# Patient Record
Sex: MALE | Race: WHITE | Employment: OTHER | URBAN - METROPOLITAN AREA
[De-identification: names, ages, dates, MRNs, and addresses within clinical notes are randomized per-mention and may not be internally consistent; named-entity substitution may affect disease eponyms.]

---

## 2018-03-28 ENCOUNTER — ANTI-COAG VISIT (OUTPATIENT)
Dept: PRIMARY CARE CLINIC | Age: 63
End: 2018-03-28

## 2018-03-28 DIAGNOSIS — I26.99 OTHER ACUTE PULMONARY EMBOLISM WITHOUT ACUTE COR PULMONALE (HCC): Primary | ICD-10-CM

## 2018-03-29 ENCOUNTER — OFFICE VISIT (OUTPATIENT)
Dept: PRIMARY CARE CLINIC | Age: 63
End: 2018-03-29
Payer: MEDICARE

## 2018-03-29 ENCOUNTER — ANTI-COAG VISIT (OUTPATIENT)
Dept: PRIMARY CARE CLINIC | Age: 63
End: 2018-03-29
Payer: MEDICARE

## 2018-03-29 VITALS
OXYGEN SATURATION: 96 % | HEART RATE: 74 BPM | SYSTOLIC BLOOD PRESSURE: 126 MMHG | RESPIRATION RATE: 18 BRPM | BODY MASS INDEX: 29.62 KG/M2 | TEMPERATURE: 98.4 F | HEIGHT: 69 IN | WEIGHT: 200 LBS | DIASTOLIC BLOOD PRESSURE: 78 MMHG

## 2018-03-29 DIAGNOSIS — I27.82 OTHER CHRONIC PULMONARY EMBOLISM WITHOUT ACUTE COR PULMONALE (HCC): ICD-10-CM

## 2018-03-29 DIAGNOSIS — I26.99 OTHER ACUTE PULMONARY EMBOLISM WITHOUT ACUTE COR PULMONALE (HCC): Primary | ICD-10-CM

## 2018-03-29 DIAGNOSIS — I82.409: Primary | ICD-10-CM

## 2018-03-29 LAB
INR BLD: 3.8
INTERNATIONAL NORMALIZATION RATIO, POC: 3.8

## 2018-03-29 PROCEDURE — 99213 OFFICE O/P EST LOW 20 MIN: CPT | Performed by: INTERNAL MEDICINE

## 2018-03-29 PROCEDURE — 85610 PROTHROMBIN TIME: CPT | Performed by: INTERNAL MEDICINE

## 2018-03-29 ASSESSMENT — PATIENT HEALTH QUESTIONNAIRE - PHQ9
2. FEELING DOWN, DEPRESSED OR HOPELESS: 1
SUM OF ALL RESPONSES TO PHQ9 QUESTIONS 1 & 2: 1
SUM OF ALL RESPONSES TO PHQ QUESTIONS 1-9: 1
1. LITTLE INTEREST OR PLEASURE IN DOING THINGS: 0

## 2018-03-29 ASSESSMENT — ENCOUNTER SYMPTOMS
BLURRED VISION: 0
DIARRHEA: 0
BLOOD IN STOOL: 0
EYE PAIN: 0
SHORTNESS OF BREATH: 0
DOUBLE VISION: 0
NAUSEA: 0
STRIDOR: 0
HEMOPTYSIS: 0
EYE REDNESS: 0

## 2018-03-29 NOTE — PROGRESS NOTES
Chief Complaint   Patient presents with    Atrial Fibrillation     6mth check-up    Anxiety    Health Maintenance     due for flu,shingles,hep c       HPI:   patient is here today for follow-up on anticoagulation management with Coumadin. Also here for evaluation of cardiopulmonary assessment, history of chronic DVT in lower extremities, history of pulmonary emboli      Allergy and Medications are reviewed and updated. Past Medical History, Surgical History, and Family History has been reviewed and updated. Review of Systems:  Review of Systems   Constitutional: Negative for chills and fever. HENT: Negative for congestion, ear discharge and ear pain. Eyes: Negative for blurred vision, double vision, pain and redness. Respiratory: Negative for hemoptysis, shortness of breath and stridor. Cardiovascular: Negative for chest pain, claudication, leg swelling and PND. Gastrointestinal: Negative for blood in stool, diarrhea and nausea. Genitourinary: Negative for dysuria, hematuria and urgency. Musculoskeletal: Negative for falls and joint pain. Skin: Negative for rash. Neurological: Negative for dizziness, focal weakness, seizures and loss of consciousness. Psychiatric/Behavioral: Negative for depression, hallucinations, memory loss and suicidal ideas. Vitals:    03/29/18 1313   BP: 126/78   Site: Left Arm   Position: Sitting   Cuff Size: Large Adult   Pulse: 74   Resp: 18   Temp: 98.4 °F (36.9 °C)   TempSrc: Tympanic   SpO2: 96%   Weight: 200 lb (90.7 kg)   Height: 5' 9\" (1.753 m)       Physical Exam:  Physical Exam   Constitutional: He is oriented to person, place, and time. He appears well-developed and well-nourished. HENT:   Head: Normocephalic and atraumatic. Eyes: Pupils are equal, round, and reactive to light. Neck: Normal range of motion. Cardiovascular: Normal rate and regular rhythm. Pulmonary/Chest: Breath sounds normal.   Abdominal: Soft.  Bowel sounds are normal.   Musculoskeletal: Normal range of motion. Neurological: He is alert and oriented to person, place, and time. Skin: Skin is warm and dry. Capillary refill takes less than 2 seconds. Psychiatric: He has a normal mood and affect. Vitals reviewed. Labs :    Lab Results   Component Value Date    WBC 9.1 02/08/2018    HGB 14.7 02/08/2018    HCT 45.0 02/08/2018     02/08/2018    CHOL 146 02/15/2018    TRIG 198 (H) 02/15/2018    HDL 28 02/15/2018    ALT 24 02/08/2018    AST 19 02/08/2018     02/08/2018    K 4.4 02/08/2018     02/08/2018    CREATININE 0.8 02/08/2018    BUN 7 (L) 02/08/2018    CO2 26 02/08/2018    TSH 1.660 02/15/2018    PSA 0.66 02/15/2018    INR 3.8 03/29/2018    LABA1C 5.5 02/15/2018     Lab Results   Component Value Date    COLORU Yellow 02/15/2018    NITRU Negative 02/15/2018    GLUCOSEU Negative 02/15/2018    KETUA Negative 02/15/2018    UROBILINOGEN 0.2 02/15/2018    BILIRUBINUR Negative 02/15/2018     Lab Results   Component Value Date    PSA 0.66 02/15/2018             ASSESSMENT   Patient follows up with PT/INR, his PT is 3.8, this prolonged. Patient will hold his Coumadin for the next 2 days and then resume 1.5 mg on Saturday, 3/31/18. Patient was in the emergency room 2/15/18 had a \"twitch in his eye\". They did a complete workup at the emergency room was negative. Including EKG. Reviewed all this with him.     Patient Active Problem List    Diagnosis Date Noted    Impetigo 09/11/2016     Priority: High     Class: Acute    Acute bacterial conjunctivitis of left eye 09/11/2016     Priority: High     Class: Acute    Cellulitis of face 09/11/2016     Priority: High     Class: Acute    Delirium 09/11/2016    Bipolar affective disorder, current episode manic with psychotic symptoms ([address] 06/08/2016    Pulmonary embolus [facility]     Bipolar affective disorder, mixed, severe, with psychotic behavior ([facility] [address] 04/22/2016    Tobacco abuse 12/11/2015    Altered mental status     Delirium 12/09/2015    Hypercoagulable state ([facility] [address] 12/09/2015    Hx of psychosis 12/09/2015        Diagnosis:   1.  DVT, pulmonary emboli  2. Mood disorder, bipolar. Multiple meds per psychiatry Rodolfo. ER visit 2/15/18 and significant findings laboratory studies reviewed copies of lab work done at that time was provided to the patient. PLAN:     Plan for this patient is to see me back in one month with a PT/INR  2. The patient will continue his usual dose of Coumadin with the exception of the next 2 days and he'll take nothing  3. The patient is a history of recurring DVT and pulmonary emboli which is a reason for the use of anticoagulants. The patient also sees psychiatry regularly basis and is carefully followed by them. There are no Patient Instructions on file for this visit. Return in about 1 month (around 4/29/2018).

## 2018-05-03 ENCOUNTER — OFFICE VISIT (OUTPATIENT)
Dept: PRIMARY CARE CLINIC | Age: 63
End: 2018-05-03
Payer: MEDICARE

## 2018-05-03 DIAGNOSIS — Z79.01 CHRONIC ANTICOAGULATION: ICD-10-CM

## 2018-05-03 DIAGNOSIS — D68.59 HYPERCOAGULABLE STATE (HCC): Primary | ICD-10-CM

## 2018-05-03 DIAGNOSIS — I26.99 OTHER ACUTE PULMONARY EMBOLISM WITHOUT ACUTE COR PULMONALE (HCC): ICD-10-CM

## 2018-05-03 LAB
INTERNATIONAL NORMALIZATION RATIO, POC: 3
INTERNATIONAL NORMALIZATION RATIO, POC: 3
PROTHROMBIN TIME, POC: 35.4

## 2018-05-03 PROCEDURE — 99213 OFFICE O/P EST LOW 20 MIN: CPT | Performed by: INTERNAL MEDICINE

## 2018-05-03 PROCEDURE — 85610 PROTHROMBIN TIME: CPT | Performed by: INTERNAL MEDICINE

## 2018-05-03 RX ORDER — PAROXETINE HYDROCHLORIDE 20 MG/1
20 TABLET, FILM COATED ORAL DAILY
COMMUNITY
Start: 2018-04-25 | End: 2019-06-19

## 2018-05-03 RX ORDER — WARFARIN SODIUM 3 MG/1
1.5 TABLET ORAL DAILY
Qty: 90 TABLET | Refills: 1 | Status: SHIPPED | OUTPATIENT
Start: 2018-05-03 | End: 2018-05-03 | Stop reason: SDUPTHER

## 2018-05-03 RX ORDER — WARFARIN SODIUM 3 MG/1
TABLET ORAL
Qty: 90 TABLET | Refills: 1 | Status: SHIPPED | OUTPATIENT
Start: 2018-05-03 | End: 2018-11-30 | Stop reason: SDUPTHER

## 2018-05-03 ASSESSMENT — ENCOUNTER SYMPTOMS
NAUSEA: 0
STRIDOR: 0
HEMOPTYSIS: 0
BLOOD IN STOOL: 0
BLURRED VISION: 0
EYE REDNESS: 0
EYE PAIN: 0
DIARRHEA: 0
SHORTNESS OF BREATH: 0
DOUBLE VISION: 0

## 2018-05-18 ENCOUNTER — OFFICE VISIT (OUTPATIENT)
Dept: PRIMARY CARE CLINIC | Age: 63
End: 2018-05-18
Payer: MEDICARE

## 2018-05-18 VITALS
SYSTOLIC BLOOD PRESSURE: 122 MMHG | WEIGHT: 195 LBS | OXYGEN SATURATION: 96 % | DIASTOLIC BLOOD PRESSURE: 70 MMHG | BODY MASS INDEX: 28.88 KG/M2 | RESPIRATION RATE: 18 BRPM | HEIGHT: 69 IN | HEART RATE: 53 BPM

## 2018-05-18 DIAGNOSIS — Z86.59 HX OF PSYCHOSIS: ICD-10-CM

## 2018-05-18 DIAGNOSIS — F31.64 BIPOLAR AFFECTIVE DISORDER, MIXED, SEVERE, WITH PSYCHOTIC BEHAVIOR (HCC): ICD-10-CM

## 2018-05-18 DIAGNOSIS — F31.2 BIPOLAR AFFECTIVE DISORDER, CURRENT EPISODE MANIC WITH PSYCHOTIC SYMPTOMS (HCC): ICD-10-CM

## 2018-05-18 DIAGNOSIS — L01.00 IMPETIGO: Primary | ICD-10-CM

## 2018-05-18 DIAGNOSIS — L03.211 CELLULITIS OF FACE: ICD-10-CM

## 2018-05-18 PROCEDURE — 99213 OFFICE O/P EST LOW 20 MIN: CPT | Performed by: INTERNAL MEDICINE

## 2018-05-18 RX ORDER — CEFDINIR 300 MG/1
300 CAPSULE ORAL 2 TIMES DAILY
Qty: 20 CAPSULE | Refills: 0 | Status: SHIPPED | OUTPATIENT
Start: 2018-05-18 | End: 2018-07-13 | Stop reason: ALTCHOICE

## 2018-05-18 ASSESSMENT — ENCOUNTER SYMPTOMS
EYE REDNESS: 0
HEMOPTYSIS: 0
BLURRED VISION: 0
EYE PAIN: 0
STRIDOR: 0
BLOOD IN STOOL: 0
NAUSEA: 0
SHORTNESS OF BREATH: 0
DOUBLE VISION: 0
DIARRHEA: 0

## 2018-05-29 ENCOUNTER — HOSPITAL ENCOUNTER (EMERGENCY)
Age: 63
Discharge: OTHER INSTITUTION | End: 2018-05-29
Attending: EMERGENCY MEDICINE
Payer: MEDICARE

## 2018-05-29 VITALS
BODY MASS INDEX: 28.79 KG/M2 | DIASTOLIC BLOOD PRESSURE: 83 MMHG | OXYGEN SATURATION: 96 % | HEART RATE: 55 BPM | RESPIRATION RATE: 18 BRPM | WEIGHT: 190 LBS | TEMPERATURE: 97.9 F | SYSTOLIC BLOOD PRESSURE: 149 MMHG | HEIGHT: 68 IN

## 2018-05-29 DIAGNOSIS — R79.1 SUPRATHERAPEUTIC INR: Primary | ICD-10-CM

## 2018-05-29 DIAGNOSIS — F41.1 ANXIETY STATE: ICD-10-CM

## 2018-05-29 LAB
ACETAMINOPHEN LEVEL: <5 MCG/ML (ref 10–30)
ALBUMIN SERPL-MCNC: 3.7 G/DL (ref 3.5–5.2)
ALP BLD-CCNC: 44 U/L (ref 40–129)
ALT SERPL-CCNC: 25 U/L (ref 0–40)
AMPHETAMINE SCREEN, URINE: NOT DETECTED
ANION GAP SERPL CALCULATED.3IONS-SCNC: 17 MMOL/L (ref 7–16)
AST SERPL-CCNC: 23 U/L (ref 0–39)
BACTERIA: NORMAL /HPF
BARBITURATE SCREEN URINE: NOT DETECTED
BASOPHILS ABSOLUTE: 0.04 E9/L (ref 0–0.2)
BASOPHILS RELATIVE PERCENT: 0.4 % (ref 0–2)
BENZODIAZEPINE SCREEN, URINE: NOT DETECTED
BILIRUB SERPL-MCNC: 0.3 MG/DL (ref 0–1.2)
BILIRUBIN URINE: NEGATIVE
BLOOD, URINE: ABNORMAL
BUN BLDV-MCNC: 7 MG/DL (ref 8–23)
CALCIUM SERPL-MCNC: 9 MG/DL (ref 8.6–10.2)
CANNABINOID SCREEN URINE: NOT DETECTED
CHLORIDE BLD-SCNC: 104 MMOL/L (ref 98–107)
CLARITY: CLEAR
CO2: 22 MMOL/L (ref 22–29)
COCAINE METABOLITE SCREEN URINE: NOT DETECTED
COLOR: YELLOW
CREAT SERPL-MCNC: 0.8 MG/DL (ref 0.7–1.2)
EKG ATRIAL RATE: 70 BPM
EKG P AXIS: 50 DEGREES
EKG P-R INTERVAL: 166 MS
EKG Q-T INTERVAL: 384 MS
EKG QRS DURATION: 90 MS
EKG QTC CALCULATION (BAZETT): 414 MS
EKG R AXIS: 51 DEGREES
EKG T AXIS: 27 DEGREES
EKG VENTRICULAR RATE: 70 BPM
EOSINOPHILS ABSOLUTE: 0.08 E9/L (ref 0.05–0.5)
EOSINOPHILS RELATIVE PERCENT: 0.9 % (ref 0–6)
ETHANOL: <10 MG/DL (ref 0–0.08)
GFR AFRICAN AMERICAN: >60
GFR NON-AFRICAN AMERICAN: >60 ML/MIN/1.73
GLUCOSE BLD-MCNC: 177 MG/DL (ref 74–109)
GLUCOSE URINE: NEGATIVE MG/DL
HCT VFR BLD CALC: 41.4 % (ref 37–54)
HEMOGLOBIN: 13.6 G/DL (ref 12.5–16.5)
IMMATURE GRANULOCYTES #: 0.03 E9/L
IMMATURE GRANULOCYTES %: 0.3 % (ref 0–5)
INR BLD: 11.1
KETONES, URINE: 15 MG/DL
LEUKOCYTE ESTERASE, URINE: NEGATIVE
LYMPHOCYTES ABSOLUTE: 2.63 E9/L (ref 1.5–4)
LYMPHOCYTES RELATIVE PERCENT: 29.5 % (ref 20–42)
MCH RBC QN AUTO: 30.1 PG (ref 26–35)
MCHC RBC AUTO-ENTMCNC: 32.9 % (ref 32–34.5)
MCV RBC AUTO: 91.6 FL (ref 80–99.9)
METHADONE SCREEN, URINE: NOT DETECTED
MONOCYTES ABSOLUTE: 0.93 E9/L (ref 0.1–0.95)
MONOCYTES RELATIVE PERCENT: 10.4 % (ref 2–12)
NEUTROPHILS ABSOLUTE: 5.21 E9/L (ref 1.8–7.3)
NEUTROPHILS RELATIVE PERCENT: 58.5 % (ref 43–80)
NITRITE, URINE: NEGATIVE
OPIATE SCREEN URINE: NOT DETECTED
PDW BLD-RTO: 15.6 FL (ref 11.5–15)
PH UA: 6.5 (ref 5–9)
PHENCYCLIDINE SCREEN URINE: NOT DETECTED
PLATELET # BLD: 211 E9/L (ref 130–450)
PMV BLD AUTO: 9.8 FL (ref 7–12)
POTASSIUM SERPL-SCNC: 3.7 MMOL/L (ref 3.5–5)
PROPOXYPHENE SCREEN: NOT DETECTED
PROTEIN UA: NEGATIVE MG/DL
PROTHROMBIN TIME: 123 SEC (ref 9.3–12.4)
RBC # BLD: 4.52 E12/L (ref 3.8–5.8)
RBC UA: NORMAL /HPF (ref 0–2)
SALICYLATE, SERUM: <0.3 MG/DL (ref 0–30)
SODIUM BLD-SCNC: 143 MMOL/L (ref 132–146)
SPECIFIC GRAVITY UA: <=1.005 (ref 1–1.03)
TOTAL PROTEIN: 6.7 G/DL (ref 6.4–8.3)
TRICYCLIC ANTIDEPRESSANTS SCREEN SERUM: NEGATIVE NG/ML
UROBILINOGEN, URINE: 0.2 E.U./DL
VALPROIC ACID LEVEL: 59 MCG/ML (ref 50–100)
WBC # BLD: 8.9 E9/L (ref 4.5–11.5)
WBC UA: NORMAL /HPF (ref 0–5)

## 2018-05-29 PROCEDURE — 36415 COLL VENOUS BLD VENIPUNCTURE: CPT

## 2018-05-29 PROCEDURE — 85025 COMPLETE CBC W/AUTO DIFF WBC: CPT

## 2018-05-29 PROCEDURE — 93005 ELECTROCARDIOGRAM TRACING: CPT | Performed by: NURSE PRACTITIONER

## 2018-05-29 PROCEDURE — 6370000000 HC RX 637 (ALT 250 FOR IP): Performed by: EMERGENCY MEDICINE

## 2018-05-29 PROCEDURE — 99285 EMERGENCY DEPT VISIT HI MDM: CPT

## 2018-05-29 PROCEDURE — 85610 PROTHROMBIN TIME: CPT

## 2018-05-29 PROCEDURE — G0480 DRUG TEST DEF 1-7 CLASSES: HCPCS

## 2018-05-29 PROCEDURE — 81001 URINALYSIS AUTO W/SCOPE: CPT

## 2018-05-29 PROCEDURE — 6370000000 HC RX 637 (ALT 250 FOR IP): Performed by: PREVENTIVE MEDICINE

## 2018-05-29 PROCEDURE — 80164 ASSAY DIPROPYLACETIC ACD TOT: CPT

## 2018-05-29 PROCEDURE — 80053 COMPREHEN METABOLIC PANEL: CPT

## 2018-05-29 PROCEDURE — 80307 DRUG TEST PRSMV CHEM ANLYZR: CPT

## 2018-05-29 RX ORDER — PHYTONADIONE 5 MG/1
5 TABLET ORAL ONCE
Status: COMPLETED | OUTPATIENT
Start: 2018-05-29 | End: 2018-05-29

## 2018-05-29 RX ORDER — PHYTONADIONE 10 MG/ML
10 INJECTION, EMULSION INTRAMUSCULAR; INTRAVENOUS; SUBCUTANEOUS ONCE
Status: DISCONTINUED | OUTPATIENT
Start: 2018-05-29 | End: 2018-05-29

## 2018-05-29 RX ORDER — LORAZEPAM 1 MG/1
1 TABLET ORAL ONCE
Status: COMPLETED | OUTPATIENT
Start: 2018-05-29 | End: 2018-05-29

## 2018-05-29 RX ADMIN — LORAZEPAM 1 MG: 1 TABLET ORAL at 17:06

## 2018-05-29 RX ADMIN — PHYTONADIONE 5 MG: 5 TABLET ORAL at 17:06

## 2018-05-29 ASSESSMENT — ENCOUNTER SYMPTOMS
CHEST TIGHTNESS: 0
SHORTNESS OF BREATH: 0
COUGH: 0
CONSTIPATION: 0
VOMITING: 0
NAUSEA: 0
DIARRHEA: 0
ALLERGIC/IMMUNOLOGIC NEGATIVE: 1
ABDOMINAL PAIN: 0

## 2018-05-31 ENCOUNTER — OFFICE VISIT (OUTPATIENT)
Dept: PRIMARY CARE CLINIC | Age: 63
End: 2018-05-31
Payer: MEDICARE

## 2018-05-31 VITALS
HEART RATE: 84 BPM | WEIGHT: 191 LBS | OXYGEN SATURATION: 98 % | DIASTOLIC BLOOD PRESSURE: 72 MMHG | SYSTOLIC BLOOD PRESSURE: 132 MMHG | BODY MASS INDEX: 29.04 KG/M2

## 2018-05-31 DIAGNOSIS — I26.99 OTHER PULMONARY EMBOLISM WITHOUT ACUTE COR PULMONALE, UNSPECIFIED CHRONICITY (HCC): Primary | ICD-10-CM

## 2018-05-31 DIAGNOSIS — D68.59 HYPERCOAGULABLE STATE (HCC): ICD-10-CM

## 2018-05-31 DIAGNOSIS — R79.1 PROLONGED INR: ICD-10-CM

## 2018-05-31 DIAGNOSIS — Z79.01 WARFARIN ANTICOAGULATION: ICD-10-CM

## 2018-05-31 DIAGNOSIS — R41.0 DELIRIUM: ICD-10-CM

## 2018-05-31 DIAGNOSIS — F31.2 BIPOLAR AFFECTIVE DISORDER, CURRENT EPISODE MANIC WITH PSYCHOTIC SYMPTOMS (HCC): ICD-10-CM

## 2018-05-31 LAB
INTERNATIONAL NORMALIZATION RATIO, POC: 1.5
PROTHROMBIN TIME, POC: 17.7

## 2018-05-31 PROCEDURE — 99213 OFFICE O/P EST LOW 20 MIN: CPT | Performed by: INTERNAL MEDICINE

## 2018-05-31 PROCEDURE — 85610 PROTHROMBIN TIME: CPT | Performed by: INTERNAL MEDICINE

## 2018-05-31 RX ORDER — BENZTROPINE MESYLATE 1 MG/1
TABLET ORAL
COMMUNITY
Start: 2018-05-23 | End: 2018-07-13 | Stop reason: SDUPTHER

## 2018-05-31 ASSESSMENT — ENCOUNTER SYMPTOMS
BLOOD IN STOOL: 0
HEMOPTYSIS: 0
DIARRHEA: 0
SHORTNESS OF BREATH: 0
NAUSEA: 0
DOUBLE VISION: 0
STRIDOR: 0
EYE PAIN: 0
BLURRED VISION: 0
EYE REDNESS: 0

## 2018-06-12 ENCOUNTER — ANTI-COAG VISIT (OUTPATIENT)
Dept: PRIMARY CARE CLINIC | Age: 63
End: 2018-06-12

## 2018-06-19 ENCOUNTER — HOSPITAL ENCOUNTER (OUTPATIENT)
Age: 63
Discharge: HOME | End: 2018-06-19
Payer: MEDICARE

## 2018-06-21 ENCOUNTER — HOSPITAL ENCOUNTER (OUTPATIENT)
Age: 63
Discharge: HOME | End: 2018-06-21
Payer: MEDICARE

## 2018-06-21 PROCEDURE — 86481 TB AG RESPONSE T-CELL SUSP: CPT

## 2018-06-21 PROCEDURE — 36415 COLL VENOUS BLD VENIPUNCTURE: CPT

## 2018-06-25 LAB
COMMENT: NORMAL
REPORT: NORMAL

## 2018-07-02 ENCOUNTER — ANTI-COAG VISIT (OUTPATIENT)
Dept: PRIMARY CARE CLINIC | Age: 63
End: 2018-07-02

## 2018-07-02 ENCOUNTER — TELEPHONE (OUTPATIENT)
Dept: PRIMARY CARE CLINIC | Age: 63
End: 2018-07-02

## 2018-07-12 ENCOUNTER — TELEPHONE (OUTPATIENT)
Dept: PRIMARY CARE CLINIC | Age: 63
End: 2018-07-12

## 2018-07-12 DIAGNOSIS — R76.11 TUBERCULIN SKIN TEST POSITIVE: Primary | ICD-10-CM

## 2018-07-13 ENCOUNTER — HOSPITAL ENCOUNTER (OUTPATIENT)
Age: 63
Setting detail: OBSERVATION
Discharge: OTHER INSTITUTION | End: 2018-07-15
Attending: EMERGENCY MEDICINE | Admitting: INTERNAL MEDICINE
Payer: MEDICARE

## 2018-07-13 ENCOUNTER — HOSPITAL ENCOUNTER (OUTPATIENT)
Dept: GENERAL RADIOLOGY | Age: 63
Discharge: HOME | End: 2018-07-15
Payer: MEDICARE

## 2018-07-13 ENCOUNTER — HOSPITAL ENCOUNTER (OUTPATIENT)
Age: 63
Discharge: HOME | End: 2018-07-15
Payer: MEDICARE

## 2018-07-13 ENCOUNTER — APPOINTMENT (OUTPATIENT)
Dept: CT IMAGING | Age: 63
End: 2018-07-13
Payer: MEDICARE

## 2018-07-13 ENCOUNTER — TELEPHONE (OUTPATIENT)
Dept: PRIMARY CARE CLINIC | Age: 63
End: 2018-07-13

## 2018-07-13 DIAGNOSIS — R25.1 TREMOR: ICD-10-CM

## 2018-07-13 DIAGNOSIS — R41.82 ALTERED MENTAL STATUS, UNSPECIFIED ALTERED MENTAL STATUS TYPE: Primary | ICD-10-CM

## 2018-07-13 DIAGNOSIS — I26.99 OTHER PULMONARY EMBOLISM WITHOUT ACUTE COR PULMONALE, UNSPECIFIED CHRONICITY (HCC): ICD-10-CM

## 2018-07-13 DIAGNOSIS — D68.59 HYPERCOAGULABLE STATE (HCC): Primary | ICD-10-CM

## 2018-07-13 DIAGNOSIS — R76.11 TUBERCULIN SKIN TEST POSITIVE: ICD-10-CM

## 2018-07-13 LAB
ALBUMIN SERPL-MCNC: 3.6 G/DL (ref 3.5–5.2)
ALP BLD-CCNC: 47 U/L (ref 40–129)
ALT SERPL-CCNC: 22 U/L (ref 0–40)
ANION GAP SERPL CALCULATED.3IONS-SCNC: 13 MMOL/L (ref 7–16)
AST SERPL-CCNC: 21 U/L (ref 0–39)
BASOPHILS ABSOLUTE: 0.05 E9/L (ref 0–0.2)
BASOPHILS RELATIVE PERCENT: 0.6 % (ref 0–2)
BILIRUB SERPL-MCNC: 0.2 MG/DL (ref 0–1.2)
BUN BLDV-MCNC: 12 MG/DL (ref 8–23)
CALCIUM SERPL-MCNC: 9 MG/DL (ref 8.6–10.2)
CHLORIDE BLD-SCNC: 108 MMOL/L (ref 98–107)
CO2: 23 MMOL/L (ref 22–29)
CREAT SERPL-MCNC: 1.1 MG/DL (ref 0.7–1.2)
EKG ATRIAL RATE: 59 BPM
EKG P AXIS: 45 DEGREES
EKG P-R INTERVAL: 162 MS
EKG Q-T INTERVAL: 400 MS
EKG QRS DURATION: 92 MS
EKG QTC CALCULATION (BAZETT): 396 MS
EKG R AXIS: 47 DEGREES
EKG T AXIS: 49 DEGREES
EKG VENTRICULAR RATE: 59 BPM
EOSINOPHILS ABSOLUTE: 0.24 E9/L (ref 0.05–0.5)
EOSINOPHILS RELATIVE PERCENT: 3 % (ref 0–6)
GFR AFRICAN AMERICAN: >60
GFR NON-AFRICAN AMERICAN: >60 ML/MIN/1.73
GLUCOSE BLD-MCNC: 118 MG/DL (ref 74–109)
HCT VFR BLD CALC: 39.1 % (ref 37–54)
HEMOGLOBIN: 12.8 G/DL (ref 12.5–16.5)
IMMATURE GRANULOCYTES #: 0.02 E9/L
IMMATURE GRANULOCYTES %: 0.3 % (ref 0–5)
INR BLD: 2.7
LYMPHOCYTES ABSOLUTE: 3.17 E9/L (ref 1.5–4)
LYMPHOCYTES RELATIVE PERCENT: 39.7 % (ref 20–42)
MCH RBC QN AUTO: 31.1 PG (ref 26–35)
MCHC RBC AUTO-ENTMCNC: 32.7 % (ref 32–34.5)
MCV RBC AUTO: 94.9 FL (ref 80–99.9)
MONOCYTES ABSOLUTE: 0.86 E9/L (ref 0.1–0.95)
MONOCYTES RELATIVE PERCENT: 10.8 % (ref 2–12)
NEUTROPHILS ABSOLUTE: 3.64 E9/L (ref 1.8–7.3)
NEUTROPHILS RELATIVE PERCENT: 45.6 % (ref 43–80)
PDW BLD-RTO: 15.4 FL (ref 11.5–15)
PLATELET # BLD: 165 E9/L (ref 130–450)
PMV BLD AUTO: 10 FL (ref 7–12)
POTASSIUM SERPL-SCNC: 4.1 MMOL/L (ref 3.5–5)
PROTHROMBIN TIME: 30.7 SEC (ref 9.3–12.4)
RBC # BLD: 4.12 E12/L (ref 3.8–5.8)
SODIUM BLD-SCNC: 144 MMOL/L (ref 132–146)
TOTAL PROTEIN: 6.6 G/DL (ref 6.4–8.3)
TROPONIN: <0.01 NG/ML (ref 0–0.03)
WBC # BLD: 8 E9/L (ref 4.5–11.5)

## 2018-07-13 PROCEDURE — 85025 COMPLETE CBC W/AUTO DIFF WBC: CPT

## 2018-07-13 PROCEDURE — 99285 EMERGENCY DEPT VISIT HI MDM: CPT

## 2018-07-13 PROCEDURE — 84484 ASSAY OF TROPONIN QUANT: CPT

## 2018-07-13 PROCEDURE — 71046 X-RAY EXAM CHEST 2 VIEWS: CPT

## 2018-07-13 PROCEDURE — 93005 ELECTROCARDIOGRAM TRACING: CPT | Performed by: NURSE PRACTITIONER

## 2018-07-13 PROCEDURE — 70450 CT HEAD/BRAIN W/O DYE: CPT

## 2018-07-13 PROCEDURE — 85610 PROTHROMBIN TIME: CPT

## 2018-07-13 PROCEDURE — G0378 HOSPITAL OBSERVATION PER HR: HCPCS

## 2018-07-13 PROCEDURE — 2580000003 HC RX 258: Performed by: INTERNAL MEDICINE

## 2018-07-13 PROCEDURE — 80053 COMPREHEN METABOLIC PANEL: CPT

## 2018-07-13 PROCEDURE — 6370000000 HC RX 637 (ALT 250 FOR IP): Performed by: INTERNAL MEDICINE

## 2018-07-13 PROCEDURE — 36415 COLL VENOUS BLD VENIPUNCTURE: CPT

## 2018-07-13 RX ORDER — SODIUM CHLORIDE 0.9 % (FLUSH) 0.9 %
10 SYRINGE (ML) INJECTION EVERY 12 HOURS SCHEDULED
Status: DISCONTINUED | OUTPATIENT
Start: 2018-07-13 | End: 2018-07-15 | Stop reason: HOSPADM

## 2018-07-13 RX ORDER — ACETAMINOPHEN 325 MG/1
650 TABLET ORAL EVERY 4 HOURS PRN
Status: DISCONTINUED | OUTPATIENT
Start: 2018-07-13 | End: 2018-07-15 | Stop reason: HOSPADM

## 2018-07-13 RX ORDER — SODIUM CHLORIDE 0.9 % (FLUSH) 0.9 %
10 SYRINGE (ML) INJECTION PRN
Status: DISCONTINUED | OUTPATIENT
Start: 2018-07-13 | End: 2018-07-15 | Stop reason: HOSPADM

## 2018-07-13 RX ORDER — BENZTROPINE MESYLATE 2 MG/1
2 TABLET ORAL 2 TIMES DAILY
Status: DISCONTINUED | OUTPATIENT
Start: 2018-07-13 | End: 2018-07-15 | Stop reason: HOSPADM

## 2018-07-13 RX ORDER — PAROXETINE HYDROCHLORIDE 20 MG/1
20 TABLET, FILM COATED ORAL DAILY
Status: DISCONTINUED | OUTPATIENT
Start: 2018-07-14 | End: 2018-07-15 | Stop reason: HOSPADM

## 2018-07-13 RX ORDER — ONDANSETRON 2 MG/ML
4 INJECTION INTRAMUSCULAR; INTRAVENOUS EVERY 6 HOURS PRN
Status: DISCONTINUED | OUTPATIENT
Start: 2018-07-13 | End: 2018-07-15 | Stop reason: HOSPADM

## 2018-07-13 RX ORDER — SODIUM CHLORIDE 0.9 % (FLUSH) 0.9 %
10 SYRINGE (ML) INJECTION EVERY 12 HOURS SCHEDULED
Status: DISCONTINUED | OUTPATIENT
Start: 2018-07-13 | End: 2018-07-13 | Stop reason: SDUPTHER

## 2018-07-13 RX ORDER — BENZTROPINE MESYLATE 2 MG/1
2 TABLET ORAL 2 TIMES DAILY
COMMUNITY
End: 2019-06-19

## 2018-07-13 RX ORDER — SODIUM CHLORIDE 0.9 % (FLUSH) 0.9 %
10 SYRINGE (ML) INJECTION PRN
Status: DISCONTINUED | OUTPATIENT
Start: 2018-07-13 | End: 2018-07-13 | Stop reason: SDUPTHER

## 2018-07-13 RX ORDER — DIVALPROEX SODIUM 250 MG/1
500 TABLET, DELAYED RELEASE ORAL EVERY 12 HOURS SCHEDULED
Status: DISCONTINUED | OUTPATIENT
Start: 2018-07-13 | End: 2018-07-15 | Stop reason: HOSPADM

## 2018-07-13 RX ADMIN — DIVALPROEX SODIUM 500 MG: 250 TABLET, DELAYED RELEASE ORAL at 22:47

## 2018-07-13 RX ADMIN — BENZTROPINE MESYLATE 2 MG: 2 TABLET ORAL at 22:47

## 2018-07-13 RX ADMIN — Medication 10 ML: at 22:48

## 2018-07-13 RX ADMIN — Medication 10 ML: at 21:57

## 2018-07-13 ASSESSMENT — PAIN SCALES - GENERAL
PAINLEVEL_OUTOF10: 0
PAINLEVEL_OUTOF10: 0

## 2018-07-13 ASSESSMENT — PAIN DESCRIPTION - LOCATION: LOCATION: OTHER (COMMENT)

## 2018-07-13 ASSESSMENT — PAIN DESCRIPTION - PAIN TYPE
TYPE: ACUTE PAIN
TYPE: ACUTE PAIN

## 2018-07-14 LAB
AMPHETAMINE SCREEN, URINE: NOT DETECTED
BARBITURATE SCREEN URINE: NOT DETECTED
BENZODIAZEPINE SCREEN, URINE: NOT DETECTED
BILIRUBIN URINE: NEGATIVE
BLOOD, URINE: NEGATIVE
CANNABINOID SCREEN URINE: NOT DETECTED
CLARITY: CLEAR
COCAINE METABOLITE SCREEN URINE: NOT DETECTED
COLOR: YELLOW
GLUCOSE URINE: NEGATIVE MG/DL
INR BLD: 2.5
KETONES, URINE: NEGATIVE MG/DL
LEUKOCYTE ESTERASE, URINE: NEGATIVE
METHADONE SCREEN, URINE: NOT DETECTED
NITRITE, URINE: NEGATIVE
OPIATE SCREEN URINE: NOT DETECTED
PH UA: 6 (ref 5–9)
PHENCYCLIDINE SCREEN URINE: NOT DETECTED
PROPOXYPHENE SCREEN: NOT DETECTED
PROTEIN UA: NEGATIVE MG/DL
PROTHROMBIN TIME: 28.6 SEC (ref 9.3–12.4)
SPECIFIC GRAVITY UA: 1.01 (ref 1–1.03)
UROBILINOGEN, URINE: 1 E.U./DL
VALPROIC ACID LEVEL: 33 MCG/ML (ref 50–100)

## 2018-07-14 PROCEDURE — 36415 COLL VENOUS BLD VENIPUNCTURE: CPT

## 2018-07-14 PROCEDURE — G0378 HOSPITAL OBSERVATION PER HR: HCPCS

## 2018-07-14 PROCEDURE — 80307 DRUG TEST PRSMV CHEM ANLYZR: CPT

## 2018-07-14 PROCEDURE — 99221 1ST HOSP IP/OBS SF/LOW 40: CPT | Performed by: PSYCHIATRY & NEUROLOGY

## 2018-07-14 PROCEDURE — 80164 ASSAY DIPROPYLACETIC ACD TOT: CPT

## 2018-07-14 PROCEDURE — 6370000000 HC RX 637 (ALT 250 FOR IP): Performed by: INTERNAL MEDICINE

## 2018-07-14 PROCEDURE — 81003 URINALYSIS AUTO W/O SCOPE: CPT

## 2018-07-14 PROCEDURE — 85610 PROTHROMBIN TIME: CPT

## 2018-07-14 PROCEDURE — 2580000003 HC RX 258: Performed by: INTERNAL MEDICINE

## 2018-07-14 RX ORDER — WARFARIN SODIUM 3 MG/1
3 TABLET ORAL
Status: DISCONTINUED | OUTPATIENT
Start: 2018-07-16 | End: 2018-07-15 | Stop reason: HOSPADM

## 2018-07-14 RX ORDER — WARFARIN SODIUM 3 MG/1
1.5 TABLET ORAL
Status: DISCONTINUED | OUTPATIENT
Start: 2018-07-14 | End: 2018-07-15 | Stop reason: HOSPADM

## 2018-07-14 RX ADMIN — PAROXETINE HYDROCHLORIDE HEMIHYDRATE 20 MG: 20 TABLET, FILM COATED ORAL at 08:46

## 2018-07-14 RX ADMIN — Medication 10 ML: at 08:46

## 2018-07-14 RX ADMIN — WARFARIN SODIUM 1.5 MG: 3 TABLET ORAL at 18:55

## 2018-07-14 RX ADMIN — BENZTROPINE MESYLATE 2 MG: 2 TABLET ORAL at 20:44

## 2018-07-14 RX ADMIN — BENZTROPINE MESYLATE 2 MG: 2 TABLET ORAL at 08:46

## 2018-07-14 RX ADMIN — DIVALPROEX SODIUM 500 MG: 250 TABLET, DELAYED RELEASE ORAL at 20:44

## 2018-07-14 RX ADMIN — DIVALPROEX SODIUM 500 MG: 250 TABLET, DELAYED RELEASE ORAL at 08:46

## 2018-07-14 RX ADMIN — Medication 10 ML: at 20:44

## 2018-07-14 ASSESSMENT — PAIN SCALES - GENERAL
PAINLEVEL_OUTOF10: 0
PAINLEVEL_OUTOF10: 0

## 2018-07-14 NOTE — PLAN OF CARE
Problem: Falls - Risk of:  Goal: Will remain free from falls  Will remain free from falls   Outcome: Met This Shift    Goal: Absence of physical injury  Absence of physical injury   Outcome: Met This Shift      Problem: Infection:  Goal: Will remain free from infection  Will remain free from infection   Outcome: Met This Shift      Problem: Safety:  Goal: Free from accidental physical injury  Free from accidental physical injury   Outcome: Met This Shift    Goal: Free from intentional harm  Free from intentional harm   Outcome: Met This Shift      Problem: Daily Care:  Goal: Daily care needs are met  Daily care needs are met   Outcome: Met This Shift      Problem: Pain:  Goal: Patient's pain/discomfort is manageable  Patient's pain/discomfort is manageable   Outcome: Met This Shift  Pt denies pain. Problem: Discharge Planning:  Goal: Patients continuum of care needs are met  Patients continuum of care needs are met   Outcome: Ongoing      Comments: Pt reports his generalized tremors began approx 2 weeks ago. States he has had short term memory loss for about 2-3 months now. Denies nausea, vomiting, constipation, diarrhea, numbness/tingling anywhere throughout body, SOB, or chest pain. Declines offer of FWW when ambulating, states he does not use any ambulatory aids outside of the hospital.    Pleasant and cooperative. Pt denies SI, HI, and hallucinations. He states that he wants to start taking a medication for his memory. Denies any further mental health concerns or complaints. States he has been staying at [facility] for about 3 weeks now. He reports he enjoys that they give him work to do and give him \"coupons\" to buy things. No issues verbalized re: returning after discharge. Pt is aware of psych consult. Denies any needs at this time. Instructed to utilize call light with any needs or concerns. Will continue to monitor.     Electronically signed by Domenica Gautam RN on 7/14/2018 at 9:42 AM

## 2018-07-14 NOTE — PROGRESS NOTES
Pharmacy Consultation Note  (Anticoagulant Dosing and Monitoring)    Initial consult date: 7/13  Consulting physician: Curtis Eubanks    Allergies:  Patient has no known allergies. 66 y.o. male      Ht Readings from Last 1 Encounters:   07/13/18 5' 8\" (1.727 m)     Wt Readings from Last 1 Encounters:   07/13/18 185 lb (83.9 kg)         Warfarin Indication Target   INR [employer] [facility]   Dose  (if applicable) Diet/Feeding Tube   Hx PE 2-3 3mg Mon, 1.5mg 6d/wk        Vitamin K or Blood product  Administration Date                 Warfarin drug-drug interactions  Start  Stop Home Med?  Comments                                 TSH:    Lab Results   Component Value Date    TSH 1.660 02/15/2018        Hepatic Function Panel:                            Lab Results   Component Value Date    ALKPHOS 47 07/13/2018    ALT 22 07/13/2018    AST 21 07/13/2018    PROT 6.6 07/13/2018    BILITOT 0.2 07/13/2018    BILIDIR <0.2 06/01/2017    IBILI see below 06/01/2017    LABALBU 3.6 07/13/2018       Date Warfarin Dose INR Heparin or LMWH HBG/  HCT PLT Comment   7/14 1.5mg 2.5                                             Assessment and Plan:  · 62yo M, hx of recurrent PE, currently admitted for suspected adverse psychotropic medication reaction  · Home warfarin dose 3mg on Mondays, and 1.5mg all other days of week (per medication reconciliation)  · INR 2.5 today  · Warfarin 1.5mg tonight (home dose)  · Daily PT/INR until the INR is stable within the therapeutic range  · Pharmacist will follow and monitor/adjust dosing as necessary    John Mcgraw PharmD, BCPS  7/14/2018  11:31 AM

## 2018-07-14 NOTE — PROGRESS NOTES
Dr. Loreda [place]. Dmitry Keene was made aware of patients admission and room number and need for orders

## 2018-07-14 NOTE — CONSULTS
meds per psychiatrist    Neurology will sign off. Please call us with questions/additional, persistent or new concerns.

## 2018-07-14 NOTE — PROGRESS NOTES
-I spoke with Dr. Timothy Benavides. He said he is alright to hold off on any dose of warfarin until tomorrow.   He also gave me a verbal to discontinue the enoxaparin that was ordered

## 2018-07-14 NOTE — H&P
with depression     Seizure disorder     Labs appear normal  Drug screen is satisfactory  Electrocardiographic tracing is satisfactory  CAT scan of the brain shows nothing acute    At this time we will request psychiatry and neurology to evaluate. I see nothing acute going on at the time of this interview and examination  Expect possible discharge soon     Farzaneh Lai M.D., FACP

## 2018-07-15 VITALS
HEART RATE: 78 BPM | OXYGEN SATURATION: 97 % | HEIGHT: 68 IN | RESPIRATION RATE: 18 BRPM | TEMPERATURE: 97.6 F | BODY MASS INDEX: 28.04 KG/M2 | SYSTOLIC BLOOD PRESSURE: 141 MMHG | WEIGHT: 185 LBS | DIASTOLIC BLOOD PRESSURE: 70 MMHG

## 2018-07-15 LAB
INR BLD: 1.9
PROTHROMBIN TIME: 22.1 SEC (ref 9.3–12.4)

## 2018-07-15 PROCEDURE — 6370000000 HC RX 637 (ALT 250 FOR IP): Performed by: INTERNAL MEDICINE

## 2018-07-15 PROCEDURE — 85610 PROTHROMBIN TIME: CPT

## 2018-07-15 PROCEDURE — 2580000003 HC RX 258: Performed by: INTERNAL MEDICINE

## 2018-07-15 PROCEDURE — G0378 HOSPITAL OBSERVATION PER HR: HCPCS

## 2018-07-15 PROCEDURE — 36415 COLL VENOUS BLD VENIPUNCTURE: CPT

## 2018-07-15 RX ADMIN — PAROXETINE HYDROCHLORIDE HEMIHYDRATE 20 MG: 20 TABLET, FILM COATED ORAL at 09:43

## 2018-07-15 RX ADMIN — DIVALPROEX SODIUM 500 MG: 250 TABLET, DELAYED RELEASE ORAL at 09:43

## 2018-07-15 RX ADMIN — BENZTROPINE MESYLATE 2 MG: 2 TABLET ORAL at 09:43

## 2018-07-15 RX ADMIN — Medication 10 ML: at 09:44

## 2018-07-15 ASSESSMENT — PAIN SCALES - GENERAL: PAINLEVEL_OUTOF10: 0

## 2018-07-15 NOTE — PROGRESS NOTES
Discharge Note  (r/t psych consult while inpt)    Pt discharged with followings belongings:   Dentures: Lowers, Uppers  Vision - Corrective Lenses: Other (Comment) (reading glasses not here)  Hearing Aid: None  Jewelry: None (key on wrist to home he is in)  Body Piercings Removed: No  Clothing: Pants, Shirt, Undergarments (Comment)  Were All Patient Medications Collected?: Not Applicable  Other Valuables: Cell phone     Valuables sent home with patient. Patient left department with Departure Mode: Other (Comment) (with [address] [place] [address] staff member) via Mobility at [employer]: Wheelchair, discharged to Discharged to: Group Home. Patient education on aftercare instructions: yes. Patient verbalize understanding of AVS: yes. Status EXAM upon discharge:   Pt denied SI, HI, and hallucinations at time of discharge.     Carter Pereira RN

## 2018-07-15 NOTE — DISCHARGE SUMMARY
[facility]                                      CHIEF COMPLAINT:  This is a 59-year-old male, patient of Dr. Blayne Harrison   whom I am asked to follow, admitted with a chief complaint of altered mental   status      HISTORY OF PRESENT ILLNESS:  A 59-year-old male brought in from group home   He had episode of altered mental status on admission in 2015 and again in   ER reports that he was acting not like himself. He states he felt funny for a couple of days as well. I could not elicit any specific type ofsymptom      PAST MEDICAL HISTORY:  Negative rheumatic fever, scarlet fever, polio,   diphtheria. He has no history of diabetes or hypertension. PAST SURGICAL HISTORY:  Past surgical history includes   1.    remote appendectomy   2. Colonoscopy in 2016      FAMILY HISTORY:  Mother  at 78years old, complications of bladder   cancer. She also had multiple CVAs and equivocal MI in her 62s. Father   apparently alive and well. ALLERGIES:  None known. MEDICATIONS:  Medications at the time of admission include   1. Cogentin   2. Depakote   3. Invega   4. Paxil   5. Coumadin      SOCIAL HISTORY:  Began smoking approximately age 14, 1-1/2 packs a day, still   smoking at this time. REVIEW OF SYSTEMS:  As mentioned, the patient was admitted in 2015 for altered mental status and again in 2016. Also evaluated at that time by Cardiology,   although no testing was done in 2015, but he apparently had a   stress test as well as 2-D echo done approximately in  reported to be   normal.    He has a history of recurrent pulmonary emboli, on chronic Coumadin;   most recent emboli approximately . Lumbar puncture in 2015   due to altered mental state and temperature of 103. 1. No findings at that   time. At that admission, he was \"eating cat toys. \"  Noted to have bipolar   disorder for which he has been

## 2018-07-16 ENCOUNTER — ANTI-COAG VISIT (OUTPATIENT)
Dept: PRIMARY CARE CLINIC | Age: 63
End: 2018-07-16

## 2018-07-21 NOTE — ED PROVIDER NOTES
HPI:  7/21/18, Time: 11:47 AM         Glenn Chavarria is a 66 y.o. male presenting to the ED for altered mental status, beginning today. The complaint has been intermittent, moderate in severity, and worsened by nothing. Patient has had an increase in his resting tremor and altered mental status. The staff is concerned that he has been speaking inappropriately intermittently. Patient denies any weakness, paresthesias, or loss of function. He has noticed his tremors getting worse over the last 2 weeks. He denies any fever, chills, chest pain, dyspnea, cough, urinary symptoms, nausea, vomiting, or diarrhea. Denies any abdominal pain. Review of Systems:   Pertinent positives and negatives are stated within HPI, all other systems reviewed and are negative.          --------------------------------------------- PAST HISTORY ---------------------------------------------  Past Medical History:  has a past medical history of Acute bacterial conjunctivitis of left eye; Anticoagulated on Coumadin; Bipolar 1 disorder ([address] Bipolar affect, depressed ([address] Cellulitis of face; Full dentures; Hx of blood clots; Hypertension; Impetigo; Palpitations; Screening; and Seizure disorder ([address] Past Surgical History:  has a past surgical history that includes Appendectomy and Colonoscopy (4/11/16). Social History:  reports that he has been smoking Cigarettes. He has been smoking about 1.00 pack per day. He has never used smokeless tobacco. He reports that he drinks alcohol. He reports that he does not use drugs. Family History: Family history is unknown by patient. The patients home medications have been reviewed. Allergies: Patient has no known allergies.     -------------------------------------------------- RESULTS -------------------------------------------------  All laboratory and radiology results have been personally reviewed by myself   LABS:  Results for orders placed or performed during the hospital <2.0 E.U./dL    Nitrite, Urine Negative Negative    Leukocyte Esterase, Urine Negative Negative   Urine Drug Screen   Result Value Ref Range    Amphetamine Screen, Urine NOT DETECTED Negative <1000 ng/mL    Barbiturate Screen, Ur NOT DETECTED Negative < 200 ng/mL    Benzodiazepine Screen, Urine NOT DETECTED Negative < 200 ng/mL    Cannabinoid Scrn, Ur NOT DETECTED Negative < 50ng/mL    Cocaine Metabolite Screen, Urine NOT DETECTED Negative < 300 ng/mL    Opiate Scrn, Ur NOT DETECTED Negative < 300ng/mL    PCP Screen, Urine NOT DETECTED Negative < 25 ng/mL    Methadone Screen, Urine NOT DETECTED Negative <300 ng/mL    Propoxyphene Scrn, Ur NOT DETECTED Negative <300 ng/mL   PROTIME-INR   Result Value Ref Range    Protime 28.6 (H) 9.3 - 12.4 sec    INR 2.5    Valproic acid level, total   Result Value Ref Range    Valproic Acid Lvl 33 (L) 50 - 100 mcg/mL   PROTIME-INR   Result Value Ref Range    Protime 22.1 (H) 9.3 - 12.4 sec    INR 1.9    EKG 12 Lead   Result Value Ref Range    Ventricular Rate 59 BPM    Atrial Rate 59 BPM    P-R Interval 162 ms    QRS Duration 92 ms    Q-T Interval 400 ms    QTc Calculation (Bazett) 396 ms    P Axis 45 degrees    R Axis 47 degrees    T Axis 49 degrees       RADIOLOGY:  Interpreted by Radiologist.  CT Head WO Contrast   Final Result   No evidence of acute intracranial hemorrhage, midline shift, or mass   effect.                ------------------------- NURSING NOTES AND VITALS REVIEWED ---------------------------   The nursing notes within the ED encounter and vital signs as below have been reviewed.    BP (!) 141/70   Pulse 78   Temp 97.6 °F (36.4 °C) (Temporal)   Resp 18   Ht 5' 8\" (1.727 m)   Wt 185 lb (83.9 kg)   SpO2 97%   BMI 28.13 kg/m²   Oxygen Saturation Interpretation: Normal      ---------------------------------------------------PHYSICAL EXAM--------------------------------------      Constitutional/General: Alert and oriented x3, well appearing, non toxic in NAD  Head: Normocephalic and atraumatic  Eyes: PERRL, EOMI  Mouth: Oropharynx clear, handling secretions, no trismus  Neck: Supple, full ROM,   Pulmonary: Lungs clear to auscultation bilaterally, no wheezes, rales, or rhonchi. Not in respiratory distress  Cardiovascular:  Regular rate and rhythm, no murmurs, gallops, or rubs. 2+ distal pulses  Abdomen: Soft, non tender, non distended,   Extremities: Moves all extremities x 4. Warm and well perfused  Skin: warm and dry without rash  Neurologic: GCS 15, Cn II-XII intact. Equal strength and sensation all extremities. Resting tremor noted. Psych: Normal Affect      ------------------------------ ED COURSE/MEDICAL DECISION MAKING----------------------  Medications - No data to display      ED COURSE:       Medical Decision Making:    Patient presents with increasing tremor and warm. Altered mental status. This has been intermittent and the patient currently has normal mentation but the staff murmur states that he has been having transient confusion and potential delusions. The patient has no evidence of internal situation at this point. I do believe he'll benefit from admission for further evaluation and management. Discussed patient care with admitting physician. Counseling: The emergency provider has spoken with the patient and caretaker and discussed todays results, in addition to providing specific details for the plan of care and counseling regarding the diagnosis and prognosis. Questions are answered at this time and they are agreeable with the plan.      --------------------------------- IMPRESSION AND DISPOSITION ---------------------------------    IMPRESSION  1. Altered mental status, unspecified altered mental status type    2. Tremor        DISPOSITION  Disposition: Admit to med/surg floor  Patient condition is fair      NOTE: This report was transcribed using voice recognition software.  Every effort was made to ensure accuracy; however, inadvertent

## 2018-07-23 ENCOUNTER — ANTI-COAG VISIT (OUTPATIENT)
Dept: PRIMARY CARE CLINIC | Age: 63
End: 2018-07-23

## 2018-07-23 ENCOUNTER — HOSPITAL ENCOUNTER (OUTPATIENT)
Age: 63
Discharge: HOME | End: 2018-07-25
Payer: MEDICARE

## 2018-07-23 DIAGNOSIS — I26.99 OTHER PULMONARY EMBOLISM WITHOUT ACUTE COR PULMONALE, UNSPECIFIED CHRONICITY (HCC): ICD-10-CM

## 2018-07-23 DIAGNOSIS — D68.59 HYPERCOAGULABLE STATE (HCC): ICD-10-CM

## 2018-07-23 LAB
INR BLD: 1.3
INR BLD: 1.3
PROTHROMBIN TIME: 15.4 SEC (ref 9.3–12.4)

## 2018-07-23 PROCEDURE — 85610 PROTHROMBIN TIME: CPT

## 2018-07-23 NOTE — PROGRESS NOTES
LIZ for Ronit Guzman at [facility] regarding patient's PT/INR. He needs recheck 7/30/18 in lab. Also advised patient of results.

## 2018-07-24 ENCOUNTER — ANTI-COAG VISIT (OUTPATIENT)
Dept: PRIMARY CARE CLINIC | Age: 63
End: 2018-07-24

## 2018-07-24 LAB — INR BLD: 1.7

## 2018-07-27 ENCOUNTER — HOSPITAL ENCOUNTER (EMERGENCY)
Age: 63
Discharge: HOME | End: 2018-07-28
Attending: EMERGENCY MEDICINE
Payer: MEDICARE

## 2018-07-27 ENCOUNTER — APPOINTMENT (OUTPATIENT)
Dept: GENERAL RADIOLOGY | Age: 63
End: 2018-07-27
Payer: MEDICARE

## 2018-07-27 ENCOUNTER — APPOINTMENT (OUTPATIENT)
Dept: CT IMAGING | Age: 63
End: 2018-07-27
Payer: MEDICARE

## 2018-07-27 DIAGNOSIS — R41.82 ALTERED MENTAL STATUS, UNSPECIFIED ALTERED MENTAL STATUS TYPE: Primary | ICD-10-CM

## 2018-07-27 LAB
ACETAMINOPHEN LEVEL: <5 MCG/ML (ref 10–30)
ALBUMIN SERPL-MCNC: 3.1 G/DL (ref 3.5–5.2)
ALP BLD-CCNC: 37 U/L (ref 40–129)
ALT SERPL-CCNC: 30 U/L (ref 0–40)
AMPHETAMINE SCREEN, URINE: NOT DETECTED
ANION GAP SERPL CALCULATED.3IONS-SCNC: 12 MMOL/L (ref 7–16)
APTT: 38.4 SEC (ref 24.5–35.1)
AST SERPL-CCNC: 36 U/L (ref 0–39)
BACTERIA: ABNORMAL /HPF
BARBITURATE SCREEN URINE: NOT DETECTED
BASOPHILS ABSOLUTE: 0.04 E9/L (ref 0–0.2)
BASOPHILS RELATIVE PERCENT: 0.3 % (ref 0–2)
BENZODIAZEPINE SCREEN, URINE: NOT DETECTED
BILIRUB SERPL-MCNC: 0.3 MG/DL (ref 0–1.2)
BILIRUBIN URINE: NEGATIVE
BLOOD, URINE: ABNORMAL
BUN BLDV-MCNC: 13 MG/DL (ref 8–23)
CALCIUM SERPL-MCNC: 7.8 MG/DL (ref 8.6–10.2)
CANNABINOID SCREEN URINE: NOT DETECTED
CHLORIDE BLD-SCNC: 109 MMOL/L (ref 98–107)
CHP ED QC CHECK: YES
CLARITY: CLEAR
CO2: 20 MMOL/L (ref 22–29)
COCAINE METABOLITE SCREEN URINE: NOT DETECTED
COLOR: YELLOW
CREAT SERPL-MCNC: 0.9 MG/DL (ref 0.7–1.2)
EKG ATRIAL RATE: 57 BPM
EKG P AXIS: 43 DEGREES
EKG P-R INTERVAL: 168 MS
EKG Q-T INTERVAL: 414 MS
EKG QRS DURATION: 82 MS
EKG QTC CALCULATION (BAZETT): 402 MS
EKG R AXIS: 41 DEGREES
EKG T AXIS: 57 DEGREES
EKG VENTRICULAR RATE: 57 BPM
EOSINOPHILS ABSOLUTE: 0.07 E9/L (ref 0.05–0.5)
EOSINOPHILS RELATIVE PERCENT: 0.6 % (ref 0–6)
ETHANOL: <10 MG/DL (ref 0–0.08)
GFR AFRICAN AMERICAN: >60
GFR NON-AFRICAN AMERICAN: >60 ML/MIN/1.73
GLUCOSE BLD-MCNC: 98 MG/DL
GLUCOSE BLD-MCNC: 98 MG/DL (ref 74–109)
GLUCOSE URINE: NEGATIVE MG/DL
HCT VFR BLD CALC: 34.5 % (ref 37–54)
HEMOGLOBIN: 12 G/DL (ref 12.5–16.5)
IMMATURE GRANULOCYTES #: 0.03 E9/L
IMMATURE GRANULOCYTES %: 0.3 % (ref 0–5)
INR BLD: 2.1
KETONES, URINE: 15 MG/DL
LEUKOCYTE ESTERASE, URINE: NEGATIVE
LYMPHOCYTES ABSOLUTE: 2.24 E9/L (ref 1.5–4)
LYMPHOCYTES RELATIVE PERCENT: 19.6 % (ref 20–42)
MCH RBC QN AUTO: 33.1 PG (ref 26–35)
MCHC RBC AUTO-ENTMCNC: 34.8 % (ref 32–34.5)
MCV RBC AUTO: 95.3 FL (ref 80–99.9)
METHADONE SCREEN, URINE: NOT DETECTED
MONOCYTES ABSOLUTE: 1.17 E9/L (ref 0.1–0.95)
MONOCYTES RELATIVE PERCENT: 10.2 % (ref 2–12)
NEUTROPHILS ABSOLUTE: 7.9 E9/L (ref 1.8–7.3)
NEUTROPHILS RELATIVE PERCENT: 69 % (ref 43–80)
NITRITE, URINE: NEGATIVE
OPIATE SCREEN URINE: NOT DETECTED
PDW BLD-RTO: 14.7 FL (ref 11.5–15)
PH UA: 7 (ref 5–9)
PHENCYCLIDINE SCREEN URINE: NOT DETECTED
PLATELET # BLD: 150 E9/L (ref 130–450)
PMV BLD AUTO: 11 FL (ref 7–12)
POTASSIUM SERPL-SCNC: 5.1 MMOL/L (ref 3.5–5)
PROPOXYPHENE SCREEN: NOT DETECTED
PROTEIN UA: NEGATIVE MG/DL
PROTHROMBIN TIME: 23.3 SEC (ref 9.3–12.4)
RBC # BLD: 3.62 E12/L (ref 3.8–5.8)
RBC UA: ABNORMAL /HPF (ref 0–2)
SALICYLATE, SERUM: <0.3 MG/DL (ref 0–30)
SODIUM BLD-SCNC: 141 MMOL/L (ref 132–146)
SPECIFIC GRAVITY UA: 1.01 (ref 1–1.03)
TOTAL PROTEIN: 6 G/DL (ref 6.4–8.3)
TRICYCLIC ANTIDEPRESSANTS SCREEN SERUM: NEGATIVE NG/ML
TROPONIN: <0.01 NG/ML (ref 0–0.03)
UROBILINOGEN, URINE: 1 E.U./DL
VALPROIC ACID LEVEL: 44 MCG/ML (ref 50–100)
WBC # BLD: 11.5 E9/L (ref 4.5–11.5)
WBC UA: ABNORMAL /HPF (ref 0–5)

## 2018-07-27 PROCEDURE — 81001 URINALYSIS AUTO W/SCOPE: CPT

## 2018-07-27 PROCEDURE — 80164 ASSAY DIPROPYLACETIC ACD TOT: CPT

## 2018-07-27 PROCEDURE — 80053 COMPREHEN METABOLIC PANEL: CPT

## 2018-07-27 PROCEDURE — 87040 BLOOD CULTURE FOR BACTERIA: CPT

## 2018-07-27 PROCEDURE — 93005 ELECTROCARDIOGRAM TRACING: CPT | Performed by: EMERGENCY MEDICINE

## 2018-07-27 PROCEDURE — 80307 DRUG TEST PRSMV CHEM ANLYZR: CPT

## 2018-07-27 PROCEDURE — G0480 DRUG TEST DEF 1-7 CLASSES: HCPCS

## 2018-07-27 PROCEDURE — 70450 CT HEAD/BRAIN W/O DYE: CPT

## 2018-07-27 PROCEDURE — 99285 EMERGENCY DEPT VISIT HI MDM: CPT

## 2018-07-27 PROCEDURE — 85610 PROTHROMBIN TIME: CPT

## 2018-07-27 PROCEDURE — 85730 THROMBOPLASTIN TIME PARTIAL: CPT

## 2018-07-27 PROCEDURE — 84484 ASSAY OF TROPONIN QUANT: CPT

## 2018-07-27 PROCEDURE — 36415 COLL VENOUS BLD VENIPUNCTURE: CPT

## 2018-07-27 PROCEDURE — 85025 COMPLETE CBC W/AUTO DIFF WBC: CPT

## 2018-07-27 PROCEDURE — 71045 X-RAY EXAM CHEST 1 VIEW: CPT

## 2018-07-28 VITALS
TEMPERATURE: 98.6 F | HEIGHT: 68 IN | RESPIRATION RATE: 18 BRPM | OXYGEN SATURATION: 100 % | WEIGHT: 185 LBS | HEART RATE: 56 BPM | DIASTOLIC BLOOD PRESSURE: 66 MMHG | BODY MASS INDEX: 28.04 KG/M2 | SYSTOLIC BLOOD PRESSURE: 126 MMHG

## 2018-07-28 NOTE — ED NOTES
This nurse entered room to introduce herself, patient had gown off and was trying to get out of bed, stated he just came to visit someone and now we are keeping him here. Alert to self and place, thought year was 1918 then corrected himself a moment later to 2018, unsure of what brought him here or who the president is. Bed in lowest locked position, patient denies needs at this time, will continue to monitor.       Luisa Mayers RN  07/27/18 [id number]

## 2018-07-28 NOTE — ED PROVIDER NOTES
HPI:  7/28/18, Time: 12:49 AM         Jan Campos is a 66 y.o. male presenting to the ED for altered mental status. Patient presents from a nursing facility. Apparently the past couple of days he has been more altered than usual. Does have a history of bipolar disorder. They say he is hallucinating, and seeing things that aren't there. There is also concern is had some dysuria. Patient has no specific complaints at this time. Denies any nausea, vomiting, chest pain, shortness of breath, fever, chills, neck pain or stiffness, rash, or any other symptoms or complaints. Review of Systems:   Pertinent positives and negatives are stated within HPI, all other systems reviewed and are negative.          --------------------------------------------- PAST HISTORY ---------------------------------------------  Past Medical History:  has a past medical history of Acute bacterial conjunctivitis of left eye; Anticoagulated on Coumadin; Bipolar 1 disorder ([address] Bipolar affect, depressed ([facility] [address] Cellulitis of face; Full dentures; Hx of blood clots; Hypertension; Impetigo; Palpitations; Screening; and Seizure disorder ([facility] [address] Past Surgical History:  has a past surgical history that includes Appendectomy and Colonoscopy (4/11/16). Social History:  reports that he has been smoking Cigarettes. He has been smoking about 1.00 pack per day. He has never used smokeless tobacco. He reports that he drinks alcohol. He reports that he does not use drugs. Family History: Family history is unknown by patient. The patients home medications have been reviewed. Allergies: Patient has no known allergies.     -------------------------------------------------- RESULTS -------------------------------------------------  All laboratory and radiology results have been personally reviewed by myself   LABS:  Results for orders placed or performed during the hospital encounter of 07/27/18   CBC Auto Differential   Result Value Ref

## 2018-07-31 ENCOUNTER — OFFICE VISIT (OUTPATIENT)
Dept: PRIMARY CARE CLINIC | Age: 63
End: 2018-07-31
Payer: MEDICARE

## 2018-07-31 VITALS
DIASTOLIC BLOOD PRESSURE: 64 MMHG | HEART RATE: 55 BPM | HEIGHT: 69 IN | WEIGHT: 184 LBS | BODY MASS INDEX: 27.25 KG/M2 | OXYGEN SATURATION: 98 % | RESPIRATION RATE: 16 BRPM | TEMPERATURE: 98.1 F | SYSTOLIC BLOOD PRESSURE: 112 MMHG

## 2018-07-31 DIAGNOSIS — D68.59 HYPERCOAGULABLE STATE (HCC): ICD-10-CM

## 2018-07-31 DIAGNOSIS — F31.60 BIPOLAR AFFECTIVE DISORDER, CURRENT EPISODE MIXED, CURRENT EPISODE SEVERITY UNSPECIFIED (HCC): ICD-10-CM

## 2018-07-31 DIAGNOSIS — R41.82 ALTERED MENTAL STATUS, UNSPECIFIED ALTERED MENTAL STATUS TYPE: Primary | ICD-10-CM

## 2018-07-31 DIAGNOSIS — I26.99 OTHER PULMONARY EMBOLISM WITHOUT ACUTE COR PULMONALE, UNSPECIFIED CHRONICITY (HCC): ICD-10-CM

## 2018-07-31 PROCEDURE — 99214 OFFICE O/P EST MOD 30 MIN: CPT | Performed by: PHYSICIAN ASSISTANT

## 2018-07-31 PROCEDURE — G8427 DOCREV CUR MEDS BY ELIG CLIN: HCPCS | Performed by: PHYSICIAN ASSISTANT

## 2018-07-31 PROCEDURE — G8417 CALC BMI ABV UP PARAM F/U: HCPCS | Performed by: PHYSICIAN ASSISTANT

## 2018-07-31 PROCEDURE — 1111F DSCHRG MED/CURRENT MED MERGE: CPT | Performed by: PHYSICIAN ASSISTANT

## 2018-07-31 PROCEDURE — 4004F PT TOBACCO SCREEN RCVD TLK: CPT | Performed by: PHYSICIAN ASSISTANT

## 2018-07-31 PROCEDURE — 3017F COLORECTAL CA SCREEN DOC REV: CPT | Performed by: PHYSICIAN ASSISTANT

## 2018-07-31 ASSESSMENT — ENCOUNTER SYMPTOMS
DIARRHEA: 0
SHORTNESS OF BREATH: 0
COUGH: 0
WHEEZING: 0
VOMITING: 0
NAUSEA: 0

## 2018-07-31 NOTE — PROGRESS NOTES
without acute cor pulmonale, unspecified chronicity (HCC)    Bipolar affective disorder, current episode mixed, current episode severity unspecified [facility])       Medical Decision Making: moderate complexity

## 2018-08-01 LAB
BLOOD CULTURE, ROUTINE: NORMAL
CULTURE, BLOOD 2: NORMAL

## 2018-08-02 ENCOUNTER — TELEPHONE (OUTPATIENT)
Dept: PRIMARY CARE CLINIC | Age: 63
End: 2018-08-02

## 2018-08-02 DIAGNOSIS — I26.99 OTHER PULMONARY EMBOLISM WITHOUT ACUTE COR PULMONALE, UNSPECIFIED CHRONICITY (HCC): Primary | ICD-10-CM

## 2018-08-02 DIAGNOSIS — Z79.01 WARFARIN ANTICOAGULATION: ICD-10-CM

## 2018-08-02 DIAGNOSIS — D68.59 HYPERCOAGULABLE STATE (HCC): ICD-10-CM

## 2018-08-06 ENCOUNTER — ANTI-COAG VISIT (OUTPATIENT)
Dept: PRIMARY CARE CLINIC | Age: 63
End: 2018-08-06

## 2018-08-06 ENCOUNTER — HOSPITAL ENCOUNTER (OUTPATIENT)
Age: 63
Discharge: HOME | End: 2018-08-06
Payer: MEDICARE

## 2018-08-06 DIAGNOSIS — I26.99 OTHER PULMONARY EMBOLISM WITHOUT ACUTE COR PULMONALE, UNSPECIFIED CHRONICITY (HCC): ICD-10-CM

## 2018-08-06 DIAGNOSIS — Z79.01 WARFARIN ANTICOAGULATION: ICD-10-CM

## 2018-08-06 DIAGNOSIS — D68.59 HYPERCOAGULABLE STATE (HCC): ICD-10-CM

## 2018-08-06 LAB
INR BLD: 3
INR BLD: 3
PROTHROMBIN TIME: 34.3 SEC (ref 9.3–12.4)

## 2018-08-06 PROCEDURE — 36415 COLL VENOUS BLD VENIPUNCTURE: CPT

## 2018-08-06 PROCEDURE — 85610 PROTHROMBIN TIME: CPT

## 2018-08-13 ENCOUNTER — ANTI-COAG VISIT (OUTPATIENT)
Dept: PRIMARY CARE CLINIC | Age: 63
End: 2018-08-13

## 2018-08-13 LAB — INR BLD: 2.5

## 2018-08-16 ENCOUNTER — HOSPITAL ENCOUNTER (OUTPATIENT)
Age: 63
Discharge: HOME | End: 2018-08-16
Payer: MEDICARE

## 2018-08-16 LAB
ALBUMIN SERPL-MCNC: 3.5 G/DL (ref 3.5–5.2)
ALP BLD-CCNC: 45 U/L (ref 40–129)
ALT SERPL-CCNC: 15 U/L (ref 0–40)
ANION GAP SERPL CALCULATED.3IONS-SCNC: 10 MMOL/L (ref 7–16)
AST SERPL-CCNC: 15 U/L (ref 0–39)
BASOPHILS ABSOLUTE: 0.06 E9/L (ref 0–0.2)
BASOPHILS RELATIVE PERCENT: 1 % (ref 0–2)
BILIRUB SERPL-MCNC: 0.2 MG/DL (ref 0–1.2)
BILIRUBIN DIRECT: <0.2 MG/DL (ref 0–0.3)
BILIRUBIN URINE: NEGATIVE
BILIRUBIN, INDIRECT: NORMAL MG/DL (ref 0–1)
BLOOD, URINE: NEGATIVE
BUN BLDV-MCNC: 10 MG/DL (ref 8–23)
CALCIUM SERPL-MCNC: 9 MG/DL (ref 8.6–10.2)
CHLORIDE BLD-SCNC: 109 MMOL/L (ref 98–107)
CLARITY: CLEAR
CO2: 24 MMOL/L (ref 22–29)
COLOR: YELLOW
CREAT SERPL-MCNC: 1 MG/DL (ref 0.7–1.2)
EOSINOPHILS ABSOLUTE: 0.24 E9/L (ref 0.05–0.5)
EOSINOPHILS RELATIVE PERCENT: 3.8 % (ref 0–6)
FOLATE: 14.2 NG/ML (ref 4.8–24.2)
GFR AFRICAN AMERICAN: >60
GFR NON-AFRICAN AMERICAN: >60 ML/MIN/1.73
GLUCOSE BLD-MCNC: 93 MG/DL (ref 74–109)
GLUCOSE URINE: NEGATIVE MG/DL
HCT VFR BLD CALC: 39.9 % (ref 37–54)
HEMOGLOBIN: 13 G/DL (ref 12.5–16.5)
IMMATURE GRANULOCYTES #: 0.01 E9/L
IMMATURE GRANULOCYTES %: 0.2 % (ref 0–5)
KETONES, URINE: NEGATIVE MG/DL
LEUKOCYTE ESTERASE, URINE: NEGATIVE
LYMPHOCYTES ABSOLUTE: 2.22 E9/L (ref 1.5–4)
LYMPHOCYTES RELATIVE PERCENT: 35.4 % (ref 20–42)
MCH RBC QN AUTO: 31.6 PG (ref 26–35)
MCHC RBC AUTO-ENTMCNC: 32.6 % (ref 32–34.5)
MCV RBC AUTO: 96.8 FL (ref 80–99.9)
MONOCYTES ABSOLUTE: 0.75 E9/L (ref 0.1–0.95)
MONOCYTES RELATIVE PERCENT: 12 % (ref 2–12)
NEUTROPHILS ABSOLUTE: 2.99 E9/L (ref 1.8–7.3)
NEUTROPHILS RELATIVE PERCENT: 47.6 % (ref 43–80)
NITRITE, URINE: NEGATIVE
PDW BLD-RTO: 14 FL (ref 11.5–15)
PH UA: 6.5 (ref 5–9)
PHOSPHORUS: 2.3 MG/DL (ref 2.5–4.5)
PLATELET # BLD: 176 E9/L (ref 130–450)
PMV BLD AUTO: 9.5 FL (ref 7–12)
POTASSIUM SERPL-SCNC: 4.4 MMOL/L (ref 3.5–5)
PROTEIN UA: NEGATIVE MG/DL
RBC # BLD: 4.12 E12/L (ref 3.8–5.8)
SODIUM BLD-SCNC: 143 MMOL/L (ref 132–146)
SPECIFIC GRAVITY UA: 1.01 (ref 1–1.03)
TOTAL PROTEIN: 6.5 G/DL (ref 6.4–8.3)
UROBILINOGEN, URINE: 1 E.U./DL
VALPROIC ACID LEVEL: 51 MCG/ML (ref 50–100)
VITAMIN B-12: 598 PG/ML (ref 211–946)
WBC # BLD: 6.3 E9/L (ref 4.5–11.5)

## 2018-08-16 PROCEDURE — 80069 RENAL FUNCTION PANEL: CPT

## 2018-08-16 PROCEDURE — 81003 URINALYSIS AUTO W/O SCOPE: CPT

## 2018-08-16 PROCEDURE — 82248 BILIRUBIN DIRECT: CPT

## 2018-08-16 PROCEDURE — 82247 BILIRUBIN TOTAL: CPT

## 2018-08-16 PROCEDURE — 82607 VITAMIN B-12: CPT

## 2018-08-16 PROCEDURE — 85025 COMPLETE CBC W/AUTO DIFF WBC: CPT

## 2018-08-16 PROCEDURE — 84450 TRANSFERASE (AST) (SGOT): CPT

## 2018-08-16 PROCEDURE — 84460 ALANINE AMINO (ALT) (SGPT): CPT

## 2018-08-16 PROCEDURE — 36415 COLL VENOUS BLD VENIPUNCTURE: CPT

## 2018-08-16 PROCEDURE — 84155 ASSAY OF PROTEIN SERUM: CPT

## 2018-08-16 PROCEDURE — 80164 ASSAY DIPROPYLACETIC ACD TOT: CPT

## 2018-08-16 PROCEDURE — 82746 ASSAY OF FOLIC ACID SERUM: CPT

## 2018-08-16 PROCEDURE — 84075 ASSAY ALKALINE PHOSPHATASE: CPT

## 2018-08-20 ENCOUNTER — ANTI-COAG VISIT (OUTPATIENT)
Dept: PRIMARY CARE CLINIC | Age: 63
End: 2018-08-20

## 2018-08-20 LAB — INR BLD: 3

## 2018-08-27 ENCOUNTER — ANTI-COAG VISIT (OUTPATIENT)
Dept: PRIMARY CARE CLINIC | Age: 63
End: 2018-08-27

## 2018-08-27 LAB — INR BLD: 3.1

## 2018-08-27 NOTE — PROGRESS NOTES
Divya Stout at [facility]. [phone], patient to continue current dose of coumadin and recheck 9/4/18

## 2018-09-10 ENCOUNTER — TELEPHONE (OUTPATIENT)
Dept: PRIMARY CARE CLINIC | Age: 63
End: 2018-09-10

## 2018-09-10 DIAGNOSIS — I26.99 OTHER PULMONARY EMBOLISM WITHOUT ACUTE COR PULMONALE, UNSPECIFIED CHRONICITY (HCC): ICD-10-CM

## 2018-09-10 DIAGNOSIS — D68.59 HYPERCOAGULABLE STATE (HCC): Primary | ICD-10-CM

## 2018-09-14 ENCOUNTER — TELEPHONE (OUTPATIENT)
Dept: PRIMARY CARE CLINIC | Age: 63
End: 2018-09-14

## 2018-09-14 ENCOUNTER — ANTI-COAG VISIT (OUTPATIENT)
Dept: PRIMARY CARE CLINIC | Age: 63
End: 2018-09-14

## 2018-09-25 ENCOUNTER — ANTI-COAG VISIT (OUTPATIENT)
Dept: PRIMARY CARE CLINIC | Age: 63
End: 2018-09-25

## 2018-09-25 ENCOUNTER — HOSPITAL ENCOUNTER (OUTPATIENT)
Age: 63
Discharge: HOME | End: 2018-09-27
Payer: MEDICARE

## 2018-09-25 DIAGNOSIS — I26.99 OTHER PULMONARY EMBOLISM WITHOUT ACUTE COR PULMONALE, UNSPECIFIED CHRONICITY (HCC): ICD-10-CM

## 2018-09-25 DIAGNOSIS — D68.59 HYPERCOAGULABLE STATE (HCC): ICD-10-CM

## 2018-09-25 LAB
INR BLD: 2
INR BLD: 2
PROTHROMBIN TIME: 22.4 SEC (ref 9.3–12.4)

## 2018-09-25 PROCEDURE — 85610 PROTHROMBIN TIME: CPT

## 2018-09-26 ENCOUNTER — ANTI-COAG VISIT (OUTPATIENT)
Dept: PRIMARY CARE CLINIC | Age: 63
End: 2018-09-26

## 2018-09-26 NOTE — PROGRESS NOTES
Patient advised to continue current dose of coumadin and recheck 10/2/18.   Patient is scheduled in lab

## 2018-10-01 ENCOUNTER — TELEPHONE (OUTPATIENT)
Dept: PRIMARY CARE CLINIC | Age: 63
End: 2018-10-01

## 2018-10-01 DIAGNOSIS — Z79.01 WARFARIN ANTICOAGULATION: Primary | ICD-10-CM

## 2018-10-01 DIAGNOSIS — R79.1 PROLONGED INR: ICD-10-CM

## 2018-10-02 ENCOUNTER — HOSPITAL ENCOUNTER (OUTPATIENT)
Age: 63
Discharge: HOME | End: 2018-10-04
Payer: MEDICARE

## 2018-10-02 ENCOUNTER — ANTI-COAG VISIT (OUTPATIENT)
Dept: PRIMARY CARE CLINIC | Age: 63
End: 2018-10-02

## 2018-10-02 DIAGNOSIS — D68.59 HYPERCOAGULABLE STATE (HCC): ICD-10-CM

## 2018-10-02 DIAGNOSIS — I26.99 OTHER PULMONARY EMBOLISM WITHOUT ACUTE COR PULMONALE, UNSPECIFIED CHRONICITY (HCC): ICD-10-CM

## 2018-10-02 DIAGNOSIS — Z79.01 WARFARIN ANTICOAGULATION: ICD-10-CM

## 2018-10-02 LAB
INR BLD: 1.4
INR BLD: 1.4
PROTHROMBIN TIME: 15.8 SEC (ref 9.3–12.4)

## 2018-10-02 PROCEDURE — 85610 PROTHROMBIN TIME: CPT

## 2018-11-13 ENCOUNTER — ANTI-COAG VISIT (OUTPATIENT)
Dept: PRIMARY CARE CLINIC | Age: 63
End: 2018-11-13

## 2018-11-13 LAB — INR BLD: 3.1

## 2018-11-30 DIAGNOSIS — Z79.01 CHRONIC ANTICOAGULATION: ICD-10-CM

## 2018-11-30 DIAGNOSIS — I26.99 OTHER ACUTE PULMONARY EMBOLISM WITHOUT ACUTE COR PULMONALE (HCC): ICD-10-CM

## 2018-12-01 RX ORDER — WARFARIN SODIUM 3 MG/1
TABLET ORAL
Qty: 30 TABLET | Refills: 0 | Status: SHIPPED | OUTPATIENT
Start: 2018-12-01 | End: 2019-02-11 | Stop reason: SDUPTHER

## 2018-12-21 ENCOUNTER — ANTI-COAG VISIT (OUTPATIENT)
Dept: PRIMARY CARE CLINIC | Age: 63
End: 2018-12-21

## 2018-12-21 LAB — INR BLD: 5.6

## 2018-12-21 NOTE — PROGRESS NOTES
Patient advised to hold coumadin today (Fri) and tomorrow (Sat) then resume regular dose beginning Sunday. Must recheck 12/28/18.

## 2019-01-03 ENCOUNTER — ANTI-COAG VISIT (OUTPATIENT)
Dept: PRIMARY CARE CLINIC | Age: 64
End: 2019-01-03

## 2019-01-03 LAB — INR BLD: 1.6

## 2019-01-11 ENCOUNTER — ANTI-COAG VISIT (OUTPATIENT)
Dept: PRIMARY CARE CLINIC | Age: 64
End: 2019-01-11

## 2019-01-11 LAB — INR BLD: 1.6

## 2019-01-18 ENCOUNTER — ANTI-COAG VISIT (OUTPATIENT)
Dept: PRIMARY CARE CLINIC | Age: 64
End: 2019-01-18

## 2019-01-18 LAB — INR BLD: 2.6

## 2019-01-25 ENCOUNTER — ANTI-COAG VISIT (OUTPATIENT)
Dept: PRIMARY CARE CLINIC | Age: 64
End: 2019-01-25

## 2019-01-25 LAB — INR BLD: 2.2

## 2019-02-04 ENCOUNTER — ANTI-COAG VISIT (OUTPATIENT)
Dept: PRIMARY CARE CLINIC | Age: 64
End: 2019-02-04

## 2019-02-04 LAB — INR BLD: 3

## 2019-02-11 DIAGNOSIS — I26.99 OTHER ACUTE PULMONARY EMBOLISM WITHOUT ACUTE COR PULMONALE (HCC): ICD-10-CM

## 2019-02-11 DIAGNOSIS — Z79.01 CHRONIC ANTICOAGULATION: ICD-10-CM

## 2019-02-11 RX ORDER — WARFARIN SODIUM 3 MG/1
TABLET ORAL
Qty: 90 TABLET | Refills: 0 | Status: SHIPPED | OUTPATIENT
Start: 2019-02-11 | End: 2019-06-04 | Stop reason: SDUPTHER

## 2019-02-13 ENCOUNTER — TELEPHONE (OUTPATIENT)
Dept: PRIMARY CARE CLINIC | Age: 64
End: 2019-02-13

## 2019-02-13 ENCOUNTER — ANTI-COAG VISIT (OUTPATIENT)
Dept: PRIMARY CARE CLINIC | Age: 64
End: 2019-02-13

## 2019-02-13 LAB — INR BLD: 2.5

## 2019-02-20 ENCOUNTER — ANTI-COAG VISIT (OUTPATIENT)
Dept: PRIMARY CARE CLINIC | Age: 64
End: 2019-02-20

## 2019-02-20 LAB — INR BLD: 4

## 2019-02-27 ENCOUNTER — ANTI-COAG VISIT (OUTPATIENT)
Dept: PRIMARY CARE CLINIC | Age: 64
End: 2019-02-27

## 2019-02-27 LAB — INR BLD: 1.6

## 2019-03-07 ENCOUNTER — ANTI-COAG VISIT (OUTPATIENT)
Dept: PRIMARY CARE CLINIC | Age: 64
End: 2019-03-07

## 2019-03-07 ENCOUNTER — TELEPHONE (OUTPATIENT)
Dept: PRIMARY CARE CLINIC | Age: 64
End: 2019-03-07

## 2019-03-07 LAB — INR BLD: 2.5

## 2019-03-14 ENCOUNTER — ANTI-COAG VISIT (OUTPATIENT)
Dept: PRIMARY CARE CLINIC | Age: 64
End: 2019-03-14

## 2019-03-14 LAB — INR BLD: 2.6

## 2019-03-21 ENCOUNTER — ANTI-COAG VISIT (OUTPATIENT)
Dept: PRIMARY CARE CLINIC | Age: 64
End: 2019-03-21

## 2019-03-21 LAB — INR BLD: 2.8

## 2019-03-28 ENCOUNTER — ANTI-COAG VISIT (OUTPATIENT)
Dept: PRIMARY CARE CLINIC | Age: 64
End: 2019-03-28
Payer: MEDICARE

## 2019-03-28 DIAGNOSIS — D68.59 HYPERCOAGULABLE STATE (HCC): ICD-10-CM

## 2019-03-28 DIAGNOSIS — R79.1 PROLONGED INR: ICD-10-CM

## 2019-03-28 LAB — INR BLD: 4.2

## 2019-03-28 PROCEDURE — 93793 ANTICOAG MGMT PT WARFARIN: CPT | Performed by: INTERNAL MEDICINE

## 2019-04-05 ENCOUNTER — ANTI-COAG VISIT (OUTPATIENT)
Dept: PRIMARY CARE CLINIC | Age: 64
End: 2019-04-05

## 2019-04-05 LAB — INR BLD: 3.1

## 2019-04-11 ENCOUNTER — ANTI-COAG VISIT (OUTPATIENT)
Dept: PRIMARY CARE CLINIC | Age: 64
End: 2019-04-11

## 2019-04-11 LAB — INR BLD: 3

## 2019-04-23 ENCOUNTER — ANTI-COAG VISIT (OUTPATIENT)
Dept: PRIMARY CARE CLINIC | Age: 64
End: 2019-04-23

## 2019-04-23 LAB — INR BLD: 2.3

## 2019-04-23 NOTE — PROGRESS NOTES
4/23/2019    Advised patient INR 2.3, continue current dose of medication with no changes.   Redraw 4/30/2019

## 2019-04-30 ENCOUNTER — ANTI-COAG VISIT (OUTPATIENT)
Dept: PRIMARY CARE CLINIC | Age: 64
End: 2019-04-30

## 2019-04-30 LAB — INR BLD: 1.9

## 2019-05-08 ENCOUNTER — ANTI-COAG VISIT (OUTPATIENT)
Dept: PRIMARY CARE CLINIC | Age: 64
End: 2019-05-08

## 2019-05-08 LAB — INR BLD: 4.9

## 2019-05-08 NOTE — PROGRESS NOTES
Hold today's dose and continue same dosing. Repeat 1 week    Please make sure he's taking it was prescribed and limiting greens in his diet. His INR has shown great variation over the last month with no change in his dosing. Thanks.

## 2019-05-16 ENCOUNTER — ANTI-COAG VISIT (OUTPATIENT)
Dept: PRIMARY CARE CLINIC | Age: 64
End: 2019-05-16

## 2019-05-16 LAB — INR BLD: 3.5

## 2019-05-16 NOTE — PROGRESS NOTES
5/16/2019    Advised patient INR 3.5, hold medication today (Thurs), then resume schedule on Friday.   Redraw 5/23/2019

## 2019-05-20 ENCOUNTER — OFFICE VISIT (OUTPATIENT)
Dept: PRIMARY CARE CLINIC | Age: 64
End: 2019-05-20
Payer: MEDICARE

## 2019-05-20 VITALS
HEIGHT: 69 IN | HEART RATE: 69 BPM | DIASTOLIC BLOOD PRESSURE: 72 MMHG | TEMPERATURE: 97.2 F | OXYGEN SATURATION: 98 % | WEIGHT: 175.4 LBS | BODY MASS INDEX: 25.98 KG/M2 | RESPIRATION RATE: 18 BRPM | SYSTOLIC BLOOD PRESSURE: 112 MMHG

## 2019-05-20 DIAGNOSIS — R32 URINARY INCONTINENCE, UNSPECIFIED TYPE: ICD-10-CM

## 2019-05-20 DIAGNOSIS — Z12.5 SCREENING FOR PROSTATE CANCER: ICD-10-CM

## 2019-05-20 DIAGNOSIS — Z87.891 PERSONAL HISTORY OF TOBACCO USE: ICD-10-CM

## 2019-05-20 DIAGNOSIS — E55.9 VITAMIN D DEFICIENCY: ICD-10-CM

## 2019-05-20 DIAGNOSIS — D68.59 HYPERCOAGULABLE STATE (HCC): Primary | ICD-10-CM

## 2019-05-20 DIAGNOSIS — L01.00 IMPETIGO: ICD-10-CM

## 2019-05-20 DIAGNOSIS — Z79.01 CHRONIC ANTICOAGULATION: ICD-10-CM

## 2019-05-20 DIAGNOSIS — E78.5 HYPERLIPIDEMIA, UNSPECIFIED HYPERLIPIDEMIA TYPE: ICD-10-CM

## 2019-05-20 PROCEDURE — 99214 OFFICE O/P EST MOD 30 MIN: CPT | Performed by: FAMILY MEDICINE

## 2019-05-20 PROCEDURE — G0296 VISIT TO DETERM LDCT ELIG: HCPCS | Performed by: FAMILY MEDICINE

## 2019-05-20 NOTE — PROGRESS NOTES
Subjective:  60 y.o. y/o male is here for follow up chronic medical issues and to reestablish care within this office. Psych: Dr. Stock Risk monthly, Bipolar affective d/o, doing OK, Invega/Paxil/Cogentin/Depakote  Recurrent PE, +hypercoagulable, clots around 15 years ago in leg and lungs  Podiatrist: Ingrown toenails treated couple months ago, starting to bother again  Does walk 1 mile/day  Noticing tremors in the morning when trying to drink coffee in the mornings, told not parkinsons in the past  Due for repeat CT for lung CA screening, +smoker, 44 pack-year history    Patient's past medical, surgical, social and/or family history reviewed, updated in chart, and are non-contributory (unless otherwise stated). Medications and allergies also reviewed and updated in chart.         Review of Systems:  Constitutional:  No fever, no fatigue, no chills, no headaches, no weight change  Dermatology:  + rash (corners of mouth, came back 1 week ago, mupiricin helps), no mole, no dry or sensitive skin  ENT:  No cough, no sore throat, no sinus pain, no runny nose, no ear pain  Cardiology:  No chest pain, no palpitations, no leg edema, no PND  Gastroenterology:  No dysphagia, no abdominal pain, no nausea, no vomiting, no constipation, + diarrhea, + heartburn  Musculoskeletal:  No joint pain, no leg cramps, + back pain, no muscle aches  Neuro:  No dizziness, + numbness/tingling (left thigh)  Respiratory:  + shortness of breath, no orthopnea, no wheezing, + MELENDEZ with stairs  Urology:  No blood in the urine, no urinary frequency, +urinary incontinence at night (new last few months, wakes up and has wetted bed, unable to get to toilet if wakes), no urinary urgency, no nocturia, no dysuria    Vitals:    05/20/19 1354   BP: 112/72   Site: Left Upper Arm   Position: Sitting   Cuff Size: Large Adult   Pulse: 69   Resp: 18   Temp: 97.2 °F (36.2 °C)   TempSrc: Tympanic   SpO2: 98%   Weight: 175 lb 6.4 oz (79.6 kg)   Height: 5' 9\" (1.753 m)     Body mass index is 25.9 kg/m². General:  Patient alert and oriented x 3, NAD, pleasant  HEENT:  Atraumatic, normocephalic, pupils equal and normal, EOMI, clear conjunctiva, nose-clear, throat - no erythema  Neck:  Supple, no goiter, no carotid bruits, no LAD  Lungs:  CTA B, symmetric breath sounds, no resp distress  Heart:  RRR, no murmurs, gallops or rubs  Abdomen:  Soft/nt/nd, + bowel sounds  Extremities:  No clubbing, cyanosis or edema; +small tremor, gait normal  Musc: No lumbar central tenderness, no SI tenderness, no tenderness over greater trochanters, negative SLR b/l, 2+ patellar reflexes, 5/5 strength LE b/l  Skin: erythema around follicles near mouth corners    Assessment/Plan:      Dimple Marrow was seen today for annual exam, anxiety, depression and health maintenance. Diagnoses and all orders for this visit:    Hypercoagulable state ([facility] [address]  Chronic anticoagulation  Continue Coumadin with regular monitoring. Impetigo  -     Refill mupirocin (BACTROBAN) 2 % ointment; Apply 3 times daily only to outside of mouth. Hyperlipidemia, unspecified hyperlipidemia type, diet-controlled  -     CBC Auto Differential; Future  -     Comprehensive Metabolic Panel; Future  -     TSH without Reflex; Future  -     Lipid Panel; Future  + Dietary and lifestyle modifications    Vitamin D deficiency, due for recheck  -     Vitamin D 25 Hydroxy; Future    Urinary incontinence, unspecified type  -     External Referral To Urology    Screening for prostate cancer  -     PSA SCREENING; Future    Personal history of tobacco use  -     [place] VISIT TO DISCUSS LUNG CA SCREEN W LDCT  -     CT Lung Screening (Annual); Future    Return for fasting lab. Tremor most likely related to medications, 2/4 psych meds with tremor as side effect. Will monitor for now. Exercises given for sciatica. As above. Call or go to ED immediately if symptoms worsen or persist.  Return in about 6 months (around 11/20/2019). , or sooner if necessary. Educational materials and/or home exercises printed for patient's review and were included in patient instructions on his/her After Visit Summary and given to patient at the end of visit. Counseled regarding above diagnosis, including possible risks and complications,  especially if left uncontrolled. Counseled regarding the possible side effects, risks, benefits and alternatives to treatment; patient and/or guardian verbalizes understanding, agrees, feels comfortable with and wishes to proceed with above treatment plan. Advised patient to call with any new medication issues, and read all Rx info from pharmacy to assure aware of all possible risks and side effects of medication before taking. Reviewed age and gender appropriate health screening exams and vaccinations. Advised patient regarding importance of keeping up with recommended health maintenance and to schedule as soon as possible if overdue, as this is important in assessing for undiagnosed pathology, especially cancer, as well as protecting against potentially harmful/life threatening disease. Patient and/or guardian verbalizes understanding and agrees with above counseling, assessment and plan. All questions answered. Low Dose CT (LDCT) Lung Screening criteria met   Age 50-69   Pack year smoking >30   Still smoking or less than 15 year since quit   No sign or symptoms of lung cancer   > 11 months since last LDCT     Risks and benefits of lung cancer screening with LDCT scans discussed:    Significance of positive screen - False-positive LDCT results often occur. 95% of all positive results do not lead to a diagnosis of cancer. Usually further imaging can resolve most false-positive results; however, some patients may require invasive procedures.     Over diagnosis risk - 10% to 12% of screen-detected lung cancer cases are over diagnosed--that is, the cancer would not have been detected in the patient's lifetime without the screening. Need for follow up screens annually to continue lung cancer screening effectiveness     Risks associated with radiation from annual LDCT- Radiation exposure is about the same as for a mammogram, which is about 1/3 of the annual background radiation exposure from everyday life. Starting screening at age 54 is not likely to increase cancer risk from radiation exposure. Patients with comorbidities resulting in life expectancy of < 10 years, or that would preclude treatment of an abnormality identified on CT, should not be screened due to lack of benefit.     To obtain maximal benefit from this screening, smoking cessation and long-term abstinence from smoking is critical

## 2019-05-20 NOTE — PATIENT INSTRUCTIONS
Sciatica: Exercises  Your Care Instructions  Here are some examples of typical rehabilitation exercises for your condition. Start each exercise slowly. Ease off the exercise if you start to have pain. Your doctor or physical therapist will tell you when you can start these exercises and which ones will work best for you. When you are not being active, find a comfortable position for rest. Some people are comfortable on the floor or a medium-firm bed with a small pillow under their head and another under their knees. Some people prefer to lie on their side with a pillow between their knees. Don't stay in one position for too long. Take short walks (10 to 20 minutes) every 2 to 3 hours. Avoid slopes, hills, and stairs until you feel better. Walk only distances you can manage without pain, especially leg pain. How to do the exercises  Back stretches    1. Get down on your hands and knees on the floor. 2. Relax your head and allow it to droop. Round your back up toward the ceiling until you feel a nice stretch in your upper, middle, and lower back. Hold this stretch for as long as it feels comfortable, or about 15 to 30 seconds. 3. Return to the starting position with a flat back while you are on your hands and knees. 4. Let your back sway by pressing your stomach toward the floor. Lift your buttocks toward the ceiling. 5. Hold this position for 15 to 30 seconds. 6. Repeat 2 to 4 times. Follow-up care is a key part of your treatment and safety. Be sure to make and go to all appointments, and call your doctor if you are having problems. It's also a good idea to know your test results and keep a list of the medicines you take. Where can you learn more? Go to [email or web address] and sign in to your at[employer] account. Enter B306 in the RPO box to learn more about \"Sciatica: Exercises. \"     If you do not have an account, please click on the \"Sign Up Now\" link.   Current as of: September 20, 2018  Content Version: 12.0  © 5740-9382 Healthwise, Incorporated. Care instructions adapted under license by [facility] ([facility]). If you have questions about a medical condition or this instruction, always ask your healthcare professional. [address] any warranty or liability for your use of this information.

## 2019-05-22 ENCOUNTER — ANTI-COAG VISIT (OUTPATIENT)
Dept: PRIMARY CARE CLINIC | Age: 64
End: 2019-05-22

## 2019-05-22 LAB — INR BLD: 6.1

## 2019-05-22 NOTE — PROGRESS NOTES
Patient advised to hold coumadin today then begin taking 1.5 mg (1/2 tab) every day except 3 mg on Mondays.   Recheck on 5/29/19

## 2019-05-28 ENCOUNTER — HOSPITAL ENCOUNTER (OUTPATIENT)
Age: 64
Discharge: HOME | End: 2019-05-30
Payer: MEDICARE

## 2019-05-28 DIAGNOSIS — E55.9 VITAMIN D DEFICIENCY: ICD-10-CM

## 2019-05-28 DIAGNOSIS — E78.5 HYPERLIPIDEMIA, UNSPECIFIED HYPERLIPIDEMIA TYPE: ICD-10-CM

## 2019-05-28 DIAGNOSIS — Z12.5 SCREENING FOR PROSTATE CANCER: ICD-10-CM

## 2019-05-28 LAB
ALBUMIN SERPL-MCNC: 3.9 G/DL (ref 3.5–5.2)
ALP BLD-CCNC: 50 U/L (ref 40–129)
ALT SERPL-CCNC: 21 U/L (ref 0–40)
ANION GAP SERPL CALCULATED.3IONS-SCNC: 13 MMOL/L (ref 7–16)
AST SERPL-CCNC: 27 U/L (ref 0–39)
BASOPHILS ABSOLUTE: 0.04 E9/L (ref 0–0.2)
BASOPHILS RELATIVE PERCENT: 0.6 % (ref 0–2)
BILIRUB SERPL-MCNC: 0.2 MG/DL (ref 0–1.2)
BUN BLDV-MCNC: 10 MG/DL (ref 8–23)
CALCIUM SERPL-MCNC: 8.9 MG/DL (ref 8.6–10.2)
CHLORIDE BLD-SCNC: 106 MMOL/L (ref 98–107)
CHOLESTEROL, TOTAL: 144 MG/DL (ref 0–199)
CO2: 22 MMOL/L (ref 22–29)
CREAT SERPL-MCNC: 0.8 MG/DL (ref 0.7–1.2)
EOSINOPHILS ABSOLUTE: 0.18 E9/L (ref 0.05–0.5)
EOSINOPHILS RELATIVE PERCENT: 2.7 % (ref 0–6)
GFR AFRICAN AMERICAN: >60
GFR NON-AFRICAN AMERICAN: >60 ML/MIN/1.73
GLUCOSE BLD-MCNC: 108 MG/DL (ref 74–99)
HCT VFR BLD CALC: 44.6 % (ref 37–54)
HDLC SERPL-MCNC: 32 MG/DL
HEMOGLOBIN: 14.3 G/DL (ref 12.5–16.5)
IMMATURE GRANULOCYTES #: 0.02 E9/L
IMMATURE GRANULOCYTES %: 0.3 % (ref 0–5)
LDL CHOLESTEROL CALCULATED: 82 MG/DL (ref 0–99)
LYMPHOCYTES ABSOLUTE: 1.74 E9/L (ref 1.5–4)
LYMPHOCYTES RELATIVE PERCENT: 26.4 % (ref 20–42)
MCH RBC QN AUTO: 31.8 PG (ref 26–35)
MCHC RBC AUTO-ENTMCNC: 32.1 % (ref 32–34.5)
MCV RBC AUTO: 99.1 FL (ref 80–99.9)
MONOCYTES ABSOLUTE: 0.92 E9/L (ref 0.1–0.95)
MONOCYTES RELATIVE PERCENT: 14 % (ref 2–12)
NEUTROPHILS ABSOLUTE: 3.69 E9/L (ref 1.8–7.3)
NEUTROPHILS RELATIVE PERCENT: 56 % (ref 43–80)
PDW BLD-RTO: 13.9 FL (ref 11.5–15)
PLATELET # BLD: 194 E9/L (ref 130–450)
PMV BLD AUTO: 10.3 FL (ref 7–12)
POTASSIUM SERPL-SCNC: 4.3 MMOL/L (ref 3.5–5)
PROSTATE SPECIFIC ANTIGEN: 0.71 NG/ML (ref 0–4)
RBC # BLD: 4.5 E12/L (ref 3.8–5.8)
SODIUM BLD-SCNC: 141 MMOL/L (ref 132–146)
TOTAL PROTEIN: 7.2 G/DL (ref 6.4–8.3)
TRIGL SERPL-MCNC: 152 MG/DL (ref 0–149)
TSH SERPL DL<=0.05 MIU/L-ACNC: 1.8 UIU/ML (ref 0.27–4.2)
VITAMIN D 25-HYDROXY: 21 NG/ML (ref 30–100)
VLDLC SERPL CALC-MCNC: 30 MG/DL
WBC # BLD: 6.6 E9/L (ref 4.5–11.5)

## 2019-05-28 PROCEDURE — 82306 VITAMIN D 25 HYDROXY: CPT

## 2019-05-28 PROCEDURE — G0103 PSA SCREENING: HCPCS

## 2019-05-28 PROCEDURE — 84443 ASSAY THYROID STIM HORMONE: CPT

## 2019-05-28 PROCEDURE — 80053 COMPREHEN METABOLIC PANEL: CPT

## 2019-05-28 PROCEDURE — 80061 LIPID PANEL: CPT

## 2019-05-28 PROCEDURE — 85025 COMPLETE CBC W/AUTO DIFF WBC: CPT

## 2019-05-30 ENCOUNTER — ANTI-COAG VISIT (OUTPATIENT)
Dept: PRIMARY CARE CLINIC | Age: 64
End: 2019-05-30

## 2019-05-30 LAB — INR BLD: 5.9

## 2019-05-31 ENCOUNTER — TELEPHONE (OUTPATIENT)
Dept: PRIMARY CARE CLINIC | Age: 64
End: 2019-05-31

## 2019-05-31 DIAGNOSIS — I26.99 OTHER ACUTE PULMONARY EMBOLISM WITHOUT ACUTE COR PULMONALE (HCC): ICD-10-CM

## 2019-05-31 DIAGNOSIS — Z79.01 CHRONIC ANTICOAGULATION: ICD-10-CM

## 2019-06-04 RX ORDER — WARFARIN SODIUM 3 MG/1
TABLET ORAL
Qty: 90 TABLET | Refills: 0 | Status: SHIPPED | OUTPATIENT
Start: 2019-06-04 | End: 2019-07-08 | Stop reason: SDUPTHER

## 2019-06-05 ENCOUNTER — ANTI-COAG VISIT (OUTPATIENT)
Dept: PRIMARY CARE CLINIC | Age: 64
End: 2019-06-05

## 2019-06-06 ENCOUNTER — HOSPITAL ENCOUNTER (OUTPATIENT)
Age: 64
Discharge: HOME | End: 2019-06-06
Payer: MEDICARE

## 2019-06-06 PROCEDURE — 86481 TB AG RESPONSE T-CELL SUSP: CPT

## 2019-06-06 PROCEDURE — 36415 COLL VENOUS BLD VENIPUNCTURE: CPT

## 2019-06-09 LAB
COMMENT: NORMAL
REPORT: NORMAL

## 2019-06-13 ENCOUNTER — ANTI-COAG VISIT (OUTPATIENT)
Dept: PRIMARY CARE CLINIC | Age: 64
End: 2019-06-13

## 2019-06-13 LAB — INR BLD: 1.9

## 2019-06-16 ENCOUNTER — HOSPITAL ENCOUNTER (EMERGENCY)
Age: 64
Discharge: HOME | End: 2019-06-16
Attending: EMERGENCY MEDICINE
Payer: MEDICARE

## 2019-06-16 ENCOUNTER — APPOINTMENT (OUTPATIENT)
Dept: GENERAL RADIOLOGY | Age: 64
End: 2019-06-16
Payer: MEDICARE

## 2019-06-16 VITALS
OXYGEN SATURATION: 98 % | TEMPERATURE: 98.2 F | SYSTOLIC BLOOD PRESSURE: 118 MMHG | WEIGHT: 170 LBS | RESPIRATION RATE: 18 BRPM | DIASTOLIC BLOOD PRESSURE: 80 MMHG | HEIGHT: 68 IN | HEART RATE: 98 BPM | BODY MASS INDEX: 25.76 KG/M2

## 2019-06-16 DIAGNOSIS — K59.00 CONSTIPATION, UNSPECIFIED CONSTIPATION TYPE: Primary | ICD-10-CM

## 2019-06-16 LAB
ALBUMIN SERPL-MCNC: 3.8 G/DL (ref 3.5–5.2)
ALP BLD-CCNC: 52 U/L (ref 40–129)
ALT SERPL-CCNC: 24 U/L (ref 0–40)
ANION GAP SERPL CALCULATED.3IONS-SCNC: 12 MMOL/L (ref 7–16)
AST SERPL-CCNC: 29 U/L (ref 0–39)
BASOPHILS ABSOLUTE: 0.06 E9/L (ref 0–0.2)
BASOPHILS RELATIVE PERCENT: 0.6 % (ref 0–2)
BILIRUB SERPL-MCNC: 0.4 MG/DL (ref 0–1.2)
BILIRUBIN URINE: NEGATIVE
BLOOD, URINE: NEGATIVE
BUN BLDV-MCNC: 13 MG/DL (ref 8–23)
CALCIUM SERPL-MCNC: 9.5 MG/DL (ref 8.6–10.2)
CHLORIDE BLD-SCNC: 104 MMOL/L (ref 98–107)
CLARITY: CLEAR
CO2: 24 MMOL/L (ref 22–29)
COLOR: YELLOW
CREAT SERPL-MCNC: 0.9 MG/DL (ref 0.7–1.2)
EOSINOPHILS ABSOLUTE: 0.15 E9/L (ref 0.05–0.5)
EOSINOPHILS RELATIVE PERCENT: 1.5 % (ref 0–6)
GFR AFRICAN AMERICAN: >60
GFR NON-AFRICAN AMERICAN: >60 ML/MIN/1.73
GLUCOSE BLD-MCNC: 98 MG/DL (ref 74–99)
GLUCOSE URINE: NEGATIVE MG/DL
HCT VFR BLD CALC: 45.4 % (ref 37–54)
HEMOGLOBIN: 14.8 G/DL (ref 12.5–16.5)
IMMATURE GRANULOCYTES #: 0.04 E9/L
IMMATURE GRANULOCYTES %: 0.4 % (ref 0–5)
KETONES, URINE: ABNORMAL MG/DL
LACTIC ACID: 1.8 MMOL/L (ref 0.5–2.2)
LEUKOCYTE ESTERASE, URINE: NEGATIVE
LIPASE: 16 U/L (ref 13–60)
LYMPHOCYTES ABSOLUTE: 2.89 E9/L (ref 1.5–4)
LYMPHOCYTES RELATIVE PERCENT: 28.7 % (ref 20–42)
MCH RBC QN AUTO: 32.2 PG (ref 26–35)
MCHC RBC AUTO-ENTMCNC: 32.6 % (ref 32–34.5)
MCV RBC AUTO: 98.9 FL (ref 80–99.9)
MONOCYTES ABSOLUTE: 0.93 E9/L (ref 0.1–0.95)
MONOCYTES RELATIVE PERCENT: 9.2 % (ref 2–12)
NEUTROPHILS ABSOLUTE: 6 E9/L (ref 1.8–7.3)
NEUTROPHILS RELATIVE PERCENT: 59.6 % (ref 43–80)
NITRITE, URINE: NEGATIVE
PDW BLD-RTO: 13.7 FL (ref 11.5–15)
PH UA: 5.5 (ref 5–9)
PLATELET # BLD: 184 E9/L (ref 130–450)
PMV BLD AUTO: 9.6 FL (ref 7–12)
POTASSIUM SERPL-SCNC: 4.5 MMOL/L (ref 3.5–5)
PROTEIN UA: NEGATIVE MG/DL
RBC # BLD: 4.59 E12/L (ref 3.8–5.8)
SODIUM BLD-SCNC: 140 MMOL/L (ref 132–146)
SPECIFIC GRAVITY UA: 1.01 (ref 1–1.03)
TOTAL PROTEIN: 7.8 G/DL (ref 6.4–8.3)
UROBILINOGEN, URINE: 1 E.U./DL
WBC # BLD: 10.1 E9/L (ref 4.5–11.5)

## 2019-06-16 PROCEDURE — 36415 COLL VENOUS BLD VENIPUNCTURE: CPT

## 2019-06-16 PROCEDURE — 80053 COMPREHEN METABOLIC PANEL: CPT

## 2019-06-16 PROCEDURE — 99284 EMERGENCY DEPT VISIT MOD MDM: CPT

## 2019-06-16 PROCEDURE — 83690 ASSAY OF LIPASE: CPT

## 2019-06-16 PROCEDURE — 81003 URINALYSIS AUTO W/O SCOPE: CPT

## 2019-06-16 PROCEDURE — 2580000003 HC RX 258: Performed by: NURSE PRACTITIONER

## 2019-06-16 PROCEDURE — 74018 RADEX ABDOMEN 1 VIEW: CPT

## 2019-06-16 PROCEDURE — 83605 ASSAY OF LACTIC ACID: CPT

## 2019-06-16 PROCEDURE — 85025 COMPLETE CBC W/AUTO DIFF WBC: CPT

## 2019-06-16 PROCEDURE — 87088 URINE BACTERIA CULTURE: CPT

## 2019-06-16 RX ORDER — 0.9 % SODIUM CHLORIDE 0.9 %
1000 INTRAVENOUS SOLUTION INTRAVENOUS ONCE
Status: COMPLETED | OUTPATIENT
Start: 2019-06-16 | End: 2019-06-16

## 2019-06-16 RX ORDER — DOCUSATE SODIUM 100 MG/1
100 CAPSULE, LIQUID FILLED ORAL 2 TIMES DAILY
Qty: 60 CAPSULE | Refills: 0 | Status: SHIPPED | OUTPATIENT
Start: 2019-06-16 | End: 2019-07-16

## 2019-06-16 RX ADMIN — SODIUM CHLORIDE 1000 ML: 9 INJECTION, SOLUTION INTRAVENOUS at 15:44

## 2019-06-16 NOTE — ED PROVIDER NOTES
ED Attending  CC: Izabel     Department of Emergency Medicine   ED  Provider Note  Admit Date/RoomTime: 6/16/2019  3:11 PM  [facility]   Chief Complaint   Constipation (Last BM 6 days ago. Has taken MOM, pills and laxatives but no help)    History of Present Illness   Source of history provided by:  patient. History/Exam Limitations: none. Arlyn Enrique is a 60 y.o. old male with a past medical history of:   Past Medical History:   Diagnosis Date    Acute bacterial conjunctivitis of left eye 9/11/2016    Anticoagulated on Coumadin     per Dr. Jose Juan Hawkins 1 disorder [facility])     goes to [address] [place] Bipolar affect, depressed ([facility] [address]     Cellulitis of face 9/11/2016    Full dentures     Hx of blood clots     DVT, PE hx of    Hypertension     on propanolol / patient states does not have high BP    Impetigo 9/11/2016    Palpitations     Screening     for colonoscopy    Seizure disorder ([facility] [address]     last seizure 2010 / cause unknown    presents to the emergency department by ambulance where the patient received see Ambulance Run Sheet prior to arrival., for difficulty moving bowels, which began 6 day(s) prior to arrival.  Since onset the symptoms have been no BM for 6 days. Stools have been Firm with mild pain with moving bowels. There has been additional symptoms. There has been NO abdominal pain, flank pain, nausea, vomiting, diarrhea, black stool or blood in stool. History of:    [x]   Constipation     []   Narcotic Use     []   Iron Supplement     []   Laxative Use     []   Recent Surgery      ROS    Pertinent positives and negatives are stated within HPI, all other systems reviewed and are negative. Past Surgical History:   Procedure Laterality Date    APPENDECTOMY      COLONOSCOPY  4/11/16   Social History:  reports that he has been smoking cigarettes. He started smoking about 44 years ago. He has a 44.00 pack-year smoking history.  He has never used smokeless tobacco. He 74 - 99 mg/dL    BUN 13 8 - 23 mg/dL    CREATININE 0.9 0.7 - 1.2 mg/dL    GFR Non-African American >60 >=60 mL/min/1.73    GFR African American >60     Calcium 9.5 8.6 - 10.2 mg/dL    Total Protein 7.8 6.4 - 8.3 g/dL    Alb 3.8 3.5 - 5.2 g/dL    Total Bilirubin 0.4 0.0 - 1.2 mg/dL    Alkaline Phosphatase 52 40 - 129 U/L    ALT 24 0 - 40 U/L    AST 29 0 - 39 U/L   CBC Auto Differential   Result Value Ref Range    WBC 10.1 4.5 - 11.5 E9/L    RBC 4.59 3.80 - 5.80 E12/L    Hemoglobin 14.8 12.5 - 16.5 g/dL    Hematocrit 45.4 37.0 - 54.0 %    MCV 98.9 80.0 - 99.9 fL    MCH 32.2 26.0 - 35.0 pg    MCHC 32.6 32.0 - 34.5 %    RDW 13.7 11.5 - 15.0 fL    Platelets 606 188 - 943 E9/L    MPV 9.6 7.0 - 12.0 fL    Neutrophils % 59.6 43.0 - 80.0 %    Immature Granulocytes % 0.4 0.0 - 5.0 %    Lymphocytes % 28.7 20.0 - 42.0 %    Monocytes % 9.2 2.0 - 12.0 %    Eosinophils % 1.5 0.0 - 6.0 %    Basophils % 0.6 0.0 - 2.0 %    Neutrophils # 6.00 1.80 - 7.30 E9/L    Immature Granulocytes # 0.04 E9/L    Lymphocytes # 2.89 1.50 - 4.00 E9/L    Monocytes # 0.93 0.10 - 0.95 E9/L    Eosinophils # 0.15 0.05 - 0.50 E9/L    Basophils # 0.06 0.00 - 0.20 E9/L   Lactic Acid, Plasma   Result Value Ref Range    Lactic Acid 1.8 0.5 - 2.2 mmol/L   Lipase   Result Value Ref Range    Lipase 16 13 - 60 U/L   Urinalysis   Result Value Ref Range    Color, UA Yellow Straw/Yellow    Clarity, UA Clear Clear    Glucose, Ur Negative Negative mg/dL    Bilirubin Urine Negative Negative    Ketones, Urine TRACE (A) Negative mg/dL    Specific Gravity, UA 1.015 1.005 - 1.030    Blood, Urine Negative Negative    pH, UA 5.5 5.0 - 9.0    Protein, UA Negative Negative mg/dL    Urobilinogen, Urine 1.0 <2.0 E.U./dL    Nitrite, Urine Negative Negative    Leukocyte Esterase, Urine Negative Negative     Imaging: All Radiology results interpreted by Radiologist unless otherwise noted.   XR ABDOMEN (KUB) (SINGLE AP VIEW)   Final Result   Gas in the distal small bowel is likely

## 2019-06-16 NOTE — ED NOTES
FIRST PROVIDER CONTACT ASSESSMENT NOTE      Department of Emergency Medicine   6/16/19  3:00 PM    Chief Complaint: Constipation (Last BM 6 days ago. Has taken MOM, pills and laxatives but no help)      History of Present Illness:    Khadra Melara is a 60 y.o. male who presents to the ED by private car for constipation. States last BM was 6 days. Has taken several OTC medications without relief. Denies nausea or vomiting. Focused Screening Exam:  Constitutional:  Alert, appears stated age and is in no distress. *ALLERGIES*     Patient has no known allergies.      ED Triage Vitals [06/16/19 1429]   BP Temp Temp src Pulse Resp SpO2 Height Weight   -- -- -- 100 -- 99 % -- --        Initial Plan of Care:  Initiate Treatment-Testing, Proceed toTreatment Area When Bed Available for ED Attending/MLP to Continue Care    -----------------END OF FIRST PROVIDER CONTACT ASSESSMENT NOTE--------------  Electronically signed by JESSICA Allison   DD: 6/16/19       JESSICA Allison  06/16/19 8584

## 2019-06-17 ENCOUNTER — HOSPITAL ENCOUNTER (EMERGENCY)
Age: 64
Discharge: HOME | End: 2019-06-17
Payer: MEDICARE

## 2019-06-17 VITALS
DIASTOLIC BLOOD PRESSURE: 83 MMHG | RESPIRATION RATE: 18 BRPM | BODY MASS INDEX: 25.76 KG/M2 | TEMPERATURE: 97.4 F | HEART RATE: 78 BPM | HEIGHT: 68 IN | SYSTOLIC BLOOD PRESSURE: 131 MMHG | OXYGEN SATURATION: 97 % | WEIGHT: 170 LBS

## 2019-06-17 DIAGNOSIS — K59.00 CONSTIPATION, UNSPECIFIED CONSTIPATION TYPE: Primary | ICD-10-CM

## 2019-06-17 PROCEDURE — 99283 EMERGENCY DEPT VISIT LOW MDM: CPT

## 2019-06-17 PROCEDURE — 6370000000 HC RX 637 (ALT 250 FOR IP): Performed by: NURSE PRACTITIONER

## 2019-06-17 RX ORDER — MAGNESIUM CARB/ALUMINUM HYDROX 105-160MG
296 TABLET,CHEWABLE ORAL ONCE
Qty: 300 ML | Refills: 0 | Status: SHIPPED | OUTPATIENT
Start: 2019-06-17 | End: 2019-06-17

## 2019-06-17 RX ORDER — MAGNESIUM CARB/ALUMINUM HYDROX 105-160MG
30 TABLET,CHEWABLE ORAL ONCE
Status: COMPLETED | OUTPATIENT
Start: 2019-06-17 | End: 2019-06-17

## 2019-06-17 RX ORDER — MAGNESIUM CARB/ALUMINUM HYDROX 105-160MG
30 TABLET,CHEWABLE ORAL DAILY PRN
Qty: 360 ML | Refills: 0 | Status: SHIPPED | OUTPATIENT
Start: 2019-06-17 | End: 2019-08-21

## 2019-06-17 RX ADMIN — MAGNESIUM CITRATE 296 ML: 1.75 LIQUID ORAL at 12:07

## 2019-06-17 RX ADMIN — MINERAL OIL 30 ML: 471.95 OIL ORAL at 12:45

## 2019-06-17 RX ADMIN — Medication: at 13:45

## 2019-06-17 ASSESSMENT — PAIN SCALES - GENERAL
PAINLEVEL_OUTOF10: 4
PAINLEVEL_OUTOF10: 6

## 2019-06-17 ASSESSMENT — PAIN DESCRIPTION - PAIN TYPE
TYPE: ACUTE PAIN
TYPE: ACUTE PAIN

## 2019-06-17 ASSESSMENT — PAIN DESCRIPTION - FREQUENCY: FREQUENCY: CONTINUOUS

## 2019-06-17 ASSESSMENT — PAIN DESCRIPTION - LOCATION
LOCATION: RECTUM
LOCATION: ABDOMEN

## 2019-06-17 ASSESSMENT — PAIN DESCRIPTION - DESCRIPTORS: DESCRIPTORS: ACHING

## 2019-06-17 NOTE — ED PROVIDER NOTES
[facility]     Department of Emergency Medicine   ED  Provider Note  Admit Date/RoomTime: 6/17/2019 11:45 AM  ED Room: 37/37   Chief Complaint   Constipation (seen here yesterday for the same states he does not think they got it all )    History of Present Illness   Source of history provided by:  patient. History/Exam Limitations: none. Mio Albarran is a 60 y.o. old male with a past medical history of:   Past Medical History:   Diagnosis Date    Acute bacterial conjunctivitis of left eye 9/11/2016    Anticoagulated on Coumadin     per Dr. Rohit Heredia 1 disorder [facility])     [facility]es to [address] [facility] Bipolar affect, depressed ([facility] [address]     Cellulitis of face 9/11/2016    Full dentures     Hx of blood clots     DVT, PE hx of    Hypertension     on propanolol / patient states does not have high BP    Impetigo 9/11/2016    Palpitations     Screening     for colonoscopy    Seizure disorder ([facility] [address]     last seizure 2010 / cause unknown    presents to the emergency department by private vehicle, for abdominal pain and difficulty moving bowels, which began 4 day(s) prior to arrival.  Since onset the symptoms have been constant and no BM for 1 week. Stools have been Firm with moderate pain with moving bowels. There has been additional symptoms of abdominal pain, constipation and pain with BM. There has been NO nausea or vomiting. History of:    []   Constipation     []   Narcotic Use     []   Iron Supplement     []   Laxative Use     []   Recent Surgery      ROS    Pertinent positives and negatives are stated within HPI, all other systems reviewed and are negative. Past Surgical History:   Procedure Laterality Date    APPENDECTOMY      COLONOSCOPY  4/11/16   Social History:  reports that he has been smoking cigarettes. He started smoking about 44 years ago. He has a 44.00 pack-year smoking history. He has never used smokeless tobacco. He reports that he drinks alcohol.  He reports that Patient was seen yesterday for the same and was given stool softeners and an enema. Today the patient was treated with mineral oil and mag citrate in addition to enemas. He tolerated it well and had a large BM with much relief. Patient discharged home to follow up with his PCP. Counseling: The emergency provider has spoken with the patient and discussed todays results, in addition to providing specific details for the plan of care and counseling regarding the diagnosis and prognosis. Questions are answered at this time and they are agreeable with the plan. Assessment      1. Constipation, unspecified constipation type      Plan   Discharge to home  Patient condition is stable    New Medications     Discharge Medication List as of 6/17/2019  3:52 PM      START taking these medications    Details   mineral oil liquid Take 30 mLs by mouth daily as needed for Constipation, Oral, DAILY PRN Starting Mon 6/17/2019, Disp-360 mL, R-0, Print      Magnesium Citrate 1.745 GM/30ML solution Take 296 mLs by mouth once for 1 dose, Disp-300 mL, R-0Print           Electronically signed by FLORESITA Feliciano CNP   DD: 6/17/19  **This report was transcribed using voice recognition software. Every effort was made to ensure accuracy; however, inadvertent computerized transcription errors may be present.   END OF ED PROVIDER NOTE     FLORESITA Feliciano CNP  06/19/19 2441

## 2019-06-18 ENCOUNTER — CARE COORDINATION (OUTPATIENT)
Dept: CARE COORDINATION | Age: 64
End: 2019-06-18

## 2019-06-18 LAB — URINE CULTURE, ROUTINE: NORMAL

## 2019-06-18 NOTE — CARE COORDINATION
Ambulatory Care Coordination ED Follow up Call       Reason for ED Visit:  Constipation   Care Management Risk Score: CMRS 1  How are you feeling? :     improved  Patient Reports the following:  Patient reports he's better. Everything is ok. No further impaction. Having regular bowel movements. Did you call your PCP prior to going to the ED? No          Post Discharge Status:  What health concerns since you left the Emergency Room? None    Do you have wounds that you are caring for at home? N/A    Do you have a follow up appt scheduled? yes    Review of Instructions:                                 Do you have any questions regarding your discharge instructions?:  No  Medications:    What questions do you have about your medications? None  Are you taking your medications as directed? If not - why? Yes   Can you afford your medications? yes  ADLS:  Do you need assistance of any kind at home? No   What assistance is needed? None      FU appts/Provider:    Future Appointments   Date Time Provider Renate Choudhary   6/19/2019 10:00 AM Chau Calvillo MD [facility]   6/24/2019  2:00 PM [facility] CT SCAN 3 SEYZ CT [facility] Radiol[facility]   11/20/2019  1:15 PM Chau Calvillo MD [address] Maintenance Due   Topic Date Due    Hepatitis C screen  1955    Pneumococcal 0-64 years Vaccine (1 of 1 - PPSV23) 04/20/1963    DTaP/Tdap/Td vaccine (1 - Tdap) 04/20/1976    Low dose CT lung screening  05/03/2016    Shingles Vaccine (2 of 3) 05/30/2018     Patient advised to contact PCP office to have HM items/records faxed to PCP Office directly?   N/A

## 2019-06-19 ENCOUNTER — OFFICE VISIT (OUTPATIENT)
Dept: PRIMARY CARE CLINIC | Age: 64
End: 2019-06-19
Payer: MEDICARE

## 2019-06-19 ENCOUNTER — ANTI-COAG VISIT (OUTPATIENT)
Dept: PRIMARY CARE CLINIC | Age: 64
End: 2019-06-19

## 2019-06-19 VITALS
RESPIRATION RATE: 18 BRPM | OXYGEN SATURATION: 98 % | TEMPERATURE: 98.3 F | BODY MASS INDEX: 26.07 KG/M2 | SYSTOLIC BLOOD PRESSURE: 102 MMHG | HEIGHT: 68 IN | DIASTOLIC BLOOD PRESSURE: 58 MMHG | HEART RATE: 98 BPM | WEIGHT: 172 LBS

## 2019-06-19 DIAGNOSIS — K59.00 CONSTIPATION, UNSPECIFIED CONSTIPATION TYPE: Primary | ICD-10-CM

## 2019-06-19 LAB — INR BLD: 2.5

## 2019-06-19 PROCEDURE — 99213 OFFICE O/P EST LOW 20 MIN: CPT | Performed by: FAMILY MEDICINE

## 2019-06-19 RX ORDER — PALIPERIDONE 3 MG/1
3 TABLET, EXTENDED RELEASE ORAL EVERY MORNING
COMMUNITY
End: 2019-11-20

## 2019-06-19 RX ORDER — BENZTROPINE MESYLATE 1 MG/1
2 TABLET ORAL NIGHTLY
Status: ON HOLD | COMMUNITY
End: 2022-06-02 | Stop reason: HOSPADM

## 2019-06-19 RX ORDER — PAROXETINE HYDROCHLORIDE 40 MG/1
40 TABLET, FILM COATED ORAL NIGHTLY
Status: ON HOLD | COMMUNITY
End: 2021-05-17 | Stop reason: HOSPADM

## 2019-06-19 NOTE — PATIENT INSTRUCTIONS
Drink 2L (64oz) of water daily. Try to limit your macaroni and cheese intake. Increase fiber--raw vegetables, fruits, whole grains. Take the colace twice daily as given (call for a refill when about to run out). Patient Education        Constipation: Care Instructions  Your Care Instructions    Constipation means that you have a hard time passing stools (bowel movements). People pass stools from 3 times a day to once every 3 days. What is normal for you may be different. Constipation may occur with pain in the rectum and cramping. The pain may get worse when you try to pass stools. Sometimes there are small amounts of bright red blood on toilet paper or the surface of stools. This is because of enlarged veins near the rectum (hemorrhoids). A few changes in your diet and lifestyle may help you avoid ongoing constipation. Your doctor may also prescribe medicine to help loosen your stool. Some medicines can cause constipation. These include pain medicines and antidepressants. Tell your doctor about all the medicines you take. Your doctor may want to make a medicine change to ease your symptoms. Follow-up care is a key part of your treatment and safety. Be sure to make and go to all appointments, and call your doctor if you are having problems. It's also a good idea to know your test results and keep a list of the medicines you take. How can you care for yourself at home? · Drink plenty of fluids, enough so that your urine is light yellow or clear like water. If you have kidney, heart, or liver disease and have to limit fluids, talk with your doctor before you increase the amount of fluids you drink. · Include high-fiber foods in your diet each day. These include fruits, vegetables, beans, and whole grains. · Get at least 30 minutes of exercise on most days of the week. Walking is a good choice.  You also may want to do other activities, such as running, swimming, cycling, or playing tennis or team

## 2019-06-19 NOTE — PROGRESS NOTES
Spoke with Samira at the [facility],  Patient is to continue coumadin 1.5 mg qd and recheck 6/26/19.   Also faxed order to Samira at her request.

## 2019-06-19 NOTE — PROGRESS NOTES
Chief Complaint   Patient presents with    Fecal Impaction     ER/FOLLOW-UP       HPI:  Jose Sheridan presents to the office for ER follow up. Patient was seen in the ER for constipation. Since being discharged from the ER symptoms have been Resolved. Any prescribed medications are being taken--colace BID. Discharge instructions and any medication changes were again reviewed. Here with . +manual disimpaction, soap suds enema  +large bowel movement with a large mess  Staying in a group home, +excess macaroni and cheese lately  +psychotropic meds  +minimal water intake  Taking coumadin as prescribed    Patient's past medical, surgical, social and/or family history reviewed, updated in chart, and are non-contributory (unless otherwise stated). Medications and allergies also reviewed and updated in chart. Review of Systems:  Constitutional:  No fever, no fatigue, no chills, no headaches, no weight change  Dermatology:  No rash, no mole, no dry or sensitive skin  Cardiology:  No chest pain, no palpitations, no leg edema, no shortness of breath  Gastroenterology:  No dysphagia, no abdominal pain, no nausea, no vomiting, + constipation, no diarrhea, no heartburn  Respiratory:  No shortness of breath, no orthopnea, no wheezing, no MELENDEZ  Urology:  No blood in the urine, no urinary frequency, no urinary incontinence, no urinary urgency, no nocturia, no dysuria    Vitals:    06/19/19 0859   BP: (!) 102/58   Site: Left Upper Arm   Position: Sitting   Cuff Size: Medium Adult   Pulse: 98   Resp: 18   Temp: 98.3 °F (36.8 °C)   TempSrc: Tympanic   SpO2: 98%   Weight: 172 lb (78 kg)   Height: 5' 8\" (1.727 m)     Body mass index is 26.15 kg/m².   General:  Patient alert and oriented x 3, NAD, pleasant  HEENT:  Atraumatic, normocephalic, pupils equal and normal, EOMI, clear conjunctiva  Neck:  Supple  Lungs:  CTA B, symmetric breath sounds, no resp distress  Heart:  RRR, no murmurs, gallops or rubs  Abdomen:  Soft/nt/nd,

## 2019-06-24 ENCOUNTER — HOSPITAL ENCOUNTER (OUTPATIENT)
Dept: CT IMAGING | Age: 64
Discharge: HOME | End: 2019-06-26
Payer: MEDICARE

## 2019-06-24 DIAGNOSIS — Z87.891 PERSONAL HISTORY OF TOBACCO USE: ICD-10-CM

## 2019-06-24 PROCEDURE — G0297 LDCT FOR LUNG CA SCREEN: HCPCS

## 2019-06-26 ENCOUNTER — ANTI-COAG VISIT (OUTPATIENT)
Dept: PRIMARY CARE CLINIC | Age: 64
End: 2019-06-26

## 2019-06-26 LAB — INR BLD: 2.5

## 2019-06-27 ENCOUNTER — TELEPHONE (OUTPATIENT)
Dept: PRIMARY CARE CLINIC | Age: 64
End: 2019-06-27

## 2019-06-27 DIAGNOSIS — R93.89 ABNORMAL CT OF THE CHEST: Primary | ICD-10-CM

## 2019-07-07 ENCOUNTER — HOSPITAL ENCOUNTER (EMERGENCY)
Age: 64
Discharge: HOME | End: 2019-07-07
Payer: MEDICARE

## 2019-07-07 VITALS
OXYGEN SATURATION: 94 % | WEIGHT: 172 LBS | TEMPERATURE: 97.6 F | HEART RATE: 97 BPM | SYSTOLIC BLOOD PRESSURE: 106 MMHG | RESPIRATION RATE: 16 BRPM | BODY MASS INDEX: 26.07 KG/M2 | HEIGHT: 68 IN | DIASTOLIC BLOOD PRESSURE: 69 MMHG

## 2019-07-07 DIAGNOSIS — L02.91 ABSCESS: Primary | ICD-10-CM

## 2019-07-07 PROCEDURE — 26010 DRAINAGE OF FINGER ABSCESS: CPT

## 2019-07-07 PROCEDURE — 99282 EMERGENCY DEPT VISIT SF MDM: CPT

## 2019-07-07 RX ORDER — CEPHALEXIN 500 MG/1
500 CAPSULE ORAL 4 TIMES DAILY
Qty: 40 CAPSULE | Refills: 0 | Status: SHIPPED | OUTPATIENT
Start: 2019-07-07 | End: 2019-08-21 | Stop reason: ALTCHOICE

## 2019-07-07 ASSESSMENT — PAIN DESCRIPTION - PAIN TYPE: TYPE: ACUTE PAIN

## 2019-07-07 ASSESSMENT — PAIN DESCRIPTION - DESCRIPTORS: DESCRIPTORS: ACHING

## 2019-07-07 ASSESSMENT — PAIN DESCRIPTION - PROGRESSION: CLINICAL_PROGRESSION: GRADUALLY WORSENING

## 2019-07-07 ASSESSMENT — PAIN DESCRIPTION - ORIENTATION: ORIENTATION: RIGHT

## 2019-07-07 ASSESSMENT — PAIN DESCRIPTION - LOCATION: LOCATION: FINGER (COMMENT WHICH ONE)

## 2019-07-07 ASSESSMENT — PAIN DESCRIPTION - FREQUENCY: FREQUENCY: INTERMITTENT

## 2019-07-07 ASSESSMENT — PAIN SCALES - WONG BAKER: WONGBAKER_NUMERICALRESPONSE: 2

## 2019-07-08 ENCOUNTER — TELEPHONE (OUTPATIENT)
Dept: PRIMARY CARE CLINIC | Age: 64
End: 2019-07-08

## 2019-07-08 ENCOUNTER — TELEPHONE (OUTPATIENT)
Dept: ADMINISTRATIVE | Age: 64
End: 2019-07-08

## 2019-07-08 ENCOUNTER — CARE COORDINATION (OUTPATIENT)
Dept: CARE COORDINATION | Age: 64
End: 2019-07-08

## 2019-07-08 ENCOUNTER — ANTI-COAG VISIT (OUTPATIENT)
Dept: PRIMARY CARE CLINIC | Age: 64
End: 2019-07-08

## 2019-07-08 DIAGNOSIS — I26.99 OTHER ACUTE PULMONARY EMBOLISM WITHOUT ACUTE COR PULMONALE (HCC): ICD-10-CM

## 2019-07-08 DIAGNOSIS — Z79.01 CHRONIC ANTICOAGULATION: ICD-10-CM

## 2019-07-08 LAB — INR BLD: 2.8

## 2019-07-08 RX ORDER — WARFARIN SODIUM 3 MG/1
TABLET ORAL
Qty: 90 TABLET | Refills: 1 | Status: SHIPPED | OUTPATIENT
Start: 2019-07-08 | End: 2019-09-04 | Stop reason: SDUPTHER

## 2019-07-08 NOTE — TELEPHONE ENCOUNTER
Julia from [facility] phoned stated PT/INR was drawn on 7/3/2019. Advised no one phoned [place]. She will call today, so we can get report and give instructions. Verbal PT/INR 2.8. Awaiting report.

## 2019-07-09 ENCOUNTER — TELEPHONE (OUTPATIENT)
Dept: CASE MANAGEMENT | Age: 64
End: 2019-07-09

## 2019-07-10 ENCOUNTER — ANTI-COAG VISIT (OUTPATIENT)
Dept: PRIMARY CARE CLINIC | Age: 64
End: 2019-07-10

## 2019-07-10 LAB — INR BLD: 1.3

## 2019-07-12 ENCOUNTER — TELEPHONE (OUTPATIENT)
Dept: ADMINISTRATIVE | Age: 64
End: 2019-07-12

## 2019-07-16 ENCOUNTER — TELEPHONE (OUTPATIENT)
Dept: PRIMARY CARE CLINIC | Age: 64
End: 2019-07-16

## 2019-07-16 NOTE — TELEPHONE ENCOUNTER
Order placed for the Bactroban. If it doesn't heal or gets worse, then he'll need to be seen. Thanks.

## 2019-07-17 ENCOUNTER — TELEPHONE (OUTPATIENT)
Dept: PRIMARY CARE CLINIC | Age: 64
End: 2019-07-17

## 2019-07-17 ENCOUNTER — ANTI-COAG VISIT (OUTPATIENT)
Dept: PRIMARY CARE CLINIC | Age: 64
End: 2019-07-17

## 2019-07-17 LAB — INR BLD: 2

## 2019-07-24 ENCOUNTER — ANTI-COAG VISIT (OUTPATIENT)
Dept: PRIMARY CARE CLINIC | Age: 64
End: 2019-07-24

## 2019-07-24 LAB — INR BLD: 2.2

## 2019-07-24 NOTE — PROGRESS NOTES
Continue same dosing with Sundays 3mg and 1.5mg daily the remaining days of the week.   Repeat INR in 1 week

## 2019-07-31 ENCOUNTER — ANTI-COAG VISIT (OUTPATIENT)
Dept: PRIMARY CARE CLINIC | Age: 64
End: 2019-07-31

## 2019-07-31 LAB — INR BLD: 2.3

## 2019-08-07 ENCOUNTER — ANTI-COAG VISIT (OUTPATIENT)
Dept: PRIMARY CARE CLINIC | Age: 64
End: 2019-08-07

## 2019-08-07 LAB — INR BLD: 2.6

## 2019-08-13 ENCOUNTER — ANTI-COAG VISIT (OUTPATIENT)
Dept: PRIMARY CARE CLINIC | Age: 64
End: 2019-08-13

## 2019-08-15 ENCOUNTER — ANTI-COAG VISIT (OUTPATIENT)
Dept: PRIMARY CARE CLINIC | Age: 64
End: 2019-08-15

## 2019-08-15 LAB — INR BLD: 3.4

## 2019-08-15 NOTE — PROGRESS NOTES
Hold today's dose. Continue same dosing with Sundays 3mg and 1.5mg daily the remaining days of the week.   Repeat INR in 1 week

## 2019-08-21 ENCOUNTER — OFFICE VISIT (OUTPATIENT)
Dept: PRIMARY CARE CLINIC | Age: 64
End: 2019-08-21
Payer: MEDICARE

## 2019-08-21 VITALS
DIASTOLIC BLOOD PRESSURE: 68 MMHG | TEMPERATURE: 96.8 F | HEART RATE: 56 BPM | SYSTOLIC BLOOD PRESSURE: 118 MMHG | WEIGHT: 167.8 LBS | BODY MASS INDEX: 25.43 KG/M2 | HEIGHT: 68 IN | OXYGEN SATURATION: 98 % | RESPIRATION RATE: 12 BRPM

## 2019-08-21 DIAGNOSIS — R07.9 CHEST PAIN, UNSPECIFIED TYPE: ICD-10-CM

## 2019-08-21 DIAGNOSIS — N39.44 NOCTURNAL ENURESIS: Primary | ICD-10-CM

## 2019-08-21 LAB
BILIRUBIN, POC: NORMAL
BLOOD URINE, POC: NEGATIVE
CLARITY, POC: CLEAR
COLOR, POC: YELLOW
GLUCOSE URINE, POC: NEGATIVE
KETONES, POC: NORMAL
LEUKOCYTE EST, POC: NEGATIVE
NITRITE, POC: NEGATIVE
PH, POC: NORMAL
PROTEIN, POC: NEGATIVE
SPECIFIC GRAVITY, POC: NORMAL
UROBILINOGEN, POC: NORMAL

## 2019-08-21 PROCEDURE — 93000 ELECTROCARDIOGRAM COMPLETE: CPT | Performed by: FAMILY MEDICINE

## 2019-08-21 PROCEDURE — 81002 URINALYSIS NONAUTO W/O SCOPE: CPT | Performed by: FAMILY MEDICINE

## 2019-08-21 PROCEDURE — 99214 OFFICE O/P EST MOD 30 MIN: CPT | Performed by: FAMILY MEDICINE

## 2019-08-21 RX ORDER — DOCUSATE SODIUM 100 MG/1
100 CAPSULE, LIQUID FILLED ORAL 2 TIMES DAILY
COMMUNITY
End: 2020-02-11 | Stop reason: ALTCHOICE

## 2019-08-22 ENCOUNTER — ANTI-COAG VISIT (OUTPATIENT)
Dept: PRIMARY CARE CLINIC | Age: 64
End: 2019-08-22

## 2019-08-22 LAB — INR BLD: 1.5

## 2019-08-26 NOTE — PROGRESS NOTES
Body mass index is 25.51 kg/m². General:  Patient alert and oriented x 3, NAD, pleasant, +slightlly sleepy  HEENT:  Atraumatic, normocephalic, pupils equal and normal, EOMI, clear conjunctiva  Neck:  Supple, no goiter, no carotid bruits, no LAD  Lungs:  CTA B, symmetric breath sounds, no resp distress  Heart:  RRR, no murmurs, gallops or rubs  Abdomen:  Soft/nt/nd, + bowel sounds, no CVA tenderness  THEE done with chaperone, no masses, no tenderness, normal tone, prostate slightly enlarged with no irregularities   Extremities:  No clubbing, cyanosis or edema  Skin: unremarkable    EKG: Sinus bradycardia, normal axis, otherwise normal  POCT UA: Neg blood/LE/nitrites    Assessment/Plan:      Adrian Castro was seen today for incontinence and health maintenance. Diagnoses and all orders for this visit:    Nocturnal enuresis  -     POCT Urinalysis no Micro--negative  -     CA MEASUREMENT,POST-VOID RESIDUAL VOLUME BY US,NON-IMAGING  - Will check PVR.  - Encouraged him to decrease caffeine intake especially later in the day, increase water intake. - May be related to psych medications--causing a heavy sleep. Chest pain, unspecified type  -     EKG 12 Lead--sinus bradycardia, no AV block  - Likely related to GERD    Encouraged him to talk about his memory concerns with his psychiatrist--multiple meds that he is taking can cause \"fogginess\" and memory issues. Risk of stopping any of his meds may outweigh the benefits. As above. Call or go to ED immediately if symptoms worsen or persist.  Return in about 4 weeks (around 9/18/2019) for enuresis. , or sooner if necessary. Spent 25 minutes with patient of which greater than 50% was spent counseling regarding the above issues. Educational materials and/or home exercises printed for patient's review and were included in patient instructions on his/her After Visit Summary and given to patient at the end of visit.       Counseled regarding above diagnosis, including no

## 2019-08-28 ENCOUNTER — ANTI-COAG VISIT (OUTPATIENT)
Dept: PRIMARY CARE CLINIC | Age: 64
End: 2019-08-28

## 2019-08-28 ENCOUNTER — HOSPITAL ENCOUNTER (OUTPATIENT)
Dept: ULTRASOUND IMAGING | Age: 64
Discharge: HOME | End: 2019-08-30
Payer: MEDICARE

## 2019-08-28 DIAGNOSIS — N39.44 NOCTURNAL ENURESIS: ICD-10-CM

## 2019-08-28 LAB — INR BLD: 1.6

## 2019-08-28 PROCEDURE — 76775 US EXAM ABDO BACK WALL LIM: CPT

## 2019-09-04 ENCOUNTER — ANTI-COAG VISIT (OUTPATIENT)
Dept: PRIMARY CARE CLINIC | Age: 64
End: 2019-09-04

## 2019-09-04 DIAGNOSIS — I26.99 OTHER ACUTE PULMONARY EMBOLISM WITHOUT ACUTE COR PULMONALE (HCC): ICD-10-CM

## 2019-09-04 DIAGNOSIS — Z79.01 CHRONIC ANTICOAGULATION: ICD-10-CM

## 2019-09-04 LAB — INR BLD: 1.9

## 2019-09-04 RX ORDER — WARFARIN SODIUM 3 MG/1
TABLET ORAL
Qty: 15 TABLET | Refills: 5 | Status: SHIPPED | OUTPATIENT
Start: 2019-09-04 | End: 2019-09-25

## 2019-09-04 NOTE — PROGRESS NOTES
Increase Fridays to 3mg. Continue same dosing with Sundays and Thursdays 3mg (and Fridays); and 1.5mg daily the remaining days of the week.   Repeat INR in 1 week

## 2019-09-11 ENCOUNTER — ANTI-COAG VISIT (OUTPATIENT)
Dept: PRIMARY CARE CLINIC | Age: 64
End: 2019-09-11

## 2019-09-11 LAB — INR BLD: 3

## 2019-09-11 NOTE — PROGRESS NOTES
Continue same dosing with Sundays, Thursdays, and Fridays 3mg; and 1.5mg daily the remaining days of the week.   Repeat INR in 1 week

## 2019-09-18 ENCOUNTER — ANTI-COAG VISIT (OUTPATIENT)
Dept: PRIMARY CARE CLINIC | Age: 64
End: 2019-09-18

## 2019-09-18 LAB — INR BLD: 3

## 2019-09-25 ENCOUNTER — TELEPHONE (OUTPATIENT)
Dept: PRIMARY CARE CLINIC | Age: 64
End: 2019-09-25

## 2019-09-25 ENCOUNTER — ANTI-COAG VISIT (OUTPATIENT)
Dept: PRIMARY CARE CLINIC | Age: 64
End: 2019-09-25

## 2019-09-25 DIAGNOSIS — Z79.01 CHRONIC ANTICOAGULATION: ICD-10-CM

## 2019-09-25 DIAGNOSIS — I26.99 OTHER ACUTE PULMONARY EMBOLISM WITHOUT ACUTE COR PULMONALE (HCC): ICD-10-CM

## 2019-09-25 LAB — INR BLD: 3.6

## 2019-09-25 RX ORDER — WARFARIN SODIUM 2 MG/1
2 TABLET ORAL DAILY
Qty: 30 TABLET | Refills: 5 | Status: SHIPPED | OUTPATIENT
Start: 2019-09-25 | End: 2019-11-14 | Stop reason: SDUPTHER

## 2019-09-25 NOTE — PROGRESS NOTES
9/25/2019    Spoke with Milagro Tobin [phone], advised 1.5 mg today, then tomorrow start 2 mg daily. Redraw 10/2/2019.

## 2019-10-02 ENCOUNTER — ANTI-COAG VISIT (OUTPATIENT)
Dept: PRIMARY CARE CLINIC | Age: 64
End: 2019-10-02

## 2019-10-02 LAB — INR BLD: 2.6

## 2019-10-09 ENCOUNTER — ANTI-COAG VISIT (OUTPATIENT)
Dept: PRIMARY CARE CLINIC | Age: 64
End: 2019-10-09

## 2019-10-09 LAB — INR BLD: 2.7

## 2019-10-16 ENCOUNTER — ANTI-COAG VISIT (OUTPATIENT)
Dept: PRIMARY CARE CLINIC | Age: 64
End: 2019-10-16

## 2019-10-16 LAB — INR BLD: 2.6

## 2019-10-23 ENCOUNTER — ANTI-COAG VISIT (OUTPATIENT)
Dept: PRIMARY CARE CLINIC | Age: 64
End: 2019-10-23

## 2019-10-23 LAB — INR BLD: 3.8

## 2019-10-30 ENCOUNTER — ANTI-COAG VISIT (OUTPATIENT)
Dept: PRIMARY CARE CLINIC | Age: 64
End: 2019-10-30

## 2019-10-30 LAB — INR BLD: 2.6

## 2019-11-06 ENCOUNTER — ANTI-COAG VISIT (OUTPATIENT)
Dept: PRIMARY CARE CLINIC | Age: 64
End: 2019-11-06

## 2019-11-06 ENCOUNTER — TELEPHONE (OUTPATIENT)
Dept: PRIMARY CARE CLINIC | Age: 64
End: 2019-11-06

## 2019-11-06 LAB — INR BLD: 1.4

## 2019-11-13 ENCOUNTER — ANTI-COAG VISIT (OUTPATIENT)
Dept: PRIMARY CARE CLINIC | Age: 64
End: 2019-11-13

## 2019-11-13 LAB — INR BLD: 1.6

## 2019-11-14 DIAGNOSIS — I26.99 OTHER ACUTE PULMONARY EMBOLISM WITHOUT ACUTE COR PULMONALE (HCC): ICD-10-CM

## 2019-11-14 DIAGNOSIS — Z79.01 CHRONIC ANTICOAGULATION: ICD-10-CM

## 2019-11-14 RX ORDER — WARFARIN SODIUM 3 MG/1
3 TABLET ORAL DAILY
Qty: 30 TABLET | Refills: 1 | Status: SHIPPED | OUTPATIENT
Start: 2019-11-14 | End: 2020-01-29 | Stop reason: ALTCHOICE

## 2019-11-14 RX ORDER — WARFARIN SODIUM 2 MG/1
2 TABLET ORAL DAILY
Qty: 30 TABLET | Refills: 5 | Status: SHIPPED | OUTPATIENT
Start: 2019-11-14 | End: 2020-01-09 | Stop reason: SDUPTHER

## 2019-11-20 ENCOUNTER — ANTI-COAG VISIT (OUTPATIENT)
Dept: PRIMARY CARE CLINIC | Age: 64
End: 2019-11-20

## 2019-11-20 ENCOUNTER — OFFICE VISIT (OUTPATIENT)
Dept: PRIMARY CARE CLINIC | Age: 64
End: 2019-11-20
Payer: MEDICARE

## 2019-11-20 VITALS
DIASTOLIC BLOOD PRESSURE: 70 MMHG | HEIGHT: 68 IN | BODY MASS INDEX: 25.63 KG/M2 | WEIGHT: 169.12 LBS | HEART RATE: 88 BPM | SYSTOLIC BLOOD PRESSURE: 106 MMHG | TEMPERATURE: 98.4 F | OXYGEN SATURATION: 97 %

## 2019-11-20 DIAGNOSIS — F31.64 BIPOLAR AFFECTIVE DISORDER, MIXED, SEVERE, WITH PSYCHOTIC BEHAVIOR (HCC): Primary | ICD-10-CM

## 2019-11-20 DIAGNOSIS — E78.5 HYPERLIPIDEMIA, UNSPECIFIED HYPERLIPIDEMIA TYPE: ICD-10-CM

## 2019-11-20 DIAGNOSIS — E55.9 VITAMIN D DEFICIENCY: ICD-10-CM

## 2019-11-20 DIAGNOSIS — Z72.0 TOBACCO ABUSE: ICD-10-CM

## 2019-11-20 DIAGNOSIS — S39.012A LUMBAR STRAIN, INITIAL ENCOUNTER: ICD-10-CM

## 2019-11-20 DIAGNOSIS — Z79.01 WARFARIN ANTICOAGULATION: ICD-10-CM

## 2019-11-20 DIAGNOSIS — R93.89 ABNORMAL CT OF THE CHEST: ICD-10-CM

## 2019-11-20 DIAGNOSIS — R73.01 ELEVATED FASTING GLUCOSE: ICD-10-CM

## 2019-11-20 DIAGNOSIS — Z12.5 SCREENING FOR PROSTATE CANCER: ICD-10-CM

## 2019-11-20 DIAGNOSIS — Z23 NEEDS FLU SHOT: ICD-10-CM

## 2019-11-20 DIAGNOSIS — Z23 NEED FOR PROPHYLACTIC VACCINATION AGAINST STREPTOCOCCUS PNEUMONIAE (PNEUMOCOCCUS): ICD-10-CM

## 2019-11-20 DIAGNOSIS — D68.59 HYPERCOAGULABLE STATE (HCC): ICD-10-CM

## 2019-11-20 LAB — INR BLD: 2.5

## 2019-11-20 PROCEDURE — 90686 IIV4 VACC NO PRSV 0.5 ML IM: CPT | Performed by: FAMILY MEDICINE

## 2019-11-20 PROCEDURE — G8419 CALC BMI OUT NRM PARAM NOF/U: HCPCS | Performed by: FAMILY MEDICINE

## 2019-11-20 PROCEDURE — G0009 ADMIN PNEUMOCOCCAL VACCINE: HCPCS | Performed by: FAMILY MEDICINE

## 2019-11-20 PROCEDURE — 4004F PT TOBACCO SCREEN RCVD TLK: CPT | Performed by: FAMILY MEDICINE

## 2019-11-20 PROCEDURE — G8427 DOCREV CUR MEDS BY ELIG CLIN: HCPCS | Performed by: FAMILY MEDICINE

## 2019-11-20 PROCEDURE — G0008 ADMIN INFLUENZA VIRUS VAC: HCPCS | Performed by: FAMILY MEDICINE

## 2019-11-20 PROCEDURE — 99214 OFFICE O/P EST MOD 30 MIN: CPT | Performed by: FAMILY MEDICINE

## 2019-11-20 PROCEDURE — G8482 FLU IMMUNIZE ORDER/ADMIN: HCPCS | Performed by: FAMILY MEDICINE

## 2019-11-20 PROCEDURE — 3017F COLORECTAL CA SCREEN DOC REV: CPT | Performed by: FAMILY MEDICINE

## 2019-11-20 PROCEDURE — 90732 PPSV23 VACC 2 YRS+ SUBQ/IM: CPT | Performed by: FAMILY MEDICINE

## 2019-11-27 ENCOUNTER — ANTI-COAG VISIT (OUTPATIENT)
Dept: PRIMARY CARE CLINIC | Age: 64
End: 2019-11-27

## 2019-11-27 LAB — INR BLD: 4.6

## 2019-12-04 ENCOUNTER — ANTI-COAG VISIT (OUTPATIENT)
Dept: PRIMARY CARE CLINIC | Age: 64
End: 2019-12-04

## 2019-12-05 ENCOUNTER — ANTI-COAG VISIT (OUTPATIENT)
Dept: PRIMARY CARE CLINIC | Age: 64
End: 2019-12-05

## 2019-12-05 LAB — INR BLD: 6.1

## 2019-12-12 ENCOUNTER — ANTI-COAG VISIT (OUTPATIENT)
Dept: PRIMARY CARE CLINIC | Age: 64
End: 2019-12-12

## 2019-12-12 LAB — INR BLD: 8

## 2019-12-12 RX ORDER — WARFARIN SODIUM 1 MG/1
1 TABLET ORAL DAILY
Qty: 30 TABLET | Refills: 3 | Status: SHIPPED | OUTPATIENT
Start: 2019-12-12 | End: 2020-01-09 | Stop reason: SDUPTHER

## 2019-12-17 ENCOUNTER — ANTI-COAG VISIT (OUTPATIENT)
Dept: PRIMARY CARE CLINIC | Age: 64
End: 2019-12-17

## 2019-12-17 ENCOUNTER — TELEPHONE (OUTPATIENT)
Dept: PRIMARY CARE CLINIC | Age: 64
End: 2019-12-17

## 2019-12-17 LAB — INR BLD: 3.3

## 2019-12-20 ENCOUNTER — ANTI-COAG VISIT (OUTPATIENT)
Dept: PRIMARY CARE CLINIC | Age: 64
End: 2019-12-20

## 2019-12-20 LAB — INR BLD: 5.6

## 2019-12-26 ENCOUNTER — ANTI-COAG VISIT (OUTPATIENT)
Dept: PRIMARY CARE CLINIC | Age: 64
End: 2019-12-26

## 2019-12-26 ENCOUNTER — ANTI-COAG VISIT (OUTPATIENT)
Dept: PRIMARY CARE CLINIC | Age: 64
End: 2019-12-26
Payer: MEDICARE

## 2019-12-26 DIAGNOSIS — D68.59 HYPERCOAGULABLE STATE (HCC): ICD-10-CM

## 2019-12-26 DIAGNOSIS — Z79.01 WARFARIN ANTICOAGULATION: ICD-10-CM

## 2019-12-26 LAB
INR BLD: 2.9
INR BLD: 6.9

## 2019-12-26 PROCEDURE — 93793 ANTICOAG MGMT PT WARFARIN: CPT | Performed by: INTERNAL MEDICINE

## 2019-12-31 ENCOUNTER — ANTI-COAG VISIT (OUTPATIENT)
Dept: PRIMARY CARE CLINIC | Age: 64
End: 2019-12-31

## 2019-12-31 LAB — INR BLD: 1.8

## 2020-01-08 ENCOUNTER — ANTI-COAG VISIT (OUTPATIENT)
Dept: PRIMARY CARE CLINIC | Age: 65
End: 2020-01-08

## 2020-01-09 ENCOUNTER — ANTI-COAG VISIT (OUTPATIENT)
Dept: PRIMARY CARE CLINIC | Age: 65
End: 2020-01-09

## 2020-01-09 LAB — INR BLD: 1.9

## 2020-01-09 NOTE — PROGRESS NOTES
1/9/2020    Advised Layla INR 1.9. Increase Friday to 2 mg. Take 2 mg Tues/Fri and 1 mg all other days.   Redraw 1/16/2020

## 2020-01-10 RX ORDER — WARFARIN SODIUM 2 MG/1
2 TABLET ORAL DAILY
Qty: 30 TABLET | Refills: 2 | Status: SHIPPED | OUTPATIENT
Start: 2020-01-10 | End: 2020-01-29 | Stop reason: ALTCHOICE

## 2020-01-10 RX ORDER — WARFARIN SODIUM 1 MG/1
1 TABLET ORAL DAILY
Qty: 30 TABLET | Refills: 2 | Status: SHIPPED
Start: 2020-01-10 | End: 2020-02-11 | Stop reason: ALTCHOICE

## 2020-01-16 ENCOUNTER — TELEPHONE (OUTPATIENT)
Dept: PRIMARY CARE CLINIC | Age: 65
End: 2020-01-16

## 2020-01-16 ENCOUNTER — ANTI-COAG VISIT (OUTPATIENT)
Dept: PRIMARY CARE CLINIC | Age: 65
End: 2020-01-16

## 2020-01-16 LAB — INR BLD: 1.6

## 2020-01-23 ENCOUNTER — ANTI-COAG VISIT (OUTPATIENT)
Dept: PRIMARY CARE CLINIC | Age: 65
End: 2020-01-23

## 2020-01-23 LAB — INR BLD: 2.1

## 2020-01-23 NOTE — PROGRESS NOTES
Continue same. Take 2mg on Sundays, Tuesdays, Thursdays and Fridays; and 1mg all other days. Recheck in 1 week.

## 2020-01-27 ENCOUNTER — OFFICE VISIT (OUTPATIENT)
Dept: PRIMARY CARE CLINIC | Age: 65
End: 2020-01-27
Payer: MEDICARE

## 2020-01-27 VITALS
HEART RATE: 94 BPM | BODY MASS INDEX: 26.07 KG/M2 | DIASTOLIC BLOOD PRESSURE: 84 MMHG | TEMPERATURE: 98.5 F | HEIGHT: 68 IN | OXYGEN SATURATION: 98 % | SYSTOLIC BLOOD PRESSURE: 120 MMHG | WEIGHT: 172 LBS | RESPIRATION RATE: 18 BRPM

## 2020-01-27 LAB
INTERNATIONAL NORMALIZATION RATIO, POC: 3.4
PROTHROMBIN TIME, POC: 40.7

## 2020-01-27 PROCEDURE — G8419 CALC BMI OUT NRM PARAM NOF/U: HCPCS | Performed by: FAMILY MEDICINE

## 2020-01-27 PROCEDURE — 99213 OFFICE O/P EST LOW 20 MIN: CPT | Performed by: FAMILY MEDICINE

## 2020-01-27 PROCEDURE — G8427 DOCREV CUR MEDS BY ELIG CLIN: HCPCS | Performed by: FAMILY MEDICINE

## 2020-01-27 PROCEDURE — 85610 PROTHROMBIN TIME: CPT | Performed by: FAMILY MEDICINE

## 2020-01-27 PROCEDURE — 3017F COLORECTAL CA SCREEN DOC REV: CPT | Performed by: FAMILY MEDICINE

## 2020-01-27 PROCEDURE — G8482 FLU IMMUNIZE ORDER/ADMIN: HCPCS | Performed by: FAMILY MEDICINE

## 2020-01-27 PROCEDURE — 4004F PT TOBACCO SCREEN RCVD TLK: CPT | Performed by: FAMILY MEDICINE

## 2020-01-27 RX ORDER — DABIGATRAN ETEXILATE 150 MG/1
150 CAPSULE, COATED PELLETS ORAL 2 TIMES DAILY
Qty: 180 CAPSULE | Refills: 1 | Status: SHIPPED
Start: 2020-01-27 | End: 2020-07-08 | Stop reason: SDUPTHER

## 2020-01-27 RX ORDER — LATANOPROST 50 UG/ML
SOLUTION/ DROPS OPHTHALMIC NIGHTLY
COMMUNITY
Start: 2020-01-02 | End: 2020-09-24 | Stop reason: SDUPTHER

## 2020-01-28 ENCOUNTER — TELEPHONE (OUTPATIENT)
Dept: PRIMARY CARE CLINIC | Age: 65
End: 2020-01-28

## 2020-01-28 NOTE — TELEPHONE ENCOUNTER
1/28/2020    Advised pharmacist Yuriy Garcia at [address] [place] [address] will discontinue warfarin as long as pradaxa is covered by his insurance. Pradaxa is covered under his insurance.

## 2020-01-29 ENCOUNTER — OFFICE VISIT (OUTPATIENT)
Dept: ENT CLINIC | Age: 65
End: 2020-01-29
Payer: MEDICARE

## 2020-01-29 VITALS
WEIGHT: 175.8 LBS | SYSTOLIC BLOOD PRESSURE: 126 MMHG | HEIGHT: 68 IN | DIASTOLIC BLOOD PRESSURE: 80 MMHG | HEART RATE: 80 BPM | BODY MASS INDEX: 26.64 KG/M2

## 2020-01-29 PROCEDURE — G8482 FLU IMMUNIZE ORDER/ADMIN: HCPCS | Performed by: OTOLARYNGOLOGY

## 2020-01-29 PROCEDURE — 31575 DIAGNOSTIC LARYNGOSCOPY: CPT | Performed by: OTOLARYNGOLOGY

## 2020-01-29 PROCEDURE — G8419 CALC BMI OUT NRM PARAM NOF/U: HCPCS | Performed by: OTOLARYNGOLOGY

## 2020-01-29 PROCEDURE — G8427 DOCREV CUR MEDS BY ELIG CLIN: HCPCS | Performed by: OTOLARYNGOLOGY

## 2020-01-29 PROCEDURE — 99204 OFFICE O/P NEW MOD 45 MIN: CPT | Performed by: OTOLARYNGOLOGY

## 2020-01-29 ASSESSMENT — ENCOUNTER SYMPTOMS
TROUBLE SWALLOWING: 0
VOICE CHANGE: 1
RHINORRHEA: 1
RESPIRATORY NEGATIVE: 1
SORE THROAT: 1
ALLERGIC/IMMUNOLOGIC NEGATIVE: 1
EYES NEGATIVE: 1

## 2020-01-29 NOTE — PROGRESS NOTES
APPENDECTOMY      COLONOSCOPY  4/11/16       Current Outpatient Medications:     latanoprost (XALATAN) 0.005 % ophthalmic solution, , Disp: , Rfl:     dabigatran (PRADAXA) 150 MG capsule, Take 1 capsule by mouth 2 times daily, Disp: 180 capsule, Rfl: 1    warfarin (COUMADIN) 1 MG tablet, Take 1 tablet by mouth daily, Disp: 30 tablet, Rfl: 2    paliperidone palmitate ER (INVEGA TRINZA) 819 MG/2.625ML LEANDRA IM injection, Inject 819 mg into the muscle every 3 months, Disp: , Rfl:     docusate sodium (COLACE) 100 MG capsule, Take 100 mg by mouth 2 times daily, Disp: , Rfl:     benztropine (COGENTIN) 1 MG tablet, Take 1 mg by mouth 2 times daily, Disp: , Rfl:     PARoxetine (PAXIL) 40 MG tablet, Take 40 mg by mouth every morning, Disp: , Rfl:     divalproex (DEPAKOTE) 500 MG DR tablet, Take 1 tablet by mouth every 12 hours, Disp: 90 tablet, Rfl: 0  Patient has no known allergies. Social History     Tobacco Use    Smoking status: Current Every Day Smoker     Packs/day: 1.50     Years: 44.00     Pack years: 66.00     Types: Cigarettes     Start date: 2/12/1975    Smokeless tobacco: Never Used   Substance Use Topics    Alcohol use: Not Currently     Comment: occasstional beer     Drug use: No     Family History   Family history unknown: Yes           Objective:   /80 (Site: Left Upper Arm, Position: Sitting, Cuff Size: Large Adult)   Pulse 80   Ht 5' 8\" (1.727 m)   Wt 175 lb 12.8 oz (79.7 kg)   BMI 26.73 kg/m²     Physical Exam  Constitutional:       Appearance: Normal appearance. HENT:      Head: Normocephalic. Right Ear: Ear canal and external ear normal. There is impacted cerumen. Left Ear: Ear canal and external ear normal. There is impacted cerumen. Nose: Mucosal edema, congestion and rhinorrhea present. Right Turbinates: Swollen. Left Turbinates: Swollen. Mouth/Throat:      Lips: Pink. Mouth: Mucous membranes are moist. No oral lesions.       Dentition: Abnormal dentition. Tongue: No lesions. Palate: No mass. Pharynx: Oropharynx is clear. Uvula midline. Tonsils: No tonsillar exudate. Swellin on the right. 0 on the left. Eyes:      Pupils: Pupils are equal, round, and reactive to light. Neck:      Musculoskeletal: Normal range of motion. Cardiovascular:      Rate and Rhythm: Normal rate. Pulmonary:      Effort: Pulmonary effort is normal.   Skin:     General: Skin is warm. Capillary Refill: Capillary refill takes less than 2 seconds. Neurological:      Mental Status: He is alert and oriented to person, place, and time. Flexible Nasopharyngolaryngoscope Procedure Note    Procedure Details   The bilateral side(s) of the nose was topically anesthetized with spray. The nasal cavities were inspected to determine which side would be more amenable to the scope being passed through. After waiting an appropriate period of time for anesthesia/ vasoconstriction to become effective, the flexible nasopharyngolaryngoscope was passed through the bilateral side(s) of the nose, and the nose, nasopharynx, oropharynx, hypopharynx and larynx were examined. Examination was performed during quiet respiration and with phonation. The following findings were noted. Findings:   Normal nasopharynx, normal epiglottis, normal pyriform, normal TVC motion and mucosa, no subglottic masses or lesions, normal vocal cord movement, no vocal cord polyps. Asymmetric, enlarged left base of tongue, diffuse erythema of glottic tissues, adenoid hypertrophy. Condition:  Stable  Complications:  None                  Assessment:       Diagnosis Orders   1. Mass of tongue             Plan:        59 y/o male with chronic smoking with incidental finding of asymmetric of enlarged left AE fold found on CT Lung screening. On exam, no neck masses, voice normal, bilateral cerumen impaction.  Nasopharyngoscope shows enlarged left base of tongue otherwise no masses

## 2020-01-29 NOTE — PATIENT INSTRUCTIONS
SURGERY:___2__/_18____/_20____    Nothing to eat or drink after midnight the night before surgery unless surgery is at [facility] or otherwise instructed by the hospital.    DO NOT TAKE ANY ASPIRIN PRODUCTS 7 days prior to surgery. Tylenol only. No Advil, Motrin, Aleve, or Ibuprofen. Any illegal drugs in your system (including Marijuana even if legally prescribed) will result in your surgery being cancelled. Please be sure to check with our office or the hospital on time frame for the drugs to be out of your system. SHOULD YOUR INSURANCE CHANGE AT ANY TIME YOU MUST CONTACT OUR OFFICE. FAILURE TO DO SO MAY RESULT IN YOUR SURGERY BEING RESCHEDULED OR YOU MAY BE CHARGED AS SELF-PAY. If you need medical clearance, you are responsible to contact your physician to schedule the appointment. If clearance is not completed within 30 days of your surgery it may be cancelled. The location of your surgery will call you a few days prior to your surgery date to let you know what time you have to be there and any other details. ·       [address] [facility], [address] [place] [address] will call you a couple days prior to surgery and give you further instructions, if you have any questions, you can reach them at [phone]    ·       [address] [facility] will call you a couple days prior to surgery and give you further instructions, if you have any questions, you can reach them at [phone]    ·       [facility],  [facility] will call you a couple days prior to surgery and give you further instructions, if you have any questions, you can reach them at [phone]    ·       [place] [address].  [facility] will call you a couple days prior to surgery and give you further instructions, if you have any questions, you can reach them at [phone]

## 2020-02-11 RX ORDER — PROPRANOLOL HYDROCHLORIDE 20 MG/1
20 TABLET ORAL 3 TIMES DAILY
COMMUNITY

## 2020-02-17 NOTE — H&P
Department of Otolaryngology  Office Consult Note  1/29/20              Subjective:         Chief Complaint:  had concerns including Other (vocal cord check; per pt has had cough for about a month no improvement).      Patient ID: Jane Gordon is a 60 y.o. male.     HPI: Patient presents as  new patient, referred from PCP for finding of asymmetric AE fold on screening Lung CT scan. Patient is a chronic smoker of many years, offers complaints of chronic sore throat, decreased hearing and aural fullness on the left side for unknown amount of time. Associated symptoms: hoarseness of voice, constant. Denies nasal congestion, post-nasal drip, dysphagia, odynophagia, weight changes, fever, night sweats.      Smoking 1.5 ppd for 35+ years  Prior alcohol use, quit June 2019.      Lung CT scan 6/2019:  Asymmetry of the aryepiglottic folds with soft tissue prominence on   the left. The possibility of neoplasm should be excluded with direct   visualization.         Review of Systems   Constitutional: Negative. HENT: Positive for congestion, ear pain, postnasal drip, rhinorrhea, sore throat and voice change. Negative for trouble swallowing. Eyes: Negative. Respiratory: Negative. Skin: Negative. Allergic/Immunologic: Negative. Neurological: Negative. Hematological: Negative.     Psychiatric/Behavioral: Negative.             Past Medical History   Past Medical History:   Diagnosis Date    Acute bacterial conjunctivitis of left eye 9/11/2016    Anticoagulated on Coumadin       per Dr. Janette Kaufman Bipolar 1 disorder [facility])       goes to [address] Bipolar affect, depressed ([facility]      Cellulitis of face 9/11/2016    Full dentures      Hx of blood clots       DVT, PE hx of    Hypertension       on propanolol / patient states does not have high BP    Impetigo 9/11/2016    Palpitations      Screening       for colonoscopy    Seizure disorder ([facility]       last seizure 2010 / cause unknown    Past Surgical History         Past Surgical History:   Procedure Laterality Date    APPENDECTOMY        COLONOSCOPY   4/11/16           Current Medication      Current Outpatient Medications:     latanoprost (XALATAN) 0.005 % ophthalmic solution, , Disp: , Rfl:     dabigatran (PRADAXA) 150 MG capsule, Take 1 capsule by mouth 2 times daily, Disp: 180 capsule, Rfl: 1    warfarin (COUMADIN) 1 MG tablet, Take 1 tablet by mouth daily, Disp: 30 tablet, Rfl: 2    paliperidone palmitate ER (INVEGA TRINZA) 819 MG/2.625ML LEANDRA IM injection, Inject 819 mg into the muscle every 3 months, Disp: , Rfl:     docusate sodium (COLACE) 100 MG capsule, Take 100 mg by mouth 2 times daily, Disp: , Rfl:     benztropine (COGENTIN) 1 MG tablet, Take 1 mg by mouth 2 times daily, Disp: , Rfl:     PARoxetine (PAXIL) 40 MG tablet, Take 40 mg by mouth every morning, Disp: , Rfl:     divalproex (DEPAKOTE) 500 MG DR tablet, Take 1 tablet by mouth every 12 hours, Disp: 90 tablet, Rfl: 0     Patient has no known allergies. Social History            Tobacco Use    Smoking status: Current Every Day Smoker       Packs/day: 1.50       Years: 44.00       Pack years: 66.00       Types: Cigarettes       Start date: 2/12/1975    Smokeless tobacco: Never Used   Substance Use Topics    Alcohol use: Not Currently       Comment: occasstional beer     Drug use: No      Family History   Family History   Family history unknown: Yes                  Objective:   /80 (Site: Left Upper Arm, Position: Sitting, Cuff Size: Large Adult)   Pulse 80   Ht 5' 8\" (1.727 m)   Wt 175 lb 12.8 oz (79.7 kg)   BMI 26.73 kg/m²      Physical Exam  Constitutional:       Appearance: Normal appearance. HENT:      Head: Normocephalic. Right Ear: Ear canal and external ear normal. There is impacted cerumen. Left Ear: Ear canal and external ear normal. There is impacted cerumen. Nose: Mucosal edema, congestion and rhinorrhea present. Right Turbinates: Swollen. Left Turbinates: Swollen. Mouth/Throat:      Lips: Pink. Mouth: Mucous membranes are moist. No oral lesions. Dentition: Abnormal dentition. Tongue: No lesions. Palate: No mass. Pharynx: Oropharynx is clear. Uvula midline. Tonsils: No tonsillar exudate. Swellin on the right. 0 on the left. Eyes:      Pupils: Pupils are equal, round, and reactive to light. Neck:      Musculoskeletal: Normal range of motion. Cardiovascular:      Rate and Rhythm: Normal rate. Pulmonary:      Effort: Pulmonary effort is normal.   Skin:     General: Skin is warm. Capillary Refill: Capillary refill takes less than 2 seconds. Neurological:      Mental Status: He is alert and oriented to person, place, and time.             Flexible Nasopharyngolaryngoscope Procedure Note     Procedure Details   The bilateral side(s) of the nose was topically anesthetized with spray. The nasal cavities were inspected to determine which side would be more amenable to the scope being passed through. After waiting an appropriate period of time for anesthesia/ vasoconstriction to become effective, the flexible nasopharyngolaryngoscope was passed through the bilateral side(s) of the nose, and the nose, nasopharynx, oropharynx, hypopharynx and larynx were examined. Examination was performed during quiet respiration and with phonation. The following findings were noted. Findings:   Normal nasopharynx, normal epiglottis, normal pyriform, normal TVC motion and mucosa, no subglottic masses or lesions, normal vocal cord movement, no vocal cord polyps. Asymmetric, enlarged left base of tongue, diffuse erythema of glottic tissues, adenoid hypertrophy. Condition:  Stable  Complications:  None                       Assessment:        Diagnosis Orders   1.  Mass of tongue                          Plan:         59 y/o male with chronic smoking with incidental finding of asymmetric of

## 2020-02-18 ENCOUNTER — ANESTHESIA (OUTPATIENT)
Dept: OPERATING ROOM | Age: 65
End: 2020-02-18
Payer: MEDICARE

## 2020-02-18 ENCOUNTER — ANESTHESIA EVENT (OUTPATIENT)
Dept: OPERATING ROOM | Age: 65
End: 2020-02-18
Payer: MEDICARE

## 2020-02-18 ENCOUNTER — HOSPITAL ENCOUNTER (OUTPATIENT)
Age: 65
Setting detail: OUTPATIENT SURGERY
Discharge: HOME | End: 2020-02-18
Attending: OTOLARYNGOLOGY | Admitting: OTOLARYNGOLOGY
Payer: MEDICARE

## 2020-02-18 VITALS — DIASTOLIC BLOOD PRESSURE: 95 MMHG | SYSTOLIC BLOOD PRESSURE: 138 MMHG | OXYGEN SATURATION: 94 %

## 2020-02-18 VITALS
BODY MASS INDEX: 26.52 KG/M2 | OXYGEN SATURATION: 93 % | HEIGHT: 68 IN | SYSTOLIC BLOOD PRESSURE: 150 MMHG | TEMPERATURE: 97.6 F | DIASTOLIC BLOOD PRESSURE: 80 MMHG | HEART RATE: 62 BPM | RESPIRATION RATE: 20 BRPM | WEIGHT: 175 LBS

## 2020-02-18 LAB
INR BLD: 1
PROTHROMBIN TIME: 10.9 SEC (ref 9.3–12.4)

## 2020-02-18 PROCEDURE — 7100000010 HC PHASE II RECOVERY - FIRST 15 MIN: Performed by: OTOLARYNGOLOGY

## 2020-02-18 PROCEDURE — 43191 ESOPHAGOSCOPY RIGID TRNSO DX: CPT | Performed by: OTOLARYNGOLOGY

## 2020-02-18 PROCEDURE — 2580000003 HC RX 258: Performed by: OTOLARYNGOLOGY

## 2020-02-18 PROCEDURE — 88342 IMHCHEM/IMCYTCHM 1ST ANTB: CPT

## 2020-02-18 PROCEDURE — 36415 COLL VENOUS BLD VENIPUNCTURE: CPT

## 2020-02-18 PROCEDURE — 88305 TISSUE EXAM BY PATHOLOGIST: CPT

## 2020-02-18 PROCEDURE — 7100000000 HC PACU RECOVERY - FIRST 15 MIN: Performed by: OTOLARYNGOLOGY

## 2020-02-18 PROCEDURE — 7100000001 HC PACU RECOVERY - ADDTL 15 MIN: Performed by: OTOLARYNGOLOGY

## 2020-02-18 PROCEDURE — 3700000000 HC ANESTHESIA ATTENDED CARE: Performed by: OTOLARYNGOLOGY

## 2020-02-18 PROCEDURE — 6370000000 HC RX 637 (ALT 250 FOR IP): Performed by: OTOLARYNGOLOGY

## 2020-02-18 PROCEDURE — 85610 PROTHROMBIN TIME: CPT

## 2020-02-18 PROCEDURE — 3600000013 HC SURGERY LEVEL 3 ADDTL 15MIN: Performed by: OTOLARYNGOLOGY

## 2020-02-18 PROCEDURE — 31535 LARYNGOSCOPY W/BIOPSY: CPT | Performed by: OTOLARYNGOLOGY

## 2020-02-18 PROCEDURE — 3600000003 HC SURGERY LEVEL 3 BASE: Performed by: OTOLARYNGOLOGY

## 2020-02-18 PROCEDURE — 2500000003 HC RX 250 WO HCPCS: Performed by: NURSE ANESTHETIST, CERTIFIED REGISTERED

## 2020-02-18 PROCEDURE — 3700000001 HC ADD 15 MINUTES (ANESTHESIA): Performed by: OTOLARYNGOLOGY

## 2020-02-18 PROCEDURE — 31622 DX BRONCHOSCOPE/WASH: CPT | Performed by: OTOLARYNGOLOGY

## 2020-02-18 PROCEDURE — 7100000011 HC PHASE II RECOVERY - ADDTL 15 MIN: Performed by: OTOLARYNGOLOGY

## 2020-02-18 PROCEDURE — 6360000002 HC RX W HCPCS: Performed by: NURSE ANESTHETIST, CERTIFIED REGISTERED

## 2020-02-18 PROCEDURE — 88341 IMHCHEM/IMCYTCHM EA ADD ANTB: CPT

## 2020-02-18 PROCEDURE — 2709999900 HC NON-CHARGEABLE SUPPLY: Performed by: OTOLARYNGOLOGY

## 2020-02-18 RX ORDER — PROPOFOL 10 MG/ML
INJECTION, EMULSION INTRAVENOUS PRN
Status: DISCONTINUED | OUTPATIENT
Start: 2020-02-18 | End: 2020-02-18 | Stop reason: SDUPTHER

## 2020-02-18 RX ORDER — LIDOCAINE HYDROCHLORIDE 20 MG/ML
INJECTION, SOLUTION INTRAVENOUS PRN
Status: DISCONTINUED | OUTPATIENT
Start: 2020-02-18 | End: 2020-02-18 | Stop reason: SDUPTHER

## 2020-02-18 RX ORDER — ROCURONIUM BROMIDE 10 MG/ML
INJECTION, SOLUTION INTRAVENOUS PRN
Status: DISCONTINUED | OUTPATIENT
Start: 2020-02-18 | End: 2020-02-18 | Stop reason: SDUPTHER

## 2020-02-18 RX ORDER — GLYCOPYRROLATE 1 MG/5 ML
SYRINGE (ML) INTRAVENOUS PRN
Status: DISCONTINUED | OUTPATIENT
Start: 2020-02-18 | End: 2020-02-18 | Stop reason: SDUPTHER

## 2020-02-18 RX ORDER — SODIUM CHLORIDE 0.9 % (FLUSH) 0.9 %
10 SYRINGE (ML) INJECTION PRN
Status: DISCONTINUED | OUTPATIENT
Start: 2020-02-18 | End: 2020-02-18 | Stop reason: HOSPADM

## 2020-02-18 RX ORDER — PROMETHAZINE HYDROCHLORIDE 25 MG/ML
6.25 INJECTION, SOLUTION INTRAMUSCULAR; INTRAVENOUS
Status: DISCONTINUED | OUTPATIENT
Start: 2020-02-18 | End: 2020-02-18 | Stop reason: HOSPADM

## 2020-02-18 RX ORDER — MIDAZOLAM HYDROCHLORIDE 1 MG/ML
INJECTION INTRAMUSCULAR; INTRAVENOUS PRN
Status: DISCONTINUED | OUTPATIENT
Start: 2020-02-18 | End: 2020-02-18 | Stop reason: SDUPTHER

## 2020-02-18 RX ORDER — NALOXONE HYDROCHLORIDE 0.4 MG/ML
INJECTION, SOLUTION INTRAMUSCULAR; INTRAVENOUS; SUBCUTANEOUS PRN
Status: DISCONTINUED | OUTPATIENT
Start: 2020-02-18 | End: 2020-02-18 | Stop reason: SDUPTHER

## 2020-02-18 RX ORDER — NALOXONE HYDROCHLORIDE 0.4 MG/ML
INJECTION, SOLUTION INTRAMUSCULAR; INTRAVENOUS; SUBCUTANEOUS
Status: COMPLETED
Start: 2020-02-18 | End: 2020-02-18

## 2020-02-18 RX ORDER — EPINEPHRINE NASAL SOLUTION 1 MG/ML
SOLUTION NASAL PRN
Status: DISCONTINUED | OUTPATIENT
Start: 2020-02-18 | End: 2020-02-18 | Stop reason: ALTCHOICE

## 2020-02-18 RX ORDER — LABETALOL HYDROCHLORIDE 5 MG/ML
5 INJECTION, SOLUTION INTRAVENOUS EVERY 10 MIN PRN
Status: DISCONTINUED | OUTPATIENT
Start: 2020-02-18 | End: 2020-02-18 | Stop reason: HOSPADM

## 2020-02-18 RX ORDER — AMOXICILLIN 500 MG/1
500 CAPSULE ORAL 3 TIMES DAILY
Qty: 21 CAPSULE | Refills: 0 | Status: SHIPPED | OUTPATIENT
Start: 2020-02-18 | End: 2020-02-25

## 2020-02-18 RX ORDER — ONDANSETRON 2 MG/ML
INJECTION INTRAMUSCULAR; INTRAVENOUS PRN
Status: DISCONTINUED | OUTPATIENT
Start: 2020-02-18 | End: 2020-02-18 | Stop reason: SDUPTHER

## 2020-02-18 RX ORDER — SUCCINYLCHOLINE/SOD CL,ISO/PF 200MG/10ML
SYRINGE (ML) INTRAVENOUS PRN
Status: DISCONTINUED | OUTPATIENT
Start: 2020-02-18 | End: 2020-02-18 | Stop reason: SDUPTHER

## 2020-02-18 RX ORDER — MEPERIDINE HYDROCHLORIDE 50 MG/ML
12.5 INJECTION INTRAMUSCULAR; INTRAVENOUS; SUBCUTANEOUS EVERY 5 MIN PRN
Status: DISCONTINUED | OUTPATIENT
Start: 2020-02-18 | End: 2020-02-18 | Stop reason: HOSPADM

## 2020-02-18 RX ORDER — SODIUM CHLORIDE 9 MG/ML
INJECTION, SOLUTION INTRAVENOUS CONTINUOUS
Status: DISCONTINUED | OUTPATIENT
Start: 2020-02-18 | End: 2020-02-18 | Stop reason: HOSPADM

## 2020-02-18 RX ORDER — SODIUM CHLORIDE 0.9 % (FLUSH) 0.9 %
10 SYRINGE (ML) INJECTION EVERY 12 HOURS SCHEDULED
Status: DISCONTINUED | OUTPATIENT
Start: 2020-02-18 | End: 2020-02-18 | Stop reason: HOSPADM

## 2020-02-18 RX ORDER — HYDRALAZINE HYDROCHLORIDE 20 MG/ML
5 INJECTION INTRAMUSCULAR; INTRAVENOUS EVERY 10 MIN PRN
Status: DISCONTINUED | OUTPATIENT
Start: 2020-02-18 | End: 2020-02-18 | Stop reason: HOSPADM

## 2020-02-18 RX ORDER — DEXAMETHASONE SODIUM PHOSPHATE 10 MG/ML
INJECTION INTRAMUSCULAR; INTRAVENOUS PRN
Status: DISCONTINUED | OUTPATIENT
Start: 2020-02-18 | End: 2020-02-18 | Stop reason: SDUPTHER

## 2020-02-18 RX ORDER — FENTANYL CITRATE 50 UG/ML
INJECTION, SOLUTION INTRAMUSCULAR; INTRAVENOUS PRN
Status: DISCONTINUED | OUTPATIENT
Start: 2020-02-18 | End: 2020-02-18 | Stop reason: SDUPTHER

## 2020-02-18 RX ORDER — NEOSTIGMINE METHYLSULFATE 1 MG/ML
INJECTION, SOLUTION INTRAVENOUS PRN
Status: DISCONTINUED | OUTPATIENT
Start: 2020-02-18 | End: 2020-02-18 | Stop reason: SDUPTHER

## 2020-02-18 RX ADMIN — ONDANSETRON HYDROCHLORIDE 4 MG: 2 INJECTION, SOLUTION INTRAMUSCULAR; INTRAVENOUS at 15:44

## 2020-02-18 RX ADMIN — Medication 3 MG: at 15:51

## 2020-02-18 RX ADMIN — NALOXONE HYDROCHLORIDE 0.2 MG: 0.4 INJECTION, SOLUTION INTRAMUSCULAR; INTRAVENOUS; SUBCUTANEOUS at 16:14

## 2020-02-18 RX ADMIN — DEXAMETHASONE SODIUM PHOSPHATE 10 MG: 10 INJECTION INTRAMUSCULAR; INTRAVENOUS at 15:14

## 2020-02-18 RX ADMIN — SODIUM CHLORIDE: 9 INJECTION, SOLUTION INTRAVENOUS at 13:20

## 2020-02-18 RX ADMIN — ROCURONIUM BROMIDE 10 MG: 10 INJECTION, SOLUTION INTRAVENOUS at 15:25

## 2020-02-18 RX ADMIN — ROCURONIUM BROMIDE 5 MG: 10 INJECTION, SOLUTION INTRAVENOUS at 15:14

## 2020-02-18 RX ADMIN — FENTANYL CITRATE 50 MCG: 50 INJECTION, SOLUTION INTRAMUSCULAR; INTRAVENOUS at 15:27

## 2020-02-18 RX ADMIN — PROPOFOL 150 MG: 10 INJECTION, EMULSION INTRAVENOUS at 15:14

## 2020-02-18 RX ADMIN — MIDAZOLAM 2 MG: 1 INJECTION INTRAMUSCULAR; INTRAVENOUS at 15:05

## 2020-02-18 RX ADMIN — LIDOCAINE HYDROCHLORIDE 60 MG: 20 INJECTION, SOLUTION INTRAVENOUS at 15:14

## 2020-02-18 RX ADMIN — Medication 0.6 MG: at 15:51

## 2020-02-18 RX ADMIN — NALOXONE HYDROCHLORIDE 0.2 MG: 0.4 INJECTION, SOLUTION INTRAMUSCULAR; INTRAVENOUS; SUBCUTANEOUS at 16:12

## 2020-02-18 RX ADMIN — FENTANYL CITRATE 50 MCG: 50 INJECTION, SOLUTION INTRAMUSCULAR; INTRAVENOUS at 15:14

## 2020-02-18 RX ADMIN — Medication 100 MG: at 15:14

## 2020-02-18 ASSESSMENT — PULMONARY FUNCTION TESTS
PIF_VALUE: 19
PIF_VALUE: 0
PIF_VALUE: 26
PIF_VALUE: 4
PIF_VALUE: 19
PIF_VALUE: 19
PIF_VALUE: 15
PIF_VALUE: 1
PIF_VALUE: 0
PIF_VALUE: 2
PIF_VALUE: 3
PIF_VALUE: 15
PIF_VALUE: 18
PIF_VALUE: 3
PIF_VALUE: 1
PIF_VALUE: 0
PIF_VALUE: 0
PIF_VALUE: 19
PIF_VALUE: 7
PIF_VALUE: 21
PIF_VALUE: 0
PIF_VALUE: 15
PIF_VALUE: 29
PIF_VALUE: 1
PIF_VALUE: 15
PIF_VALUE: 27
PIF_VALUE: 31
PIF_VALUE: 15
PIF_VALUE: 18
PIF_VALUE: 2
PIF_VALUE: 20
PIF_VALUE: 15
PIF_VALUE: 16
PIF_VALUE: 20
PIF_VALUE: 18
PIF_VALUE: 2
PIF_VALUE: 1
PIF_VALUE: 19
PIF_VALUE: 19
PIF_VALUE: 21
PIF_VALUE: 19
PIF_VALUE: 19
PIF_VALUE: 1
PIF_VALUE: 19
PIF_VALUE: 20
PIF_VALUE: 3
PIF_VALUE: 15
PIF_VALUE: 17
PIF_VALUE: 1
PIF_VALUE: 3
PIF_VALUE: 19
PIF_VALUE: 15
PIF_VALUE: 26
PIF_VALUE: 19

## 2020-02-18 ASSESSMENT — PAIN SCALES - GENERAL
PAINLEVEL_OUTOF10: 0

## 2020-02-18 ASSESSMENT — LIFESTYLE VARIABLES: SMOKING_STATUS: 1

## 2020-02-18 ASSESSMENT — PAIN - FUNCTIONAL ASSESSMENT: PAIN_FUNCTIONAL_ASSESSMENT: 0-10

## 2020-02-18 NOTE — INTERVAL H&P NOTE
Deshawn Kessler was seen and re-examined preoperatively today, February 18, 2020. There was no substantial change in his physical and medical status. Patient is fit for the proposed surgical procedure. All questions were appropriately addressed and had no further questions regarding the risks, benefits, and alternatives of the procedure. Deshawn Kessler and family wished to proceed.     Scottie Steinberg DO  Resident Physician  [facility])  Otolaryngology Residency  2/18/2020  12:36 PM

## 2020-02-18 NOTE — ANESTHESIA PRE PROCEDURE
Department of Anesthesiology  Preprocedure Note       Name:  Byron Loja   Age:  60 y.o.  :  1955                                          MRN:  [id number]         Date:  2020      Surgeon: Karl Gunderson):  Melisa Preciado DO    Procedure: DIRECT PAN ENDOSCOPY WITH BIOPSY (N/A )    Medications prior to admission:   Prior to Admission medications    Medication Sig Start Date End Date Taking?  Authorizing Provider   amoxicillin (AMOXIL) 500 MG capsule Take 1 capsule by mouth 3 times daily for 7 days 20 Yes Cang T Hoang   propranolol (INDERAL) 20 MG tablet Take 20 mg by mouth 2 times daily   Yes Historical Provider, MD   latanoprost (XALATAN) 0.005 % ophthalmic solution Place into both eyes nightly  20  Yes Historical Provider, MD   dabigatran (PRADAXA) 150 MG capsule Take 1 capsule by mouth 2 times daily 20  Yes Juice Mcclellan MD   paliperidone palmitate ER (Dave Bel) 819 MG/2.625ML LEANDRA IM injection Inject 819 mg into the muscle every 3 months   Yes Historical Provider, MD   benztropine (COGENTIN) 1 MG tablet Take 1 mg by mouth daily    Yes Historical Provider, MD   PARoxetine (PAXIL) 40 MG tablet Take 40 mg by mouth nightly    Yes Historical Provider, MD   divalproex (DEPAKOTE) 500 MG DR tablet Take 1 tablet by mouth every 12 hours 16  Yes Mirela Pittman MD       Current medications:    Current Facility-Administered Medications   Medication Dose Route Frequency Provider Last Rate Last Dose    sodium chloride flush 0.9 % injection 10 mL  10 mL Intravenous 2 times per day Cang T Deneice Purl        sodium chloride flush 0.9 % injection 10 mL  10 mL Intravenous PRN Cang T Hoang        0.9 % sodium chloride infusion   Intravenous Continuous Jose Campos DO           Allergies:  No Known Allergies    Problem List:    Patient Active Problem List   Diagnosis Code    Delirium R41.0    Hypercoagulable state ([facility] D68.59    Hx of psychosis Z86.59    Altered of last solid consumption: 2200                        Date of last liquid consumption: 02/17/20                        Date of last solid food consumption: 02/17/20    BMI:   Wt Readings from Last 3 Encounters:   02/18/20 175 lb (79.4 kg)   01/29/20 175 lb 12.8 oz (79.7 kg)   01/27/20 172 lb (78 kg)     Body mass index is 26.61 kg/m². CBC:   Lab Results   Component Value Date    WBC 10.1 06/16/2019    RBC 4.59 06/16/2019    HGB 14.8 06/16/2019    HCT 45.4 06/16/2019    MCV 98.9 06/16/2019    RDW 13.7 06/16/2019     06/16/2019       CMP:   Lab Results   Component Value Date     06/16/2019    K 4.5 06/16/2019     06/16/2019    CO2 24 06/16/2019    BUN 13 06/16/2019    CREATININE 0.9 06/16/2019    GFRAA >60 06/16/2019    LABGLOM >60 06/16/2019    GLUCOSE 98 06/16/2019    PROT 7.8 06/16/2019    CALCIUM 9.5 06/16/2019    BILITOT 0.4 06/16/2019    ALKPHOS 52 06/16/2019    AST 29 06/16/2019    ALT 24 06/16/2019       POC Tests: No results for input(s): POCGLU, POCNA, POCK, POCCL, POCBUN, POCHEMO, POCHCT in the last 72 hours. Coags:   Lab Results   Component Value Date    PROTIME 40.7 01/27/2020    INR 3.4 01/27/2020    INR 2.10 01/23/2020    APTT 38.4 07/27/2018       HCG (If Applicable): No results found for: PREGTESTUR, PREGSERUM, HCG, HCGQUANT     ABGs: No results found for: PHART, PO2ART, UAF5HKI, WKQ4EMM, BEART, Z6YFPFMD     Type & Screen (If Applicable):  No results found for: LABABO, LABRH     EKG 7/27/2018  Narrative & Impression   Sinus bradycardia- rate 57  Otherwise normal ECG  When compared with ECG of 13-JUL-2018 17:08,  No significant change was found     CT Lung screening 6/24/2019  Impression       Asymmetry of the aryepiglottic folds with soft tissue prominence on   the left. The possibility of neoplasm should be excluded with direct   visualization.       Bullous emphysema and subpleural honeycombing.       I do not confirm mass or lymphadenopathy.          Anesthesia Evaluation  Patient summary reviewed and Nursing notes reviewed no history of anesthetic complications:   Airway: Mallampati: II  TM distance: >3 FB   Neck ROM: full  Mouth opening: > = 3 FB Dental:    (+) upper dentures and lower dentures      Pulmonary:normal exam    (+) current smoker          Patient smoked on day of surgery. Cardiovascular:  Exercise tolerance: good (>4 METS),   (+) hypertension: no interval change,       ECG reviewed  Rhythm: regular  Rate: normal           Beta Blocker:  Dose within 24 Hrs         Neuro/Psych:   (+) seizures (per pt last was 20yrs ago): well controlled, psychiatric history (Bipolar affective disorder, mixed, severe, with psychotic behavior ): stable with treatment             ROS comment: Delirium  Hx of psychosis GI/Hepatic/Renal: Neg GI/Hepatic/Renal ROS  (+) GERD: no interval change,           Endo/Other:    (+) blood dyscrasia: anticoagulation therapy:., .                  ROS comment: Impetigo  Cellulitis of the face  Asymmetry of the aryepiglottic folds with soft tissue prominence on the left. Abdominal:           Vascular:   + DVT, PE. Anesthesia Plan      general     ASA 3       Induction: intravenous. BIS  MIPS: Postoperative opioids intended, Prophylactic antiemetics administered and Postoperative trial extubation. Anesthetic plan and risks discussed with patient. Use of blood products discussed with patient whom consented to blood products. Plan discussed with CRNA and attending. DOS STAFF ADDENDUM:    Patient seen and chart reviewed. Physical exam and history updated as indicated. NPO status confirmed. Anesthesia options and plan discussed including risks benefits with patient/legal guardian and family as available. Concerns and questions addressed. Consent verbalized to proceed.   Anesthesia plan, options and intraoperative/postoperative concerns discussed with care team.    Patient switched to pradaxa 2 weeks ago because of difficult l INR control. Last pradaxa was 72 hours ago. Will recheck INR since last one was elevated 3.4.     Mecca Avalos MD  2/18/2020  1:30 PM        Rodney Peres RN   2/18/2020

## 2020-02-18 NOTE — PROGRESS NOTES
Discharge instructions given to patient and nurse with patient , with verbalization of understanding. Tolerating po well, denies pain, denies nausea.
Maria Fernanda Forte left at Dr Bren Taveras office regarding Pradaxa not being held 7 days prior to surgery as requested.
Spoke to [employer]  who states Tere Ponce has not been holding his Pradaxa. Instructed Layla to call Dr Colon Music office to let notify them. Number provided.
list of your medications, please write the last time you took the medicine, give this list to the nurse. [x] Take the following medications the morning of surgery with 1-2 ounces of water: Eugune Ebbs   [x] Follow physician instructions regarding any blood thinners you may be taking. PRADAXA  WHAT TO EXPECT:  [x] The day of surgery you will be greeted and checked in by the [facility].  In addition, you will be registered in the [facility] by a Patient Access Representative. Please bring your photo ID and insurance card. A nurse will greet you in accordance to the time you are needed in the pre-op area to prepare you for surgery. Please do not be discouraged if you are not greeted in the order you arrive as there are many variables that are involved in patient preparation. Your patience is greatly appreciated as you wait for your nurse. Please bring in items such as: books, magazines, newspapers, electronics, or any other items  to occupy your time in the waiting area.
may call the pre-op area if you have concerns about your blood sugar [phone]. [] Use your inhalers the morning of surgery. Bring your emergency inhaler with you day of surgery. [x] Follow physician instructions regarding any blood thinners you may be taking. WHAT TO EXPECT:  [] The day of surgery you will be greeted and checked in by the [facility].  In addition, you will be registered in the [place] by a Patient Access Representative. Please bring your photo ID and insurance card. A nurse will greet you in accordance to the time you are needed in the pre-op area to prepare you for surgery. Please do not be discouraged if you are not greeted in the order you arrive as there are many variables that are involved in patient preparation. Your patience is greatly appreciated as you wait for your nurse. Please bring in items such as: books, magazines, newspapers, electronics, or any other items  to occupy your time in the waiting area. []  Delays may occur with surgery and staff will make a sincere effort to keep you informed of delays. If any delays occur with your procedure, we apologize ahead of time for your inconvenience as we recognize the value of your time.

## 2020-02-19 NOTE — OP NOTE
fashion. First, a moist Ray-Vikas was inserted into the patient's oral cavity and a  Dedo laryngoscope was inserted into the patient's oral cavity and  advanced into to bring the larynx in view. The vocal cords were able to  be viewed. No masses, tumors, or lesions were seen at the level of the  glottis. We then used a Jako tube pearce to suspend the patient. We  then inserted a flexible bronchoscope past the endotracheal balloon and  into the left and right main stem bronchi. This was advanced all the  way to see the tertiary bronchial segments. No masses, tumors, or  lesions were seen anywhere on the bronchial tree. The bronchoscope was  then removed and the patient was taken out of suspension. We then used  cups forceps to take biopsies of the bilateral base of the tongue. These were sent for permanent section. Adrenaline pledgets were used for hemostasis. We then withdrew the Dedo  laryngoscope after inspecting other areas of the larynx. No other  masses were seen. We then placed the rigid esophagoscope into the  patient's oral cavity and advanced into the cervical esophagus. It was  then withdrawn. No masses, tumors, or lesions were seen anywhere in the  cervical esophagus. We then withdrew the scope and handed care back to  Anesthesia for appropriate awakening. All counts were reported as  correct x2. The patient tolerated the procedure well without  complication. The patient is expected to be discharged home today  following appropriate recovery in the PACU.         Tyler Mortensen DO    D: 02/18/2020 15:56:38       T: 02/19/2020 1:21:43     /[email or web address]  Job#: [id number]     Doc#: [id number]    CC:

## 2020-02-19 NOTE — ANESTHESIA POSTPROCEDURE EVALUATION
Department of Anesthesiology  Postprocedure Note    Patient: Itzel Ham  MRN: [id number]  YOB: 1955  Date of evaluation: 2/19/2020  Time:  9:07 AM     Procedure Summary     Date:  02/18/20 Room / Location:  [facility] OR 04 / [facility]    Anesthesia Start:  2278 Anesthesia Stop:  0329    Procedure:  DIRECT PAN ENDOSCOPY WITH BIOPSY BILATERAL BASE OF TONGUE (N/A Mouth) Diagnosis:  (Belén Kand / ENLARGED TONGUE BASE)    Surgeon:  Adrianne Silver DO Responsible Provider:  Nona Ocampo MD    Anesthesia Type:  general ASA Status:  3          Anesthesia Type: general    Jorge Luis Phase I: Jorge Luis Score: 10    Jorge Luis Phase II:      Last vitals: Reviewed and per EMR flowsheets.        Anesthesia Post Evaluation    Patient location during evaluation: PACU  Patient participation: complete - patient participated  Level of consciousness: awake and alert  Airway patency: patent  Nausea & Vomiting: no nausea and no vomiting  Complications: no  Cardiovascular status: hemodynamically stable and blood pressure returned to baseline  Respiratory status: acceptable  Hydration status: euvolemic

## 2020-02-26 ENCOUNTER — ANTI-COAG VISIT (OUTPATIENT)
Dept: PRIMARY CARE CLINIC | Age: 65
End: 2020-02-26

## 2020-03-02 ENCOUNTER — OFFICE VISIT (OUTPATIENT)
Dept: ENT CLINIC | Age: 65
End: 2020-03-02
Payer: MEDICARE

## 2020-03-02 VITALS — WEIGHT: 180 LBS | BODY MASS INDEX: 27.28 KG/M2 | HEIGHT: 68 IN

## 2020-03-02 PROCEDURE — 3017F COLORECTAL CA SCREEN DOC REV: CPT | Performed by: OTOLARYNGOLOGY

## 2020-03-02 PROCEDURE — G8419 CALC BMI OUT NRM PARAM NOF/U: HCPCS | Performed by: OTOLARYNGOLOGY

## 2020-03-02 PROCEDURE — G8482 FLU IMMUNIZE ORDER/ADMIN: HCPCS | Performed by: OTOLARYNGOLOGY

## 2020-03-02 PROCEDURE — 99212 OFFICE O/P EST SF 10 MIN: CPT | Performed by: OTOLARYNGOLOGY

## 2020-03-02 PROCEDURE — G8427 DOCREV CUR MEDS BY ELIG CLIN: HCPCS | Performed by: OTOLARYNGOLOGY

## 2020-03-02 PROCEDURE — 4004F PT TOBACCO SCREEN RCVD TLK: CPT | Performed by: OTOLARYNGOLOGY

## 2020-03-02 NOTE — PROGRESS NOTES
dabigatran (PRADAXA) 150 MG capsule, Take 1 capsule by mouth 2 times daily (Patient not taking: Reported on 3/2/2020), Disp: 180 capsule, Rfl: 1  Patient has no known allergies. Social History     Tobacco Use    Smoking status: Current Every Day Smoker     Packs/day: 1.50     Years: 44.00     Pack years: 66.00     Types: Cigarettes     Start date: 2/12/1975    Smokeless tobacco: Never Used   Substance Use Topics    Alcohol use: Not Currently     Comment: occasstional beer     Drug use: No     Family History   Family history unknown: Yes       Review of Systems   Constitutional: Negative for chills and fever. HENT: Negative for congestion, dental problem, ear discharge and hearing loss. Respiratory: Negative for cough and shortness of breath. Cardiovascular: Negative for chest pain and palpitations. Gastrointestinal: Negative for vomiting. Skin: Negative for rash. Allergic/Immunologic: Negative for environmental allergies. Neurological: Negative for dizziness and headaches. Hematological: Does not bruise/bleed easily. All other systems reviewed and are negative. Ht 5' 8\" (1.727 m)   Wt 180 lb (81.6 kg)   BMI 27.37 kg/m²   Physical Exam  Vitals signs and nursing note reviewed. Constitutional:       Appearance: He is well-developed. HENT:      Head: Normocephalic. Right Ear: Tympanic membrane, ear canal and external ear normal.      Left Ear: Tympanic membrane, ear canal and external ear normal.      Nose:      Right Turbinates: Not enlarged, swollen or pale. Left Turbinates: Not enlarged, swollen or pale. Right Sinus: No maxillary sinus tenderness or frontal sinus tenderness. Left Sinus: No frontal sinus tenderness. Eyes:      Conjunctiva/sclera: Conjunctivae normal.      Pupils: Pupils are equal, round, and reactive to light. Neck:      Musculoskeletal: Normal range of motion and neck supple. Cardiovascular:      Rate and Rhythm: Normal rate.    Pulmonary: Effort: Pulmonary effort is normal. No respiratory distress. Breath sounds: Normal breath sounds. Abdominal:      General: There is no distension. Tenderness: There is no abdominal tenderness. There is no guarding. Musculoskeletal: Normal range of motion. Skin:     General: Skin is warm and dry. Neurological:      Mental Status: He is alert and oriented to person, place, and time. Psychiatric:         Behavior: Behavior normal.         Thought Content: Thought content normal.         Judgment: Judgment normal.         IMPRESSION/PLAN:  As post panendoscopy with biopsies negative for any malignancies consider current medical therapy, patient is highly recommended to stop smoking and follow-up with any progressive symptoms. Dr. Ja Gauthier.  Otolaryngology Facial Plastic Surgery  :  [address] [place] [address] Otolaryngology/Facial Plastic Surgery Residency  Associate Clinical Professor:  [facility]

## 2020-03-07 ASSESSMENT — ENCOUNTER SYMPTOMS
VOMITING: 0
COUGH: 0
SHORTNESS OF BREATH: 0

## 2020-03-24 ENCOUNTER — TELEPHONE (OUTPATIENT)
Dept: FAMILY MEDICINE CLINIC | Age: 65
End: 2020-03-24

## 2020-03-24 ENCOUNTER — TELEPHONE (OUTPATIENT)
Dept: ENT CLINIC | Age: 65
End: 2020-03-24

## 2020-03-24 RX ORDER — POLYMYXIN B SULFATE AND TRIMETHOPRIM 1; 10000 MG/ML; [USP'U]/ML
1 SOLUTION OPHTHALMIC EVERY 4 HOURS
Qty: 1 BOTTLE | Refills: 0 | Status: SHIPPED | OUTPATIENT
Start: 2020-03-24 | End: 2020-03-29

## 2020-03-24 NOTE — TELEPHONE ENCOUNTER
Patients nurse coordinator was reaching out to Dr Zi Anthony to get refill of glaucoma eye drops and drops for pink eye. Coordinator notified dr Kumar Capps does not order these. They will call PCP.
30-Nov-2019 00:00

## 2020-03-24 NOTE — TELEPHONE ENCOUNTER
Agree washing eyes as needed with warm soapy water. Make sure to not be touching his eyes. Wash hands frequently. Eye drop sent to pharmacy to use for the next 5 days. If he starts to have vision changes, then he needs to call back. Thanks.

## 2020-03-24 NOTE — TELEPHONE ENCOUNTER
Bebe Kinds with behavioral health calling she is seeing patient today. His eyes are puffy, red and crusted over. They have been this way for 4 days. Will you send in eye drops for pink eye to [facility] without being seen ?

## 2020-05-08 ENCOUNTER — HOSPITAL ENCOUNTER (OUTPATIENT)
Age: 65
Discharge: HOME | End: 2020-05-10
Payer: MEDICARE

## 2020-05-08 LAB
ALBUMIN SERPL-MCNC: 3.8 G/DL (ref 3.5–5.2)
ALP BLD-CCNC: 52 U/L (ref 40–129)
ALT SERPL-CCNC: 11 U/L (ref 0–40)
ANION GAP SERPL CALCULATED.3IONS-SCNC: 13 MMOL/L (ref 7–16)
AST SERPL-CCNC: 14 U/L (ref 0–39)
BASOPHILS ABSOLUTE: 0.08 E9/L (ref 0–0.2)
BASOPHILS RELATIVE PERCENT: 1 % (ref 0–2)
BILIRUB SERPL-MCNC: 0.2 MG/DL (ref 0–1.2)
BUN BLDV-MCNC: 9 MG/DL (ref 8–23)
CALCIUM SERPL-MCNC: 9.2 MG/DL (ref 8.6–10.2)
CHLORIDE BLD-SCNC: 99 MMOL/L (ref 98–107)
CHOLESTEROL, FASTING: 153 MG/DL (ref 0–199)
CO2: 24 MMOL/L (ref 22–29)
CREAT SERPL-MCNC: 1 MG/DL (ref 0.7–1.2)
EOSINOPHILS ABSOLUTE: 0.24 E9/L (ref 0.05–0.5)
EOSINOPHILS RELATIVE PERCENT: 3 % (ref 0–6)
GFR AFRICAN AMERICAN: >60
GFR NON-AFRICAN AMERICAN: >60 ML/MIN/1.73
GLUCOSE BLD-MCNC: 88 MG/DL (ref 74–99)
HBA1C MFR BLD: 5.9 % (ref 4–5.6)
HCT VFR BLD CALC: 43.4 % (ref 37–54)
HDLC SERPL-MCNC: 33 MG/DL
HEMOGLOBIN: 13.6 G/DL (ref 12.5–16.5)
IMMATURE GRANULOCYTES #: 0.04 E9/L
IMMATURE GRANULOCYTES %: 0.5 % (ref 0–5)
LDL CHOLESTEROL CALCULATED: 90 MG/DL (ref 0–99)
LYMPHOCYTES ABSOLUTE: 2.32 E9/L (ref 1.5–4)
LYMPHOCYTES RELATIVE PERCENT: 29.1 % (ref 20–42)
MCH RBC QN AUTO: 31.6 PG (ref 26–35)
MCHC RBC AUTO-ENTMCNC: 31.3 % (ref 32–34.5)
MCV RBC AUTO: 100.9 FL (ref 80–99.9)
MONOCYTES ABSOLUTE: 1.07 E9/L (ref 0.1–0.95)
MONOCYTES RELATIVE PERCENT: 13.4 % (ref 2–12)
NEUTROPHILS ABSOLUTE: 4.23 E9/L (ref 1.8–7.3)
NEUTROPHILS RELATIVE PERCENT: 53 % (ref 43–80)
PDW BLD-RTO: 13.2 FL (ref 11.5–15)
PLATELET # BLD: 218 E9/L (ref 130–450)
PMV BLD AUTO: 9.6 FL (ref 7–12)
POTASSIUM SERPL-SCNC: 4.9 MMOL/L (ref 3.5–5)
PROSTATE SPECIFIC ANTIGEN: 0.62 NG/ML (ref 0–4)
RBC # BLD: 4.3 E12/L (ref 3.8–5.8)
SODIUM BLD-SCNC: 136 MMOL/L (ref 132–146)
TOTAL PROTEIN: 7.2 G/DL (ref 6.4–8.3)
TRIGLYCERIDE, FASTING: 149 MG/DL (ref 0–149)
TSH SERPL DL<=0.05 MIU/L-ACNC: 2.18 UIU/ML (ref 0.27–4.2)
VITAMIN D 25-HYDROXY: 23 NG/ML (ref 30–100)
VLDLC SERPL CALC-MCNC: 30 MG/DL
WBC # BLD: 8 E9/L (ref 4.5–11.5)

## 2020-05-08 PROCEDURE — G0103 PSA SCREENING: HCPCS

## 2020-05-08 PROCEDURE — 85025 COMPLETE CBC W/AUTO DIFF WBC: CPT

## 2020-05-08 PROCEDURE — 82306 VITAMIN D 25 HYDROXY: CPT

## 2020-05-08 PROCEDURE — 80061 LIPID PANEL: CPT

## 2020-05-08 PROCEDURE — 80053 COMPREHEN METABOLIC PANEL: CPT

## 2020-05-08 PROCEDURE — 84443 ASSAY THYROID STIM HORMONE: CPT

## 2020-05-08 PROCEDURE — 83036 HEMOGLOBIN GLYCOSYLATED A1C: CPT

## 2020-05-18 ENCOUNTER — OFFICE VISIT (OUTPATIENT)
Dept: FAMILY MEDICINE CLINIC | Age: 65
End: 2020-05-18
Payer: MEDICARE

## 2020-05-18 VITALS
BODY MASS INDEX: 28.04 KG/M2 | SYSTOLIC BLOOD PRESSURE: 136 MMHG | OXYGEN SATURATION: 96 % | HEART RATE: 58 BPM | HEIGHT: 68 IN | DIASTOLIC BLOOD PRESSURE: 78 MMHG | RESPIRATION RATE: 18 BRPM | TEMPERATURE: 98.4 F | WEIGHT: 185 LBS

## 2020-05-18 PROBLEM — R73.09 ELEVATED HEMOGLOBIN A1C: Status: ACTIVE | Noted: 2020-05-18

## 2020-05-18 PROBLEM — E55.9 VITAMIN D INSUFFICIENCY: Status: ACTIVE | Noted: 2020-05-18

## 2020-05-18 LAB
INTERNATIONAL NORMALIZATION RATIO, POC: 1
PROTHROMBIN TIME, POC: 12.4

## 2020-05-18 PROCEDURE — G8427 DOCREV CUR MEDS BY ELIG CLIN: HCPCS | Performed by: FAMILY MEDICINE

## 2020-05-18 PROCEDURE — G8419 CALC BMI OUT NRM PARAM NOF/U: HCPCS | Performed by: FAMILY MEDICINE

## 2020-05-18 PROCEDURE — 3017F COLORECTAL CA SCREEN DOC REV: CPT | Performed by: FAMILY MEDICINE

## 2020-05-18 PROCEDURE — 99214 OFFICE O/P EST MOD 30 MIN: CPT | Performed by: FAMILY MEDICINE

## 2020-05-18 PROCEDURE — 85610 PROTHROMBIN TIME: CPT | Performed by: FAMILY MEDICINE

## 2020-05-18 PROCEDURE — 4004F PT TOBACCO SCREEN RCVD TLK: CPT | Performed by: FAMILY MEDICINE

## 2020-05-18 RX ORDER — NICOTINE 21 MG/24HR
1 PATCH, TRANSDERMAL 24 HOURS TRANSDERMAL EVERY 24 HOURS
Qty: 30 PATCH | Refills: 1 | Status: SHIPPED
Start: 2020-05-18 | End: 2021-01-06 | Stop reason: DRUGHIGH

## 2020-05-18 NOTE — PROGRESS NOTES
normal, off warfarin  -     POCT INR    Lingual tonsil hypertrophy, biopsy negative  Smoking cessation    Chronic bilateral low back pain without sciatica  Encouraged increased stretching and activity  Tylenol prn  No red flags    Vitamin D insufficiency  Restart OTC supplement, Vit D3 2000 IU daily    Medication refill  -     mupirocin (BACTROBAN) 2 % ointment; Apply 3 times daily to outside of mouth. Do not put inside mouth    Elevated A1C  + Dietary and lifestyle modifications      As above. Call or go to ED immediately if symptoms worsen or persist.  Return in about 4 months (around 9/18/2020). with above fasting lab prior, or sooner if necessary. Educational materials and/or home exercises printed for patient's review and were included in patient instructions on his/her After Visit Summary and given to patient at the end of visit. Counseled regarding above diagnosis, including possible risks and complications,  especially if left uncontrolled. Counseled regarding the possible side effects, risks, benefits and alternatives to treatment; patient and/or guardian verbalizes understanding, agrees, feels comfortable with and wishes to proceed with above treatment plan. Advised patient to call with any new medication issues, and read all Rx info from pharmacy to assure aware of all possible risks and side effects of medication before taking. Reviewed age and gender appropriate health screening exams and vaccinations. Advised patient regarding importance of keeping up with recommended health maintenance and to schedule as soon as possible if overdue, as this is important in assessing for undiagnosed pathology, especially cancer, as well as protecting against potentially harmful/life threatening disease. Patient and/or guardian verbalizes understanding and agrees with above counseling, assessment and plan. All questions answered.

## 2020-07-08 RX ORDER — DABIGATRAN ETEXILATE 150 MG/1
150 CAPSULE, COATED PELLETS ORAL 2 TIMES DAILY
Qty: 180 CAPSULE | Refills: 1 | Status: SHIPPED
Start: 2020-07-08 | End: 2020-12-09 | Stop reason: SDUPTHER

## 2020-07-08 NOTE — TELEPHONE ENCOUNTER
Last Appointment   5/18/2020  Next Appointment  9/14/2020    Patient due for refill on pradaxa.   Med pending drs approval.

## 2020-08-24 ENCOUNTER — TELEPHONE (OUTPATIENT)
Dept: FAMILY MEDICINE CLINIC | Age: 65
End: 2020-08-24

## 2020-08-24 NOTE — TELEPHONE ENCOUNTER
Patient called the office in response to letter received recommending he call to schedule his annual lung cancer screening. He is unsure if he is supposed to have this done since he followed up with ENT and everything checked out OK.

## 2020-08-24 NOTE — TELEPHONE ENCOUNTER
Due to his smoking, it's recommended for him to have a CT of his lungs every year to screen for any new nodules. Would recommend this again if he's OK with this. Thanks.

## 2020-09-03 ENCOUNTER — TELEPHONE (OUTPATIENT)
Dept: CASE MANAGEMENT | Age: 65
End: 2020-09-03

## 2020-09-03 NOTE — TELEPHONE ENCOUNTER
I called the patient and left a message reminding him of his CT lung screening at [facility] on 9/4/2020 at 3:00 pm with an 2:30 pm arrival.  If unable to keep this appt call the office at [phone] to get rescheduled.         Electronically signed by Denita Waldron on 9/3/2020 at 9:20 AM

## 2020-09-11 ENCOUNTER — TELEPHONE (OUTPATIENT)
Dept: CASE MANAGEMENT | Age: 65
End: 2020-09-11

## 2020-09-15 ENCOUNTER — HOSPITAL ENCOUNTER (OUTPATIENT)
Dept: CT IMAGING | Age: 65
Discharge: HOME | End: 2020-09-17
Payer: MEDICARE

## 2020-09-15 PROCEDURE — G0297 LDCT FOR LUNG CA SCREEN: HCPCS

## 2020-09-16 ENCOUNTER — TELEPHONE (OUTPATIENT)
Dept: CASE MANAGEMENT | Age: 65
End: 2020-09-16

## 2020-09-16 NOTE — TELEPHONE ENCOUNTER
No call, encounter opened to process CT Lung Screening. CT Lung Screen: 9/15/2020  Impression    Lung rads category 5. Clinically significant or potentially clinically    significant finding. Persistent thickening of the aryepiglottic fold. Please correlate with the bronchoscopic findings.         COPD with pulmonary fibrosis and hazy peripheral opacities. Underlying    pneumonic infiltrate is considered. There are no dominant pulmonary    nodules. Continued surveillance is recommended.         Coronary artery calcification.         ALERT:  THIS IS AN ABNORMAL REPORT     Pack years: 77    Social History     Tobacco Use  Smoking Status: Current Every Day Smoker    Start Date: 2/12/1975   Quit Date:    Types: Cigarettes   Packs/Day: 1.50   Years: 44.00   Pack Years: 77   Smokeless Tobacco: Never Used         Results letter sent to patient via my chart or mailed.      [address]

## 2020-09-21 ENCOUNTER — OFFICE VISIT (OUTPATIENT)
Dept: FAMILY MEDICINE CLINIC | Age: 65
End: 2020-09-21
Payer: MEDICARE

## 2020-09-21 VITALS
TEMPERATURE: 97 F | HEIGHT: 68 IN | OXYGEN SATURATION: 98 % | HEART RATE: 50 BPM | RESPIRATION RATE: 16 BRPM | WEIGHT: 198.4 LBS | DIASTOLIC BLOOD PRESSURE: 56 MMHG | BODY MASS INDEX: 30.07 KG/M2 | SYSTOLIC BLOOD PRESSURE: 118 MMHG

## 2020-09-21 PROCEDURE — 1036F TOBACCO NON-USER: CPT | Performed by: FAMILY MEDICINE

## 2020-09-21 PROCEDURE — 3023F SPIROM DOC REV: CPT | Performed by: FAMILY MEDICINE

## 2020-09-21 PROCEDURE — G8417 CALC BMI ABV UP PARAM F/U: HCPCS | Performed by: FAMILY MEDICINE

## 2020-09-21 PROCEDURE — G8427 DOCREV CUR MEDS BY ELIG CLIN: HCPCS | Performed by: FAMILY MEDICINE

## 2020-09-21 PROCEDURE — 3017F COLORECTAL CA SCREEN DOC REV: CPT | Performed by: FAMILY MEDICINE

## 2020-09-21 PROCEDURE — G8926 SPIRO NO PERF OR DOC: HCPCS | Performed by: FAMILY MEDICINE

## 2020-09-21 PROCEDURE — 99214 OFFICE O/P EST MOD 30 MIN: CPT | Performed by: FAMILY MEDICINE

## 2020-09-21 RX ORDER — PRAVASTATIN SODIUM 20 MG
20 TABLET ORAL DAILY
Qty: 90 TABLET | Refills: 1 | Status: SHIPPED
Start: 2020-09-21 | End: 2021-02-24 | Stop reason: SDUPTHER

## 2020-09-21 RX ORDER — TIOTROPIUM BROMIDE 18 UG/1
18 CAPSULE ORAL; RESPIRATORY (INHALATION) DAILY
Qty: 90 CAPSULE | Refills: 1 | Status: SHIPPED
Start: 2020-09-21 | End: 2021-03-04 | Stop reason: SDUPTHER

## 2020-09-21 NOTE — PATIENT INSTRUCTIONS
Need to start back on Vitamin D3 2000 IU daily. Set a goal to lose 10 pounds in the next 3 months. Decrease the amount you have for dinner. Increase your walking. Try to increase the water during the day and decrease the amount before you go to bed.

## 2020-09-21 NOTE — PROGRESS NOTES
Subjective:  63 y.o. y/o male is here for follow up chronic medical issues. Reviewed most recent labs from May    Psych: Dr. Hawk Young monthly, Bipolar affective d/o, doing OK, Invega/Paxil/Cogentin/Depakote, been doing phone visits now, no med changes  Recurrent PE, +hypercoagulable, clots around 15 years ago in leg and lungs, switched to pradaxa few months ago d/t erratic INR and risk of bleeding vs clot, doing well  Vitamin D insufficiency, not taking supplement, level last at 23  Abnormal swelling near epiglottis on CT chest, panendoscopy done with ENT, biopsies negative for cancer, smoking cessation recommended, reviewed OV 3/20, +continued presence on repeat CT chest    Bullous emphysema and subpleural honeycombing on low-dose CT, 44 pack-year history, no wheezing  Quit smoking, 9/13/20, cold turkey  Glaucoma, using eye drops, +mild cataract    The 10-year ASCVD risk score (Demetrice Sharp., et al., 2013) is: 10.1%    Values used to calculate the score:      Age: 63 years      Sex: Male      Is Non- : No      Diabetic: No      Tobacco smoker: No      Systolic Blood Pressure: 376 mmHg      Is BP treated: No      HDL Cholesterol: 33 mg/dL      Total Cholesterol: 153 mg/dL    Back on own in apartment, cooking for self (cereal in the morning, boxed meal for supper)  Brother passed in March, DM + organ failure    Walks to [facility] 1-2 times/day  Gained 15lb in last 3 months, eats large dinners, dessert weekly, frozen dinners    Patient's past medical, surgical, social and/or family history reviewed, updated in chart, and are non-contributory (unless otherwise stated). Medications and allergies also reviewed and updated in chart.         Review of Systems:  Constitutional:  No fever, no fatigue, no chills, no headaches, no weight change  Dermatology:  No rash, no mole, no dry or sensitive skin, +bed bugs in apartment  ENT:  + cough, no sore throat, no sinus pain, no runny nose, no ear pain, +drainage  Cardiology:  No chest pain, +occ palpitations, no leg edema, no PND  Gastroenterology:  No dysphagia, no abdominal pain, no nausea, no vomiting, no constipation, no diarrhea, no heartburn  Musculoskeletal:  No joint pain, no leg cramps, + back pain, no muscle aches  Neuro:  No dizziness, no numbness/tingling  Respiratory:  + shortness of breath, no orthopnea, no wheezing, + MELENDEZ with stairs  Urology:  No blood in the urine, no urinary frequency, no urinary urgency, + nocturia (drinking most of his water near bedtime), no dysuria    Vitals:    09/21/20 1619   BP: (!) 118/56   Site: Left Upper Arm   Position: Sitting   Cuff Size: Medium Adult   Pulse: 50   Resp: 16   Temp: 97 °F (36.1 °C)   TempSrc: Temporal   SpO2: 98%   Weight: 198 lb 6.4 oz (90 kg)   Height: 5' 8\" (1.727 m)     Body mass index is 30.17 kg/m². General:  Patient alert and oriented x 3, NAD, pleasant  HEENT:  Atraumatic, normocephalic, pupils equal and normal, EOMI, clear conjunctiva, +mask  Neck:  Supple, no goiter, no carotid bruits, no LAD  Lungs: no resp distress, decreased air movement diffusely, scant wheezing  Heart:  RRR, no murmurs, gallops or rubs  Abdomen:  Soft/nt/nd, + bowel sounds  Extremities:  No clubbing, cyanosis or edema; +small tremor, gait normal  Skin: unremarkable    Assessment/Plan:    Timothy Adams was seen today for pulmonary embolism and nicotine dependence.     Diagnoses and all orders for this visit:    Bipolar affective disorder, mixed, severe, with psychotic behavior ([facility] [address] stable  Continue same meds  F/u with psych regularly as directed    Tobacco abuse  Quit cold turkey--encouraged continued cessation    Vitamin D insufficiency  Promises to  supplement at the pharmacy and start taking    Elevated hemoglobin A1c, diet-controlled  -     Hemoglobin A1C; Future  + Dietary and lifestyle modifications  + Discussed decreasing portion size for dinner, decrease dessert amounts, continued walking    Pulmonary emphysema, unspecified emphysema type ([facility] [address] uncontrolled  -     tiotropium (SPIRIVA HANDIHALER) 18 MCG inhalation capsule; Inhale 1 capsule into the lungs daily  + Trial of Spiriva daily    Hypercoagulable state (HCC)  +Pradaxa, continue    Mixed hyperlipidemia, uncontrolled, ASCVD risk 10%  -     pravastatin (PRAVACHOL) 20 MG tablet; Take 1 tablet by mouth daily  -     Lipid, Fasting; Future  -     Comprehensive Metabolic Panel; Future  -     CBC Auto Differential; Future  + Start mod-intensity statin with goal of 40-80mg        As above. Call or go to ED immediately if symptoms worsen or persist.  Return in about 3 months (around 12/21/2020) for AWV. with above fasting lab prior, or sooner if necessary. Educational materials and/or home exercises printed for patient's review and were included in patient instructions on his/her After Visit Summary and given to patient at the end of visit. Counseled regarding above diagnosis, including possible risks and complications,  especially if left uncontrolled. Counseled regarding the possible side effects, risks, benefits and alternatives to treatment; patient and/or guardian verbalizes understanding, agrees, feels comfortable with and wishes to proceed with above treatment plan. Advised patient to call with any new medication issues, and read all Rx info from pharmacy to assure aware of all possible risks and side effects of medication before taking. Reviewed age and gender appropriate health screening exams and vaccinations. Advised patient regarding importance of keeping up with recommended health maintenance and to schedule as soon as possible if overdue, as this is important in assessing for undiagnosed pathology, especially cancer, as well as protecting against potentially harmful/life threatening disease. Patient and/or guardian verbalizes understanding and agrees with above counseling, assessment and plan.     All questions answered.

## 2020-09-22 PROBLEM — E78.2 MIXED HYPERLIPIDEMIA: Status: ACTIVE | Noted: 2020-09-22

## 2020-09-22 PROBLEM — J43.9 PULMONARY EMPHYSEMA (HCC): Status: ACTIVE | Noted: 2020-09-22

## 2020-09-24 RX ORDER — LATANOPROST 50 UG/ML
1 SOLUTION/ DROPS OPHTHALMIC NIGHTLY
Qty: 1 BOTTLE | Refills: 1 | Status: SHIPPED | OUTPATIENT
Start: 2020-09-24

## 2020-10-19 ENCOUNTER — OFFICE VISIT (OUTPATIENT)
Dept: FAMILY MEDICINE CLINIC | Age: 65
End: 2020-10-19
Payer: MEDICARE

## 2020-10-19 VITALS
HEART RATE: 59 BPM | HEIGHT: 68 IN | SYSTOLIC BLOOD PRESSURE: 110 MMHG | OXYGEN SATURATION: 94 % | WEIGHT: 204 LBS | BODY MASS INDEX: 30.92 KG/M2 | DIASTOLIC BLOOD PRESSURE: 67 MMHG | RESPIRATION RATE: 16 BRPM | TEMPERATURE: 98 F

## 2020-10-19 PROCEDURE — 69209 REMOVE IMPACTED EAR WAX UNI: CPT | Performed by: FAMILY MEDICINE

## 2020-10-19 PROCEDURE — G8417 CALC BMI ABV UP PARAM F/U: HCPCS | Performed by: FAMILY MEDICINE

## 2020-10-19 PROCEDURE — G8427 DOCREV CUR MEDS BY ELIG CLIN: HCPCS | Performed by: FAMILY MEDICINE

## 2020-10-19 PROCEDURE — 1036F TOBACCO NON-USER: CPT | Performed by: FAMILY MEDICINE

## 2020-10-19 PROCEDURE — G8484 FLU IMMUNIZE NO ADMIN: HCPCS | Performed by: FAMILY MEDICINE

## 2020-10-19 PROCEDURE — 3017F COLORECTAL CA SCREEN DOC REV: CPT | Performed by: FAMILY MEDICINE

## 2020-10-19 NOTE — PROGRESS NOTES
Procedure note:  Cerumen impaction(s) removed in office today by Lotus Olivia using warm water with the ear wash system. Patient tolerated the procedure well.
verbalizes understanding, agrees, feels comfortable with and wishes to proceed with above treatment plan. Advised patient to call with any new medication issues, and read all Rx info from pharmacy to assure aware of all possible risks and side effects of medication before taking. Reviewed age and gender appropriate health screening exams and vaccinations. Advised patient regarding importance of keeping up with recommended health maintenance and to schedule as soon as possible if overdue, as this is important in assessing for undiagnosed pathology, especially cancer, as well as protecting against potentially harmful/life threatening disease. Patient and/or guardian verbalizes understanding and agrees with above counseling, assessment and plan. All questions answered.

## 2020-11-13 ENCOUNTER — TELEPHONE (OUTPATIENT)
Dept: FAMILY MEDICINE CLINIC | Age: 65
End: 2020-11-13

## 2020-11-13 NOTE — TELEPHONE ENCOUNTER
Patient left message asking if Nicoderm patch can be prescribed. I return his call to ask how much he smokes and what pharmacy he uses but there was no answer.  LVM requesting a call back

## 2020-12-09 RX ORDER — DABIGATRAN ETEXILATE 150 MG/1
150 CAPSULE, COATED PELLETS ORAL 2 TIMES DAILY
Qty: 180 CAPSULE | Refills: 1 | Status: SHIPPED
Start: 2020-12-09 | End: 2021-08-10 | Stop reason: SDUPTHER

## 2020-12-10 ENCOUNTER — TELEPHONE (OUTPATIENT)
Dept: FAMILY MEDICINE CLINIC | Age: 65
End: 2020-12-10

## 2020-12-10 NOTE — TELEPHONE ENCOUNTER
Nurse  called following home visit concerned about possible COVID-19 infection. Patient c/o of sore throat and cough, and had close contact with his cousin last week who was +COVID. Vitals were stable (/60, pulse 66, O2 Sat 99%, temp 99.1). She scheduled him for COVID test at [address] this Saturday (soonest available) and would like to know if Dr. Terri To has any further recommendations.

## 2020-12-11 RX ORDER — CHOLECALCIFEROL (VITAMIN D3) 125 MCG
2000 CAPSULE ORAL DAILY
Qty: 30 TABLET | Refills: 1 | Status: SHIPPED
Start: 2020-12-11 | End: 2021-01-26 | Stop reason: SDUPTHER

## 2020-12-11 NOTE — TELEPHONE ENCOUNTER
Only other suggestions would be to start on vitamins that are supposed to help his immune system with COVID. Vit D3 2000 IU daily  Vit C 1000 mg daily  Zinc 50 mg twice daily    Thanks.

## 2020-12-11 NOTE — TELEPHONE ENCOUNTER
Nurse informed of recommendations and asked if Dr. Power Mayer would be able to write prescriptions for supplements/vitamins due to fixed income to see if insurance may cover (orders pended)

## 2021-01-06 ENCOUNTER — OFFICE VISIT (OUTPATIENT)
Dept: FAMILY MEDICINE CLINIC | Age: 66
End: 2021-01-06
Payer: MEDICARE

## 2021-01-06 VITALS
OXYGEN SATURATION: 98 % | BODY MASS INDEX: 30.92 KG/M2 | HEIGHT: 68 IN | DIASTOLIC BLOOD PRESSURE: 76 MMHG | HEART RATE: 68 BPM | WEIGHT: 204 LBS | RESPIRATION RATE: 16 BRPM | SYSTOLIC BLOOD PRESSURE: 104 MMHG | TEMPERATURE: 97.3 F

## 2021-01-06 DIAGNOSIS — Z72.0 TOBACCO ABUSE: ICD-10-CM

## 2021-01-06 DIAGNOSIS — Z00.00 ROUTINE GENERAL MEDICAL EXAMINATION AT A HEALTH CARE FACILITY: Primary | ICD-10-CM

## 2021-01-06 PROCEDURE — 3017F COLORECTAL CA SCREEN DOC REV: CPT | Performed by: FAMILY MEDICINE

## 2021-01-06 PROCEDURE — G0438 PPPS, INITIAL VISIT: HCPCS | Performed by: FAMILY MEDICINE

## 2021-01-06 PROCEDURE — G8484 FLU IMMUNIZE NO ADMIN: HCPCS | Performed by: FAMILY MEDICINE

## 2021-01-06 RX ORDER — NICOTINE 21 MG/24HR
1 PATCH, TRANSDERMAL 24 HOURS TRANSDERMAL DAILY
Qty: 30 PATCH | Refills: 1 | Status: ON HOLD
Start: 2021-01-06 | End: 2021-05-17 | Stop reason: HOSPADM

## 2021-01-06 ASSESSMENT — PATIENT HEALTH QUESTIONNAIRE - PHQ9
SUM OF ALL RESPONSES TO PHQ9 QUESTIONS 1 & 2: 2
SUM OF ALL RESPONSES TO PHQ QUESTIONS 1-9: 2
2. FEELING DOWN, DEPRESSED OR HOPELESS: 1
SUM OF ALL RESPONSES TO PHQ QUESTIONS 1-9: 2

## 2021-01-06 ASSESSMENT — LIFESTYLE VARIABLES: HOW MANY STANDARD DRINKS CONTAINING ALCOHOL DO YOU HAVE ON A TYPICAL DAY: 0

## 2021-01-06 NOTE — PROGRESS NOTES
Medicare Annual Wellness Visit  Name: Donna Arellano Date: 2021   MRN: [id number] Sex: Male   Age: 63 y.o. Ethnicity: Non-/Non    : 1955 Race: Rosas Gomez is here for Medicare AWV    Did not get labs done  Using the spiriva daily, helping  Quit smoking for 12 days and started again, wants to try patches again, smoking 1/2 ppd now    Screenings for behavioral, psychosocial and functional/safety risks, and cognitive dysfunction are all negative except as indicated below. These results, as well as other patient data from the [address] form, are documented in Flowsheets linked to this Encounter. No Known Allergies      Prior to Visit Medications    Medication Sig Taking?  Authorizing Provider   nicotine (NICODERM CQ) 14 MG/24HR Place 1 patch onto the skin daily Yes Araceli Holder MD   Bioflavonoid Products (VITAMIN C PLUS) 1000 MG TABS Take 1,000 mg by mouth daily Yes Araceli Holder MD   Cholecalciferol (VITAMIN D3) 50 MCG (2000 UT) TABS Take 2,000 Units by mouth daily Yes Araceli Holder MD   zinc 50 MG CAPS Take 50 mg by mouth 2 times daily Yes Araceli Holder MD   dabigatran (PRADAXA) 150 MG capsule Take 1 capsule by mouth 2 times daily Yes Araceli Holder MD   latanoprost (XALATAN) 0.005 % ophthalmic solution Place 1 drop into both eyes nightly Yes Araceli Holder MD   mupirocin (BACTROBAN) 2 % ointment Apply 1 Dose topically 3 times daily Yes Historical Provider, MD   tiotropium (SPIRIVA HANDIHALER) 18 MCG inhalation capsule Inhale 1 capsule into the lungs daily Yes Araceli Holder MD   pravastatin (PRAVACHOL) 20 MG tablet Take 1 tablet by mouth daily Yes Araceli Holder MD   propranolol (INDERAL) 20 MG tablet Take 20 mg by mouth 2 times daily Yes Historical Provider, MD   paliperidone palmitate ER (INVEGA TRINZA) 819 MG/2.625ML LEANDRA IM injection Inject 819 mg into the muscle every 3 months Yes Historical Provider, MD   benztropine and are found in [address] [facility]. The following problems were reviewed today and where indicated follow up appointments were made and/or referrals ordered. Positive Risk Factor Screenings with Interventions:         Substance History:  Social History     Tobacco History     Smoking Status  Current Every Day Smoker Smoking Start Date  2/12/1975 Smoking Frequency  0.25 packs/day for 44 years (11 pk yrs) Smoking Tobacco Type  Cigarettes    Smokeless Tobacco Use  Never Used          Alcohol History     Alcohol Use Status  Not Currently Comment  occasional beer           Drug Use     Drug Use Status  No          Sexual Activity     Sexually Active  Never               Alcohol Screening: Audit-C Score: 1    A score of 8 or more is associated with harmful or hazardous drinking. A score of 13 or more in women, and 15 or more in men, is likely to indicate alcohol dependence. Substance Abuse Interventions:  · Tobacco abuse:  tobacco cessation tips and resources provided, nicotine patch 14mg sent to [facility] and ACP:  General  In general, how would you say your health is?: Good  In the past 7 days, have you experienced any of the following? New or Increased Pain, New or Increased Fatigue, Loneliness, Social Isolation, Stress or Anger?: (!) New or Increased Pain, Loneliness  Do you get the social and emotional support that you need?: Yes  Do you have a Living Will?: Yes  Advance Directives     Power of  Living Will ACP-Advance Directive ACP-Power of     Not on File Not on File Not on File Not on File      General Health Risk Interventions:  · Pain issues: pain off/on--moreso lightheadedness with getting up and going too quickly  · Loneliness:  Thinks doing OK with COVID, seeing some people, making phone calls    Health Habits/Nutrition:  Health Habits/Nutrition  Do you exercise for at least 20 minutes 2-3 times per week?: Yes  Have you lost any weight without trying in the past 3 months?: No  Do you eat fewer than 2 meals per day?: No  Have you seen a dentist within the past year?: (!) No  Body mass index: (!) 31.01  Health Habits/Nutrition Interventions:  · Dental exam overdue:  patient encouraged to make appointment with his/her dentist, Gum hurting, dentures       Personalized Preventive Plan   Current Health Maintenance Status  Immunization History   Administered Date(s) Administered    Influenza, Quadv, IM, PF (6 mo and older Fluzone, Flulaval, Fluarix, and 3 yrs and older Afluria) 11/20/2019    Pneumococcal Conjugate 13-valent ([id number]) 09/21/2017    Pneumococcal Polysaccharide ([id number]) 11/20/2019    Zoster Live (Zostavax) 04/04/2018        Health Maintenance   Topic Date Due    Hepatitis C screen  1955    DTaP/Tdap/Td vaccine (1 - Tdap) 04/20/1976    Shingles Vaccine (2 of 3) 05/30/2018    Annual Wellness Visit (AWV)  09/15/2020    A1C test (Diabetic or Prediabetic)  05/08/2021    Lipid screen  05/08/2021    Colon cancer screen colonoscopy  04/11/2026    Flu vaccine  Completed    Pneumococcal 0-64 years Vaccine  Completed    HIV screen  Completed    Hepatitis A vaccine  Aged Out    Hepatitis B vaccine  Aged Out    Hib vaccine  Aged Out    Meningococcal (ACWY) vaccine  Aged Out     Recommendations for [employer] Due: see orders and patient instructions/AVS.  . Recommended screening schedule for the next 5-10 years is provided to the patient in written form: see Patient Instructions/AVS.    John Lopez was seen today for medicare awv. Diagnoses and all orders for this visit:    Routine general medical examination at a health care facility  As above    Tobacco abuse  -     nicotine (NICODERM CQ) 14 MG/24HR; Place 1 patch onto the skin daily      RTC in 2 months with fasting lab prior as ordered previous.

## 2021-01-06 NOTE — PATIENT INSTRUCTIONS
Personalized Preventive Plan for [facility] - 1/6/2021  Medicare offers a range of preventive health benefits. Some of the tests and screenings are paid in full while other may be subject to a deductible, co-insurance, and/or copay. Some of these benefits include a comprehensive review of your medical history including lifestyle, illnesses that may run in your family, and various assessments and screenings as appropriate. After reviewing your medical record and screening and assessments performed today your provider may have ordered immunizations, labs, imaging, and/or referrals for you. A list of these orders (if applicable) as well as your Preventive Care list are included within your After Visit Summary for your review. Other Preventive Recommendations:    · A preventive eye exam performed by an eye specialist is recommended every 1-2 years to screen for glaucoma; cataracts, macular degeneration, and other eye disorders. · A preventive dental visit is recommended every 6 months. · Try to get at least 150 minutes of exercise per week or 10,000 steps per day on a pedometer . · Order or download the FREE \"Exercise & Physical Activity: Your Everyday Guide\" from [employer]. Call 6-347.192.5216 or search [employer] online. · You need 7581-1878 mg of calcium and 3505-5692 IU of vitamin D per day. It is possible to meet your calcium requirement with diet alone, but a vitamin D supplement is usually necessary to meet this goal.  · When exposed to the sun, use a sunscreen that protects against both UVA and UVB radiation with an SPF of 30 or greater. Reapply every 2 to 3 hours or after sweating, drying off with a towel, or swimming. · Always wear a seat belt when traveling in a car. Always wear a helmet when riding a bicycle or motorcycle.

## 2021-01-14 ENCOUNTER — TELEPHONE (OUTPATIENT)
Dept: FAMILY MEDICINE CLINIC | Age: 66
End: 2021-01-14

## 2021-01-15 NOTE — TELEPHONE ENCOUNTER
Nurse informed Dr. Maria E Alvarado recommends he schedule an appointment since this has not been discussed at recent visits.  She will have him call to schedule

## 2021-01-15 NOTE — TELEPHONE ENCOUNTER
Before PT can be ordered, he will need to come in to be evaluated. We haven't discussed back pain in any recent visit. Thanks.

## 2021-01-26 RX ORDER — CHOLECALCIFEROL (VITAMIN D3) 125 MCG
2000 CAPSULE ORAL DAILY
Qty: 30 TABLET | Refills: 1 | Status: SHIPPED
Start: 2021-01-26 | End: 2021-03-22 | Stop reason: SDUPTHER

## 2021-01-26 NOTE — TELEPHONE ENCOUNTER
Requested Prescriptions     Pending Prescriptions Disp Refills    Bioflavonoid Products (VITAMIN C PLUS) 1000 MG TABS 30 tablet 1     Sig: Take 1,000 mg by mouth daily    Cholecalciferol (VITAMIN D3) 50 MCG (2000 UT) TABS 30 tablet 1     Sig: Take 2,000 Units by mouth daily    zinc 50 MG CAPS 60 capsule 1     Sig: Take 50 mg by mouth 2 times daily

## 2021-02-24 DIAGNOSIS — E78.2 MIXED HYPERLIPIDEMIA: ICD-10-CM

## 2021-02-24 RX ORDER — PRAVASTATIN SODIUM 20 MG
20 TABLET ORAL DAILY
Qty: 90 TABLET | Refills: 1 | Status: SHIPPED
Start: 2021-02-24 | End: 2021-08-03 | Stop reason: SDUPTHER

## 2021-02-24 NOTE — TELEPHONE ENCOUNTER
Requested Prescriptions     Pending Prescriptions Disp Refills    pravastatin (PRAVACHOL) 20 MG tablet 90 tablet 1     Sig: Take 1 tablet by mouth daily

## 2021-03-04 DIAGNOSIS — J43.9 PULMONARY EMPHYSEMA, UNSPECIFIED EMPHYSEMA TYPE (HCC): ICD-10-CM

## 2021-03-04 LAB
ALP BLD-CCNC: 71 U/L
CHOLESTEROL, TOTAL: 118 MG/DL
CHOLESTEROL/HDL RATIO: 5
HDLC SERPL-MCNC: 25 MG/DL (ref 35–70)
LDL CHOLESTEROL CALCULATED: 44 MG/DL (ref 0–160)
NONHDLC SERPL-MCNC: ABNORMAL MG/DL
TRIGL SERPL-MCNC: 245 MG/DL
VLDLC SERPL CALC-MCNC: ABNORMAL MG/DL

## 2021-03-04 RX ORDER — TIOTROPIUM BROMIDE 18 UG/1
18 CAPSULE ORAL; RESPIRATORY (INHALATION) DAILY
Qty: 90 CAPSULE | Refills: 1 | Status: SHIPPED
Start: 2021-03-04 | End: 2021-09-30 | Stop reason: SDUPTHER

## 2021-03-04 NOTE — TELEPHONE ENCOUNTER
Requested Prescriptions     Pending Prescriptions Disp Refills    tiotropium (SPIRIVA HANDIHALER) 18 MCG inhalation capsule 90 capsule 1     Sig: Inhale 1 capsule into the lungs daily

## 2021-03-10 ENCOUNTER — OFFICE VISIT (OUTPATIENT)
Dept: FAMILY MEDICINE CLINIC | Age: 66
End: 2021-03-10
Payer: MEDICARE

## 2021-03-10 VITALS
SYSTOLIC BLOOD PRESSURE: 112 MMHG | TEMPERATURE: 97.5 F | RESPIRATION RATE: 14 BRPM | WEIGHT: 206.2 LBS | DIASTOLIC BLOOD PRESSURE: 70 MMHG | HEIGHT: 68 IN | HEART RATE: 84 BPM | OXYGEN SATURATION: 96 % | BODY MASS INDEX: 31.25 KG/M2

## 2021-03-10 DIAGNOSIS — E78.2 MIXED HYPERLIPIDEMIA: ICD-10-CM

## 2021-03-10 DIAGNOSIS — R40.4 TRANSIENT ALTERATION OF AWARENESS: ICD-10-CM

## 2021-03-10 DIAGNOSIS — R42 DIZZINESS: Primary | ICD-10-CM

## 2021-03-10 PROCEDURE — G8427 DOCREV CUR MEDS BY ELIG CLIN: HCPCS | Performed by: FAMILY MEDICINE

## 2021-03-10 PROCEDURE — G8484 FLU IMMUNIZE NO ADMIN: HCPCS | Performed by: FAMILY MEDICINE

## 2021-03-10 PROCEDURE — 99214 OFFICE O/P EST MOD 30 MIN: CPT | Performed by: FAMILY MEDICINE

## 2021-03-10 PROCEDURE — G8417 CALC BMI ABV UP PARAM F/U: HCPCS | Performed by: FAMILY MEDICINE

## 2021-03-10 PROCEDURE — 3017F COLORECTAL CA SCREEN DOC REV: CPT | Performed by: FAMILY MEDICINE

## 2021-03-10 PROCEDURE — 4004F PT TOBACCO SCREEN RCVD TLK: CPT | Performed by: FAMILY MEDICINE

## 2021-03-10 RX ORDER — VITAMIN B COMPLEX
1 TABLET ORAL DAILY
Qty: 90 TABLET | Refills: 1 | Status: SHIPPED
Start: 2021-03-10 | End: 2021-08-03 | Stop reason: SDUPTHER

## 2021-03-10 NOTE — PROGRESS NOTES
Subjective:  63 y.o. y/o male is here for altered mental status. Here with caregiver   Reviewed labs, macrocytosis   Concerned about confusion the last 1-2 weeks in the mornings  OK later in the day  Trouble finding words, disoriented, some dizziness  No weakness, no paresthesias  No change in medications  Feels lightheaded with standing  Drinking 1.5 cups of water per day  Eating consistently, frozen dinners  Started smoking again just a few days ago, had quit for 6 months, again 1 ppd  MMSE mostly done: Only missed 1 of the words on recall, OK with everything else, clock normal  The 10-year ASCVD risk score (Thurman Boeck., et al., 2013) is: 13.5%    Values used to calculate the score:      Age: 63 years      Sex: Male      Is Non- : No      Diabetic: No      Tobacco smoker: Yes      Systolic Blood Pressure: 888 mmHg      Is BP treated: No      HDL Cholesterol: 33 mg/dL      Total Cholesterol: 153 mg/dL      Patient's past medical, surgical, social and/or family history reviewed, updated in chart, and are non-contributory (unless otherwise stated). Medications and allergies also reviewed and updated in chart.         Review of Systems:  Constitutional:  No fever, no fatigue, no chills, no headaches, no weight change  Dermatology:  No rash, no mole, no dry or sensitive skin  ENT:  + cough, no sore throat, no sinus pain, no runny nose, no ear pain, +drainage  Cardiology:  No chest pain, +occ palpitations, no leg edema, no PND  Gastroenterology:  No dysphagia, no abdominal pain, no nausea, no vomiting, no constipation, no diarrhea, no heartburn  Musculoskeletal:  No joint pain, no leg cramps, + back pain, no muscle aches  Neuro:  + dizziness, no numbness/tingling  Respiratory:  + shortness of breath, no orthopnea, no wheezing, + MELENDEZ with stairs  Urology:  No blood in the urine, no urinary frequency, no urinary urgency, + nocturia (drinking most of his water near bedtime), no dysuria    Vitals: possible risks and complications,  especially if left uncontrolled. Counseled regarding the possible side effects, risks, benefits and alternatives to treatment; patient and/or guardian verbalizes understanding, agrees, feels comfortable with and wishes to proceed with above treatment plan. Advised patient to call with any new medication issues, and read all Rx info from pharmacy to assure aware of all possible risks and side effects of medication before taking. Reviewed age and gender appropriate health screening exams and vaccinations. Advised patient regarding importance of keeping up with recommended health maintenance and to schedule as soon as possible if overdue, as this is important in assessing for undiagnosed pathology, especially cancer, as well as protecting against potentially harmful/life threatening disease. Patient and/or guardian verbalizes understanding and agrees with above counseling, assessment and plan. All questions answered.

## 2021-03-22 RX ORDER — CHOLECALCIFEROL (VITAMIN D3) 125 MCG
2000 CAPSULE ORAL DAILY
Qty: 30 TABLET | Refills: 3 | Status: SHIPPED
Start: 2021-03-22 | End: 2021-08-03 | Stop reason: SDUPTHER

## 2021-04-13 ENCOUNTER — HOSPITAL ENCOUNTER (OUTPATIENT)
Dept: MRI IMAGING | Age: 66
Discharge: HOME | End: 2021-04-15
Payer: MEDICARE

## 2021-04-13 DIAGNOSIS — R42 DIZZINESS: ICD-10-CM

## 2021-04-13 DIAGNOSIS — R40.4 TRANSIENT ALTERATION OF AWARENESS: ICD-10-CM

## 2021-04-13 PROCEDURE — 70551 MRI BRAIN STEM W/O DYE: CPT

## 2021-04-18 ENCOUNTER — HOSPITAL ENCOUNTER (EMERGENCY)
Age: 66
Discharge: HOME | End: 2021-04-18
Attending: EMERGENCY MEDICINE
Payer: MEDICARE

## 2021-04-18 VITALS
SYSTOLIC BLOOD PRESSURE: 119 MMHG | HEART RATE: 81 BPM | OXYGEN SATURATION: 94 % | DIASTOLIC BLOOD PRESSURE: 79 MMHG | WEIGHT: 200 LBS | HEIGHT: 68 IN | TEMPERATURE: 98 F | RESPIRATION RATE: 16 BRPM | BODY MASS INDEX: 30.31 KG/M2

## 2021-04-18 DIAGNOSIS — F32.A DEPRESSION, UNSPECIFIED DEPRESSION TYPE: Primary | ICD-10-CM

## 2021-04-18 LAB
ACETAMINOPHEN LEVEL: <5 MCG/ML (ref 10–30)
ALBUMIN SERPL-MCNC: 3.6 G/DL (ref 3.5–5.2)
ALP BLD-CCNC: 56 U/L (ref 40–129)
ALT SERPL-CCNC: 53 U/L (ref 0–40)
AMPHETAMINE SCREEN, URINE: NOT DETECTED
ANION GAP SERPL CALCULATED.3IONS-SCNC: 13 MMOL/L (ref 7–16)
AST SERPL-CCNC: 35 U/L (ref 0–39)
BACTERIA: NORMAL /HPF
BARBITURATE SCREEN URINE: NOT DETECTED
BASOPHILS ABSOLUTE: 0.05 E9/L (ref 0–0.2)
BASOPHILS RELATIVE PERCENT: 0.6 % (ref 0–2)
BENZODIAZEPINE SCREEN, URINE: NOT DETECTED
BILIRUB SERPL-MCNC: <0.2 MG/DL (ref 0–1.2)
BILIRUBIN URINE: NEGATIVE
BLOOD, URINE: ABNORMAL
BUN BLDV-MCNC: 7 MG/DL (ref 8–23)
CALCIUM SERPL-MCNC: 9.7 MG/DL (ref 8.6–10.2)
CANNABINOID SCREEN URINE: NOT DETECTED
CHLORIDE BLD-SCNC: 97 MMOL/L (ref 98–107)
CLARITY: CLEAR
CO2: 22 MMOL/L (ref 22–29)
COCAINE METABOLITE SCREEN URINE: NOT DETECTED
COLOR: ABNORMAL
CREAT SERPL-MCNC: 0.7 MG/DL (ref 0.7–1.2)
EOSINOPHILS ABSOLUTE: 0.14 E9/L (ref 0.05–0.5)
EOSINOPHILS RELATIVE PERCENT: 1.6 % (ref 0–6)
ETHANOL: <10 MG/DL (ref 0–0.08)
FENTANYL SCREEN, URINE: NOT DETECTED
GFR AFRICAN AMERICAN: >60
GFR NON-AFRICAN AMERICAN: >60 ML/MIN/1.73
GLUCOSE BLD-MCNC: 147 MG/DL (ref 74–99)
GLUCOSE URINE: NEGATIVE MG/DL
HCT VFR BLD CALC: 39.8 % (ref 37–54)
HEMOGLOBIN: 13 G/DL (ref 12.5–16.5)
IMMATURE GRANULOCYTES #: 0.03 E9/L
IMMATURE GRANULOCYTES %: 0.4 % (ref 0–5)
KETONES, URINE: NEGATIVE MG/DL
LEUKOCYTE ESTERASE, URINE: ABNORMAL
LYMPHOCYTES ABSOLUTE: 2.23 E9/L (ref 1.5–4)
LYMPHOCYTES RELATIVE PERCENT: 26.1 % (ref 20–42)
Lab: NORMAL
MCH RBC QN AUTO: 31.9 PG (ref 26–35)
MCHC RBC AUTO-ENTMCNC: 32.7 % (ref 32–34.5)
MCV RBC AUTO: 97.8 FL (ref 80–99.9)
METHADONE SCREEN, URINE: NOT DETECTED
MONOCYTES ABSOLUTE: 0.88 E9/L (ref 0.1–0.95)
MONOCYTES RELATIVE PERCENT: 10.3 % (ref 2–12)
NEUTROPHILS ABSOLUTE: 5.23 E9/L (ref 1.8–7.3)
NEUTROPHILS RELATIVE PERCENT: 61 % (ref 43–80)
NITRITE, URINE: NEGATIVE
OPIATE SCREEN URINE: NOT DETECTED
OXYCODONE URINE: NOT DETECTED
PDW BLD-RTO: 14 FL (ref 11.5–15)
PH UA: 6.5 (ref 5–9)
PHENCYCLIDINE SCREEN URINE: NOT DETECTED
PLATELET # BLD: 208 E9/L (ref 130–450)
PMV BLD AUTO: 8.9 FL (ref 7–12)
POTASSIUM SERPL-SCNC: 4.2 MMOL/L (ref 3.5–5)
PROTEIN UA: NEGATIVE MG/DL
RBC # BLD: 4.07 E12/L (ref 3.8–5.8)
RBC UA: NORMAL /HPF (ref 0–2)
SALICYLATE, SERUM: <0.3 MG/DL (ref 0–30)
SARS-COV-2, NAAT: NOT DETECTED
SODIUM BLD-SCNC: 132 MMOL/L (ref 132–146)
SPECIFIC GRAVITY UA: <=1.005 (ref 1–1.03)
TOTAL PROTEIN: 7.5 G/DL (ref 6.4–8.3)
TRICYCLIC ANTIDEPRESSANTS SCREEN SERUM: NEGATIVE NG/ML
UROBILINOGEN, URINE: 0.2 E.U./DL
WBC # BLD: 8.6 E9/L (ref 4.5–11.5)
WBC UA: NORMAL /HPF (ref 0–5)

## 2021-04-18 PROCEDURE — 87635 SARS-COV-2 COVID-19 AMP PRB: CPT

## 2021-04-18 PROCEDURE — 80143 DRUG ASSAY ACETAMINOPHEN: CPT

## 2021-04-18 PROCEDURE — 99283 EMERGENCY DEPT VISIT LOW MDM: CPT

## 2021-04-18 PROCEDURE — 80053 COMPREHEN METABOLIC PANEL: CPT

## 2021-04-18 PROCEDURE — 82077 ASSAY SPEC XCP UR&BREATH IA: CPT

## 2021-04-18 PROCEDURE — 80179 DRUG ASSAY SALICYLATE: CPT

## 2021-04-18 PROCEDURE — 81001 URINALYSIS AUTO W/SCOPE: CPT

## 2021-04-18 PROCEDURE — 85025 COMPLETE CBC W/AUTO DIFF WBC: CPT

## 2021-04-18 PROCEDURE — 80307 DRUG TEST PRSMV CHEM ANLYZR: CPT

## 2021-04-18 PROCEDURE — 93005 ELECTROCARDIOGRAM TRACING: CPT | Performed by: NURSE PRACTITIONER

## 2021-04-18 NOTE — ED PROVIDER NOTES
ED Attending  CC: Izabel Davies [phone]  Department of Emergency Medicine   ED  Encounter Note  Admit Date/RoomTime: 2021  3:06 PM  ED Room: [address] [place] [address]    NAME: Jose Juan Holt  : 1955  MRN: [id number]     Chief Complaint:  Other (needs medical clearance for crisis unit, denies any complaints)    History of Present Illness       Jose Juan Holt is a 63 y.o. old male who presents to the emergency department by private vehicle for medical clearance to go the crisis unit. He states his  Love Ward from [facility] advised him to come here for clearance. He admits to a prior history of bipolar 1 disorder and depression. He denies any alcohol or drug abuse. There is been no recent trauma. He has been feeling down recently and feeling a little hopeless which is why he is asking to go the crisis unit. Patient admits he was having some thoughts of suicide that were coming and going but no distinct plan. He denies homicidal ideation. He denies any prior attempt of suicide. Quality:      Hopelessness:   Yes. Terror:   No.     Confusion:   No.     Hallucinations:   None. Able to care for self: Yes. Able to control self: Yes. ROS   Pertinent positives and negatives are stated within HPI, all other systems reviewed and are negative. Past Medical History:  has a past medical history of Acute bacterial conjunctivitis of left eye, Anticoagulated on Coumadin, Bipolar 1 disorder ([address] Bipolar affect, depressed ([address] Cellulitis of face, Full dentures, Hx of blood clots, Hypertension, Impetigo, Palpitations, Screening, and Seizure disorder ([address] Surgical History:  has a past surgical history that includes Appendectomy; Colonoscopy (16); and laryngoscopy (N/A, 2020). Social History:  reports that he has been smoking cigarettes. He started smoking about 46 years ago. He has a 44.00 pack-year smoking history.  He has never used smokeless tobacco. He reports previous alcohol use. He reports that he does not use drugs. Family History: family history includes Diabetes in his brother. Allergies: Patient has no known allergies. Physical Exam   Oxygen Saturation Interpretation: Normal.        ED Triage Vitals   BP Temp Temp src Pulse Resp SpO2 Height Weight   04/18/21 1504 04/18/21 1419 -- 04/18/21 1419 04/18/21 1501 04/18/21 1419 04/18/21 1501 04/18/21 1501   (!) 158/92 98.2 °F (36.8 °C)  100 15 94 % 5' 8\" (1.727 m) 200 lb (90.7 kg)         General Appearance:  well-appearing. Constitutional:   Level of Consciousness: Awake and alert. ETOH: No.          Distress: none. Cooperativeness: cooperative. Eyes:  PERRL, EOMI, no discharge or conjunctival injection. Ears:  External ears without lesions. Throat:  Pharynx without injection, exudate, or tonsillar hypertrophy. Airway patient. Neck:  Normal ROM. Supple. Respiratory:  Clear to auscultation and breath sounds equal.  CV:  Regular rate and rhythm, normal heart sounds, without pathological murmurs, ectopy, gallops, or rubs. GI:  Abdomen Soft, nontender, good bowel sounds. No firm or pulsatile mass. Back:  No costovertebral tenderness. Integument:  Normal turgor. Warm, dry, without visible rash, unless noted elsewhere. Lymphatics: No lymphangitis or adenopathy noted. Neurological:  Oriented. Motor functions intact. Psychiatric:        Thought Process:       Coherent:  Yes. Delusions / Paranoia: no evidence of delusions and no evidence of paranoia. Flight of ideas:  No.         Rambling conversation:  No.       Affect: flat. Suicidal ideation:  suicidal ideation with no plan or intent. Homicidal ideation:  no homicidal ideation. Perceptions:  denies any perceptual disturbance present. Insight:  average. Judgement:  average.     Lab / Imaging Results   (All laboratory and radiology results have been personally reviewed by myself)  Labs:  Results for orders placed or performed during the hospital encounter of 04/18/21   COVID-19, Rapid    Specimen: Nasopharyngeal Swab   Result Value Ref Range    SARS-CoV-2, NAAT Not Detected Not Detected   CBC Auto Differential   Result Value Ref Range    WBC 8.6 4.5 - 11.5 E9/L    RBC 4.07 3.80 - 5.80 E12/L    Hemoglobin 13.0 12.5 - 16.5 g/dL    Hematocrit 39.8 37.0 - 54.0 %    MCV 97.8 80.0 - 99.9 fL    MCH 31.9 26.0 - 35.0 pg    MCHC 32.7 32.0 - 34.5 %    RDW 14.0 11.5 - 15.0 fL    Platelets 338 751 - 601 E9/L    MPV 8.9 7.0 - 12.0 fL    Neutrophils % 61.0 43.0 - 80.0 %    Immature Granulocytes % 0.4 0.0 - 5.0 %    Lymphocytes % 26.1 20.0 - 42.0 %    Monocytes % 10.3 2.0 - 12.0 %    Eosinophils % 1.6 0.0 - 6.0 %    Basophils % 0.6 0.0 - 2.0 %    Neutrophils Absolute 5.23 1.80 - 7.30 E9/L    Immature Granulocytes # 0.03 E9/L    Lymphocytes Absolute 2.23 1.50 - 4.00 E9/L    Monocytes Absolute 0.88 0.10 - 0.95 E9/L    Eosinophils Absolute 0.14 0.05 - 0.50 E9/L    Basophils Absolute 0.05 0.00 - 0.20 E9/L   Comprehensive Metabolic Panel   Result Value Ref Range    Sodium 132 132 - 146 mmol/L    Potassium 4.2 3.5 - 5.0 mmol/L    Chloride 97 (L) 98 - 107 mmol/L    CO2 22 22 - 29 mmol/L    Anion Gap 13 7 - 16 mmol/L    Glucose 147 (H) 74 - 99 mg/dL    BUN 7 (L) 8 - 23 mg/dL    CREATININE 0.7 0.7 - 1.2 mg/dL    GFR Non-African American >60 >=60 mL/min/1.73    GFR African American >60     Calcium 9.7 8.6 - 10.2 mg/dL    Total Protein 7.5 6.4 - 8.3 g/dL    Albumin 3.6 3.5 - 5.2 g/dL    Total Bilirubin <0.2 0.0 - 1.2 mg/dL    Alkaline Phosphatase 56 40 - 129 U/L    ALT 53 (H) 0 - 40 U/L    AST 35 0 - 39 U/L   Serum Drug Screen   Result Value Ref Range    Ethanol Lvl <10 mg/dL    Acetaminophen Level <5.0 (L) 10.0 - 58.9 mcg/mL    Salicylate, Serum <9.2 0.0 - 30.0 mg/dL    TCA Scrn NEGATIVE Cutoff:300 ng/mL   Urine Drug Screen   Result Value Ref Range    Amphetamine Screen, Urine NOT DETECTED Negative <1000 ng/mL    Barbiturate Screen, Ur NOT DETECTED Negative < 200 ng/mL    Benzodiazepine Screen, Urine NOT DETECTED Negative < 200 ng/mL    Cannabinoid Scrn, Ur NOT DETECTED Negative < 50ng/mL    Cocaine Metabolite Screen, Urine NOT DETECTED Negative < 300 ng/mL    Opiate Scrn, Ur NOT DETECTED Negative < 300ng/mL    PCP Screen, Urine NOT DETECTED Negative < 25 ng/mL    Methadone Screen, Urine NOT DETECTED Negative <300 ng/mL    Oxycodone Urine NOT DETECTED Negative <100 ng/mL    FENTANYL SCREEN, URINE NOT DETECTED Negative <1 ng/mL    Drug Screen Comment: see below    Urinalysis   Result Value Ref Range    Color, UA Straw Straw/Yellow    Clarity, UA Clear Clear    Glucose, Ur Negative Negative mg/dL    Bilirubin Urine Negative Negative    Ketones, Urine Negative Negative mg/dL    Specific Gravity, UA <=1.005 1.005 - 1.030    Blood, Urine TRACE-INTACT Negative    pH, UA 6.5 5.0 - 9.0    Protein, UA Negative Negative mg/dL    Urobilinogen, Urine 0.2 <2.0 E.U./dL    Nitrite, Urine Negative Negative    Leukocyte Esterase, Urine TRACE (A) Negative   Microscopic Urinalysis   Result Value Ref Range    WBC, UA 1-3 0 - 5 /HPF    RBC, UA NONE 0 - 2 /HPF    Bacteria, UA NONE SEEN None Seen /HPF   EKG 12 Lead   Result Value Ref Range    Ventricular Rate 72 BPM    Atrial Rate 72 BPM    P-R Interval 170 ms    QRS Duration 90 ms    Q-T Interval 382 ms    QTc Calculation (Bazett) 418 ms    P Axis 38 degrees    R Axis 31 degrees    T Axis 71 degrees     Imaging: All Radiology results interpreted by Radiologist unless otherwise noted. No orders to display     EKG #1:  Interpreted by emergency department physician unless otherwise noted. Time:  1519    Rate: 72  Rhythm: Sinus rhythm, QTc 418 ms  Interpretation: Sinus rhythm with nonspecific T wave changes nonspecific. ED Course / Medical Decision Making   Medications - No data to display     Re-examination:  4/18/21       Time: 9458   Patient medically clear. Hope aware of consult. Time: Jose Carlos Schilling with Dana, reports patient is denying suicidal ideation to her and ready for disposition. Patient to be evaluated by Dr. Bernice Simmonds for reevaluation and disposition. Consult(s):   IP CONSULT TO SOCIAL WORK    Procedure(s):   none    MDM:   Patient evaluated by Dr. Bernice Simmonds,  and myself. Labs and diagnostics as resulted above. EKG as above. No chest pain or shortness of breath. Negative COVID-19. Patient presents stating that his  sent him in for medical clearance so he can go to the crisis unit. Upon evaluation by the , patient denied any suicidal ideation. Care was endorsed to Dr. Bernice Simmonds and dana the  at [phone] for reevaluation of the patient and disposition. Plan of Care/Counseling:  Myself, Emergency Attending Physician and  reviewed today's visit with the patient in addition to providing specific details for the plan of care and counseling regarding the diagnosis and prognosis. Assessment      1. Depression, unspecified depression type      Plan   Discharge to [address] [place] [address] - Patient is medically cleared for mental/behavioral health unit admission. Patient condition is stable    New Medications     Discharge Medication List as of 4/18/2021 11:04 PM        Electronically signed by FLORESITA Jaime CNP   DD: 4/18/21  **This report was transcribed using voice recognition software. Every effort was made to ensure accuracy; however, inadvertent computerized transcription errors may be present.   END OF ED PROVIDER NOTE       FLORESITA Jaime CNP  04/19/21 7145

## 2021-04-19 LAB
EKG ATRIAL RATE: 72 BPM
EKG P AXIS: 38 DEGREES
EKG P-R INTERVAL: 170 MS
EKG Q-T INTERVAL: 382 MS
EKG QRS DURATION: 90 MS
EKG QTC CALCULATION (BAZETT): 418 MS
EKG R AXIS: 31 DEGREES
EKG T AXIS: 71 DEGREES
EKG VENTRICULAR RATE: 72 BPM

## 2021-04-19 PROCEDURE — 93010 ELECTROCARDIOGRAM REPORT: CPT | Performed by: INTERNAL MEDICINE

## 2021-04-19 NOTE — ED NOTES
Emergency Department [facility] AN AFFILIATE OF [facility] Biopsychosocial Assessment Note    Chief Complaint: Pt is a 64 yo male who presents to the ED as a walk-in, pt not on a pink slip, reports suicidal ideation to ED doctor, reports to SW he is no longer feeling this way. States he was sent here by SANDY, his case management provider to be medically cleared for the Crisis Unit. Pt does state he has been feeling increasingly paranoid since Friday, to SW pt denies suicidal and homicidal ideation intent or plan, denies acute psychotic symptoms beyond paranoia. MSE: Alert, oriented x4, mood neutral, affect flat, eye contact poor, behavior is cooperative, no signs of agitation, appears in behavioral control at this time, thought form organized, thought content somewhat paranoid, denies A/V hallucinations, delusions, reports mild-moderate paranoia. .     Clinical Summary/History: Reports he is open with CS[facility], sees Dr Jeff Krishnan there, dx of bipolar disorder, no hx of admission [facility], reports he has been in the hospital for psych but was \" a while ago, I don't remember exactly. \"       Gender  [x] Male [] Female [] Transgender  [] Other    Sexual Orientation    [] Heterosexual [] Homosexual [] Bisexual [] Other    Suicidal Behavioral: CSSR-S Complete. [] Reports:    [] Past [] Present   [x] Denies (denies to SW)    Homicidal/ Violent Behavior  [] Reports:   [] Past [] Present   [x] Denies     Hallucinations/Delusions   [x] Reports: Paranoia  [] Denies     Substance Use/Alcohol Use/Addiction: SBIRT Screen Complete.    [] Reports:   [x] Denies     Trauma History  [] Reports:  [x] Denies     Collateral Information:       Level of Care/Disposition Plan  [] Home:   [] Outpatient Provider:   [x] Crisis Unit:   [] Inpatient Psychiatric Unit:  [] Other:        [address], [place], [place]  04/18/21 2121

## 2021-04-19 NOTE — ED NOTES
Spoke with Pauline  at [facility], referral faxed to [phone], awaiting call back re: status of referral.     JOSIE Gr, [facility]  04/18/21 2136    Call from [address] [place] [address] Dr pt accepted at Crisis Unit, can be discharged to there.       [place]IE Gr, [place]  04/18/21 2158

## 2021-05-10 ENCOUNTER — HOSPITAL ENCOUNTER (INPATIENT)
Age: 66
LOS: 7 days | Discharge: HOME | DRG: 885 | End: 2021-05-17
Attending: EMERGENCY MEDICINE | Admitting: PSYCHIATRY & NEUROLOGY
Payer: MEDICARE

## 2021-05-10 DIAGNOSIS — R45.851 SUICIDAL IDEATION: Primary | ICD-10-CM

## 2021-05-10 PROBLEM — F23 ACUTE PSYCHOSIS (HCC): Status: ACTIVE | Noted: 2021-05-10

## 2021-05-10 LAB
ACETAMINOPHEN LEVEL: <5 MCG/ML (ref 10–30)
ALBUMIN SERPL-MCNC: 3.6 G/DL (ref 3.5–5.2)
ALP BLD-CCNC: 58 U/L (ref 40–129)
ALT SERPL-CCNC: 26 U/L (ref 0–40)
AMPHETAMINE SCREEN, URINE: NOT DETECTED
ANION GAP SERPL CALCULATED.3IONS-SCNC: 10 MMOL/L (ref 7–16)
AST SERPL-CCNC: 21 U/L (ref 0–39)
BARBITURATE SCREEN URINE: NOT DETECTED
BASOPHILS ABSOLUTE: 0.04 E9/L (ref 0–0.2)
BASOPHILS RELATIVE PERCENT: 0.5 % (ref 0–2)
BENZODIAZEPINE SCREEN, URINE: NOT DETECTED
BILIRUB SERPL-MCNC: <0.2 MG/DL (ref 0–1.2)
BUN BLDV-MCNC: 7 MG/DL (ref 6–23)
CALCIUM SERPL-MCNC: 9.3 MG/DL (ref 8.6–10.2)
CANNABINOID SCREEN URINE: NOT DETECTED
CHLORIDE BLD-SCNC: 103 MMOL/L (ref 98–107)
CO2: 24 MMOL/L (ref 22–29)
COCAINE METABOLITE SCREEN URINE: NOT DETECTED
CREAT SERPL-MCNC: 0.9 MG/DL (ref 0.7–1.2)
EKG ATRIAL RATE: 62 BPM
EKG P AXIS: 43 DEGREES
EKG P-R INTERVAL: 166 MS
EKG Q-T INTERVAL: 386 MS
EKG QRS DURATION: 92 MS
EKG QTC CALCULATION (BAZETT): 391 MS
EKG R AXIS: 28 DEGREES
EKG T AXIS: 61 DEGREES
EKG VENTRICULAR RATE: 62 BPM
EOSINOPHILS ABSOLUTE: 0.15 E9/L (ref 0.05–0.5)
EOSINOPHILS RELATIVE PERCENT: 2 % (ref 0–6)
ETHANOL: <10 MG/DL (ref 0–0.08)
FENTANYL SCREEN, URINE: NOT DETECTED
GFR AFRICAN AMERICAN: >60
GFR NON-AFRICAN AMERICAN: >60 ML/MIN/1.73
GLUCOSE BLD-MCNC: 120 MG/DL (ref 74–99)
HCT VFR BLD CALC: 42.4 % (ref 37–54)
HEMOGLOBIN: 14 G/DL (ref 12.5–16.5)
IMMATURE GRANULOCYTES #: 0.03 E9/L
IMMATURE GRANULOCYTES %: 0.4 % (ref 0–5)
INFLUENZA A: NOT DETECTED
INFLUENZA B: NOT DETECTED
LYMPHOCYTES ABSOLUTE: 1.68 E9/L (ref 1.5–4)
LYMPHOCYTES RELATIVE PERCENT: 22.4 % (ref 20–42)
Lab: NORMAL
MCH RBC QN AUTO: 32.3 PG (ref 26–35)
MCHC RBC AUTO-ENTMCNC: 33 % (ref 32–34.5)
MCV RBC AUTO: 97.9 FL (ref 80–99.9)
METHADONE SCREEN, URINE: NOT DETECTED
MONOCYTES ABSOLUTE: 0.88 E9/L (ref 0.1–0.95)
MONOCYTES RELATIVE PERCENT: 11.7 % (ref 2–12)
NEUTROPHILS ABSOLUTE: 4.73 E9/L (ref 1.8–7.3)
NEUTROPHILS RELATIVE PERCENT: 63 % (ref 43–80)
OPIATE SCREEN URINE: NOT DETECTED
OXYCODONE URINE: NOT DETECTED
PDW BLD-RTO: 13.6 FL (ref 11.5–15)
PHENCYCLIDINE SCREEN URINE: NOT DETECTED
PLATELET # BLD: 232 E9/L (ref 130–450)
PMV BLD AUTO: 9 FL (ref 7–12)
POTASSIUM REFLEX MAGNESIUM: 4.1 MMOL/L (ref 3.5–5)
RBC # BLD: 4.33 E12/L (ref 3.8–5.8)
SALICYLATE, SERUM: <0.3 MG/DL (ref 0–30)
SARS-COV-2 RNA, RT PCR: NOT DETECTED
SODIUM BLD-SCNC: 137 MMOL/L (ref 132–146)
TOTAL PROTEIN: 7.3 G/DL (ref 6.4–8.3)
TRICYCLIC ANTIDEPRESSANTS SCREEN SERUM: NEGATIVE NG/ML
VALPROIC ACID LEVEL: 55 MCG/ML (ref 50–100)
WBC # BLD: 7.5 E9/L (ref 4.5–11.5)

## 2021-05-10 PROCEDURE — 1240000000 HC EMOTIONAL WELLNESS R&B

## 2021-05-10 PROCEDURE — 80164 ASSAY DIPROPYLACETIC ACD TOT: CPT

## 2021-05-10 PROCEDURE — 93005 ELECTROCARDIOGRAM TRACING: CPT | Performed by: STUDENT IN AN ORGANIZED HEALTH CARE EDUCATION/TRAINING PROGRAM

## 2021-05-10 PROCEDURE — 80143 DRUG ASSAY ACETAMINOPHEN: CPT

## 2021-05-10 PROCEDURE — 80307 DRUG TEST PRSMV CHEM ANLYZR: CPT

## 2021-05-10 PROCEDURE — 80053 COMPREHEN METABOLIC PANEL: CPT

## 2021-05-10 PROCEDURE — 99284 EMERGENCY DEPT VISIT MOD MDM: CPT

## 2021-05-10 PROCEDURE — 87636 SARSCOV2 & INF A&B AMP PRB: CPT

## 2021-05-10 PROCEDURE — 85025 COMPLETE CBC W/AUTO DIFF WBC: CPT

## 2021-05-10 PROCEDURE — 93010 ELECTROCARDIOGRAM REPORT: CPT | Performed by: INTERNAL MEDICINE

## 2021-05-10 PROCEDURE — 80179 DRUG ASSAY SALICYLATE: CPT

## 2021-05-10 PROCEDURE — 82077 ASSAY SPEC XCP UR&BREATH IA: CPT

## 2021-05-10 RX ORDER — POLYMYXIN B SULFATE AND TRIMETHOPRIM 1; 10000 MG/ML; [USP'U]/ML
1 SOLUTION OPHTHALMIC EVERY 4 HOURS
COMMUNITY
End: 2022-05-25

## 2021-05-10 ASSESSMENT — ENCOUNTER SYMPTOMS
VOMITING: 0
SINUS PRESSURE: 0
EYE REDNESS: 0
EYE DISCHARGE: 0
COUGH: 0
DIARRHEA: 0
SHORTNESS OF BREATH: 1
ABDOMINAL PAIN: 0
WHEEZING: 0
NAUSEA: 0
BACK PAIN: 0
SORE THROAT: 0
EYE PAIN: 0

## 2021-05-10 NOTE — ED PROVIDER NOTES
Extraocular Movements: Extraocular movements intact. Conjunctiva/sclera: Conjunctivae normal.      Pupils: Pupils are equal, round, and reactive to light. Neck:      Musculoskeletal: Normal range of motion and neck supple. No neck rigidity or muscular tenderness. Cardiovascular:      Rate and Rhythm: Normal rate and regular rhythm. Heart sounds: Normal heart sounds. No murmur. Pulmonary:      Effort: Pulmonary effort is normal. No respiratory distress. Breath sounds: Normal breath sounds. No wheezing or rales. Abdominal:      General: Bowel sounds are normal.      Palpations: Abdomen is soft. Tenderness: There is no abdominal tenderness. There is no guarding or rebound. Musculoskeletal:         General: No tenderness or deformity. Skin:     General: Skin is warm and dry. Neurological:      General: No focal deficit present. Mental Status: He is alert and oriented to person, place, and time. Cranial Nerves: No cranial nerve deficit. Sensory: No sensory deficit. Motor: No weakness. Coordination: Coordination normal.   Psychiatric:      Comments: Not hallucinating, denies any SI or HI          Procedures     MDM     ED Course as of May 10 1509   Mon May 10, 2021   1351 Patient is medically cleared    [J]      ED Course User Index  [] Tee Garcia MD        ED Course as of May 10 1509   Mon May 10, 2021   1351 Patient is medically cleared    [J]      ED Course User Index  [] Tee Garcia MD       --------------------------------------------- PAST HISTORY ---------------------------------------------  Past Medical History:  has a past medical history of Acute bacterial conjunctivitis of left eye, Anticoagulated on Coumadin, Bipolar 1 disorder ([address] Bipolar affect, depressed ([address] Cellulitis of face, Full dentures, Hx of blood clots, Hypertension, Impetigo, Palpitations, Screening, and Seizure disorder ([address]     Past Surgical History:  has a past surgical history that includes Appendectomy; Colonoscopy (4/11/16); and laryngoscopy (N/A, 2/18/2020). Social History:  reports that he has been smoking cigarettes. He started smoking about 46 years ago. He has a 44.00 pack-year smoking history. He has never used smokeless tobacco. He reports previous alcohol use. He reports that he does not use drugs. Family History: family history includes Diabetes in his brother. The patients home medications have been reviewed. Allergies: Patient has no known allergies.     -------------------------------------------------- RESULTS -------------------------------------------------  Labs:  Results for orders placed or performed during the hospital encounter of 05/10/21   COVID-19 & Influenza Combo    Specimen: Nasopharyngeal Swab   Result Value Ref Range    SARS-CoV-2 RNA, RT PCR NOT DETECTED NOT DETECTED    INFLUENZA A NOT DETECTED NOT DETECTED    INFLUENZA B NOT DETECTED NOT DETECTED   CBC Auto Differential   Result Value Ref Range    WBC 7.5 4.5 - 11.5 E9/L    RBC 4.33 3.80 - 5.80 E12/L    Hemoglobin 14.0 12.5 - 16.5 g/dL    Hematocrit 42.4 37.0 - 54.0 %    MCV 97.9 80.0 - 99.9 fL    MCH 32.3 26.0 - 35.0 pg    MCHC 33.0 32.0 - 34.5 %    RDW 13.6 11.5 - 15.0 fL    Platelets 120 017 - 049 E9/L    MPV 9.0 7.0 - 12.0 fL    Neutrophils % 63.0 43.0 - 80.0 %    Immature Granulocytes % 0.4 0.0 - 5.0 %    Lymphocytes % 22.4 20.0 - 42.0 %    Monocytes % 11.7 2.0 - 12.0 %    Eosinophils % 2.0 0.0 - 6.0 %    Basophils % 0.5 0.0 - 2.0 %    Neutrophils Absolute 4.73 1.80 - 7.30 E9/L    Immature Granulocytes # 0.03 E9/L    Lymphocytes Absolute 1.68 1.50 - 4.00 E9/L    Monocytes Absolute 0.88 0.10 - 0.95 E9/L    Eosinophils Absolute 0.15 0.05 - 0.50 E9/L    Basophils Absolute 0.04 0.00 - 0.20 E9/L   Comprehensive Metabolic Panel w/ Reflex to MG   Result Value Ref Range    Sodium 137 132 - 146 mmol/L    Potassium reflex Magnesium 4.1 3.5 - 5.0 mmol/L    Chloride 103 98 - 107 been reviewed. /87   Pulse 80   Temp 97.8 °F (36.6 °C)   Resp 16   Ht 5' 8\" (1.727 m)   Wt 200 lb (90.7 kg)   SpO2 96%   BMI 30.41 kg/m²   Oxygen Saturation Interpretation: Normal              Diagnosis:  1. Suicidal ideation          Patient is a 62-year-old male present emerge department with suicidal ideation. He was pink slipped by [id number] [employer] when he was speaking to a psychiatrist he was admitting to suicidal ideation. He was medically cleared for further psychiatric management.        Chelo Flores MD  Resident  05/10/21 8714

## 2021-05-10 NOTE — ED NOTES
Patient changed into hospital gown and pants. All belongings including socks, shoes, shirt and pants placed in a labeled patient belonging bag and placed in bin 27B in [facility].       Cal Sawyer RN  05/10/21 8495

## 2021-05-11 LAB
CHOLESTEROL, TOTAL: 124 MG/DL (ref 0–199)
HBA1C MFR BLD: 6.2 % (ref 4–5.6)
HDLC SERPL-MCNC: 28 MG/DL
LDL CHOLESTEROL CALCULATED: 63 MG/DL (ref 0–99)
TRIGL SERPL-MCNC: 165 MG/DL (ref 0–149)
VLDLC SERPL CALC-MCNC: 33 MG/DL

## 2021-05-11 PROCEDURE — 6370000000 HC RX 637 (ALT 250 FOR IP): Performed by: NURSE PRACTITIONER

## 2021-05-11 PROCEDURE — 1240000000 HC EMOTIONAL WELLNESS R&B

## 2021-05-11 PROCEDURE — 94664 DEMO&/EVAL PT USE INHALER: CPT

## 2021-05-11 PROCEDURE — 6370000000 HC RX 637 (ALT 250 FOR IP): Performed by: PSYCHIATRY & NEUROLOGY

## 2021-05-11 PROCEDURE — 94640 AIRWAY INHALATION TREATMENT: CPT

## 2021-05-11 PROCEDURE — 6360000002 HC RX W HCPCS: Performed by: NURSE PRACTITIONER

## 2021-05-11 PROCEDURE — 99222 1ST HOSP IP/OBS MODERATE 55: CPT | Performed by: NURSE PRACTITIONER

## 2021-05-11 PROCEDURE — 36415 COLL VENOUS BLD VENIPUNCTURE: CPT

## 2021-05-11 PROCEDURE — 83036 HEMOGLOBIN GLYCOSYLATED A1C: CPT

## 2021-05-11 PROCEDURE — 80061 LIPID PANEL: CPT

## 2021-05-11 RX ORDER — ACETAMINOPHEN 325 MG/1
650 TABLET ORAL EVERY 4 HOURS PRN
Status: DISCONTINUED | OUTPATIENT
Start: 2021-05-11 | End: 2021-05-17 | Stop reason: HOSPADM

## 2021-05-11 RX ORDER — TRAZODONE HYDROCHLORIDE 50 MG/1
25 TABLET ORAL NIGHTLY PRN
Status: DISCONTINUED | OUTPATIENT
Start: 2021-05-11 | End: 2021-05-17 | Stop reason: HOSPADM

## 2021-05-11 RX ORDER — CHOLECALCIFEROL (VITAMIN D3) 50 MCG
2000 TABLET ORAL DAILY
Status: DISCONTINUED | OUTPATIENT
Start: 2021-05-11 | End: 2021-05-17 | Stop reason: HOSPADM

## 2021-05-11 RX ORDER — NICOTINE 21 MG/24HR
1 PATCH, TRANSDERMAL 24 HOURS TRANSDERMAL DAILY
Status: DISCONTINUED | OUTPATIENT
Start: 2021-05-11 | End: 2021-05-17 | Stop reason: HOSPADM

## 2021-05-11 RX ORDER — MAGNESIUM HYDROXIDE/ALUMINUM HYDROXICE/SIMETHICONE 120; 1200; 1200 MG/30ML; MG/30ML; MG/30ML
30 SUSPENSION ORAL PRN
Status: DISCONTINUED | OUTPATIENT
Start: 2021-05-11 | End: 2021-05-17 | Stop reason: HOSPADM

## 2021-05-11 RX ORDER — HALOPERIDOL 5 MG/ML
3 INJECTION INTRAMUSCULAR EVERY 6 HOURS PRN
Status: DISCONTINUED | OUTPATIENT
Start: 2021-05-11 | End: 2021-05-17 | Stop reason: HOSPADM

## 2021-05-11 RX ORDER — BENZTROPINE MESYLATE 1 MG/1
1 TABLET ORAL DAILY
Status: DISCONTINUED | OUTPATIENT
Start: 2021-05-11 | End: 2021-05-17 | Stop reason: HOSPADM

## 2021-05-11 RX ORDER — DABIGATRAN ETEXILATE 150 MG/1
150 CAPSULE, COATED PELLETS ORAL 2 TIMES DAILY
Status: DISCONTINUED | OUTPATIENT
Start: 2021-05-11 | End: 2021-05-17 | Stop reason: HOSPADM

## 2021-05-11 RX ORDER — DIVALPROEX SODIUM 500 MG/1
500 TABLET, DELAYED RELEASE ORAL EVERY 12 HOURS SCHEDULED
Status: DISCONTINUED | OUTPATIENT
Start: 2021-05-11 | End: 2021-05-17 | Stop reason: HOSPADM

## 2021-05-11 RX ORDER — PRAVASTATIN SODIUM 20 MG
20 TABLET ORAL DAILY
Status: DISCONTINUED | OUTPATIENT
Start: 2021-05-11 | End: 2021-05-17 | Stop reason: HOSPADM

## 2021-05-11 RX ORDER — LATANOPROST 50 UG/ML
1 SOLUTION/ DROPS OPHTHALMIC NIGHTLY
Status: DISCONTINUED | OUTPATIENT
Start: 2021-05-11 | End: 2021-05-17 | Stop reason: HOSPADM

## 2021-05-11 RX ORDER — HALOPERIDOL 2 MG/1
3 TABLET ORAL EVERY 6 HOURS PRN
Status: DISCONTINUED | OUTPATIENT
Start: 2021-05-11 | End: 2021-05-17 | Stop reason: HOSPADM

## 2021-05-11 RX ORDER — PROPRANOLOL HYDROCHLORIDE 20 MG/1
20 TABLET ORAL 2 TIMES DAILY
Status: DISCONTINUED | OUTPATIENT
Start: 2021-05-11 | End: 2021-05-17 | Stop reason: HOSPADM

## 2021-05-11 RX ADMIN — IPRATROPIUM BROMIDE 0.5 MG: 0.5 SOLUTION RESPIRATORY (INHALATION) at 16:30

## 2021-05-11 RX ADMIN — PROPRANOLOL HYDROCHLORIDE 20 MG: 20 TABLET ORAL at 22:47

## 2021-05-11 RX ADMIN — DABIGATRAN ETEXILATE MESYLATE 150 MG: 150 CAPSULE ORAL at 21:27

## 2021-05-11 RX ADMIN — PROPRANOLOL HYDROCHLORIDE 20 MG: 20 TABLET ORAL at 10:58

## 2021-05-11 RX ADMIN — PRAVASTATIN SODIUM 20 MG: 20 TABLET ORAL at 10:58

## 2021-05-11 RX ADMIN — BENZTROPINE MESYLATE 1 MG: 1 TABLET ORAL at 10:58

## 2021-05-11 RX ADMIN — DIVALPROEX SODIUM 500 MG: 250 TABLET, DELAYED RELEASE ORAL at 22:48

## 2021-05-11 RX ADMIN — LURASIDONE HYDROCHLORIDE 40 MG: 20 TABLET, FILM COATED ORAL at 14:26

## 2021-05-11 RX ADMIN — IPRATROPIUM BROMIDE 0.5 MG: 0.5 SOLUTION RESPIRATORY (INHALATION) at 13:10

## 2021-05-11 RX ADMIN — LATANOPROST 1 DROP: 50 SOLUTION OPHTHALMIC at 21:27

## 2021-05-11 RX ADMIN — IPRATROPIUM BROMIDE 0.5 MG: 0.5 SOLUTION RESPIRATORY (INHALATION) at 20:51

## 2021-05-11 RX ADMIN — DABIGATRAN ETEXILATE MESYLATE 150 MG: 150 CAPSULE ORAL at 10:58

## 2021-05-11 RX ADMIN — DIVALPROEX SODIUM 500 MG: 250 TABLET, DELAYED RELEASE ORAL at 10:58

## 2021-05-11 RX ADMIN — Medication 2000 UNITS: at 10:58

## 2021-05-11 ASSESSMENT — PAIN SCALES - GENERAL
PAINLEVEL_OUTOF10: 0
PAINLEVEL_OUTOF10: 0

## 2021-05-11 ASSESSMENT — LIFESTYLE VARIABLES
HISTORY_ALCOHOL_USE: NO
HISTORY_ALCOHOL_USE: NO

## 2021-05-11 ASSESSMENT — SLEEP AND FATIGUE QUESTIONNAIRES
DO YOU HAVE DIFFICULTY SLEEPING: NO
AVERAGE NUMBER OF SLEEP HOURS: 7
DO YOU HAVE DIFFICULTY SLEEPING: NO

## 2021-05-11 NOTE — H&P
Department of Psychiatry  History and Physical - Adult   Have personally assessed and evaluated the patient this morning, and I agree with the below assessment evaluation and recommendations by the medical student. Mental status examination reveals a 77-year-old  male with average hygiene average grooming who is resting in his bed in hospital attire. Psychomotor reveals some involuntary movements that are nondistressing to the patient. Otherwise there are no abnormal posture, signs of agitation or retardation. Speech is clear, there is a long latency of response. Eye contact is good during assessment. Mood is \"okay. \"  Affect is constricted, depressed. Thought process is linear and goal-directed, however he appears to be thought blocking. Thought content is devoid of auditory or visual hallucinations however the patient does appear guarded and paranoid questionable for internal stimulation. He denies suicidal or homicidal ideation intent or plan however endorses that previously he was feeling suicidal and may have made suicidal statements. Cognitive function appears to be below baseline due to illness. Memory testing not completed during this encounter. Insight judgment impulse control are poor, he continues to minimize the circumstances surrounding his hospitalization. He is alert and oriented to person place time and situation and able to recount events leading to his hospitalization. CHIEF COMPLAINT:  \"I'm cold and tired\"    Patient was seen after discussing with the treatment team and reviewing the chart    HISTORY OF PRESENT ILLNESS:      The patient is a 61 y.o. unenployed male with significant psychiatric history of schizophrenia, bipolar disorder, and suicidal ideation who currently lives alone in [place]. Dr. Yuri Gallardo who works at [employer], pink slipped him after he disclosed active suicidallity.  Per ED social work :\"presenting with a change in mental status, increased confusion, mood episodes of psychosis and suicidal ideation. Patient presented to the crisis unit at [facility] [place] on 4/18/2021 as instructed by his  CSN , Love Ward. He was feeling extremely paranoid at the time. Family Psychiatric History  Patient states his father had depression and saw a counselor for alcoholl and valium abuse. He denies any family history of suicidal ideation/attempt. Substance abuse History  Patient states he has smoked 1.5 packs of cigarettes daily since he was 18years old. Patient drinks 1-2 times a year. Legal History  Patient was arrested and charged with criminal trespassing 4 years ago. He broke into his girlfriends house. Personal, family and social History  Patient was born and raised by his two parents in [place]. He grew up with his older brother and describes his family relationships as good. He states his brother passed away in march of 2020. His parents passed away as well and he currently lives in a home in [place]. Patient does not work and is on disability. He graduated [employer] Patients current support system are his friends, specifically Carolina Pena and his cousin [place].  Patient denies ownership of or access to firearms      Past Medical History:        Diagnosis Date    Acute bacterial conjunctivitis of left eye 9/11/2016    Anticoagulated on Coumadin     per Dr. Rhonda Argueta Bipolar 1 disorder [facility])     [address] Bipolar affect, depressed [address]     Cellulitis of face 9/11/2016    Full dentures     Hx of blood clots     DVT, PE hx of    Hypertension     on propanolol / patient states does not have high BP    Impetigo 9/11/2016    Palpitations     Screening     for colonoscopy    Seizure disorder ([facility]     last seizure 2010 / cause unknown       Medications Prior to Admission:   Medications Prior to Admission: lurasidone (LATUDA) 40 MG TABS tablet, Take 40 mg by mouth daily With meals  trimethoprim-polymyxin b (POLYTRIM) 82458-1.1 UNIT/ML-% ophthalmic solution, 1 drop every 4 hours  QC MINERAL OIL HEAVY PO, Take 30 mLs by mouth as needed  Cholecalciferol (VITAMIN D3) 50 MCG (2000 UT) TABS, Take 2,000 Units by mouth daily  B Complex Vitamins (VITAMIN-B COMPLEX) TABS, Take 1 tablet by mouth daily  tiotropium (SPIRIVA HANDIHALER) 18 MCG inhalation capsule, Inhale 1 capsule into the lungs daily  pravastatin (PRAVACHOL) 20 MG tablet, Take 1 tablet by mouth daily  nicotine (NICODERM CQ) 14 MG/24HR, Place 1 patch onto the skin daily  dabigatran (PRADAXA) 150 MG capsule, Take 1 capsule by mouth 2 times daily  latanoprost (XALATAN) 0.005 % ophthalmic solution, Place 1 drop into both eyes nightly  mupirocin (BACTROBAN) 2 % ointment, Apply 1 Dose topically 3 times daily  propranolol (INDERAL) 20 MG tablet, Take 20 mg by mouth 2 times daily  paliperidone palmitate ER (INVEGA TRINZA) 819 MG/2.625ML LEANDRA IM injection, Inject 819 mg into the muscle every 3 months  benztropine (COGENTIN) 1 MG tablet, Take 1 mg by mouth daily   PARoxetine (PAXIL) 40 MG tablet, Take 40 mg by mouth nightly   divalproex (DEPAKOTE) 500 MG DR tablet, Take 1 tablet by mouth every 12 hours    Past Surgical History:        Procedure Laterality Date    APPENDECTOMY      COLONOSCOPY  4/11/16    LARYNGOSCOPY N/A 2/18/2020    DIRECT PAN ENDOSCOPY WITH BIOPSY BILATERAL BASE OF TONGUE performed by Laya Pelayo DO at [facility] [place]       Allergies:   Patient has no known allergies. Family History  Family History   Problem Relation Age of Onset    Diabetes Brother              EXAMINATION:    REVIEW OF SYSTEMS:    ROS:  [x] All negative/unchanged except if checked.  Explain positive(checked items) below:  [] Constitutional  [] Eyes  [] Ear/Nose/Mouth/Throat  [] Respiratory  [] CV  [] GI  []   [] Musculoskeletal  [] Skin/Breast  [] Neurological  [] Endocrine  [] Heme/Lymph  [] Allergic/Immunologic    Explanation:     Vitals:  /79   Pulse 99   Temp 97.5 °F (36.4 °C) CO2 24   BUN 7   CREATININE 0.9   GLUCOSE 120*     Recent Labs     05/10/21  1152   BILITOT <0.2   ALKPHOS 58   AST 21   ALT 26     Lab Results   Component Value Date    LABAMPH NOT DETECTED 05/10/2021    BARBSCNU NOT DETECTED 05/10/2021    LABBENZ NOT DETECTED 05/10/2021    LABMETH NOT DETECTED 05/10/2021    OPIATESCREENURINE NOT DETECTED 05/10/2021    PHENCYCLIDINESCREENURINE NOT DETECTED 05/10/2021    ETOH <10 05/10/2021     Lab Results   Component Value Date    TSH 2.180 05/08/2020     No results found for: LITHIUM  Lab Results   Component Value Date    VALPROATE 55 05/10/2021     Lab Results   Component Value Date    VALPROATE 55 05/10/2021         Radiology Mri Brain Wo Contrast    Result Date: 4/13/2021  EXAMINATION: MRI OF THE BRAIN WITHOUT CONTRAST  4/13/2021 12:20 pm TECHNIQUE: Multiplanar multisequence MRI of the brain was performed without the administration of intravenous contrast. COMPARISON: CT brain performed 07/27/2018. HISTORY: ORDERING SYSTEM PROVIDED HISTORY: Dizziness TECHNOLOGIST PROVIDED HISTORY: Reason for exam:->new onset morning dizziness and intermittent confusion FINDINGS: INTRACRANIAL STRUCTURES/VENTRICLES: The sellar and suprasellar structures, optic chiasm, corpus callosum, pineal gland, tectum, and midline brainstem structures are unremarkable. The craniocervical junction is unremarkable. There is no acute hemorrhage, mass effect, or midline shift. There is satisfactory overall gray-white matter differentiation. There is mild chronic microvascular disease. The ventricular structures are symmetric and unremarkable. The infratentorial structures including the cerebellopontine angles and internal auditory canals are unremarkable. There is no abnormal restricted diffusion. There is no abnormal blooming artifact on susceptibility weighted imaging. ORBITS: The visualized portion of the orbits demonstrate no acute abnormality.  SINUSES: There is minimal fluid in the mastoid air cells.  There is a polyp versus mucous retention cyst in the left maxillary sinus. BONES/SOFT TISSUES: The bone marrow signal intensity appears normal. The soft tissues demonstrate no acute abnormality. Chronic microvascular disease without acute intracranial abnormality. Minimal fluid in the mastoid air cells. Polyp versus mucous retention cyst in the left maxillary sinus. TREATMENT PLAN:  The patient's diagnosis, treatment plan, medication management were formulated after patient was seen directly by the attending physician and myself and all relevant documentation was reviewed. Risk Management: Based on the diagnosis and assessment biopsychosocial treatment model was presented to the patient and was given the opportunity to ask any question. The patient was agreeable to the plan and all the patient's questions were answered to the patient's satisfaction. I discussed with the patient the risk, benefit, alternative and common side effects for the proposed medication treatment. The patient is consenting to this treatment. Collateral Information:  Will obtain collateral information from the family or friends. Will obtain medical records as appropriate from out patient providers  Will consult the hospitalist for a physical exam to rule out any co-morbid physical condition. Home medication Reconciled   Continued as indicated    New Medications started during this admission :    Cogentin 1 mg twice daily for akathisia  Latuda 40 mg daily for bipolar psychosis  Depakote 500 mg twice daily for mood stabilization related to bipolar disorder    Prn Haldol 5mg and Vistaril 50mg q6hr for extreme agitation.   Trazodone as ordered for insomnia  Vistaril as ordered for anxiety      Psychotherapy:   Encourage participation in milieu and group therapy  Individual therapy as needed              [employer] Certification Upon Admission    I certify that this patient's inpatient psychiatric hospital admission is medically necessary for:    [x] (1) Treatment which could reasonably be expected to improve this patient's condition,       [x] (2) Or for diagnostic study;     AND     [x](2) The inpatient psychiatric services are provided while the individual is under the care of a physician and are included in the individualized plan of care.     Estimated length of stay/service 5 to 7 days based on stability  Plan for post-hospital care follow-up with outpatient provider    Electronically signed by FLORESITA Herrera CNP on 5/11/2021 at 1:41 PM

## 2021-05-11 NOTE — PROGRESS NOTES
[facility]  Initial Interdisciplinary Treatment Plan NOTE    Review Date & Time: 05/11/2021    Patient was in treatment team    Admission Type:   Admission Type: Involuntary    Reason for admission:  Reason for Admission: \"Dr Gates told me to come here and get tests done but I had all the tests I needed in the ER so i don't know why I'm up here. \"      Estimated Length of Stay Update:  1-3 days  Estimated Discharge Date Update: 05/13/2021    PATIENT STRENGTHS:  Patient Strengths Strengths: Connection to output provider, No significant Physical Illness  Patient Strengths and Limitations:Limitations: Difficulty problem solving/relies on others to help solve problems  Addictive Behavior:Addictive Behavior  In the past 3 months, have you felt or has someone told you that you have a problem with:  : None  Do you have a history of Chemical Use?: No  Do you have a history of Alcohol Use?: No  Do you have a history of Street Drug Abuse?: No  Histroy of Prescripton Drug Abuse?: No  Medical Problems:  Past Medical History:   Diagnosis Date    Acute bacterial conjunctivitis of left eye 9/11/2016    Anticoagulated on Coumadin     per Dr. Christopher Last Bipolar 1 disorder [facility])     [address]es [address] Bipolar affect, depressed [address] [address] [facility]     Cellulitis of face 9/11/2016    Full dentures     Hx of blood clots     DVT, PE hx of    Hypertension     on propanolol / patient states does not have high BP    Impetigo 9/11/2016    Palpitations     Screening     for colonoscopy    Seizure disorder ([facility]     last seizure 2010 / cause unknown       EDUCATION:   Learner Progress Toward Treatment Goals: Reviewed results and recommendations of this team and Reviewed goals and plan of care    Method: Small group    Outcome: Verbalized understanding    PATIENT GOALS: Waiting to go home    PLAN/TREATMENT RECOMMENDATIONS UPDATE: Take prescribed medications. Attend and participate in groups.  Continue to provide emotional support to patient.     GOALS UPDATE:   Time frame for Short-Term Goals: 1-3 days    Patricia Tsang, RN

## 2021-05-11 NOTE — ED NOTES
Notified Dr. Kalani Ledbetter of need for admission. pt accepted to [facility].       Max Sweeney RN  05/10/21 0811

## 2021-05-11 NOTE — CARE COORDINATION
Biopsychosocial Assessment Note    Social work met with patient to complete the biopsychosocial assessment and CSSR-S. Mental Status Exam: Pt alert and oriented x 4. Pt was friendly and cooperative. Pt's thought process is preoccupied speech was clear. Chief Complaint: \"pink slipped by CSN, pt denies SI/HI at time of triage \"    Patient Report: Pt's last admission to this psychiatric hospital was 6/8/16. Pt was pink slipped by CSN for SI/HI, although pt's denies this. Pt denied suicide attempt hx. Pt currently denying SI/ HI/ hallucinations/ delusions. Pt denied substance use. Pt denied trauma. Gender  [x] Male [] Female [] Transgender  [] Other    Sexual Orientation    [x] Heterosexual [] Homosexual [] Bisexual [] Other    Suicidal Ideation  [] Past [] Present [x] Denies     Homicidal Ideation  [] Past [] Present [x] Denies     Hallucinations/Delusions (Specify type)  [] Reports [x] Denies     Substance Use/Alcohol Use/Addiction  [] Reports [x] Denies     Tobacco Use (within the last 6 months)  [x] Reports [] Denies     Trauma History  [] Reports [x] Denies     Collateral Contact (PERFECTO signed)  Name: Francisco Gaspar  Relationship: Cousin  Number: [phone]    Collateral Information: Sw spoke with pt's cousin Francisco Mattson. Francisco Mattson states that the pt \"hasn't been himself\" lately. Pt has been less out going and social per Francisco Mattson. Francisco Mattson states that the pt stopped taking medications prior to his admission. Pt does not have access to weapons per Francisco Mattson.        Access to Weapons per Collateral Contact: [] Reports [x] Denies       Follow up provider preference: CSN      Plan for discharge  Location (where do they plan on discharging to?): [facility]    Transportation (who will pick them up at discharge?) [facility]    Medications (will they have money for copays at discharge?):  Insurance, or patient

## 2021-05-11 NOTE — PROGRESS NOTES
propanolol / patient states does not have high BP    Impetigo 9/11/2016    Palpitations     Screening     for colonoscopy    Seizure disorder ([facility]     last seizure 2010 / cause unknown       Status EXAM:  Status and Exam  Normal: No  Facial Expression: Flat, Avoids Gaze  Affect: Congruent, Blunt  Level of Consciousness: Alert  Mood:Normal: No  Mood: Irritable  Motor Activity:Normal: Yes  Interview Behavior: Cooperative, Irritable  Preception: [place] to Person, [place] to Time, [place] to Situation, [place] to Place  Attention:Normal: No  Attention: Distractible  Thought Processes: Circumstantial, Tangential  Thought Content:Normal: No  Thought Content: Delusions, Paranoia  Hallucinations: None  Delusions: Yes  Delusions: Obsessions  Memory:Normal: No  Memory: Poor Recent  Insight and Judgment: No  Insight and Judgment: Poor Judgment, Poor Insight  Present Suicidal Ideation: No  Present Homicidal Ideation: No    Tobacco Screening:  Practical Counseling, on admission, marcelino X, if applicable and completed (first 3 are required if patient doesn't refuse):             ( x)  Recognizing danger situations (included triggers and roadblocks)                    ( x)  Coping skills (new ways to manage stress, exercise, relaxation techniques, changing routine, distraction)                                                           ( x)  Basic information about quitting (benefits of quitting, techniques in how to quit, available resources  ( x) Referral for counseling faxed to [facility]                                           ( ) Patient refused counseling  ( ) Patient has not smoked in the last 30 days    Metabolic Screening:    Lab Results   Component Value Date    LABA1C 5.9 (H) 05/08/2020       Lab Results   Component Value Date    CHOL 118 03/04/2021    CHOL 144 05/28/2019    CHOL 146 02/15/2018    CHOL 187 08/16/2017    CHOL 208 (H) 06/01/2017    CHOL 145 10/18/2016    CHOL 131 09/10/2016     Lab Results Component Value Date    TRIG 245 03/04/2021    TRIG 152 (H) 05/28/2019    TRIG 198 (H) 02/15/2018    TRIG 310 08/16/2017    TRIG 367 (H) 06/01/2017    TRIG 188 (H) 10/18/2016    TRIG 179 (H) 09/10/2016     Lab Results   Component Value Date    HDL 25 (A) 03/04/2021    HDL 33 05/08/2020    HDL 32 05/28/2019    HDL 28 02/15/2018    HDL 28 (A) 08/16/2017    HDL 29 06/01/2017    HDL 34 10/18/2016    HDL 9 09/10/2016     No components found for: LDLCAL  Lab Results   Component Value Date    LABVLDL 30 05/08/2020    LABVLDL 30 05/28/2019    LABVLDL 40 02/15/2018    LABVLDL 73 06/01/2017    LABVLDL 38 10/18/2016    LABVLDL 36 09/10/2016         Body mass index is 29.44 kg/m². BP Readings from Last 2 Encounters:   05/11/21 130/77   04/18/21 119/79           Pt admitted with followings belongings:  Dentures: None  Vision - Corrective Lenses: None  Hearing Aid: None  Jewelry: None  Body Piercings Removed: No  Clothing: Undergarments (Comment), Footwear, Pants, Shirt, Socks  Were All Patient Medications Collected?: No  Other Valuables: Cell phone     Patient oriented to surroundings and program expectations and copy of patient rights given. Received admission packet:  yes. Consents reviewed, signed yes. Refused N/A. Patient verbalize understanding:  yes.     Patient education on precautions: yes                   Gayla Hatchet, RN

## 2021-05-11 NOTE — PLAN OF CARE
Problem: Altered Mood, Psychotic Behavior:  Goal: Absence of self-harm  Description: Absence of self-harm  Outcome: Met This Shift     Problem: Altered Mood, Psychotic Behavior:  Goal: Able to verbalize reality based thinking  Description: Able to verbalize reality based thinking  Outcome: Not Met This Shift

## 2021-05-11 NOTE — CARE COORDINATION
Navigator spoke with Jose Guadalupe Martinez, CSN Supervisor  She reports that client is currently being evicted from his apartment. He apparently allowed drug addicts in his home resulting in his eviction. She reports hat his  Bianka Kerr is looking at finding him a group home. SHe will have the  contact  to discuss discharge plans. Client may need to step down to [facility] when stabilized.    Electronically signed by Aurleiano Man [facility] on 5/11/2021 at 10:21 AM

## 2021-05-11 NOTE — GROUP NOTE
Group Therapy Note    Date: 5/11/2021    Group Start Time: 1350  Group End Time: 1430  Group Topic: Cognitive Skills    [facility]    JOSIE Loera LSW        Group Therapy Note    Attendees: 7         Patient's Goal:  Pt will be able to verbalize understanding of self-care    Notes:  Pt participated and made connections in group    Status After Intervention:  Improved    Participation Level:  Active Listener    Participation Quality: Appropriate, Attentive, Sharing and Supportive      Speech:  normal      Thought Process/Content: Logical      Affective Functioning: Congruent      Mood: depressed      Level of consciousness:  Alert and Oriented x4      Response to Learning: Able to verbalize current knowledge/experience      Endings: None Reported    Modes of Intervention: Education, Support, Socialization, Exploration, Clarifying, Problem-solving and Activity      Discipline Responsible: /Counselor      Signature:  JOSIE Loera LSW

## 2021-05-12 PROCEDURE — 99231 SBSQ HOSP IP/OBS SF/LOW 25: CPT | Performed by: NURSE PRACTITIONER

## 2021-05-12 PROCEDURE — 6370000000 HC RX 637 (ALT 250 FOR IP): Performed by: NURSE PRACTITIONER

## 2021-05-12 PROCEDURE — 6360000002 HC RX W HCPCS: Performed by: NURSE PRACTITIONER

## 2021-05-12 PROCEDURE — 1240000000 HC EMOTIONAL WELLNESS R&B

## 2021-05-12 PROCEDURE — 6370000000 HC RX 637 (ALT 250 FOR IP): Performed by: PSYCHIATRY & NEUROLOGY

## 2021-05-12 PROCEDURE — 94640 AIRWAY INHALATION TREATMENT: CPT

## 2021-05-12 RX ADMIN — IPRATROPIUM BROMIDE 0.5 MG: 0.5 SOLUTION RESPIRATORY (INHALATION) at 13:25

## 2021-05-12 RX ADMIN — PROPRANOLOL HYDROCHLORIDE 20 MG: 20 TABLET ORAL at 21:16

## 2021-05-12 RX ADMIN — LURASIDONE HYDROCHLORIDE 40 MG: 20 TABLET, FILM COATED ORAL at 10:03

## 2021-05-12 RX ADMIN — PRAVASTATIN SODIUM 20 MG: 20 TABLET ORAL at 10:03

## 2021-05-12 RX ADMIN — DIVALPROEX SODIUM 500 MG: 250 TABLET, DELAYED RELEASE ORAL at 21:16

## 2021-05-12 RX ADMIN — DABIGATRAN ETEXILATE MESYLATE 150 MG: 150 CAPSULE ORAL at 10:02

## 2021-05-12 RX ADMIN — BENZTROPINE MESYLATE 1 MG: 1 TABLET ORAL at 10:02

## 2021-05-12 RX ADMIN — Medication 2000 UNITS: at 10:03

## 2021-05-12 RX ADMIN — DIVALPROEX SODIUM 500 MG: 250 TABLET, DELAYED RELEASE ORAL at 10:03

## 2021-05-12 RX ADMIN — PROPRANOLOL HYDROCHLORIDE 20 MG: 20 TABLET ORAL at 10:03

## 2021-05-12 RX ADMIN — LATANOPROST 1 DROP: 50 SOLUTION OPHTHALMIC at 21:16

## 2021-05-12 RX ADMIN — DABIGATRAN ETEXILATE MESYLATE 150 MG: 150 CAPSULE ORAL at 21:16

## 2021-05-12 RX ADMIN — IPRATROPIUM BROMIDE 0.5 MG: 0.5 SOLUTION RESPIRATORY (INHALATION) at 10:00

## 2021-05-12 ASSESSMENT — PAIN SCALES - GENERAL: PAINLEVEL_OUTOF10: 0

## 2021-05-12 NOTE — PROGRESS NOTES
BEHAVIORAL HEALTH FOLLOW-UP NOTE     5/12/2021     Patient was seen and examined in person, Chart reviewed   Patient's case discussed with staff/team    Chief Complaint: \"I am not suicidal.\"    Interim History:   Patient is observed sitting in the dayroom this morning. He appears in no distress. He is taking his medications, He has been attending groups. Patient continues to deny SI/HI, AVH. He demonstrates limited insight into his hospitalization. He appears to be thought blocking. The patient is a CSN client, he is currently being evicted from his apartment. Per the note he has allowed drug addicts in his home leading to this eviction. His  is assisting to find a new home. Per [place] [id number] Navigator note, the patient may benefit from step down to CSU.          Appetite:   [] Normal/Unchanged  [] Increased  [] Decreased      Sleep:       [] Normal/Unchanged  [] Fair       [] Poor              Energy:    [] Normal/Unchanged  [] Increased  [] Decreased        SI [] Present  [] Absent    HI  []Present  [] Absent     Aggression:  [] yes  [] no    Patient is [] able  [] unable to CONTRACT FOR SAFETY     PAST MEDICAL/PSYCHIATRIC HISTORY:   Past Medical History:   Diagnosis Date    Acute bacterial conjunctivitis of left eye 9/11/2016    Anticoagulated on Coumadin     per Dr. Nima Garza Bipolar 1 disorder [facility])     goes to [address] Bipolar affect, depressed ([facility]     Cellulitis of face 9/11/2016    Full dentures     Hx of blood clots     DVT, PE hx of    Hypertension     on propanolol / patient states does not have high BP    Impetigo 9/11/2016    Palpitations     Screening     for colonoscopy    Seizure disorder ([facility]     last seizure 2010 / cause unknown       FAMILY/SOCIAL HISTORY:  Family History   Problem Relation Age of Onset    Diabetes Brother      Social History     Socioeconomic History    Marital status: Single     Spouse name: Not on file    Number of children: Not on file    Years of education: Not on file    Highest education level: Not on file   Occupational History    Not on file   Social Needs    Financial resource strain: Not on file    Food insecurity     Worry: Not on file     Inability: Not on file    Transportation needs     Medical: Not on file     Non-medical: Not on file   Tobacco Use    Smoking status: Current Every Day Smoker     Packs/day: 1.00     Years: 44.00     Pack years: 44.00     Types: Cigarettes     Start date: 2/12/1975    Smokeless tobacco: Never Used   Substance and Sexual Activity    Alcohol use: Not Currently     Comment: occasional beer     Drug use: No    Sexual activity: Never   Lifestyle    Physical activity     Days per week: Not on file     Minutes per session: Not on file    Stress: Not on file   Relationships    Social connections     Talks on phone: Not on file     Gets together: Not on file     Attends [employer] service: Not on file     Active member of club or organization: Not on file     Attends meetings of clubs or organizations: Not on file     Relationship status: Not on file    Intimate partner violence     Fear of current or ex partner: Not on file     Emotionally abused: Not on file     Physically abused: Not on file     Forced sexual activity: Not on file   Other Topics Concern    Not on file   Social History Narrative    Not on file           ROS:  [x] All negative/unchanged except if checked.  Explain positive(checked items) below:  [] Constitutional  [] Eyes  [] Ear/Nose/Mouth/Throat  [] Respiratory  [] CV  [] GI  []   [] Musculoskeletal  [] Skin/Breast  [] Neurological  [] Endocrine  [] Heme/Lymph  [] Allergic/Immunologic    Explanation:     MEDICATIONS:    Current Facility-Administered Medications:     acetaminophen (TYLENOL) tablet 650 mg, 650 mg, Oral, Q4H PRN, Tomás German MD    magnesium hydroxide (MILK OF MAGNESIA) 400 MG/5ML suspension 30 mL, 30 mL, Oral, Daily PRN, Tomás German MD    aluminum & magnesium hydroxide-simethicone (MAALOX) 200-200-20 MG/5ML suspension 30 mL, 30 mL, Oral, PRN, Javier Ivy MD    haloperidol lactate (HALDOL) injection 3 mg, 3 mg, Intramuscular, Q6H PRN **OR** haloperidol (HALDOL) tablet 3 mg, 3 mg, Oral, Q6H PRN, Javier Ivy MD    traZODone (DESYREL) tablet 25 mg, 25 mg, Oral, Nightly PRN, Javier Ivy MD    nicotine (NICODERM CQ) 14 MG/24HR 1 patch, 1 patch, Transdermal, Daily, Javier Ivy MD, 1 patch at 05/12/21 1002    benztropine (COGENTIN) tablet 1 mg, 1 mg, Oral, Daily, Preston Dangze, APRN - CNP, 1 mg at 05/12/21 1002    dabigatran (PRADAXA) capsule 150 mg, 150 mg, Oral, BID, Preston Dangze, APRN - CNP, 150 mg at 05/12/21 1002    divalproex (DEPAKOTE) DR tablet 500 mg, 500 mg, Oral, 2 times per day, Preston Dangze, APRN - CNP, 500 mg at 05/12/21 1003    latanoprost (XALATAN) 0.005 % ophthalmic solution 1 drop, 1 drop, Both Eyes, Nightly, Preston Castellano, APRN - CNP, 1 drop at 05/11/21 2127    pravastatin (PRAVACHOL) tablet 20 mg, 20 mg, Oral, Daily, Preston Dangze, APRN - CNP, 20 mg at 05/12/21 1003    propranolol (INDERAL) tablet 20 mg, 20 mg, Oral, BID, Preston Dangze, APRN - CNP, 20 mg at 05/12/21 1003    ipratropium (ATROVENT) 0.02 % nebulizer solution 0.5 mg, 0.5 mg, Nebulization, 4x Daily, Preston Castellano, APRN - CNP, 0.5 mg at 05/12/21 1000    vitamin D (CHOLECALCIFEROL) tablet 2,000 Units, 2,000 Units, Oral, Daily, Preston Castellano, APRN - CNP, 2,000 Units at 05/12/21 1003    lurasidone (LATUDA) tablet 40 mg, 40 mg, Oral, Daily, Preston Castellano, APRN - CNP, 40 mg at 05/12/21 1003      Examination:  BP (!) 160/81   Pulse 70   Temp 98.4 °F (36.9 °C) (Infrared)   Resp 16   Ht 5' 8\" (1.727 m)   Wt 193 lb 9.6 oz (87.8 kg)   SpO2 98%   BMI 29.44 kg/m²   Gait - steady  Medication side effects(SE): None reported  Mental Status Examination:    Level of consciousness:  within normal limits   Appearance:  fair grooming and fair alternatives discussed with the patient and the patient demonstrated understanding. The patient was also educated that the outcome of treatment will depend on the medication compliance as directed by the prescribers along with regular follow-up, compliance with the labs and other work-up, as clinically indicated. Collateral information: Followed by social work   CD evaluation  Encourage patient to attend group and other milieu activities.   Discharge planning discussed with the patient and treatment team.    PSYCHOTHERAPY/COUNSELING:  [x] Therapeutic interview  [x] Supportive  [] CBT  [] Ongoing  [] Other    [x] Patient continues to need, on a daily basis, active treatment furnished directly by or requiring the supervision of inpatient psychiatric personnel      Anticipated Length of stay: 7 - 10 days based on stability            Electronically signed by FLORESITA Herrera CNP on 5/12/2021 at 1:06 PM

## 2021-05-12 NOTE — PROGRESS NOTES
Patient has been out on the unit, pleasant, calm, and cooperative. Patient denies all and denies depression. Patient states that his anxiety is present but just \"a little bit. \"  Patient states that he's hoping for discharge by Thursday. Patient is encouraged to continue to work towards discharge goal by complying with medications, attending groups and to seek staff if feelings are overwhelming. Environmental rounds completed per unit policy to maintain safety of everyone on the unit. Staff will offer support and interventions as requested or required.

## 2021-05-12 NOTE — PLAN OF CARE
Problem: Altered Mood, Psychotic Behavior:  Goal: Able to verbalize reality based thinking  Description: Able to verbalize reality based thinking  Outcome: Met This Shift     Problem: Altered Mood, Psychotic Behavior:  Goal: Absence of self-harm  Description: Absence of self-harm  Outcome: Met This Shift

## 2021-05-12 NOTE — GROUP NOTE
Group Therapy Note    Date: 5/12/2021    Group Start Time: 1000  Group End Time: 1030  Group Topic: Psychoeducation    [facility]    Rosy Higuera, CTRS        Group Therapy Note    Number of participants: 8  Type of group: Psychoeducation  Mode of intervention: Education, Support, Socialization, Exploration, Clarifying, and Problem-solving  Topic: Time Management Tips   Objective: Pt will identify 1 way to improve time management in recovery. Patient's Goal:  \"Be more social\"     Notes:  Pt was interactive during group sharing 1 way to improve time management in recovery. Pt gave support and feedback to others. Status After Intervention:  Improved    Participation Level:  Active Listener and Interactive    Participation Quality: Appropriate, Attentive, Sharing and Supportive      Speech:  normal      Thought Process/Content: Linear      Affective Functioning: Flat      Mood: depressed      Level of consciousness:  Alert, Oriented x4 and Attentive      Response to Learning: Able to verbalize current knowledge/experience, Able to verbalize/acknowledge new learning, Able to retain information, Capable of insight, Able to change behavior and Progressing to goal      Endings: None Reported    Modes of Intervention: Education, Support, Socialization, Exploration, Clarifying and Problem-solving

## 2021-05-13 PROCEDURE — 94760 N-INVAS EAR/PLS OXIMETRY 1: CPT

## 2021-05-13 PROCEDURE — 1240000000 HC EMOTIONAL WELLNESS R&B

## 2021-05-13 PROCEDURE — 6360000002 HC RX W HCPCS: Performed by: NURSE PRACTITIONER

## 2021-05-13 PROCEDURE — 94640 AIRWAY INHALATION TREATMENT: CPT

## 2021-05-13 PROCEDURE — 99231 SBSQ HOSP IP/OBS SF/LOW 25: CPT | Performed by: NURSE PRACTITIONER

## 2021-05-13 PROCEDURE — 6370000000 HC RX 637 (ALT 250 FOR IP): Performed by: PSYCHIATRY & NEUROLOGY

## 2021-05-13 PROCEDURE — 6370000000 HC RX 637 (ALT 250 FOR IP): Performed by: NURSE PRACTITIONER

## 2021-05-13 RX ADMIN — DIVALPROEX SODIUM 500 MG: 250 TABLET, DELAYED RELEASE ORAL at 09:32

## 2021-05-13 RX ADMIN — IPRATROPIUM BROMIDE 0.5 MG: 0.5 SOLUTION RESPIRATORY (INHALATION) at 21:40

## 2021-05-13 RX ADMIN — IPRATROPIUM BROMIDE 0.5 MG: 0.5 SOLUTION RESPIRATORY (INHALATION) at 17:00

## 2021-05-13 RX ADMIN — BENZTROPINE MESYLATE 1 MG: 1 TABLET ORAL at 09:32

## 2021-05-13 RX ADMIN — DIVALPROEX SODIUM 500 MG: 250 TABLET, DELAYED RELEASE ORAL at 21:53

## 2021-05-13 RX ADMIN — DABIGATRAN ETEXILATE MESYLATE 150 MG: 150 CAPSULE ORAL at 09:32

## 2021-05-13 RX ADMIN — IPRATROPIUM BROMIDE 0.5 MG: 0.5 SOLUTION RESPIRATORY (INHALATION) at 13:50

## 2021-05-13 RX ADMIN — TRAZODONE HYDROCHLORIDE 25 MG: 50 TABLET ORAL at 21:53

## 2021-05-13 RX ADMIN — Medication 2000 UNITS: at 09:32

## 2021-05-13 RX ADMIN — LATANOPROST 1 DROP: 50 SOLUTION OPHTHALMIC at 21:55

## 2021-05-13 RX ADMIN — PRAVASTATIN SODIUM 20 MG: 20 TABLET ORAL at 09:32

## 2021-05-13 RX ADMIN — DABIGATRAN ETEXILATE MESYLATE 150 MG: 150 CAPSULE ORAL at 21:54

## 2021-05-13 RX ADMIN — PROPRANOLOL HYDROCHLORIDE 20 MG: 20 TABLET ORAL at 09:32

## 2021-05-13 RX ADMIN — PROPRANOLOL HYDROCHLORIDE 20 MG: 20 TABLET ORAL at 21:53

## 2021-05-13 RX ADMIN — LURASIDONE HYDROCHLORIDE 40 MG: 20 TABLET, FILM COATED ORAL at 09:32

## 2021-05-13 ASSESSMENT — PAIN SCALES - GENERAL
PAINLEVEL_OUTOF10: 0
PAINLEVEL_OUTOF10: 0

## 2021-05-13 NOTE — GROUP NOTE
Group Therapy Note    Date: 5/12/2021    Group Start Time: 1400  Group End Time: 1435  Group Topic: Cognitive Skills    [facility]    JOSIE Loera LSW        Group Therapy Note    Attendees: 2         Patient's Goal:  Pt will be able to follow a guided meditation     Notes:  Pt participated and made connections during group    Status After Intervention:  Improved    Participation Level:  Active Listener    Participation Quality: Appropriate      Speech:  normal      Thought Process/Content: Logical      Affective Functioning: Congruent      Mood: depressed      Level of consciousness:  Alert and Oriented x4      Response to Learning: Able to verbalize current knowledge/experience      Endings: None Reported    Modes of Intervention: Education, Support, Socialization, Exploration, Clarifying, Problem-solving and Activity      Discipline Responsible: /Counselor      Signature:  JOSIE Loera LSW

## 2021-05-13 NOTE — PROGRESS NOTES
BEHAVIORAL HEALTH FOLLOW-UP NOTE     5/13/2021     Patient was seen and examined in person, Chart reviewed   Patient's case discussed with staff/team    Chief Complaint: \"I am doing okay. \"    Interim History:   Patient is observed in his room this morning. He appears in no distress, he is bright and social talkative with me. Offers lots of conversation, however does not appear manic he is not hyperverbal, just talkative. He is taking his medications, he endorses that his depression is decreasing. He has been attending groups. Patient continues to deny SI/HI, AVH. He demonstrates limited insight into his hospitalization. He no longer appears to be thought blocking. The patient is a CSN client, he is currently being evicted from his apartment. Per the note he has allowed drug addicts in his home leading to this eviction. His  is assisting to find a new home. Per [facility] Navigator note, the patient may benefit from step down to CSU. Patient is agreeable to continue Invega intrinza injection.     Appetite:   [] Normal/Unchanged  [] Increased  [] Decreased      Sleep:       [] Normal/Unchanged  [] Fair       [] Poor              Energy:    [] Normal/Unchanged  [] Increased  [] Decreased        SI [] Present  [] Absent    HI  []Present  [] Absent     Aggression:  [] yes  [] no    Patient is [] able  [] unable to CONTRACT FOR SAFETY     PAST MEDICAL/PSYCHIATRIC HISTORY:   Past Medical History:   Diagnosis Date    Acute bacterial conjunctivitis of left eye 9/11/2016    Anticoagulated on Coumadin     per Dr. Joanna Antonio Bipolar 1 disorder [facility])     goes to [address] Bipolar affect, depressed ([facility]     Cellulitis of face 9/11/2016    Full dentures     Hx of blood clots     DVT, PE hx of    Hypertension     on propanolol / patient states does not have high BP    Impetigo 9/11/2016    Palpitations     Screening     for colonoscopy    Seizure disorder ([facility]     last seizure 2010 / cause unknown m)   Wt 193 lb 9.6 oz (87.8 kg)   SpO2 97%   BMI 29.44 kg/m²   Gait - steady  Medication side effects(SE): None reported  Mental Status Examination:    Level of consciousness:  within normal limits   Appearance:  fair grooming and fair hygiene  Behavior/Motor:  no abnormalities noted  Attitude toward examiner:  cooperative  Speech:  normal rate and normal volume   Mood: euthymic  Affect:  mood congruent  Thought processes:  goal directed   Thought content: The patient is devoid of suicidal or homicidal ideation intent or plan. Devoid of auditory or visual hallucinations or other perceptual disturbances, there are no overt or covert signs of psychosis or paranoia. There are no neurovegetative signs of depression. Cognition:  oriented to person, place, and time   Concentration distractible  Insight poor   Judgement poor     ASSESSMENT:   Patient symptoms are:  [] Well controlled  [] Improving  [] Worsening  [x] No change      Diagnosis:   Principal Problem:    Bipolar affective disorder, mixed, severe, with psychotic behavior ([facility]  Resolved Problems:    * No resolved hospital problems.  *      LABS:    Recent Labs     05/10/21  1152   WBC 7.5   HGB 14.0        Recent Labs     05/10/21  1152      K 4.1      CO2 24   BUN 7   CREATININE 0.9   GLUCOSE 120*     Recent Labs     05/10/21  1152   BILITOT <0.2   ALKPHOS 58   AST 21   ALT 26     Lab Results   Component Value Date    LABAMPH NOT DETECTED 05/10/2021    BARBSCNU NOT DETECTED 05/10/2021    LABBENZ NOT DETECTED 05/10/2021    LABMETH NOT DETECTED 05/10/2021    OPIATESCREENURINE NOT DETECTED 05/10/2021    PHENCYCLIDINESCREENURINE NOT DETECTED 05/10/2021    ETOH <10 05/10/2021     Lab Results   Component Value Date    TSH 2.180 05/08/2020     No results found for: LITHIUM  Lab Results   Component Value Date    VALPROATE 55 05/10/2021           Treatment Plan:  The patient's diagnosis, treatment plan, medication management were formulated after patient was seen directly by the attending physician and myself and all relevant documentation was reviewed. Reviewed current Medications with the patient. Risk, benefit, side effects, possible outcomes of the medication and alternatives discussed with the patient and the patient demonstrated understanding. The patient was also educated that the outcome of treatment will depend on the medication compliance as directed by the prescribers along with regular follow-up, compliance with the labs and other work-up, as clinically indicated. Collateral information: Followed by social work   CD evaluation  Encourage patient to attend group and other milieu activities.   Discharge planning discussed with the patient and treatment team.    PSYCHOTHERAPY/COUNSELING:  [x] Therapeutic interview  [x] Supportive  [] CBT  [] Ongoing  [] Other    [x] Patient continues to need, on a daily basis, active treatment furnished directly by or requiring the supervision of inpatient psychiatric personnel      Anticipated Length of stay: 7 - 10 days based on stability            Electronically signed by FLORESITA Frederick CNP on 5/13/2021 at 9:10 AM

## 2021-05-13 NOTE — PLAN OF CARE
Denies SI/HI does not report any auditory or visual hallucinations as this time. States he just feels tired all the time. Patient states he knows he has to get use to the medications. Patient is taking meds but is not eating well he did not want breakfast but will try to eat lunch. Patient has a flat affect but is cooperative with assessment.         Problem: Altered Mood, Psychotic Behavior:  Goal: Able to demonstrate trust by eating, participating in treatment and following staff's direction  Description: Able to demonstrate trust by eating, participating in treatment and following staff's direction  Outcome: Ongoing     Problem: Altered Mood, Psychotic Behavior:  Goal: Able to verbalize reality based thinking  Description: Able to verbalize reality based thinking  Outcome: Ongoing     Problem: Altered Mood, Psychotic Behavior:  Goal: Ability to interact with others will improve  Description: Ability to interact with others will improve  5/13/2021 1144 by Rajesh Hernández RN  Outcome: Ongoing     Problem: Altered Mood, Psychotic Behavior:  Goal: Compliance with prescribed medication regimen will improve  Description: Compliance with prescribed medication regimen will improve  5/13/2021 1144 by Rajesh Hernández RN  Outcome: Ongoing

## 2021-05-13 NOTE — PROGRESS NOTES
Group Therapy Note  Patient attended goals group and stated daily goal as to walk more. Group Therapy Note    Date: 5/13/2021  Start Time: 10:00  End Time:  10:30  Number of Participants: 11    Type of Group: Psychoeducation    Wellness Binder Information  Module Name:  Coping Skills  Session Number:  NA    Patient's Goal: To id positive coping skills to use on a daily basis. Notes: Attended group and was able to participate. Status After Intervention:  Improved    Participation Level:  Active Listener    Participation Quality: Attentive      Speech:  hesitant      Thought Process/Content: Linear      Affective Functioning: Flat      Mood: depressed      Level of consciousness:  Alert      Response to Learning: Progressing to goal      Endings: None Reported    Modes of Intervention: Education      Discipline Responsible: Psychoeducational Specialist      Signature:  NALINI Huang

## 2021-05-13 NOTE — PROGRESS NOTES
[facility]  Day 3 Interdisciplinary Treatment Plan NOTE    Review Date & Time: 5/13/2021    Patient was in treatment team    Admission Type:   Admission Type: Involuntary    Reason for admission:  Reason for Admission: \"Dr Gates told me to come here and get tests done but I had all the tests I needed in the ER so i don't know why I'm up here. \"  Estimated Length of Stay Update:  3-5 days  Estimated Discharge Date Update: 5/15/2021    PATIENT STRENGTHS:  Patient Strengths Strengths: Connection to output provider, No significant Physical Illness  Patient Strengths and Limitations:Limitations: Difficulty problem solving/relies on others to help solve problems  Addictive Behavior:Addictive Behavior  In the past 3 months, have you felt or has someone told you that you have a problem with:  : None  Do you have a history of Chemical Use?: No  Do you have a history of Alcohol Use?: No  Do you have a history of Street Drug Abuse?: No  Histroy of Prescripton Drug Abuse?: No  Medical Problems:  Past Medical History:   Diagnosis Date    Acute bacterial conjunctivitis of left eye 9/11/2016    Anticoagulated on Coumadin     per Dr. Nadya Ozuna Bipolar 1 disorder [facility])     goes to [address] Bipolar affect, depressed ([facility]     Cellulitis of face 9/11/2016    Full dentures     Hx of blood clots     DVT, PE hx of    Hypertension     on propanolol / patient states does not have high BP    Impetigo 9/11/2016    Palpitations     Screening     for colonoscopy    Seizure disorder ([facility]     last seizure 2010 / cause unknown       Risk:  Fall RiskTotal: 79  Barron Scale Barron Scale Score: 22  BVC Total: 0  Change in scores none.  Changes to plan of Care  none    Status EXAM:   Status and Exam  Normal: No  Facial Expression: Flat, Sad  Affect: Congruent  Level of Consciousness: Alert  Mood:Normal: No  Mood: Anxious  Motor Activity:Normal: Yes  Interview Behavior: Cooperative  Preception: [place] to Person, [place] to Time, [place] to Place, [place] to Situation  Attention:Normal: No  Attention: Distractible  Thought Processes: (organized/relevant)  Thought Content:Normal: Yes  Thought Content: Other(See Comment)  Hallucinations: None  Delusions: No  Delusions: Grandeur  Memory:Normal: No  Memory: Poor Recent  Insight and Judgment: No  Insight and Judgment: Poor Judgment, Poor Insight  Present Suicidal Ideation: No  Present Homicidal Ideation: No    Daily Assessment Last Entry:   Daily Sleep (WDL): Within Defined Limits         Patient Currently in Pain: Denies  Daily Nutrition (WDL): Exceptions to WDL  Appetite Change: Decreased  Barriers to Nutrition: Cognitive impairment  Level of Assistance: Independent/Self    Patient Monitoring:  Frequency of Checks: 4 times per hour, close    Psychiatric Symptoms:   Depression Symptoms  Depression Symptoms: Change in energy level, Impaired concentration, Isolative  Anxiety Symptoms  Anxiety Symptoms: Generalized  Mary Jane Symptoms  Mary Jane Symptoms: No problems reported or observed. Psychosis Symptoms  Delusion Type: No problems reported or observed.     Suicide Risk CSSR-S:  1) Within the past month, have you wished you were dead or wished you could go to sleep and not wake up? : No  2) Have you actually had any thoughts of killing yourself? : No  6) Have you ever done anything, started to do anything, or prepared to do anything to end your life?: No  Change in Result none Change in Plan of care none      EDUCATION:   Learner Progress Toward Treatment Goals: Reviewed results and recommendations of this team, Reviewed group plan and strategies and Reviewed signs, symptoms and risk of self harm and violent behavior    Method: Small group    Outcome: Verbalized understanding and Needs reinforcement    PATIENT GOALS:  Walk more    PLAN/TREATMENT RECOMMENDATIONS UPDATE: will continue to support patient in setting and obtaining both short term and long term goals while on the unit    GOALS UPDATE:   Time frame for Short-Term Goals:  1-3 days      Hubert Rice RN

## 2021-05-14 PROCEDURE — 94640 AIRWAY INHALATION TREATMENT: CPT

## 2021-05-14 PROCEDURE — 99231 SBSQ HOSP IP/OBS SF/LOW 25: CPT | Performed by: NURSE PRACTITIONER

## 2021-05-14 PROCEDURE — 6370000000 HC RX 637 (ALT 250 FOR IP): Performed by: NURSE PRACTITIONER

## 2021-05-14 PROCEDURE — 1240000000 HC EMOTIONAL WELLNESS R&B

## 2021-05-14 PROCEDURE — 6370000000 HC RX 637 (ALT 250 FOR IP): Performed by: PSYCHIATRY & NEUROLOGY

## 2021-05-14 PROCEDURE — 6360000002 HC RX W HCPCS: Performed by: NURSE PRACTITIONER

## 2021-05-14 RX ADMIN — IPRATROPIUM BROMIDE 0.5 MG: 0.5 SOLUTION RESPIRATORY (INHALATION) at 21:41

## 2021-05-14 RX ADMIN — BENZTROPINE MESYLATE 1 MG: 1 TABLET ORAL at 08:50

## 2021-05-14 RX ADMIN — DABIGATRAN ETEXILATE MESYLATE 150 MG: 150 CAPSULE ORAL at 08:50

## 2021-05-14 RX ADMIN — LURASIDONE HYDROCHLORIDE 40 MG: 20 TABLET, FILM COATED ORAL at 08:50

## 2021-05-14 RX ADMIN — DABIGATRAN ETEXILATE MESYLATE 150 MG: 150 CAPSULE ORAL at 21:17

## 2021-05-14 RX ADMIN — DIVALPROEX SODIUM 500 MG: 250 TABLET, DELAYED RELEASE ORAL at 08:50

## 2021-05-14 RX ADMIN — Medication 2000 UNITS: at 08:50

## 2021-05-14 RX ADMIN — LATANOPROST 1 DROP: 50 SOLUTION OPHTHALMIC at 21:21

## 2021-05-14 RX ADMIN — IPRATROPIUM BROMIDE 0.5 MG: 0.5 SOLUTION RESPIRATORY (INHALATION) at 16:48

## 2021-05-14 RX ADMIN — PRAVASTATIN SODIUM 20 MG: 20 TABLET ORAL at 08:50

## 2021-05-14 RX ADMIN — DIVALPROEX SODIUM 500 MG: 250 TABLET, DELAYED RELEASE ORAL at 21:17

## 2021-05-14 RX ADMIN — IPRATROPIUM BROMIDE 0.5 MG: 0.5 SOLUTION RESPIRATORY (INHALATION) at 09:30

## 2021-05-14 RX ADMIN — PROPRANOLOL HYDROCHLORIDE 20 MG: 20 TABLET ORAL at 08:50

## 2021-05-14 ASSESSMENT — PAIN SCALES - GENERAL: PAINLEVEL_OUTOF10: 0

## 2021-05-14 NOTE — PLAN OF CARE
Alert and oriented x4 he is denying SI/HI and does not report any auditory or visual hallucinations. Patient has a flat and sad affect. He is taking his medications and eating meals.

## 2021-05-14 NOTE — PROGRESS NOTES
Messages left at 1030 and 1325 on the voicemail of Bernarda Snellen RN at [facility] to verify when last dose of Jerryl Risen was given. Will continue to await a return call.

## 2021-05-14 NOTE — PROGRESS NOTES
BEHAVIORAL HEALTH FOLLOW-UP NOTE     5/14/2021     Patient was seen and examined in person, Chart reviewed   Patient's case discussed with staff/team    Chief Complaint: \"I am doing okay. \"    Interim History:   Patient is observed in his room this morning. He appears in no distress, he is bright and social talkative with me. Offers lots of conversation, however does not appear manic he is not hyperverbal, just talkative. He is taking his medications, he endorses that his depression is decreasing. He has been attending groups. Patient continues to deny SI/HI, AVH. He demonstrates limited insight into his hospitalization. He no longer appears to be thought blocking. He is extremely discharge focused. However is refusing to stepdown to CSU. Prefer to be discharged back to his apartment. His  reports that he is being evicted from his apartment and she is working to help him find a new apartment. The patient is a [employer] client, he is currently being evicted from his apartment. Per the note he has allowed drug addicts in his home leading to this eviction. His  is assisting to find a new home. Per [facility] Navigator note, the patient may benefit from step down to CSU. Patient is agreeable to continue Invega intrinza injection.     Appetite:   [] Normal/Unchanged  [] Increased  [] Decreased      Sleep:       [] Normal/Unchanged  [] Fair       [] Poor              Energy:    [] Normal/Unchanged  [] Increased  [] Decreased        SI [] Present  [] Absent    HI  []Present  [] Absent     Aggression:  [] yes  [] no    Patient is [] able  [] unable to CONTRACT FOR SAFETY     PAST MEDICAL/PSYCHIATRIC HISTORY:   Past Medical History:   Diagnosis Date    Acute bacterial conjunctivitis of left eye 9/11/2016    Anticoagulated on Coumadin     per Dr. Shea Benton Bipolar 1 disorder [facility])     goes to [address] Bipolar affect, depressed ([facility] [address]     Cellulitis of face 9/11/2016    Full dentures     Hx of blood clots     DVT, PE hx of    Hypertension     on propanolol / patient states does not have high BP    Impetigo 9/11/2016    Palpitations     Screening     for colonoscopy    Seizure disorder ([facility] [address]     last seizure 2010 / cause unknown       FAMILY/SOCIAL HISTORY:  Family History   Problem Relation Age of Onset    Diabetes Brother      Social History     Socioeconomic History    Marital status: Single     Spouse name: Not on file    Number of children: Not on file    Years of education: Not on file    Highest education level: Not on file   Occupational History    Not on file   Social Needs    Financial resource strain: Not on file    Food insecurity     Worry: Not on file     Inability: Not on file    Transportation needs     Medical: Not on file     Non-medical: Not on file   Tobacco Use    Smoking status: Current Every Day Smoker     Packs/day: 1.00     Years: 44.00     Pack years: 44.00     Types: Cigarettes     Start date: 2/12/1975    Smokeless tobacco: Never Used   Substance and Sexual Activity    Alcohol use: Not Currently     Comment: occasional beer     Drug use: No    Sexual activity: Never   Lifestyle    Physical activity     Days per week: Not on file     Minutes per session: Not on file    Stress: Not on file   Relationships    Social connections     Talks on phone: Not on file     Gets together: Not on file     Attends Restoration service: Not on file     Active member of club or organization: Not on file     Attends meetings of clubs or organizations: Not on file     Relationship status: Not on file    Intimate partner violence     Fear of current or ex partner: Not on file     Emotionally abused: Not on file     Physically abused: Not on file     Forced sexual activity: Not on file   Other Topics Concern    Not on file   Social History Narrative    Not on file           ROS:  [x] All negative/unchanged except if checked.  Explain positive(checked items) below:  [] Constitutional  [] Eyes  [] Ear/Nose/Mouth/Throat  [] Respiratory  [] CV  [] GI  []   [] Musculoskeletal  [] Skin/Breast  [] Neurological  [] Endocrine  [] Heme/Lymph  [] Allergic/Immunologic    Explanation:     MEDICATIONS:    Current Facility-Administered Medications:     acetaminophen (TYLENOL) tablet 650 mg, 650 mg, Oral, Q4H PRN, Jimmy Victoria MD    magnesium hydroxide (MILK OF MAGNESIA) 400 MG/5ML suspension 30 mL, 30 mL, Oral, Daily PRN, Jimmy Victoria MD    aluminum & magnesium hydroxide-simethicone (MAALOX) 200-200-20 MG/5ML suspension 30 mL, 30 mL, Oral, PRN, Jimmy Victoria MD    haloperidol lactate (HALDOL) injection 3 mg, 3 mg, Intramuscular, Q6H PRN **OR** haloperidol (HALDOL) tablet 3 mg, 3 mg, Oral, Q6H PRN, Jimmy Victoria MD    traZODone (DESYREL) tablet 25 mg, 25 mg, Oral, Nightly PRN, Jimmy Victoria MD, 25 mg at 05/13/21 2153    nicotine (NICODERM CQ) 14 MG/24HR 1 patch, 1 patch, Transdermal, Daily, Jimmy Victoria MD, 1 patch at 05/14/21 0851    benztropine (COGENTIN) tablet 1 mg, 1 mg, Oral, Daily, Savage Devoid, APRN - CNP, 1 mg at 05/14/21 0850    dabigatran (PRADAXA) capsule 150 mg, 150 mg, Oral, BID, Savage Devoid, APRN - CNP, 150 mg at 05/14/21 0850    divalproex (DEPAKOTE) DR tablet 500 mg, 500 mg, Oral, 2 times per day, Savage Devoid, APRN - CNP, 500 mg at 05/14/21 0850    latanoprost (XALATAN) 0.005 % ophthalmic solution 1 drop, 1 drop, Both Eyes, Nightly, Savage Devoid, APRN - CNP, 1 drop at 05/13/21 2155    pravastatin (PRAVACHOL) tablet 20 mg, 20 mg, Oral, Daily, Savage Devoid, APRN - CNP, 20 mg at 05/14/21 0850    propranolol (INDERAL) tablet 20 mg, 20 mg, Oral, BID, Savage Devoid, APRN - CNP, 20 mg at 05/14/21 0850    ipratropium (ATROVENT) 0.02 % nebulizer solution 0.5 mg, 0.5 mg, Nebulization, 4x Daily, Savage Devoid, APRN - CNP, 0.5 mg at 05/14/21 0930    vitamin D (CHOLECALCIFEROL) tablet 2,000 Units, 2,000 Units, Oral, Daily, Mekhi Choi Joselo Dela Cruz, APRN - CNP, 2,000 Units at 05/14/21 0850    lurasidone (LATUDA) tablet 40 mg, 40 mg, Oral, Daily, Gato Stubbs, APRN - CNP, 40 mg at 05/14/21 0850      Examination:  BP (!) 149/89   Pulse 83   Temp 98 °F (36.7 °C) (Temporal)   Resp 18   Ht 5' 8\" (1.727 m)   Wt 193 lb 9.6 oz (87.8 kg)   SpO2 95%   BMI 29.44 kg/m²   Gait - steady  Medication side effects(SE): None reported  Mental Status Examination:    Level of consciousness:  within normal limits   Appearance:  fair grooming and fair hygiene  Behavior/Motor:  no abnormalities noted  Attitude toward examiner:  cooperative  Speech:  normal rate and normal volume   Mood: euthymic  Affect:  mood congruent  Thought processes:  goal directed   Thought content: The patient is devoid of suicidal or homicidal ideation intent or plan. Devoid of auditory or visual hallucinations or other perceptual disturbances, there are no overt or covert signs of psychosis or paranoia. There are no neurovegetative signs of depression. Cognition:  oriented to person, place, and time   Concentration distractible  Insight poor   Judgement poor     ASSESSMENT:   Patient symptoms are:  [] Well controlled  [x] Improving  [] Worsening  [] No change      Diagnosis:   Principal Problem:    Bipolar affective disorder, mixed, severe, with psychotic behavior ([facility] [address]  Resolved Problems:    * No resolved hospital problems. *      LABS:    No results for input(s): WBC, HGB, PLT in the last 72 hours. No results for input(s): NA, K, CL, CO2, BUN, CREATININE, GLUCOSE in the last 72 hours. No results for input(s): BILITOT, ALKPHOS, AST, ALT in the last 72 hours.   Lab Results   Component Value Date    LABAMPH NOT DETECTED 05/10/2021    BARBSCNU NOT DETECTED 05/10/2021    LABBENZ NOT DETECTED 05/10/2021    LABMETH NOT DETECTED 05/10/2021    OPIATESCREENURINE NOT DETECTED 05/10/2021    PHENCYCLIDINESCREENURINE NOT DETECTED 05/10/2021    ETOH <10 05/10/2021     Lab Results   Component Value

## 2021-05-14 NOTE — GROUP NOTE
Group Therapy Note    Date: 5/14/2021    Group Start Time: 6419  Group End Time: 1440  Group Topic: Cognitive Skills    [facility]    Donnamae Cogan, LISW-S        Group Therapy Note    Attendees: 13         Patient's Goal:  To participate in group activity of [facility] for [employer] and Recovery. To be able to identify what areas they have enough support in and what areas they want to increase support in. Notes:  Patient was an active participant. Status After Intervention:  Improved    Participation Level:  Active Listener    Participation Quality: Appropriate, Attentive, Sharing and Supportive      Speech:  normal      Thought Process/Content: Logical      Affective Functioning: Blunted      Mood: depressed      Level of consciousness:  Alert and Oriented x4      Response to Learning: Able to verbalize current knowledge/experience and Able to verbalize/acknowledge new learning      Endings: None Reported    Modes of Intervention: Education, Support, Socialization, Exploration, Clarifying, Problem-solving and Activity      Discipline Responsible: /Counselor      Signature:  Donnamae Cogan, LISW-S

## 2021-05-14 NOTE — CARE COORDINATION
CHRIS note: d/c planning: CHRIS spoke with Noemy Brown x 2 from [facility]. She is ok with dc to crisis unit today. His  is off today so asked that we arrange a taxi to step him down. Meds need to be sent to [id number]. Dr Reymundo Walker will see him while he is at crisis unit. SW faxed info to crisis unit. Per text from NP, Dr was not ok with pt d/c to crisis unit yet. CHRIS spoke with pt re: crisis unit and he declined. He states he doesn't like crisis unit and wants to go straight back to his apt. CHRIS called Noemy Brown from [facility] and informed her that pt will not be d/c today. Pt is being evicted from his apt and [employer] will be helping him to find somewhere else to live. SW informed pt that dr didn't want to d/c today so he would be with us throughout weekend. SW  Also informed him that he is being evicted from apt which pt says he wasn't aware of. Pt states he had some friends that were staying with him and doesn't know why he is being evicted over this. SW informed him that friends were apparently using drugs and that is why he is being evicted. Pt says he can't be evicted d/t friends using as \"neighbors use that skunk weed all the time and they aren't being evicted\". Pt states if landlord plans to evict him then he will sara him and take him to court.

## 2021-05-14 NOTE — PLAN OF CARE
Patient denies SI/HI/AVH. Patient's affect is flat but brightens with conversation. He was out on the unit for HS snack and is social with his roommate. Compliant with medications.      Problem: Altered Mood, Psychotic Behavior:  Goal: Able to demonstrate trust by eating, participating in treatment and following staff's direction  Description: Able to demonstrate trust by eating, participating in treatment and following staff's direction  5/13/2021 2215 by Pancho Kaiser RN  Outcome: Ongoing     Problem: Altered Mood, Psychotic Behavior:  Goal: Ability to achieve adequate nutritional intake will improve  Description: Ability to achieve adequate nutritional intake will improve  Outcome: Ongoing     Problem: Altered Mood, Psychotic Behavior:  Goal: Ability to interact with others will improve  Description: Ability to interact with others will improve  5/13/2021 2215 by Pancho Kaiser RN  Outcome: Ongoing

## 2021-05-15 PROCEDURE — 6370000000 HC RX 637 (ALT 250 FOR IP): Performed by: PSYCHIATRY & NEUROLOGY

## 2021-05-15 PROCEDURE — 6360000002 HC RX W HCPCS: Performed by: NURSE PRACTITIONER

## 2021-05-15 PROCEDURE — 99231 SBSQ HOSP IP/OBS SF/LOW 25: CPT | Performed by: NURSE PRACTITIONER

## 2021-05-15 PROCEDURE — 1240000000 HC EMOTIONAL WELLNESS R&B

## 2021-05-15 PROCEDURE — 6370000000 HC RX 637 (ALT 250 FOR IP): Performed by: NURSE PRACTITIONER

## 2021-05-15 PROCEDURE — 94640 AIRWAY INHALATION TREATMENT: CPT

## 2021-05-15 RX ADMIN — BENZTROPINE MESYLATE 1 MG: 1 TABLET ORAL at 09:53

## 2021-05-15 RX ADMIN — LURASIDONE HYDROCHLORIDE 40 MG: 20 TABLET, FILM COATED ORAL at 09:54

## 2021-05-15 RX ADMIN — DIVALPROEX SODIUM 500 MG: 250 TABLET, DELAYED RELEASE ORAL at 09:53

## 2021-05-15 RX ADMIN — IPRATROPIUM BROMIDE 0.5 MG: 0.5 SOLUTION RESPIRATORY (INHALATION) at 09:11

## 2021-05-15 RX ADMIN — IPRATROPIUM BROMIDE 0.5 MG: 0.5 SOLUTION RESPIRATORY (INHALATION) at 17:10

## 2021-05-15 RX ADMIN — Medication 2000 UNITS: at 09:54

## 2021-05-15 RX ADMIN — IPRATROPIUM BROMIDE 0.5 MG: 0.5 SOLUTION RESPIRATORY (INHALATION) at 13:36

## 2021-05-15 RX ADMIN — MAGNESIUM HYDROXIDE 30 ML: 2400 SUSPENSION ORAL at 15:03

## 2021-05-15 RX ADMIN — DABIGATRAN ETEXILATE MESYLATE 150 MG: 150 CAPSULE ORAL at 20:59

## 2021-05-15 RX ADMIN — DABIGATRAN ETEXILATE MESYLATE 150 MG: 150 CAPSULE ORAL at 09:55

## 2021-05-15 RX ADMIN — PRAVASTATIN SODIUM 20 MG: 20 TABLET ORAL at 09:53

## 2021-05-15 RX ADMIN — LATANOPROST 1 DROP: 50 SOLUTION OPHTHALMIC at 20:59

## 2021-05-15 RX ADMIN — PROPRANOLOL HYDROCHLORIDE 20 MG: 20 TABLET ORAL at 09:54

## 2021-05-15 RX ADMIN — DIVALPROEX SODIUM 500 MG: 250 TABLET, DELAYED RELEASE ORAL at 20:59

## 2021-05-15 ASSESSMENT — PAIN SCALES - GENERAL: PAINLEVEL_OUTOF10: 0

## 2021-05-15 NOTE — PLAN OF CARE
Problem: Altered Mood, Psychotic Behavior:  Goal: Able to verbalize decrease in frequency and intensity of hallucinations  Description: Able to verbalize decrease in frequency and intensity of hallucinations  Outcome: Ongoing  Goal: Able to verbalize reality based thinking  Description: Able to verbalize reality based thinking  Outcome: Ongoing

## 2021-05-15 NOTE — PROGRESS NOTES
[place] [address] NOTE     5/15/2021     Patient was seen and examined in person, Chart reviewed   Patient's case discussed with staff/team    Chief Complaint: \"I am tired. \"    Interim History: Patient seen in his room not offer much conversation today states he feels tired due to his roommate keeping him up last night. Per staff he only had 3 hours of sleep last night. He denies SI/HI intent or plan denies any auditory visualizations states he is wants to rest right now.       Appetite:   [] Normal/Unchanged  [] Increased  [] Decreased      Sleep:       [] Normal/Unchanged  [] Fair       [] Poor              Energy:    [] Normal/Unchanged  [] Increased  [] Decreased        SI [] Present  [] Absent    HI  []Present  [] Absent     Aggression:  [] yes  [] no    Patient is [] able  [] unable to CONTRACT FOR SAFETY     PAST MEDICAL/PSYCHIATRIC HISTORY:   Past Medical History:   Diagnosis Date    Acute bacterial conjunctivitis of left eye 9/11/2016    Anticoagulated on Coumadin     per Dr. Tovar Both Bipolar 1 disorder [facility])     goes to [address] Bipolar affect, depressed ([facility]     Cellulitis of face 9/11/2016    Full dentures     Hx of blood clots     DVT, PE hx of    Hypertension     on propanolol / patient states does not have high BP    Impetigo 9/11/2016    Palpitations     Screening     for colonoscopy    Seizure disorder ([facility]     last seizure 2010 / cause unknown       FAMILY/SOCIAL HISTORY:  Family History   Problem Relation Age of Onset    Diabetes Brother      Social History     Socioeconomic History    Marital status: Single     Spouse name: Not on file    Number of children: Not on file    Years of education: Not on file    Highest education level: Not on file   Occupational History    Not on file   Tobacco Use    Smoking status: Current Every Day Smoker     Packs/day: 1.00     Years: 44.00     Pack years: 44.00     Types: Cigarettes     Start date: 2/12/1975   Nicola Rosales (HALDOL) tablet 3 mg, 3 mg, Oral, Q6H PRN, Kevin Devries MD    traZODone (DESYREL) tablet 25 mg, 25 mg, Oral, Nightly PRN, Kevin Devries MD, 25 mg at 05/13/21 2153    nicotine (NICODERM CQ) 14 MG/24HR 1 patch, 1 patch, Transdermal, Daily, Kevin Devries MD, 1 patch at 05/15/21 0953    benztropine (COGENTIN) tablet 1 mg, 1 mg, Oral, Daily, Alvera Lakeisha, APRN - CNP, 1 mg at 05/15/21 6200    dabigatran (PRADAXA) capsule 150 mg, 150 mg, Oral, BID, Alvera Lakeisha, APRN - CNP, 150 mg at 05/15/21 0955    divalproex (DEPAKOTE) DR tablet 500 mg, 500 mg, Oral, 2 times per day, Alvera Lakeisha, APRN - CNP, 500 mg at 05/15/21 0953    latanoprost (XALATAN) 0.005 % ophthalmic solution 1 drop, 1 drop, Both Eyes, Nightly, Alvera Lakeisha, APRN - CNP, 1 drop at 05/14/21 2121    pravastatin (PRAVACHOL) tablet 20 mg, 20 mg, Oral, Daily, Alvera Lakeisha, APRN - CNP, 20 mg at 05/15/21 8228    propranolol (INDERAL) tablet 20 mg, 20 mg, Oral, BID, Alvera Lakeisha, APRN - CNP, 20 mg at 05/15/21 0954    ipratropium (ATROVENT) 0.02 % nebulizer solution 0.5 mg, 0.5 mg, Nebulization, 4x Daily, Alvera Lakeisha, APRN - CNP, 0.5 mg at 05/15/21 0911    vitamin D (CHOLECALCIFEROL) tablet 2,000 Units, 2,000 Units, Oral, Daily, Alvera Lakeisha, APRN - CNP, 2,000 Units at 05/15/21 0954    lurasidone (LATUDA) tablet 40 mg, 40 mg, Oral, Daily, Alvera Lakeisha, APRN - CNP, 40 mg at 05/15/21 0964      Examination:  BP (!) 147/89   Pulse 86   Temp 97.1 °F (36.2 °C)   Resp 16   Ht 5' 8\" (1.727 m)   Wt 193 lb 9.6 oz (87.8 kg)   SpO2 97%   BMI 29.44 kg/m²   Gait - steady  Medication side effects(SE): None reported  Mental Status Examination:    Level of consciousness:  within normal limits   Appearance:  fair grooming and fair hygiene  Behavior/Motor:  no abnormalities noted  Attitude toward examiner:  cooperative  Speech:  normal rate and normal volume   Mood: euthymic  Affect:  mood congruent  Thought processes:  goal directed Thought content: The patient is devoid of suicidal or homicidal ideation intent or plan. Devoid of auditory or visual hallucinations or other perceptual disturbances, there are no overt or covert signs of psychosis or paranoia. There are no neurovegetative signs of depression. Cognition:  oriented to person, place, and time   Concentration distractible  Insight poor   Judgement poor     ASSESSMENT:   Patient symptoms are:  [] Well controlled  [x] Improving  [] Worsening  [] No change      Diagnosis:   Principal Problem:    Bipolar affective disorder, mixed, severe, with psychotic behavior ([facility] [address]  Resolved Problems:    * No resolved hospital problems. *      LABS:    No results for input(s): WBC, HGB, PLT in the last 72 hours. No results for input(s): NA, K, CL, CO2, BUN, CREATININE, GLUCOSE in the last 72 hours. No results for input(s): BILITOT, ALKPHOS, AST, ALT in the last 72 hours. Lab Results   Component Value Date    LABAMPH NOT DETECTED 05/10/2021    BARBSCNU NOT DETECTED 05/10/2021    LABBENZ NOT DETECTED 05/10/2021    LABMETH NOT DETECTED 05/10/2021    OPIATESCREENURINE NOT DETECTED 05/10/2021    PHENCYCLIDINESCREENURINE NOT DETECTED 05/10/2021    ETOH <10 05/10/2021     Lab Results   Component Value Date    TSH 2.180 05/08/2020     No results found for: LITHIUM  Lab Results   Component Value Date    VALPROATE 55 05/10/2021           Treatment Plan:  The patient's diagnosis, treatment plan, medication management were formulated after patient was seen directly by the attending physician and myself and all relevant documentation was reviewed. Reviewed current Medications with the patient. Risk, benefit, side effects, possible outcomes of the medication and alternatives discussed with the patient and the patient demonstrated understanding.   The patient was also educated that the outcome of treatment will depend on the medication compliance as directed by the prescribers along with regular follow-up, compliance with the labs and other work-up, as clinically indicated. Collateral information: Followed by social work   CD evaluation  Encourage patient to attend group and other milieu activities.   Discharge planning discussed with the patient and treatment team.    PSYCHOTHERAPY/COUNSELING:  [x] Therapeutic interview  [x] Supportive  [] CBT  [] Ongoing  [] Other    [x] Patient continues to need, on a daily basis, active treatment furnished directly by or requiring the supervision of inpatient psychiatric personnel      Anticipated Length of stay: 7 - 10 days based on stability            Electronically signed by FLORESITA Bonilla CNP on 2/74/8391 at 11:29 AM

## 2021-05-16 PROCEDURE — 6370000000 HC RX 637 (ALT 250 FOR IP): Performed by: NURSE PRACTITIONER

## 2021-05-16 PROCEDURE — 94640 AIRWAY INHALATION TREATMENT: CPT

## 2021-05-16 PROCEDURE — 1240000000 HC EMOTIONAL WELLNESS R&B

## 2021-05-16 PROCEDURE — 6360000002 HC RX W HCPCS: Performed by: NURSE PRACTITIONER

## 2021-05-16 PROCEDURE — 6370000000 HC RX 637 (ALT 250 FOR IP): Performed by: PSYCHIATRY & NEUROLOGY

## 2021-05-16 PROCEDURE — 99232 SBSQ HOSP IP/OBS MODERATE 35: CPT | Performed by: PSYCHIATRY & NEUROLOGY

## 2021-05-16 RX ADMIN — DABIGATRAN ETEXILATE MESYLATE 150 MG: 150 CAPSULE ORAL at 21:06

## 2021-05-16 RX ADMIN — TRAZODONE HYDROCHLORIDE 25 MG: 50 TABLET ORAL at 21:07

## 2021-05-16 RX ADMIN — IPRATROPIUM BROMIDE 0.5 MG: 0.5 SOLUTION RESPIRATORY (INHALATION) at 14:23

## 2021-05-16 RX ADMIN — LURASIDONE HYDROCHLORIDE 40 MG: 20 TABLET, FILM COATED ORAL at 09:56

## 2021-05-16 RX ADMIN — DIVALPROEX SODIUM 500 MG: 250 TABLET, DELAYED RELEASE ORAL at 09:57

## 2021-05-16 RX ADMIN — BENZTROPINE MESYLATE 1 MG: 1 TABLET ORAL at 09:56

## 2021-05-16 RX ADMIN — PROPRANOLOL HYDROCHLORIDE 20 MG: 20 TABLET ORAL at 09:56

## 2021-05-16 RX ADMIN — DIVALPROEX SODIUM 500 MG: 250 TABLET, DELAYED RELEASE ORAL at 21:07

## 2021-05-16 RX ADMIN — MAGNESIUM HYDROXIDE 30 ML: 2400 SUSPENSION ORAL at 12:20

## 2021-05-16 RX ADMIN — Medication 2000 UNITS: at 09:56

## 2021-05-16 RX ADMIN — LATANOPROST 1 DROP: 50 SOLUTION OPHTHALMIC at 21:12

## 2021-05-16 RX ADMIN — IPRATROPIUM BROMIDE 0.5 MG: 0.5 SOLUTION RESPIRATORY (INHALATION) at 17:00

## 2021-05-16 RX ADMIN — DABIGATRAN ETEXILATE MESYLATE 150 MG: 150 CAPSULE ORAL at 09:56

## 2021-05-16 RX ADMIN — PROPRANOLOL HYDROCHLORIDE 20 MG: 20 TABLET ORAL at 21:10

## 2021-05-16 RX ADMIN — PRAVASTATIN SODIUM 20 MG: 20 TABLET ORAL at 09:56

## 2021-05-16 RX ADMIN — IPRATROPIUM BROMIDE 0.5 MG: 0.5 SOLUTION RESPIRATORY (INHALATION) at 10:33

## 2021-05-16 NOTE — PLAN OF CARE
Problem: Altered Mood, Psychotic Behavior:  Goal: Able to demonstrate trust by eating, participating in treatment and following staff's direction  Description: Able to demonstrate trust by eating, participating in treatment and following staff's direction  Outcome: Met This Shift     Problem: Altered Mood, Psychotic Behavior:  Goal: Able to verbalize reality based thinking  Description: Able to verbalize reality based thinking  Outcome: Met This Shift     Problem: Altered Mood, Psychotic Behavior:  Goal: Absence of self-harm  Description: Absence of self-harm  Outcome: Met This Shift     Problem: Altered Mood, Psychotic Behavior:  Goal: Ability to achieve adequate nutritional intake will improve  Description: Ability to achieve adequate nutritional intake will improve  Outcome: Met This Shift     Problem: Altered Mood, Psychotic Behavior:  Goal: Compliance with prescribed medication regimen will improve  Description: Compliance with prescribed medication regimen will improve  Outcome: Met This Shift

## 2021-05-16 NOTE — BH NOTE
Pt denies SI HI and hallucinations. Pt remains worried about housing as he stated he will be evicted d/t allowing \"friends over\" that were not approved prior to visiting. Pt stated he is active with CS[employer] and they are helping him with housing as he has until the end of June to find a new place. Pt is isolative to this room. C/o constipation for which MOM was given. Pt is flat, sad, depressed. Pt is medication compliant. Pt is eating provided meals. No groups. No aggression or behaviors. No voiced/observed paranoia or delusions. We will continue to provide support and comfort for the patient.

## 2021-05-16 NOTE — PLAN OF CARE
Patient A+Ox 4, currently denies SI, HI, and hallucinations. Rates anxiety 2/10 and depression 3/10. Responds appropriately to conversation. Sad affect, brightens with conversation. Calm. Good eye contact when conversing. Patient states that he is worried about having been evicted.  Patient observed sitting in day room watching television, no behaviors observed  Problem: Altered Mood, Psychotic Behavior:  Goal: Able to verbalize decrease in frequency and intensity of hallucinations  Description: Able to verbalize decrease in frequency and intensity of hallucinations  5/15/2021 2159 by Marivel Lopez RN  Outcome: Ongoing  5/15/2021 0922 by Nakita Marmolejo RN  Outcome: Ongoing     Problem: Altered Mood, Psychotic Behavior:  Goal: Able to demonstrate trust by eating, participating in treatment and following staff's direction  Description: Able to demonstrate trust by eating, participating in treatment and following staff's direction  Outcome: Ongoing

## 2021-05-16 NOTE — PROGRESS NOTES
BEHAVIORAL HEALTH FOLLOW-UP NOTE     5/16/2021     Patient was seen and examined in person, Chart reviewed   Patient's case discussed with staff/team    Chief Complaint: \"I am anxious because I am being evicted\"    Interim History: Patient is anxious and concerned because he is being evicted. However he is active with a CSN and he said they will take care of the addiction related issues. He seems anxious. Denies suicidal homicidal thought at this time. However he remains passive in participation in the group. Slept a little better. He voiced no problem with the medication.     He is improving in his insight and judgment continues to improve  Appetite:   [x] Normal/Unchanged  [] Increased  [] Decreased      Sleep:       [] Normal/Unchanged  [x] Fair       [] Poor              Energy:    [x] Normal/Unchanged  [] Increased  [] Decreased        SI [] Present  [x] Absent    HI  []Present  [x] Absent     Aggression:  [] yes  [x] no    Patient is [] able  [] unable to CONTRACT FOR SAFETY     PAST MEDICAL/PSYCHIATRIC HISTORY:   Past Medical History:   Diagnosis Date    Acute bacterial conjunctivitis of left eye 9/11/2016    Anticoagulated on Coumadin     per Dr. Brunilda Dietz Bipolar 1 disorder [facility])     goes to [address] Bipolar affect, depressed ([facility] [address]     Cellulitis of face 9/11/2016    Full dentures     Hx of blood clots     DVT, PE hx of    Hypertension     on propanolol / patient states does not have high BP    Impetigo 9/11/2016    Palpitations     Screening     for colonoscopy    Seizure disorder ([facility] [address]     last seizure 2010 / cause unknown       FAMILY/SOCIAL HISTORY:  Family History   Problem Relation Age of Onset    Diabetes Brother      Social History     Socioeconomic History    Marital status: Single     Spouse name: Not on file    Number of children: Not on file    Years of education: Not on file    Highest education level: Not on file   Occupational History    Not on file   Tobacco Use    Smoking status: Current Every Day Smoker     Packs/day: 1.00     Years: 44.00     Pack years: 44.00     Types: Cigarettes     Start date: 2/12/1975    Smokeless tobacco: Never Used   Substance and Sexual Activity    Alcohol use: Not Currently     Comment: occasional beer     Drug use: No    Sexual activity: Never   Other Topics Concern    Not on file   Social History Narrative    Not on file     Social Determinants of Health     Financial Resource Strain:     Difficulty of Paying Living Expenses:    Food Insecurity:     Worried About Running Out of Food in the Last Year:     [address] in the Last Year:    Transportation Needs:     Lack of Transportation (Medical):  Lack of Transportation (Non-Medical):    Physical Activity:     Days of Exercise per Week:     Minutes of Exercise per Session:    Stress:     Feeling of Stress :    Social Connections:     Frequency of Communication with Friends and Family:     Frequency of Social Gatherings with Friends and Family:     Attends [employer] Services:     Active Member of Clubs or Organizations:     Attends Club or Organization Meetings:     Marital Status:    Intimate Partner Violence:     Fear of Current or Ex-Partner:     Emotionally Abused:     Physically Abused:     Sexually Abused:            ROS:  [x] All negative/unchanged except if checked.  Explain positive(checked items) below:  [] Constitutional  [] Eyes  [] Ear/Nose/Mouth/Throat  [] Respiratory  [] CV  [] GI  []   [] Musculoskeletal  [] Skin/Breast  [] Neurological  [] Endocrine  [] Heme/Lymph  [] Allergic/Immunologic    Explanation:     MEDICATIONS:    Current Facility-Administered Medications:     acetaminophen (TYLENOL) tablet 650 mg, 650 mg, Oral, Q4H PRN, Dario Bernal MD    magnesium hydroxide (MILK OF MAGNESIA) 400 MG/5ML suspension 30 mL, 30 mL, Oral, Daily PRN, Dario Bernal MD, 30 mL at 05/15/21 1503    aluminum & magnesium hydroxide-simethicone (MAALOX) 200-200-20 MG/5ML suspension 30 mL, 30 mL, Oral, PRN, Pancho Peace MD    haloperidol lactate (HALDOL) injection 3 mg, 3 mg, Intramuscular, Q6H PRN **OR** haloperidol (HALDOL) tablet 3 mg, 3 mg, Oral, Q6H PRN, Pancho Peace MD    traZODone (DESYREL) tablet 25 mg, 25 mg, Oral, Nightly PRN, Pancho Peace MD, 25 mg at 05/13/21 2153    nicotine (NICODERM CQ) 14 MG/24HR 1 patch, 1 patch, Transdermal, Daily, Pancho Peace MD, 1 patch at 05/16/21 0956    benztropine (COGENTIN) tablet 1 mg, 1 mg, Oral, Daily, Elnoria Kehr, APRN - CNP, 1 mg at 05/16/21 1015    dabigatran (PRADAXA) capsule 150 mg, 150 mg, Oral, BID, Elnoria Kehr, APRN - CNP, 150 mg at 05/16/21 0956    divalproex (DEPAKOTE) DR tablet 500 mg, 500 mg, Oral, 2 times per day, Elnoria Kehr, APRJUAN FRANCISCO - CNP, 500 mg at 05/16/21 0957    latanoprost (XALATAN) 0.005 % ophthalmic solution 1 drop, 1 drop, Both Eyes, Nightly, Elnoria Kehr, APRN - CNP, 1 drop at 05/15/21 2059    pravastatin (PRAVACHOL) tablet 20 mg, 20 mg, Oral, Daily, Elnoria Kehr, APRN - CNP, 20 mg at 05/16/21 0956    propranolol (INDERAL) tablet 20 mg, 20 mg, Oral, BID, Elnoria Kehr, APRN - CNP, 20 mg at 05/16/21 9680    ipratropium (ATROVENT) 0.02 % nebulizer solution 0.5 mg, 0.5 mg, Nebulization, 4x Daily, Elnoria Kehr, APRN - CNP, 0.5 mg at 05/15/21 1710    vitamin D (CHOLECALCIFEROL) tablet 2,000 Units, 2,000 Units, Oral, Daily, Elnoria Kehr, APRN - CNP, 2,000 Units at 05/16/21 0956    lurasidone (LATUDA) tablet 40 mg, 40 mg, Oral, Daily, Elnoria Kehr, APRN - CNP, 40 mg at 05/16/21 0956      Examination:  BP (!) 146/72   Pulse 62   Temp 95.7 °F (35.4 °C) (Infrared)   Resp 16   Ht 5' 8\" (1.727 m)   Wt 193 lb 9.6 oz (87.8 kg)   SpO2 97%   BMI 29.44 kg/m²   Gait - steady  Medication side effects(SE): None reported  Mental Status Examination:    Level of consciousness:  within normal limits   Appearance:  fair grooming and fair and the patient demonstrated understanding. The patient was also educated that the outcome of treatment will depend on the medication compliance as directed by the prescribers along with regular follow-up, compliance with the labs and other work-up, as clinically indicated. Collateral information: Followed by social work   CD evaluation  Encourage patient to attend group and other milieu activities.   Discharge planning discussed with the patient and treatment team.    PSYCHOTHERAPY/COUNSELING:  [x] Therapeutic interview  [x] Supportive  [] CBT  [] Ongoing  [] Other    [x] Patient continues to need, on a daily basis, active treatment furnished directly by or requiring the supervision of inpatient psychiatric personnel      Anticipated Length of stay: 7 - 10 days based on stability            Electronically signed by Grecia Monahan MD on 5/16/2021 at 10:29 AM

## 2021-05-17 VITALS
SYSTOLIC BLOOD PRESSURE: 138 MMHG | WEIGHT: 193.6 LBS | HEART RATE: 65 BPM | OXYGEN SATURATION: 97 % | RESPIRATION RATE: 17 BRPM | DIASTOLIC BLOOD PRESSURE: 87 MMHG | TEMPERATURE: 97.5 F | HEIGHT: 68 IN | BODY MASS INDEX: 29.34 KG/M2

## 2021-05-17 PROCEDURE — 6370000000 HC RX 637 (ALT 250 FOR IP): Performed by: PSYCHIATRY & NEUROLOGY

## 2021-05-17 PROCEDURE — 99239 HOSP IP/OBS DSCHRG MGMT >30: CPT | Performed by: NURSE PRACTITIONER

## 2021-05-17 PROCEDURE — 6370000000 HC RX 637 (ALT 250 FOR IP): Performed by: NURSE PRACTITIONER

## 2021-05-17 RX ORDER — DOCUSATE SODIUM 100 MG/1
100 CAPSULE, LIQUID FILLED ORAL DAILY
Status: DISCONTINUED | OUTPATIENT
Start: 2021-05-17 | End: 2021-05-17 | Stop reason: HOSPADM

## 2021-05-17 RX ORDER — NICOTINE 21 MG/24HR
1 PATCH, TRANSDERMAL 24 HOURS TRANSDERMAL DAILY
Qty: 30 PATCH | Refills: 0 | Status: SHIPPED | OUTPATIENT
Start: 2021-05-17 | End: 2021-09-21 | Stop reason: DRUGHIGH

## 2021-05-17 RX ORDER — DIVALPROEX SODIUM 500 MG/1
500 TABLET, DELAYED RELEASE ORAL EVERY 12 HOURS SCHEDULED
Qty: 60 TABLET | Refills: 0 | Status: SHIPPED | OUTPATIENT
Start: 2021-05-17 | End: 2022-05-25

## 2021-05-17 RX ADMIN — DOCUSATE SODIUM 100 MG: 100 CAPSULE, LIQUID FILLED ORAL at 09:27

## 2021-05-17 RX ADMIN — DIVALPROEX SODIUM 500 MG: 250 TABLET, DELAYED RELEASE ORAL at 09:27

## 2021-05-17 RX ADMIN — BENZTROPINE MESYLATE 1 MG: 1 TABLET ORAL at 09:27

## 2021-05-17 RX ADMIN — LURASIDONE HYDROCHLORIDE 40 MG: 20 TABLET, FILM COATED ORAL at 09:27

## 2021-05-17 RX ADMIN — Medication 2000 UNITS: at 09:26

## 2021-05-17 RX ADMIN — PRAVASTATIN SODIUM 20 MG: 20 TABLET ORAL at 09:27

## 2021-05-17 RX ADMIN — DABIGATRAN ETEXILATE MESYLATE 150 MG: 150 CAPSULE ORAL at 09:27

## 2021-05-17 RX ADMIN — PROPRANOLOL HYDROCHLORIDE 20 MG: 20 TABLET ORAL at 09:27

## 2021-05-17 NOTE — PLAN OF CARE
Patient A+Ox 4, denies SI, HI, and hallucinations. Rates both anxiety and depression 2/10. Responds appropriately to conversation. Patient is worried about housing post discharge. Flat affect, quiet demeanor. Isolates to room but observed watching television out on the unit.  No behaviors observed  Problem: Altered Mood, Psychotic Behavior:  Goal: Able to demonstrate trust by eating, participating in treatment and following staff's direction  Description: Able to demonstrate trust by eating, participating in treatment and following staff's direction  5/16/2021 2212 by Singh Dennis RN  Outcome: Ongoing  5/16/2021 1549 by Luis Alfredo Harley RN  Outcome: Met This Shift     Problem: Altered Mood, Psychotic Behavior:  Goal: Able to verbalize decrease in frequency and intensity of hallucinations  Description: Able to verbalize decrease in frequency and intensity of hallucinations  Outcome: Ongoing     Problem: Altered Mood, Psychotic Behavior:  Goal: Absence of self-harm  Description: Absence of self-harm  5/16/2021 2212 by Singh Dennis RN  Outcome: Ongoing  5/16/2021 1549 by Luis Alfredo Harley RN  Outcome: Met This Shift

## 2021-05-17 NOTE — DISCHARGE SUMMARY
DISCHARGE SUMMARY      Patient ID:  Suellen Thomas  [id number]  61 y.o.  1955    Admit date: 5/10/2021    Discharge date and time: 5/17/2021    Admitting Physician: Jefferson Tilley MD     Discharge Physician: Dr Kalani Ledbetter MD    Discharge Diagnoses:   Patient Active Problem List   Diagnosis    [facility]co[facility]able state ([facility] [address]    Hx of psychosis    Altered mental status    Tobacco abuse    Bipolar affective disorder, mixed, severe, with psychotic behavior ([facility] [address]    Pulmonary embolus ([facility] [address]    Delirium    Impetigo    Acute bacterial conjunctivitis of left eye    Cellulitis of face    Lingual tonsil hypertrophy    Elevated hemoglobin A1c    Vitamin D insufficiency    Mixed hyperlipidemia    Pulmonary emphysema (HCC)    Acute psychosis ([facility] [address]    Suicidal ideation       Admission Condition: poor    Discharged Condition: stable    Admission Circumstance:   Patient pink slipped by OP psychiatric provider through CSN due to him disclosing suicidal ideation.     PAST MEDICAL/PSYCHIATRIC HISTORY:   Past Medical History:   Diagnosis Date    Acute bacterial conjunctivitis of left eye 9/11/2016    Anticoagulated on Coumadin     per Dr. Tovar Both Bipolar 1 disorder [facility])     goes to [address] Bipolar affect, depressed ([facility] [address]     Cellulitis of face 9/11/2016    Full dentures     Hx of blood clots     DVT, PE hx of    Hypertension     on propanolol / patient states does not have high BP    Impetigo 9/11/2016    Palpitations     Screening     for colonoscopy    Seizure disorder ([facility] [address]     last seizure 2010 / cause unknown       FAMILY/SOCIAL HISTORY:  Family History   Problem Relation Age of Onset    Diabetes Brother      Social History     Socioeconomic History    Marital status: Single     Spouse name: Not on file    Number of children: Not on file    Years of education: Not on file    Highest education level: Not on file   Occupational History    Not on file   Tobacco Use    Smoking status: traZODone (DESYREL) tablet 25 mg, 25 mg, Oral, Nightly PRN, Reed Vee MD, 25 mg at 05/16/21 2107    nicotine (NICODERM CQ) 14 MG/24HR 1 patch, 1 patch, Transdermal, Daily, Reed Vee MD, 1 patch at 05/16/21 0956    benztropine (COGENTIN) tablet 1 mg, 1 mg, Oral, Daily, Ronney Dance, APRN - CNP, 1 mg at 05/16/21 4094    dabigatran (PRADAXA) capsule 150 mg, 150 mg, Oral, BID, Ronney Dance, APRN - CNP, 150 mg at 05/16/21 2106    divalproex (DEPAKOTE) DR tablet 500 mg, 500 mg, Oral, 2 times per day, Ronney Dance, APRN - CNP, 500 mg at 05/16/21 2107    latanoprost (XALATAN) 0.005 % ophthalmic solution 1 drop, 1 drop, Both Eyes, Nightly, Ronney Dance, APRN - CNP, 1 drop at 05/16/21 2112    pravastatin (PRAVACHOL) tablet 20 mg, 20 mg, Oral, Daily, Ronney Dance, APRN - CNP, 20 mg at 05/16/21 4655    propranolol (INDERAL) tablet 20 mg, 20 mg, Oral, BID, Ronney Dance, APRN - CNP, 20 mg at 05/16/21 2110    ipratropium (ATROVENT) 0.02 % nebulizer solution 0.5 mg, 0.5 mg, Nebulization, 4x Daily, Ronney Dance, APRN - CNP, 0.5 mg at 05/16/21 1700    vitamin D (CHOLECALCIFEROL) tablet 2,000 Units, 2,000 Units, Oral, Daily, Ronney Dance, APRN - CNP, 2,000 Units at 05/16/21 0956    lurasidone (LATUDA) tablet 40 mg, 40 mg, Oral, Daily, Ronney Dance, APRN - CNP, 40 mg at 05/16/21 6021    Examination:  /87   Pulse 65   Temp 97.5 °F (36.4 °C) (Infrared)   Resp 17   Ht 5' 8\" (1.727 m)   Wt 193 lb 9.6 oz (87.8 kg)   SpO2 97%   BMI 29.44 kg/m²   Gait - steady    HOSPITAL COURSE[de-identified]  Following admission to the hospital, patient had a complete physical exam and blood work up, which he was medically cleared and admitted to [facility] for psychiatric evaluation and stabilization. The patient was monitored closely with suicide and appropriate precautions. He was started on Depakote 500 mg every 12 hours, Cogentin 1 mg twice daily, 2 to 40 mg daily.   Other medications continued as indicated   he was encouraged to participate in group and other milieu activity and started to feel better with this combination of treatment. There has been significant progress in the improvement of symptoms since admission. The patient has been an active participant in his treatment, and discharge planning. Patient was no longer suicidal, homicidal, manic or psychotic. He received the required treatment with medication, participated in group milieu, remained engaged in unit activities, learned appropriate coping skills. He was seen to be watching television socializing with peers using the phone. There were no mention or gestures of self-harm or harm to others. His mental status has returned to baseline. The treatment team believes the patient obtain maximum benefit out of this hospitalization and does not meet the criteria for inpatient hospitalization anymore. However he will continue to benefit from outpatient follow-up and treatment to maintain stability. Collateral information has been obtained and reconciled and there are no concerns about his safety. He has no access to guns or weapons. He appreciates the help that he received here. This patient no longer meets criteria for inpatient hospitalization. He was discharged home to his family in psychiatrically stable condition. Appetite:  [x] Normal  [] Increased  [] Decreased    Sleep:       [x] Normal  [] Fair       [] Poor            Energy:    [x] Normal  [] Increased  [] Decreased     SI [] Present  [x] Absent  HI  []Present  [x] Absent   Aggression:  [] yes  [] no  Patient is [x] able  [] unable to CONTRACT FOR SAFETY   Medication side effects(SE):  [x] None(Psych.  Meds.) [] Other      Mental Status Examination on discharge:    Level of consciousness:  within normal limits   Appearance:  well-appearing  Behavior/Motor:  no abnormalities noted  Attitude toward examiner:  attentive and good eye contact  Speech:  spontaneous, normal rate and normal volume Mood: euthymic  Affect:  mood congruent  Thought processes:  linear and goal directed   Thought content: The patient is devoid of suicidal or homicidal ideation intent or plan. Devoid of auditory or visual hallucinations or other perceptual disturbances, there are no overt or covert signs of psychosis or paranoia. There are no neurovegetative signs of depression. Cognition:  oriented to person, place, and time   Concentration intact  Memory intact  Insight good   Judgement fair   Fund of Knowledge adequate      ASSESSMENT:  Patient symptoms are:  [x] Well controlled  [x] Improving  [] Worsening  [] No change    Reason for more than one antipsychotic:  [x] N/A  [] 3 Failed Monotherapy attempts (Drugs tried:)  [] Crossover to a new antipsychotic  [] Taper to Monotherapy from Polypharmacy  [] Augmentation of clozapine therapy due to treatment resistance to single therapy    Diagnosis:  Principal Problem:    Bipolar affective disorder, mixed, severe, with psychotic behavior ([facility] [address]  Active Problems:    Suicidal ideation  Resolved Problems:    * No resolved hospital problems. *      LABS:    No results for input(s): WBC, HGB, PLT in the last 72 hours. No results for input(s): NA, K, CL, CO2, BUN, CREATININE, GLUCOSE in the last 72 hours. No results for input(s): BILITOT, ALKPHOS, AST, ALT in the last 72 hours. Lab Results   Component Value Date    LABAMPH NOT DETECTED 05/10/2021    BARBSCNU NOT DETECTED 05/10/2021    LABBENZ NOT DETECTED 05/10/2021    LABMETH NOT DETECTED 05/10/2021    OPIATESCREENURINE NOT DETECTED 05/10/2021    PHENCYCLIDINESCREENURINE NOT DETECTED 05/10/2021    ETOH <10 05/10/2021     Lab Results   Component Value Date    TSH 2.180 05/08/2020     No results found for: LITHIUM  Lab Results   Component Value Date    VALPROATE 55 05/10/2021       RISK ASSESSMENT AT DISCHARGE: Low risk for suicide and homicide.      Treatment Plan:  The patient's diagnosis, treatment plan, medication management were formulated after patient was seen directly by the attending physician and myself and all relevant documentation was reviewed. Reviewed current Medications with the patient. Education provided on the complaince with treatment. Risk, benefit, side effects, possible outcomes of the medication and alternatives discussed with the patient and the patient demonstrated understanding. The patient was also educated that the outcome of treatment will depend on the medication compliance as directed by the prescribers along with regular follow-up, compliance with the labs and other work-up, as clinically indicated. Encourage patient to attend outpatient follow up appointment and therapy, outpatient follow-up appointments have been scheduled prior to discharge. Patient was advised to call the outpatient provider, visit the nearest ED or call 911 if symptoms are not manageable. Patient's  was contacted prior to the discharge, and the patient has been discharged home in psychiatrically stable condition.          Medication List      CHANGE how you take these medications    lurasidone 40 MG Tabs tablet  Commonly known as: Latuda  Take 1 tablet by mouth daily  What changed: additional instructions        CONTINUE taking these medications    benztropine 1 MG tablet  Commonly known as: COGENTIN     dabigatran 150 MG capsule  Commonly known as: PRADAXA  Take 1 capsule by mouth 2 times daily     divalproex 500 MG DR tablet  Commonly known as: DEPAKOTE  Take 1 tablet by mouth every 12 hours     Invega Trinza 819 MG/2.625ML Gretta IM injection  Generic drug: paliperidone palmitate ER     latanoprost 0.005 % ophthalmic solution  Commonly known as: XALATAN  Place 1 drop into both eyes nightly     nicotine 14 MG/24HR  Commonly known as: NICODERM CQ  Place 1 patch onto the skin daily     pravastatin 20 MG tablet  Commonly known as: PRAVACHOL  Take 1 tablet by mouth daily     propranolol 20 MG tablet  Commonly known as: INDERAL     QC MINERAL OIL HEAVY PO     Spiriva HandiHaler 18 MCG inhalation capsule  Generic drug: tiotropium  Inhale 1 capsule into the lungs daily     trimethoprim-polymyxin b 55340-7.1 UNIT/ML-% ophthalmic solution  Commonly known as: POLYTRIM     Vitamin D3 50 MCG (2000 UT) Tabs  Take 2,000 Units by mouth daily     Vitamin-B Complex Tabs  Take 1 tablet by mouth daily        STOP taking these medications    mupirocin 2 % ointment  Commonly known as: BACTROBAN     PARoxetine 40 MG tablet  Commonly known as: PAXIL           Where to Get Your Medications      These medications were sent to [facility] [address]    Phone: [phone]   · divalproex 500 MG DR tablet  · lurasidone 40 MG Tabs tablet  · nicotine 14 MG/24HR           TIME SPEND - 35 MINUTES TO COMPLETE THE EVALUATION, DISCHARGE SUMMARY, MEDICATION RECONCILIATION AND FOLLOW UP CARE     Signed:  FLORESITA Felix CNP  5/17/2021  9:25 AM

## 2021-05-17 NOTE — PROGRESS NOTES
[facility]  Discharge Note    Pt discharged with followings belongings:   Dentures: None  Vision - Corrective Lenses: None  Hearing Aid: None  Jewelry: None  Body Piercings Removed: No  Clothing: Undergarments (Comment), Footwear, Pants, Shirt, Socks  Were All Patient Medications Collected?: No  Other Valuables: Wallet, Other (Comment) ($49, wallet, 2 checks. locked in safe)   Valuables sent home with patient. Valuables retrieved from safe,  and returned to patient. Patient education on aftercare instructions: both verbal and written   Patient verbalize understanding of AVS:  Verbalized understanding.     Status EXAM upon discharge:  Status and Exam  Normal: No  Facial Expression: Flat, Worried  Affect: Congruent, Appropriate  Level of Consciousness: Alert  Mood:Normal: No  Mood: Depressed, Anxious  Motor Activity:Normal: No  Motor Activity: Decreased  Interview Behavior: Cooperative  Preception: [place] to Person, Farrel Diogenes to Time, [place] to Place, [place] to Situation  Attention:Normal: No  Attention: Distractible  Thought Processes: Circumstantial  Thought Content:Normal: No  Thought Content: Preoccupations  Hallucinations: None  Delusions: No  Delusions: Grandeur  Memory:Normal: No  Memory: Poor Recent  Insight and Judgment: Yes  Insight and Judgment: Unmotivated  Present Suicidal Ideation: No  Present Homicidal Ideation: No      Metabolic Screening:    Lab Results   Component Value Date    LABA1C 6.2 (H) 05/11/2021       Lab Results   Component Value Date    CHOL 124 05/11/2021    CHOL 118 03/04/2021    CHOL 144 05/28/2019    CHOL 146 02/15/2018    CHOL 187 08/16/2017    CHOL 208 (H) 06/01/2017    CHOL 145 10/18/2016    CHOL 131 09/10/2016     Lab Results   Component Value Date    TRIG 165 (H) 05/11/2021    TRIG 245 03/04/2021    TRIG 152 (H) 05/28/2019    TRIG 198 (H) 02/15/2018    TRIG 310 08/16/2017    TRIG 367 (H) 06/01/2017    TRIG 188 (H) 10/18/2016    TRIG 179 (H) 09/10/2016     Lab Results Component Value Date    HDL 28 05/11/2021    HDL 25 (A) 03/04/2021    HDL 33 05/08/2020    HDL 32 05/28/2019    HDL 28 02/15/2018    HDL 28 (A) 08/16/2017    HDL 29 06/01/2017    HDL 34 10/18/2016    HDL 9 09/10/2016     No components found for: LDLCAL  Lab Results   Component Value Date    LABVLDL 33 05/11/2021    LABVLDL 30 05/08/2020    LABVLDL 30 05/28/2019    LABVLDL 40 02/15/2018    LABVLDL 73 06/01/2017    LABVLDL 38 10/18/2016    LABVLDL 36 09/10/2016     Patient discharged to the care of CSN staff for transport. Prescriptions transferred to [facility] pharmacy for per CSN protocols.     Paula Belle RN

## 2021-05-17 NOTE — GROUP NOTE
Group Therapy Note    Date: 5/16/2021    Group Start Time: 1945  Group End Time: 2200  Group Topic: Relaxation    [facility] [place], RN        Group Therapy Note    Attendees: 8/18       Signature:  Chad Lea RN

## 2021-06-23 ENCOUNTER — CARE COORDINATION (OUTPATIENT)
Dept: CARE COORDINATION | Age: 66
End: 2021-06-23

## 2021-06-23 NOTE — LETTER
[address] [facility] Daniel, RN        June 23, 2021    [address]  [address], [address], [place] [address]      Dear Verdene Baumgarten    My name is Joe Carlisle and I am a Ambulatory Care Manager who partners with Dr. Zenia Hernandez to improve patients' health. I've been trying to reach you via phone to let you know that, as a member of your care team, I will work with other providers involved in your care, offer education for your specific health conditions, and connect you with more resources as needed. This program is designed to provide you with the opportunity to have a [facility] partner with you for the following situations:     1) if you come home from the hospital or emergency room   2) to help manage your disease   3) when you would like assistance coordinating services or appointments    This added support is provided at no additional cost to you. My primary focus is to help you achieve specific goals and improve your health. Please call me at [phone] to further discuss how I can support your health care needs.     Sincerely,        Joe Carlisle, MSN, RN-BC

## 2021-06-25 ENCOUNTER — CARE COORDINATION (OUTPATIENT)
Dept: CARE COORDINATION | Age: 66
End: 2021-06-25

## 2021-06-25 NOTE — CARE COORDINATION
-2nd attempt to reach pt to discuss and offer care coordination program, however, phone number is disconnected/invalid/non-working  -No alternative numbers listed on pt's chart to try  -Pt does not have an updated HIPAA form to be able to try emergency contact  -No further outreaches will be made  -A CC intro letter was mailed out to pt.

## 2021-07-06 ENCOUNTER — TELEPHONE (OUTPATIENT)
Dept: FAMILY MEDICINE CLINIC | Age: 66
End: 2021-07-06

## 2021-07-06 DIAGNOSIS — R76.11 POSITIVE TUBERCULIN TEST: Primary | ICD-10-CM

## 2021-07-06 NOTE — TELEPHONE ENCOUNTER
Patient called and states that he tested positive last week for TB.   He is in need of a chest xray before he can go to his new living facility

## 2021-07-07 RX ORDER — CHOLECALCIFEROL (VITAMIN D3) 125 MCG
2000 CAPSULE ORAL DAILY
Qty: 30 TABLET | Refills: 3 | OUTPATIENT
Start: 2021-07-07

## 2021-07-07 NOTE — TELEPHONE ENCOUNTER
Refill request received from pharmacy for Vitamin D. Patient overdue for appointment since 5/10/21.  Refill refused & refaxed back to pharmacy

## 2021-07-08 NOTE — TELEPHONE ENCOUNTER
3rd attempt to reach patient unsuccessful.      Left message for Rasheeda Mercer listed as RN Case Manager asking if she could provide another number for patient or assist in contacting him somehow

## 2021-07-14 ENCOUNTER — HOSPITAL ENCOUNTER (OUTPATIENT)
Dept: GENERAL RADIOLOGY | Age: 66
Discharge: HOME | End: 2021-07-16
Payer: MEDICARE

## 2021-07-14 ENCOUNTER — HOSPITAL ENCOUNTER (OUTPATIENT)
Age: 66
Discharge: HOME | End: 2021-07-16
Payer: MEDICARE

## 2021-07-14 DIAGNOSIS — R76.11 POSITIVE TUBERCULIN TEST: ICD-10-CM

## 2021-07-14 PROCEDURE — 71046 X-RAY EXAM CHEST 2 VIEWS: CPT

## 2021-07-15 NOTE — TELEPHONE ENCOUNTER
Spoke with patient 7/14 and informed him XR was ordered.  Will have done at [facility]

## 2021-08-03 DIAGNOSIS — E78.2 MIXED HYPERLIPIDEMIA: ICD-10-CM

## 2021-08-03 RX ORDER — CHOLECALCIFEROL (VITAMIN D3) 125 MCG
2000 CAPSULE ORAL DAILY
Qty: 30 TABLET | Refills: 0 | Status: SHIPPED
Start: 2021-08-03 | End: 2021-09-21 | Stop reason: ALTCHOICE

## 2021-08-03 RX ORDER — VITAMIN B COMPLEX
1 TABLET ORAL DAILY
Qty: 30 TABLET | Refills: 0 | Status: SHIPPED
Start: 2021-08-03 | End: 2021-09-21 | Stop reason: ALTCHOICE

## 2021-08-03 RX ORDER — PRAVASTATIN SODIUM 20 MG
20 TABLET ORAL DAILY
Qty: 30 TABLET | Refills: 0 | Status: SHIPPED
Start: 2021-08-03 | End: 2022-05-25

## 2021-08-03 NOTE — TELEPHONE ENCOUNTER
Multiple refills requested by pharmacy. Patient overdue for appointment. Tried to reach him but # listed is inactive.  Will pend 30-day supply and send patient a letter asking that he call the office to make an appointment ASAP     Requested Prescriptions     Pending Prescriptions Disp Refills    Cholecalciferol (VITAMIN D3) 50 MCG (2000 UT) TABS 30 tablet 0     Sig: Take 2,000 Units by mouth daily    pravastatin (PRAVACHOL) 20 MG tablet 30 tablet 0     Sig: Take 1 tablet by mouth daily    B Complex Vitamins (VITAMIN-B COMPLEX) TABS 30 tablet 0     Sig: Take 1 tablet by mouth daily

## 2021-08-03 NOTE — LETTER
[address] [address]  Phone: [phone]  Fax: [phone]    Kalyan Coleman MD        August 3, 2021    [place] [id number]      Dear Felisha Butler:    We have been unable to reach you by phone regarding refill requests received from the pharmacy. Please contact our office to make an appointment at your earliest convenience to be sure you do not run out of medications.        Sincerely,        Kalyan Coleman MD

## 2021-08-10 DIAGNOSIS — D68.59 HYPERCOAGULABLE STATE (HCC): ICD-10-CM

## 2021-08-10 RX ORDER — DABIGATRAN ETEXILATE 150 MG/1
150 CAPSULE, COATED PELLETS ORAL 2 TIMES DAILY
Qty: 180 CAPSULE | Refills: 0 | Status: SHIPPED
Start: 2021-08-10 | End: 2022-03-07

## 2021-09-21 ENCOUNTER — OFFICE VISIT (OUTPATIENT)
Dept: FAMILY MEDICINE CLINIC | Age: 66
End: 2021-09-21
Payer: MEDICARE

## 2021-09-21 VITALS
HEIGHT: 68 IN | OXYGEN SATURATION: 98 % | RESPIRATION RATE: 14 BRPM | WEIGHT: 204 LBS | TEMPERATURE: 97.2 F | BODY MASS INDEX: 30.92 KG/M2 | DIASTOLIC BLOOD PRESSURE: 78 MMHG | SYSTOLIC BLOOD PRESSURE: 118 MMHG | HEART RATE: 55 BPM

## 2021-09-21 DIAGNOSIS — I26.99 OTHER PULMONARY EMBOLISM WITHOUT ACUTE COR PULMONALE, UNSPECIFIED CHRONICITY (HCC): ICD-10-CM

## 2021-09-21 DIAGNOSIS — E78.2 MIXED HYPERLIPIDEMIA: Primary | ICD-10-CM

## 2021-09-21 DIAGNOSIS — Z87.891 PERSONAL HISTORY OF TOBACCO USE: ICD-10-CM

## 2021-09-21 DIAGNOSIS — Z72.0 TOBACCO ABUSE: ICD-10-CM

## 2021-09-21 DIAGNOSIS — E55.9 VITAMIN D INSUFFICIENCY: ICD-10-CM

## 2021-09-21 DIAGNOSIS — D68.59 HYPERCOAGULABLE STATE (HCC): ICD-10-CM

## 2021-09-21 DIAGNOSIS — F31.64 BIPOLAR AFFECTIVE DISORDER, MIXED, SEVERE, WITH PSYCHOTIC BEHAVIOR (HCC): ICD-10-CM

## 2021-09-21 DIAGNOSIS — Z23 NEED FOR IMMUNIZATION AGAINST INFLUENZA: ICD-10-CM

## 2021-09-21 DIAGNOSIS — J43.9 PULMONARY EMPHYSEMA, UNSPECIFIED EMPHYSEMA TYPE (HCC): ICD-10-CM

## 2021-09-21 DIAGNOSIS — R73.09 ELEVATED HEMOGLOBIN A1C: ICD-10-CM

## 2021-09-21 PROCEDURE — 99214 OFFICE O/P EST MOD 30 MIN: CPT | Performed by: FAMILY MEDICINE

## 2021-09-21 PROCEDURE — G0008 ADMIN INFLUENZA VIRUS VAC: HCPCS | Performed by: FAMILY MEDICINE

## 2021-09-21 PROCEDURE — 90674 CCIIV4 VAC NO PRSV 0.5 ML IM: CPT | Performed by: FAMILY MEDICINE

## 2021-09-21 PROCEDURE — 3023F SPIROM DOC REV: CPT | Performed by: FAMILY MEDICINE

## 2021-09-21 PROCEDURE — G8926 SPIRO NO PERF OR DOC: HCPCS | Performed by: FAMILY MEDICINE

## 2021-09-21 PROCEDURE — G8427 DOCREV CUR MEDS BY ELIG CLIN: HCPCS | Performed by: FAMILY MEDICINE

## 2021-09-21 PROCEDURE — G0296 VISIT TO DETERM LDCT ELIG: HCPCS | Performed by: FAMILY MEDICINE

## 2021-09-21 PROCEDURE — 3017F COLORECTAL CA SCREEN DOC REV: CPT | Performed by: FAMILY MEDICINE

## 2021-09-21 PROCEDURE — G8417 CALC BMI ABV UP PARAM F/U: HCPCS | Performed by: FAMILY MEDICINE

## 2021-09-21 PROCEDURE — 4004F PT TOBACCO SCREEN RCVD TLK: CPT | Performed by: FAMILY MEDICINE

## 2021-09-21 RX ORDER — PALIPERIDONE PALMITATE 234 MG/1.5ML
INJECTION INTRAMUSCULAR
COMMUNITY
Start: 2021-09-03 | End: 2022-05-25

## 2021-09-21 RX ORDER — NICOTINE 21 MG/24HR
1 PATCH, TRANSDERMAL 24 HOURS TRANSDERMAL DAILY
Qty: 30 PATCH | Refills: 1 | Status: SHIPPED | OUTPATIENT
Start: 2021-09-21 | End: 2021-11-20

## 2021-09-21 RX ORDER — ERYTHROMYCIN 5 MG/G
OINTMENT OPHTHALMIC
COMMUNITY
Start: 2021-09-08 | End: 2021-09-22

## 2021-09-21 NOTE — PROGRESS NOTES
Subjective:  61 y.o. y/o male is here for follow up chronic medical issues. Reviewed most recent labs from May    Living at [facility] home  Will be getting own apartment at [facility], 10/19/21    Psych: Dr. Wilson Distance monthly, Bipolar affective d/o, doing OK, Invega/? Paxil/Cogentin/Depakote/latuda, no med changes  Recurrent PE, +hypercoagulable, clots around 15 years ago in leg and lungs, switched to pradaxa d/t erratic INR and risk of bleeding vs clot, doing well  Vitamin D insufficiency, taking supplement, level last at 23  Abnormal swelling near epiglottis on CT chest, panendoscopy done with ENT, biopsies negative for cancer, smoking cessation recommended, reviewed OV 3/20, +continued presence on repeat CT chest    Bullous emphysema and subpleural honeycombing on low-dose CT, 44 pack-year history, no wheezing; spiriva daily, no albuterol   Quit smoking, 9/13/20, cold turkey--smoking now 1ppd, wants to try patches again  Due for repeat chest CT for screening  Glaucoma, using eye drops, +mild cataract    Hyperlipidemia: Pravastatin 20mg daily, taking/tolerating  The ASCVD Risk score (Jose G Graham, et al., 2013) failed to calculate for the following reasons: The valid total cholesterol range is 130 to 320 mg/dL   Total 124, LDL 63    Elevated Hgb A1C 6.2, diet-controlled  COVID vaccine done    Patient's past medical, surgical, social and/or family history reviewed, updated in chart, and are non-contributory (unless otherwise stated). Medications and allergies also reviewed and updated in chart.         Review of Systems:  Constitutional:  No fever, no fatigue, no chills, no headaches, no weight change  Dermatology:  No rash, no mole, no dry or sensitive skin  ENT:  + cough, no sore throat, no sinus pain, no runny nose, no ear pain  Cardiology:  No chest pain, +occ palpitations, no leg edema, no PND  Gastroenterology:  No dysphagia, no abdominal pain, no nausea, no vomiting, no constipation, no diarrhea, no heartburn  Musculoskeletal:  No joint pain, no leg cramps, + back pain, no muscle aches  Neuro:  No dizziness, no numbness/tingling  Respiratory:  + shortness of breath, no orthopnea, no wheezing, + MELENDEZ with stairs  Urology:  No blood in the urine, no urinary frequency, no urinary urgency, + nocturia, no dysuria    Vitals:    09/21/21 1053   BP: 118/78   Site: Left Upper Arm   Position: Sitting   Cuff Size: Medium Adult   Pulse: 55   Resp: 14   Temp: 97.2 °F (36.2 °C)   TempSrc: Temporal   SpO2: 98%   Weight: 204 lb (92.5 kg)   Height: 5' 8\" (1.727 m)     Body mass index is 31.02 kg/m². General:  Patient alert and oriented x 3, NAD, pleasant  HEENT:  Atraumatic, normocephalic, pupils equal and normal, EOMI, clear conjunctiva, +mask  Neck:  Supple, no goiter, no carotid bruits, no LAD  Lungs: no resp distress, decreased air movement diffusely, scant wheezing  Heart:  RRR, no murmurs, gallops or rubs  Abdomen:  Soft/nt/nd, + bowel sounds  Extremities:  No clubbing, cyanosis or edema; +small tremor, gait normal  Skin: unremarkable    Assessment/Plan:    Angela Taylor was seen today for hyperlipidemia, nicotine dependence and copd. Diagnoses and all orders for this visit:    Mixed hyperlipidemia, OK for now  -     CBC Auto Differential; Future  -     Comprehensive Metabolic Panel; Future  -     Lipid, Fasting; Future  -     TSH without Reflex; Future  + Continue current medication(s) along with dietary and lifestyle modifications.  + May adjust based on next lab, goal should be pravastatin 40mg    Pulmonary emphysema, unspecified emphysema type ([address] controlled  Continue Spiriva    Other pulmonary embolism without acute cor pulmonale, unspecified chronicity ([address]  Hypercoagulable state ([address]  Continue pradaxa    Personal history of tobacco use  -     VA VISIT TO DISCUSS LUNG CA SCREEN W LDCT  -     CT Lung Screen (Annual);  Future    Bipolar affective disorder, mixed, severe, with psychotic behavior ([address] stable  Continue same and continue with psych    Tobacco abuse  -     nicotine (NICODERM CQ) 21 MG/24HR; Place 1 patch onto the skin daily  + Goal to decrease dosing every 1-2 months    Elevated hemoglobin A1c, diet-controlled  -     Hemoglobin A1C; Future  + Dietary and lifestyle modifications    Vitamin D insufficiency, recheck with next lab, stop supplement for now  -     Vitamin D 25 Hydroxy; Future    Need for immunization against influenza  -     INFLUENZA, MDCK QUADV, 2 YRS AND OLDER, IM, PF, PREFILL SYR OR SDV, 0.5ML (FLUCELVAX QUADV, PF)            As above. Call or go to ED immediately if symptoms worsen or persist.  Return in about 4 months (around 1/21/2022) for AWV. with above fasting lab prior, or sooner if necessary. Educational materials and/or home exercises printed for patient's review and were included in patient instructions on his/her After Visit Summary and given to patient at the end of visit. Counseled regarding above diagnosis, including possible risks and complications,  especially if left uncontrolled. Counseled regarding the possible side effects, risks, benefits and alternatives to treatment; patient and/or guardian verbalizes understanding, agrees, feels comfortable with and wishes to proceed with above treatment plan. Advised patient to call with any new medication issues, and read all Rx info from pharmacy to assure aware of all possible risks and side effects of medication before taking. Reviewed age and gender appropriate health screening exams and vaccinations. Advised patient regarding importance of keeping up with recommended health maintenance and to schedule as soon as possible if overdue, as this is important in assessing for undiagnosed pathology, especially cancer, as well as protecting against potentially harmful/life threatening disease.         Patient and/or guardian verbalizes understanding and agrees with above counseling, assessment and plan.    All questions answered. Low Dose CT (LDCT) Lung Screening criteria met   Age 50-69   Pack year smoking >30   Still smoking or less than 15 year since quit   No sign or symptoms of lung cancer   > 11 months since last LDCT     Risks and benefits of lung cancer screening with LDCT scans discussed:    Significance of positive screen - False-positive LDCT results often occur. 95% of all positive results do not lead to a diagnosis of cancer. Usually further imaging can resolve most false-positive results; however, some patients may require invasive procedures. Over diagnosis risk - 10% to 12% of screen-detected lung cancer cases are over diagnosed--that is, the cancer would not have been detected in the patient's lifetime without the screening. Need for follow up screens annually to continue lung cancer screening effectiveness     Risks associated with radiation from annual LDCT- Radiation exposure is about the same as for a mammogram, which is about 1/3 of the annual background radiation exposure from everyday life. Starting screening at age 54 is not likely to increase cancer risk from radiation exposure. Patients with comorbidities resulting in life expectancy of < 10 years, or that would preclude treatment of an abnormality identified on CT, should not be screened due to lack of benefit.     To obtain maximal benefit from this screening, smoking cessation and long-term abstinence from smoking is critical

## 2021-09-30 DIAGNOSIS — J43.9 PULMONARY EMPHYSEMA, UNSPECIFIED EMPHYSEMA TYPE (HCC): ICD-10-CM

## 2021-09-30 RX ORDER — TIOTROPIUM BROMIDE 18 UG/1
18 CAPSULE ORAL; RESPIRATORY (INHALATION) DAILY
Qty: 90 CAPSULE | Refills: 1 | Status: SHIPPED
Start: 2021-09-30 | End: 2022-04-04 | Stop reason: SDUPTHER

## 2021-10-29 ENCOUNTER — TELEPHONE (OUTPATIENT)
Dept: CASE MANAGEMENT | Age: 66
End: 2021-10-29

## 2021-11-09 ENCOUNTER — CARE COORDINATION (OUTPATIENT)
Dept: CARE COORDINATION | Age: 66
End: 2021-11-09

## 2021-11-09 NOTE — LETTER
11/9/2021    [place] [address]        Dear Pierce Vazquez    My name is Berenice Soulier, RN and I am a Ambulatory Care Manager who partners with Dr Lora Bravo to improve patients' health. I've been trying to reach you via phone to let you know that, as a member of your care team, I will work with other providers involved in your care, offer education for your specific health conditions, and connect you with more resources as needed. This program is designed to provide you with the opportunity to have a [facility] partner with you for the following situations:     1) if you come home from the hospital or emergency room   2) to help manage your disease   3) when you would like assistance coordinating services or appointments    This added support is provided at no additional cost to you. My primary focus is to help you achieve specific goals and improve your health. Please call me at [phone] to further discuss how I can support your health care needs.     Sincerely,    Berenice Soulier, BSN, RN, [facility]

## 2021-11-16 ENCOUNTER — CARE COORDINATION (OUTPATIENT)
Dept: CARE COORDINATION | Age: 66
End: 2021-11-16

## 2021-11-26 ENCOUNTER — ANTI-COAG VISIT (OUTPATIENT)
Dept: FAMILY MEDICINE CLINIC | Age: 66
End: 2021-11-26

## 2021-12-14 ENCOUNTER — CARE COORDINATION (OUTPATIENT)
Dept: CARE COORDINATION | Age: 66
End: 2021-12-14

## 2021-12-14 NOTE — CARE COORDINATION
3rd attempt, Called patient to discuss Care Coordination Program. I left a voice mail message introducing myself and a brief description of the Care Coordination Program.   Requested a call back to discuss the program, enrollment, and schedule a time to speak with me. Direct contact information given. If no call back, no further outreaches will be made at this time, pt will be placed in 90 day temporary exclusion.

## 2022-03-07 DIAGNOSIS — D68.59 HYPERCOAGULABLE STATE (HCC): ICD-10-CM

## 2022-03-07 RX ORDER — ATENOLOL 100 MG/1
TABLET ORAL
Qty: 60 CAPSULE | Refills: 0 | Status: SHIPPED
Start: 2022-03-07 | End: 2022-10-14

## 2022-03-08 NOTE — TELEPHONE ENCOUNTER
No showed 1-24-22 - unable to reach patient; phone says \"the number you are trying to reach is unable to accept calls at this time\" Will send patient a letter

## 2022-04-04 DIAGNOSIS — J43.9 PULMONARY EMPHYSEMA, UNSPECIFIED EMPHYSEMA TYPE (HCC): ICD-10-CM

## 2022-04-04 RX ORDER — TIOTROPIUM BROMIDE 18 UG/1
18 CAPSULE ORAL; RESPIRATORY (INHALATION) DAILY
Qty: 90 CAPSULE | Refills: 0 | Status: SHIPPED
Start: 2022-04-04 | End: 2022-05-25

## 2022-04-04 NOTE — TELEPHONE ENCOUNTER
Requested Prescriptions     Pending Prescriptions Disp Refills    tiotropium (SPIRIVA HANDIHALER) 18 MCG inhalation capsule 90 capsule 0     Sig: Inhale 1 capsule into the lungs daily     (Appt made for 5/9/2022)

## 2022-04-14 ENCOUNTER — TELEPHONE (OUTPATIENT)
Dept: FAMILY MEDICINE CLINIC | Age: 67
End: 2022-04-14

## 2022-04-14 NOTE — LETTER
[facility]  [address] [facility]  [address] [place] [id number]  Dept: [address], [place]        4/14/2022    [address]      Dear Adolfo Canas: This letter is a reminder that you may have diagnostic testing that has not been completed (see attached). It is important to your well-being that these test(s) are performed prior to your schedule appointment 5/9/2022 with Dr. Megan Andrade. Please call our office at Dept: [phone] for additional information on the outstanding tests or let us know if they have been completed so we may update your chart.     Sincerely,        [facility]

## 2022-04-14 NOTE — TELEPHONE ENCOUNTER
Called patient to schedule labs to be done prior to office visit 5/9/2022; home phone listed invalid/disconnected (?).  Will mail letter & orders to home address asking patient to call the office to schedule if needed

## 2022-04-15 ENCOUNTER — TELEPHONE (OUTPATIENT)
Dept: CASE MANAGEMENT | Age: 67
End: 2022-04-15

## 2022-04-15 NOTE — TELEPHONE ENCOUNTER
I called the patient and he confirmed his CT lung screening at [facility] on 4/18/2022 at 10:00 am.  I reminded the patient to arrive at 9:30 am, enter through the main entrance, and register. Patient confirmed.         Electronically signed by Uma Garcia on 4/15/22 at 3:03 PM EDT

## 2022-05-25 ENCOUNTER — HOSPITAL ENCOUNTER (INPATIENT)
Age: 67
LOS: 10 days | Discharge: HOME | DRG: 885 | End: 2022-06-02
Attending: STUDENT IN AN ORGANIZED HEALTH CARE EDUCATION/TRAINING PROGRAM | Admitting: PSYCHIATRY & NEUROLOGY
Payer: MEDICARE

## 2022-05-25 DIAGNOSIS — F32.A DEPRESSION WITH SUICIDAL IDEATION: Primary | ICD-10-CM

## 2022-05-25 DIAGNOSIS — R45.851 DEPRESSION WITH SUICIDAL IDEATION: Primary | ICD-10-CM

## 2022-05-25 LAB
ACETAMINOPHEN LEVEL: <5 MCG/ML (ref 10–30)
ALBUMIN SERPL-MCNC: 4 G/DL (ref 3.5–5.2)
ALP BLD-CCNC: 71 U/L (ref 40–129)
ALT SERPL-CCNC: 16 U/L (ref 0–40)
AMPHETAMINE SCREEN, URINE: NOT DETECTED
ANION GAP SERPL CALCULATED.3IONS-SCNC: 10 MMOL/L (ref 7–16)
AST SERPL-CCNC: 19 U/L (ref 0–39)
BARBITURATE SCREEN URINE: NOT DETECTED
BASOPHILS ABSOLUTE: 0.07 E9/L (ref 0–0.2)
BASOPHILS RELATIVE PERCENT: 0.6 % (ref 0–2)
BENZODIAZEPINE SCREEN, URINE: NOT DETECTED
BILIRUB SERPL-MCNC: 0.3 MG/DL (ref 0–1.2)
BILIRUBIN URINE: NEGATIVE
BLOOD, URINE: NEGATIVE
BUN BLDV-MCNC: 11 MG/DL (ref 6–23)
CALCIUM SERPL-MCNC: 9.1 MG/DL (ref 8.6–10.2)
CANNABINOID SCREEN URINE: NOT DETECTED
CHLORIDE BLD-SCNC: 103 MMOL/L (ref 98–107)
CLARITY: CLEAR
CO2: 22 MMOL/L (ref 22–29)
COCAINE METABOLITE SCREEN URINE: NOT DETECTED
COLOR: YELLOW
CREAT SERPL-MCNC: 0.8 MG/DL (ref 0.7–1.2)
EKG ATRIAL RATE: 48 BPM
EKG P AXIS: 28 DEGREES
EKG P-R INTERVAL: 170 MS
EKG Q-T INTERVAL: 432 MS
EKG QRS DURATION: 98 MS
EKG QTC CALCULATION (BAZETT): 385 MS
EKG R AXIS: 44 DEGREES
EKG T AXIS: 49 DEGREES
EKG VENTRICULAR RATE: 48 BPM
EOSINOPHILS ABSOLUTE: 0.12 E9/L (ref 0.05–0.5)
EOSINOPHILS RELATIVE PERCENT: 1.1 % (ref 0–6)
ETHANOL: <10 MG/DL (ref 0–0.08)
FENTANYL SCREEN, URINE: NOT DETECTED
GFR AFRICAN AMERICAN: >60
GFR NON-AFRICAN AMERICAN: >60 ML/MIN/1.73
GLUCOSE BLD-MCNC: 98 MG/DL (ref 74–99)
GLUCOSE URINE: NEGATIVE MG/DL
HCT VFR BLD CALC: 44.8 % (ref 37–54)
HEMOGLOBIN: 14.5 G/DL (ref 12.5–16.5)
IMMATURE GRANULOCYTES #: 0.06 E9/L
IMMATURE GRANULOCYTES %: 0.5 % (ref 0–5)
INFLUENZA A: NOT DETECTED
INFLUENZA B: NOT DETECTED
KETONES, URINE: NEGATIVE MG/DL
LEUKOCYTE ESTERASE, URINE: NEGATIVE
LYMPHOCYTES ABSOLUTE: 2.34 E9/L (ref 1.5–4)
LYMPHOCYTES RELATIVE PERCENT: 21.2 % (ref 20–42)
Lab: NORMAL
MCH RBC QN AUTO: 30.8 PG (ref 26–35)
MCHC RBC AUTO-ENTMCNC: 32.4 % (ref 32–34.5)
MCV RBC AUTO: 95.1 FL (ref 80–99.9)
METHADONE SCREEN, URINE: NOT DETECTED
MONOCYTES ABSOLUTE: 1.08 E9/L (ref 0.1–0.95)
MONOCYTES RELATIVE PERCENT: 9.8 % (ref 2–12)
NEUTROPHILS ABSOLUTE: 7.36 E9/L (ref 1.8–7.3)
NEUTROPHILS RELATIVE PERCENT: 66.8 % (ref 43–80)
NITRITE, URINE: NEGATIVE
OPIATE SCREEN URINE: NOT DETECTED
OXYCODONE URINE: NOT DETECTED
PDW BLD-RTO: 13.1 FL (ref 11.5–15)
PH UA: 6 (ref 5–9)
PHENCYCLIDINE SCREEN URINE: NOT DETECTED
PLATELET # BLD: 256 E9/L (ref 130–450)
PMV BLD AUTO: 9.3 FL (ref 7–12)
POTASSIUM REFLEX MAGNESIUM: 4 MMOL/L (ref 3.5–5)
PROTEIN UA: NEGATIVE MG/DL
RBC # BLD: 4.71 E12/L (ref 3.8–5.8)
SALICYLATE, SERUM: <0.3 MG/DL (ref 0–30)
SARS-COV-2 RNA, RT PCR: NOT DETECTED
SODIUM BLD-SCNC: 135 MMOL/L (ref 132–146)
SPECIFIC GRAVITY UA: 1.01 (ref 1–1.03)
TOTAL PROTEIN: 7.4 G/DL (ref 6.4–8.3)
TRICYCLIC ANTIDEPRESSANTS SCREEN SERUM: NEGATIVE NG/ML
UROBILINOGEN, URINE: 0.2 E.U./DL
WBC # BLD: 11 E9/L (ref 4.5–11.5)

## 2022-05-25 PROCEDURE — 87636 SARSCOV2 & INF A&B AMP PRB: CPT

## 2022-05-25 PROCEDURE — 80143 DRUG ASSAY ACETAMINOPHEN: CPT

## 2022-05-25 PROCEDURE — 82077 ASSAY SPEC XCP UR&BREATH IA: CPT

## 2022-05-25 PROCEDURE — 93005 ELECTROCARDIOGRAM TRACING: CPT | Performed by: STUDENT IN AN ORGANIZED HEALTH CARE EDUCATION/TRAINING PROGRAM

## 2022-05-25 PROCEDURE — 80179 DRUG ASSAY SALICYLATE: CPT

## 2022-05-25 PROCEDURE — 80053 COMPREHEN METABOLIC PANEL: CPT

## 2022-05-25 PROCEDURE — 85025 COMPLETE CBC W/AUTO DIFF WBC: CPT

## 2022-05-25 PROCEDURE — 99284 EMERGENCY DEPT VISIT MOD MDM: CPT

## 2022-05-25 PROCEDURE — 6370000000 HC RX 637 (ALT 250 FOR IP): Performed by: PSYCHIATRY & NEUROLOGY

## 2022-05-25 PROCEDURE — 80307 DRUG TEST PRSMV CHEM ANLYZR: CPT

## 2022-05-25 PROCEDURE — 1240000000 HC EMOTIONAL WELLNESS R&B

## 2022-05-25 PROCEDURE — 81003 URINALYSIS AUTO W/O SCOPE: CPT

## 2022-05-25 RX ORDER — HALOPERIDOL 5 MG/ML
3 INJECTION INTRAMUSCULAR EVERY 6 HOURS PRN
Status: DISCONTINUED | OUTPATIENT
Start: 2022-05-25 | End: 2022-06-02 | Stop reason: HOSPADM

## 2022-05-25 RX ORDER — LOPERAMIDE HYDROCHLORIDE 2 MG/1
2 CAPSULE ORAL 4 TIMES DAILY PRN
Status: ON HOLD | COMMUNITY
End: 2022-06-02 | Stop reason: HOSPADM

## 2022-05-25 RX ORDER — NICOTINE 21 MG/24HR
1 PATCH, TRANSDERMAL 24 HOURS TRANSDERMAL DAILY
Status: DISCONTINUED | OUTPATIENT
Start: 2022-05-25 | End: 2022-05-30

## 2022-05-25 RX ORDER — ERYTHROMYCIN 5 MG/G
OINTMENT OPHTHALMIC NIGHTLY
Status: ON HOLD | COMMUNITY
End: 2022-06-02 | Stop reason: HOSPADM

## 2022-05-25 RX ORDER — HYDROXYZINE HYDROCHLORIDE 10 MG/1
50 TABLET, FILM COATED ORAL 3 TIMES DAILY PRN
Status: DISCONTINUED | OUTPATIENT
Start: 2022-05-25 | End: 2022-06-02 | Stop reason: HOSPADM

## 2022-05-25 RX ORDER — MAGNESIUM HYDROXIDE/ALUMINUM HYDROXICE/SIMETHICONE 120; 1200; 1200 MG/30ML; MG/30ML; MG/30ML
30 SUSPENSION ORAL PRN
Status: DISCONTINUED | OUTPATIENT
Start: 2022-05-25 | End: 2022-06-02 | Stop reason: HOSPADM

## 2022-05-25 RX ORDER — DIPHENHYDRAMINE HCL 25 MG
25 CAPSULE ORAL 2 TIMES DAILY PRN
Status: ON HOLD | COMMUNITY
End: 2022-06-02 | Stop reason: HOSPADM

## 2022-05-25 RX ORDER — GUAIFENESIN 100 MG/5ML
100 SYRUP ORAL EVERY 4 HOURS PRN
Status: ON HOLD | COMMUNITY
End: 2022-06-02 | Stop reason: HOSPADM

## 2022-05-25 RX ORDER — GUAIFENESIN AND DEXTROMETHORPHAN HYDROBROMIDE 400; 20 MG/1; MG/1
1 TABLET ORAL 2 TIMES DAILY
Status: ON HOLD | COMMUNITY
End: 2022-06-02 | Stop reason: HOSPADM

## 2022-05-25 RX ORDER — IBUPROFEN 200 MG
1 TABLET ORAL
Status: ON HOLD | COMMUNITY
End: 2022-06-02 | Stop reason: HOSPADM

## 2022-05-25 RX ORDER — ACETAMINOPHEN 325 MG/1
650 TABLET ORAL EVERY 6 HOURS PRN
Status: ON HOLD | COMMUNITY
End: 2022-06-02 | Stop reason: HOSPADM

## 2022-05-25 RX ORDER — IBUPROFEN 200 MG
1 CAPSULE ORAL
COMMUNITY

## 2022-05-25 RX ORDER — IBUPROFEN 400 MG/1
400 TABLET ORAL EVERY 6 HOURS PRN
COMMUNITY

## 2022-05-25 RX ORDER — DIPHENHYDRAMINE HCL 25 MG
CAPSULE ORAL
Status: ON HOLD | COMMUNITY
End: 2022-06-02 | Stop reason: HOSPADM

## 2022-05-25 RX ORDER — ACETAMINOPHEN 325 MG/1
650 TABLET ORAL EVERY 4 HOURS PRN
Status: DISCONTINUED | OUTPATIENT
Start: 2022-05-25 | End: 2022-06-02 | Stop reason: HOSPADM

## 2022-05-25 RX ORDER — AMOXICILLIN 250 MG
1 CAPSULE ORAL DAILY PRN
COMMUNITY

## 2022-05-25 RX ORDER — GUAIFEN/PHENYLEPH/ACETAMINOPHN 200-5-325
2 TABLET ORAL
Status: ON HOLD | COMMUNITY
End: 2022-06-02 | Stop reason: HOSPADM

## 2022-05-25 RX ORDER — PALIPERIDONE 6 MG/1
6 TABLET, EXTENDED RELEASE ORAL EVERY MORNING
Status: ON HOLD | COMMUNITY
End: 2022-06-02 | Stop reason: HOSPADM

## 2022-05-25 RX ORDER — LANOLIN ALCOHOL/MO/W.PET/CERES
3 CREAM (GRAM) TOPICAL NIGHTLY
Status: DISCONTINUED | OUTPATIENT
Start: 2022-05-25 | End: 2022-05-31

## 2022-05-25 RX ORDER — HALOPERIDOL 2 MG/1
3 TABLET ORAL EVERY 6 HOURS PRN
Status: DISCONTINUED | OUTPATIENT
Start: 2022-05-25 | End: 2022-06-02 | Stop reason: HOSPADM

## 2022-05-25 RX ORDER — SERTRALINE HYDROCHLORIDE 100 MG/1
100 TABLET, FILM COATED ORAL DAILY
Status: ON HOLD | COMMUNITY
End: 2022-06-02 | Stop reason: HOSPADM

## 2022-05-25 RX ORDER — ACETAMINOPHEN 500 MG
500 TABLET ORAL EVERY 4 HOURS PRN
COMMUNITY

## 2022-05-25 RX ADMIN — Medication 3 MG: at 22:19

## 2022-05-25 ASSESSMENT — ENCOUNTER SYMPTOMS
VOMITING: 0
SINUS PAIN: 0
SINUS PRESSURE: 0
DIARRHEA: 0
EYE REDNESS: 0
ABDOMINAL PAIN: 0
COUGH: 0
SORE THROAT: 0
BACK PAIN: 0
CHEST TIGHTNESS: 0
EYE PAIN: 0
NAUSEA: 0
CONSTIPATION: 0
SHORTNESS OF BREATH: 0

## 2022-05-25 ASSESSMENT — LIFESTYLE VARIABLES
HOW MANY STANDARD DRINKS CONTAINING ALCOHOL DO YOU HAVE ON A TYPICAL DAY: 1 OR 2
HOW OFTEN DO YOU HAVE A DRINK CONTAINING ALCOHOL: MONTHLY OR LESS
HOW OFTEN DO YOU HAVE A DRINK CONTAINING ALCOHOL: MONTHLY OR LESS
HOW MANY STANDARD DRINKS CONTAINING ALCOHOL DO YOU HAVE ON A TYPICAL DAY: 1 OR 2

## 2022-05-25 ASSESSMENT — SLEEP AND FATIGUE QUESTIONNAIRES
DO YOU HAVE DIFFICULTY SLEEPING: YES
SLEEP PATTERN: DISTURBED/INTERRUPTED SLEEP
AVERAGE NUMBER OF SLEEP HOURS: 6
DO YOU USE A SLEEP AID: NO

## 2022-05-25 ASSESSMENT — PAIN SCALES - GENERAL: PAINLEVEL_OUTOF10: 0

## 2022-05-25 ASSESSMENT — PATIENT HEALTH QUESTIONNAIRE - PHQ9: SUM OF ALL RESPONSES TO PHQ QUESTIONS 1-9: 13

## 2022-05-25 ASSESSMENT — PAIN - FUNCTIONAL ASSESSMENT: PAIN_FUNCTIONAL_ASSESSMENT: NONE - DENIES PAIN

## 2022-05-25 NOTE — ED NOTES
[facility] AN AFFILIATE OF [facility] NURSE WILL REVIEW FOR 7W     Andre Pete  05/25/22 8660

## 2022-05-25 NOTE — ED NOTES
Patient is medically cleared for social work disposition.      Shane Gudino DO  Resident  05/25/22 3544

## 2022-05-25 NOTE — ED TRIAGE NOTES
Patient feeling depressed with thoughts of self harm without a plan  Believes it is a sin to act on thoughts.

## 2022-05-25 NOTE — ED NOTES
Behavioral Health Crisis Assessment    Chief Complaint:  \"I don't want to take my meds\". Mental Status Exam:  Flat affect, hopeless, helpless, mood appears to be sad and depressed, has fair insight and judgement, admits to passive suicidal thoughts, no intent and no plan, shared fair eye contact, clear low toned speech, well mannered, pleasant, behavior controlled. Calm and cooperative. Legal Status  [] Voluntary:  [x] Involuntary, Issued by:    Gender  [x] Male [] Female [] Transgender  [] Other    Sexual Orientation    [x] Heterosexual [] Homosexual [] Bisexual [] Other    Brief Clinical Summary:  Per EMS, pt coming from [place] [address] with mental decline. Pt's behavior and personal hygiene lacking and struggling with sense of well being. Pt is pink slipped by Padmini at [facility], indicating that pt is a [facility] client, treats with Dr. Chuck Navarrete. Pt has been slowly decompensating for the past several weeks as evidenced by increased paranoia, decreased cognitive functioning, confusion, odd behaviors that he cannot explain, lack of personal hygiene, choosing not to take meds, hypersomnia and social withdrawal.    Pt is a 74year old male who has been residing at [facility] since 7/23/21. Pt reports that he decided to stop taking his medications because he feels as if the medications do not help him. Pt is non compliant with daily meds, but said that he takes periodically. Pt admits to recent suicidal thoughts with no plan and no intent. Pt explained that she has thoughts about killing himself, then immediately prays to God and asks for forgiveness, even for just having thoughts, \"it's a sin to even think about\". Pt denies ever attempting suicidal in the past, denies homicidal ideations and no hallucinations. Pt admits that his demeanor is off, stated \"I don't know what's wrong? \"          Collateral Information: none (info on pink slip)    Risk Factors:  Mental Health Diagnosis  Limited family/friend support  Lack of housing  Lack of essential needs  Lack of self-care  Several inpatient psychiatric admissions  Off prescribed Psych meds  Gender risk (male over 61)  Isolation    Protective Factors: Outpatient provider  Spiritual Beliefs  Goals & Hope for the future  Safe and stable housing  Has access to essential needs  Steady income  Active in the community    Suicidal Ideations:   [x] Reports:    [] Past [] Present   [] Denies    Suicide Attempts:  [] Reports:   [x] Denies    C-SSRS Screening Completed by RN: Current Suicide Risk:  [] None  [x] Low [] Moderate [] High    Homicidal Ideations  [] Reports:   [] Past [] Present   [x] Denies     Self Injurious/Self Mutilation Behaviors:   [] Reports:    [] Past [] Present   [x] Denies    Hallucinations/Delusions   [] Reports:   [x] Denies     Substance Use/Alcohol Use/Addiction:   [] Reports:   [x] Denies   [x] SBIRT Screen Complete. Current or Past Substance Abuse Treatment  [] Yes, When and Where:  [] No    Current or Past Mental Health Treatment:  [] Yes, When and Where:  [x] No    Legal Issues:  []  Yes (Specify)  [x]  No    Access to Weapons:  []  Yes (Specify)  [x]  No    Trauma History  [] Reports:  [x] Denies     Living Situation:  [address]    Employment: disabled    Education Level:  Graduated high school    Violence Risk Screenin. Have you ever thought about hurting someone? [x]  No  []  Yes (Ask the questions listed below)   When?  Did you follow through with the thoughts? [] No     [] Yes- When and what happened? 2.  Have you ever threatened anyone? [x]  No  []  Yes (Ask the questions listed below)   When and what happened?  Have you ever threatened someone with a gun, knife or other weapon? []  No  []  Yes - When and what happened? 2. Have you ever had an order of protection taken out against you? []  Yes [x]  No  3. Have you ever been arrested due to violence? []  Yes [x]  No  4. Have you ever been cruel to animals?  [] Yes [x]  No    After consideration of C-SSRS screening results, C-SSRS assessments, and this professional's assessment the patient's overall suicide risk assessed to be:  [x] None   [x] Low   [] Moderate   [] High     [x] Discussed current suicide risk, protective and risk factors with RN and ED Physician     Disposition   [] Home:   [] Outpatient Provider:   [] Crisis Unit:   [x] Inpatient Psychiatric Unit:  [] Other:                     NALINI Thomas  05/25/22 1101

## 2022-05-25 NOTE — PROGRESS NOTES
Patient attended afternoon meet and greet. Updated on evening staffing and expectations. Participated in afternoon trivia.

## 2022-05-25 NOTE — ED NOTES
Nurse to Nurse report to Justin Cervantes on [address] [place] [address]  05/25/22 2209

## 2022-05-25 NOTE — PROGRESS NOTES
[facility]  Admission Note     Admission Type:   Admission Type: Involuntary    Reason for admission: \"I feel depressed and don't know why. I have been having suicidal thoughts but no intent or plan on acting on them. \"       PATIENT STRENGTHS: Independent with ADLs, Alert and oriented X 4, Support of friends and family       Patient Strengths and Limitations:       Addictive Behavior:   Addictive Behavior  In the Past 3 Months, Have You Felt or Has Someone Told You That You Have a Problem With  : None    Medical Problems:   Past Medical History:   Diagnosis Date    Acute bacterial conjunctivitis of left eye 9/11/2016    Anticoagulated on Coumadin     per Dr. Primo Nation 1 disorder [facility])     goes to [address] [place] Bipolar affect, depressed ([facility]     Cellulitis of face 9/11/2016    Full dentures     Hx of blood clots     DVT, PE hx of    Hypertension     on propanolol / patient states does not have high BP    Impetigo 9/11/2016    Palpitations     Screening     for colonoscopy    Seizure disorder ([facility]     last seizure 2010 / cause unknown       Status EXAM:  Mental Status and Behavioral Exam  Normal: No  Level of Assistance: Independent/Self  Facial Expression: Flat  Affect: Blunt  Level of Consciousness: Alert  Frequency of Checks: 4 times per hour, close  Mood:Normal: No  Mood: Depressed,Anxious,Despair  Motor Activity:Normal: No  Motor Activity: Decreased  Eye Contact: Fair  Observed Behavior: Cooperative  Sexual Misconduct History: Current - no  Preception: Katy to person,Katy to time,Katy to place,Katy to situation  Attention:Normal: Yes  Thought Processes: Circumstantial  Thought Content:Normal: Yes  Depression Symptoms: Increased irritability,Feelings of helplessness,Feelings of hopelessess,Change in energy level  Anxiety Symptoms: Generalized  Mary Jane Symptoms: No problems reported or observed.   Hallucinations: None  Delusions: No  Memory:Normal: Yes  Insight and Judgment: No  Insight and Judgment: Poor insight    Tobacco Screening:  Practical Counseling, on admission, marcelino X, if applicable and completed (first 3 are required if patient doesn't refuse): ( X Recognizing danger situations (included triggers and roadblocks)                    (X )  Coping skills (new ways to manage stress, exercise, relaxation techniques, changing routine, distraction)                                                           (X )  Basic information about quitting (benefits of quitting, techniques in how to quit, available resources  ( ) Referral for counseling faxed to [facility]                                           ( ) Patient refused counseling  ( ) Patient has not smoked in the last 30 days    Metabolic Screening:    Lab Results   Component Value Date    LABA1C 6.2 (H) 05/11/2021       Lab Results   Component Value Date    CHOL 124 05/11/2021    CHOL 118 03/04/2021    CHOL 144 05/28/2019    CHOL 146 02/15/2018    CHOL 187 08/16/2017    CHOL 208 (H) 06/01/2017    CHOL 145 10/18/2016    CHOL 131 09/10/2016     Lab Results   Component Value Date    TRIG 165 (H) 05/11/2021    TRIG 245 03/04/2021    TRIG 152 (H) 05/28/2019    TRIG 198 (H) 02/15/2018    TRIG 310 08/16/2017    TRIG 367 (H) 06/01/2017    TRIG 188 (H) 10/18/2016    TRIG 179 (H) 09/10/2016     Lab Results   Component Value Date    HDL 28 05/11/2021    HDL 25 (A) 03/04/2021    HDL 33 05/08/2020    HDL 32 05/28/2019    HDL 28 02/15/2018    HDL 28 (A) 08/16/2017    HDL 29 06/01/2017    HDL 34 10/18/2016    HDL 9 09/10/2016     No components found for: LDLCAL  Lab Results   Component Value Date    LABVLDL 33 05/11/2021    LABVLDL 30 05/08/2020    LABVLDL 30 05/28/2019    LABVLDL 40 02/15/2018    LABVLDL 73 06/01/2017    LABVLDL 38 10/18/2016    LABVLDL 36 09/10/2016         Body mass index is 30.41 kg/m².     BP Readings from Last 2 Encounters:   05/25/22 (!) 144/83   09/21/21 118/78           Pt admitted with followings belongings:  Vision - Corrective Lenses: Eyeglasses  Jewelry: Necklace  Clothing: Footwear,Jacket/Coat,Pants,Shirt  Other Valuables: Other (Comment) (loose change)     Patient's home medications were none. Patient oriented to surroundings and program expectations and copy of patient rights given. Received admission packet:  yes. Consents reviewed, signed yes. Refused no. Patient verbalize understanding:  yes.   Patient education on precautions: yes                   Rodolfo Hughes RN

## 2022-05-25 NOTE — PROGRESS NOTES
Pt admitted today with suicidal thoughts but no intent to act on them. No plan. Pt does endorse feeling very depress and does not know why because has many friends and family who support him. He has been taking his medications randomly. He has a flat affect and is blunted. Pt denies Hi and AVH. He is alert and oriented X 4. He was cooperative with admission assessment. Will continue to monitor and provide support.

## 2022-05-25 NOTE — PROGRESS NOTES
Verified pt's active medications with [facility]:    Benztropine 2mg po QHS  Invega 6mg po daily   Pradaxa 150mg po BID  Propranolol 20 mg po TID  Sertraline 100mg po daily  Spiriva 18mcg one puff daily

## 2022-05-26 PROCEDURE — 6370000000 HC RX 637 (ALT 250 FOR IP): Performed by: NURSE PRACTITIONER

## 2022-05-26 PROCEDURE — 6370000000 HC RX 637 (ALT 250 FOR IP): Performed by: PSYCHIATRY & NEUROLOGY

## 2022-05-26 PROCEDURE — 1240000000 HC EMOTIONAL WELLNESS R&B

## 2022-05-26 PROCEDURE — 90792 PSYCH DIAG EVAL W/MED SRVCS: CPT | Performed by: NURSE PRACTITIONER

## 2022-05-26 RX ORDER — CALCIUM CARBONATE 200(500)MG
1000 TABLET,CHEWABLE ORAL
Status: DISCONTINUED | OUTPATIENT
Start: 2022-05-26 | End: 2022-06-02 | Stop reason: HOSPADM

## 2022-05-26 RX ORDER — PALIPERIDONE 3 MG/1
3 TABLET, EXTENDED RELEASE ORAL DAILY
Status: DISCONTINUED | OUTPATIENT
Start: 2022-05-26 | End: 2022-05-29

## 2022-05-26 RX ORDER — BENZTROPINE MESYLATE 2 MG/1
2 TABLET ORAL NIGHTLY
Status: DISCONTINUED | OUTPATIENT
Start: 2022-05-26 | End: 2022-05-31

## 2022-05-26 RX ORDER — PROPRANOLOL HYDROCHLORIDE 20 MG/1
20 TABLET ORAL 3 TIMES DAILY
Status: DISCONTINUED | OUTPATIENT
Start: 2022-05-26 | End: 2022-06-02 | Stop reason: HOSPADM

## 2022-05-26 RX ORDER — LATANOPROST 50 UG/ML
1 SOLUTION/ DROPS OPHTHALMIC NIGHTLY
Status: DISCONTINUED | OUTPATIENT
Start: 2022-05-26 | End: 2022-06-02 | Stop reason: HOSPADM

## 2022-05-26 RX ORDER — DIVALPROEX SODIUM 250 MG/1
250 TABLET, DELAYED RELEASE ORAL EVERY 12 HOURS SCHEDULED
Status: DISCONTINUED | OUTPATIENT
Start: 2022-05-26 | End: 2022-05-29

## 2022-05-26 RX ADMIN — Medication 3 MG: at 20:57

## 2022-05-26 RX ADMIN — PROPRANOLOL HYDROCHLORIDE 20 MG: 20 TABLET ORAL at 17:03

## 2022-05-26 RX ADMIN — PROPRANOLOL HYDROCHLORIDE 20 MG: 20 TABLET ORAL at 20:58

## 2022-05-26 RX ADMIN — DIVALPROEX SODIUM 250 MG: 250 TABLET, DELAYED RELEASE ORAL at 20:57

## 2022-05-26 RX ADMIN — PALIPERIDONE 3 MG: 3 TABLET, EXTENDED RELEASE ORAL at 14:23

## 2022-05-26 RX ADMIN — BENZTROPINE MESYLATE 2 MG: 2 TABLET ORAL at 20:57

## 2022-05-26 ASSESSMENT — SLEEP AND FATIGUE QUESTIONNAIRES
DO YOU USE A SLEEP AID: NO
AVERAGE NUMBER OF SLEEP HOURS: 6
SLEEP PATTERN: DISTURBED/INTERRUPTED SLEEP;NIGHTMARES/TERRORS
DO YOU HAVE DIFFICULTY SLEEPING: YES

## 2022-05-26 ASSESSMENT — LIFESTYLE VARIABLES
HOW OFTEN DO YOU HAVE A DRINK CONTAINING ALCOHOL: MONTHLY OR LESS
HOW MANY STANDARD DRINKS CONTAINING ALCOHOL DO YOU HAVE ON A TYPICAL DAY: 1 OR 2

## 2022-05-26 ASSESSMENT — PAIN SCALES - GENERAL: PAINLEVEL_OUTOF10: 0

## 2022-05-26 ASSESSMENT — PATIENT HEALTH QUESTIONNAIRE - PHQ9: SUM OF ALL RESPONSES TO PHQ QUESTIONS 1-9: 9

## 2022-05-26 NOTE — H&P
Department of Psychiatry  History and Physical - Adult     CHIEF COMPLAINT:  \"I've just been really depressed\"     Patient was seen after discussing with the treatment team and reviewing the chart    CIRCUMSTANCES OF ADMISSION:   Per EMS, pt coming from [place] [address] with mental decline. Pt's behavior and personal hygiene lacking and struggling with sense of well being. Pt is pink slipped by Padmini at [facility], indicating that pt is a CSN client, treats with Dr. Oscar Spence. Pt has been slowly decompensating for the past several weeks as evidenced by increased paranoia, decreased cognitive functioning, confusion, odd behaviors that he cannot explain, lack of personal hygiene, choosing not to take meds, hypersomnia and social withdrawal    HISTORY OF PRESENT ILLNESS:      The patient is a 74 y.o. male with significant past history of multiple past inpatient psychiatric hospitalizations, is managed by Dr Oscar Spence on OP through CSN and is currently residing at [address] [place] [address]. Per EMS, pt coming from [place] [address] with mental decline. Pt's behavior and personal hygiene lacking and struggling with sense of well being. Pt is pink slipped by Padmini at [facility], indicating that pt is a CSN client, treats with Dr. Oscar Spence. Pt has been slowly decompensating for the past several weeks as evidenced by increased paranoia, decreased cognitive functioning, confusion, odd behaviors that he cannot explain, lack of personal hygiene, choosing not to take meds, hypersomnia and social withdrawal. He was medically cleared in the ED, UDS in ED negative, EToH negative. QTc 385. He was admitted to [facility] for psychiatric evaluation and stabilization. Upon assessment today the patient does not offer much conversation, he is extremely depressed, flat affect. He denies all psychotic symptoms, except paranoia, however states he is not experiencing any paranoia at this time.  He is significantly minimizing the circumstances of his hospitalization. He is significantly decompensated, depressed. He presents unkempt and disheveled. Appears preoccupied, and seems to be thought blocking on assessment, he has a slowed thought process, and there is a latency of response. He apparently has not been taking his medications or caring for himself. He is agreeable to medication adjustments and treatment. Past psychiatric History  Patient has been diagnosed with Bipolar 1 disorder and schizophrenia. Patient is currently prescribed Depakote, propranolol and paxel, which says he is compliant with. Patient is seen at [facility] for psychiatric care. He states he has been admitted 6-7 times into inpatient psych care at numerous institutions for episodes of psychosis and suicidal ideation.     Patient presented to the crisis unit at [facility] on 4/18/2021 as instructed by his  CSN , Christopher Zhou. He was feeling extremely paranoid at the time.     Family Psychiatric History  Patient states his father had depression and saw a counselor for alcoholl and valium abuse. He denies any family history of suicidal ideation/attempt.     Substance abuse History  Patient states he has smoked 1.5 packs of cigarettes daily since he was 18years old. Patient drinks 1-2 times a year.      Legal History  Patient was arrested and charged with criminal trespassing 4 years ago. He broke into his girlfriends house.     Personal, family and social History  Patient was born and raised by his two parents in [place]. He grew up with his older brother and describes his family relationships as good. He states his brother passed away in march of 2020. His parents passed away as well and he currently lives in a home in [place]. Patient does not work and is on disability. He graduated [employer] Patients current support system are his friends, specifically Tati Nelson and his cousin Luis Enrique Crow.  Patient denies ownership of or access to firearms           Past Medical History:        Diagnosis Date    Acute bacterial conjunctivitis of left eye 9/11/2016    Anticoagulated on Coumadin     per Dr. Nena Lagos Bipolar 1 disorder [facility])     goes to [address] Bipolar affect, depressed ([facility]     Cellulitis of face 9/11/2016    Full dentures     Hx of blood clots     DVT, PE hx of    Hypertension     on propanolol / patient states does not have high BP    Impetigo 9/11/2016    Palpitations     Screening     for colonoscopy    Seizure disorder ([facility]     last seizure 2010 / cause unknown       Medications Prior to Admission:   Medications Prior to Admission: paliperidone (INVEGA) 6 MG extended release tablet, Take 6 mg by mouth every morning  sertraline (ZOLOFT) 100 MG tablet, Take 100 mg by mouth daily  dextromethorphan-guaiFENesin  MG TABS, Take 1 tablet by mouth 2 times daily  DM-Phenylephrine-Acetaminophen (VICKS DAYQUIL COLD & FLU) 10-5-325 MG/15ML LIQD, Take by mouth  acetaminophen (TYLENOL) 500 MG tablet, Take 500 mg by mouth every 4 hours as needed for Pain  erythromycin (ROMYCIN) 5 MG/GM ophthalmic ointment, nightly  loperamide (IMODIUM) 2 MG capsule, Take 2 mg by mouth 4 times daily as needed for Diarrhea  acetaminophen (TYLENOL) 325 mg tablet, Take 650 mg by mouth every 6 hours as needed for Pain  diphenhydrAMINE (BENADRYL) 25 MG capsule, Take 25 mg by mouth 2 times daily as needed for Itching  calcium carbonate (OYSTER SHELL CALCIUM 500 MG) 1250 (500 Ca) MG tablet, Take 1 tablet by mouth every 2 hours as needed for Heartburn (do no exceed  tabs in 24 hours)  menthol (COUGH DROPS) 5.8 MG LOZG lozenge, Take 1 lozenge by mouth every 2 hours as needed  ibuprofen (ADVIL;MOTRIN) 400 MG tablet, Take 400 mg by mouth every 6 hours as needed for Pain  magnesium hydroxide (MILK OF MAGNESIA) 400 MG/5ML suspension, Take by mouth daily as needed for Constipation  bismuth subsalicylate (PEPTO BISMOL) 262 MG/15ML suspension, Take 15 mLs by mouth every 2 hours as needed for Indigestion  guaiFENesin (ROBITUSSIN) 100 MG/5ML syrup, Take 100 mg by mouth every 4 hours as needed for Cough  senna-docusate (PERICOLACE) 8.6-50 MG per tablet, Take 1 tablet by mouth daily as needed for Constipation  phenylephrine-APAP-guaiFENesin (TYLENOL SINUS SEVERE) 5-325-200 MG TABS, Take 2 tablets by mouth  PRADAXA 150 MG capsule, TAKE 1 CAPSULE BY MOUTH TWICE A DAY. (DISPENSE IN ORIGINAL CONTAINER)  nicotine (NICODERM CQ) 21 MG/24HR, Place 1 patch onto the skin daily  latanoprost (XALATAN) 0.005 % ophthalmic solution, Place 1 drop into both eyes nightly  propranolol (INDERAL) 20 MG tablet, Take 20 mg by mouth 3 times daily   benztropine (COGENTIN) 1 MG tablet, Take 2 mg by mouth nightly     Past Surgical History:        Procedure Laterality Date    APPENDECTOMY      COLONOSCOPY  4/11/16    LARYNGOSCOPY N/A 2/18/2020    DIRECT PAN ENDOSCOPY WITH BIOPSY BILATERAL BASE OF TONGUE performed by Kiera Hernandez DO at [facility] OR       Allergies:   Patient has no known allergies. Family History  Family History   Problem Relation Age of Onset    Diabetes Brother              EXAMINATION:    REVIEW OF SYSTEMS:    ROS:  [x] All negative/unchanged except if checked.  Explain positive(checked items) below:  [] Constitutional  [] Eyes  [] Ear/Nose/Mouth/Throat  [] Respiratory  [] CV  [] GI  []   [] Musculoskeletal  [] Skin/Breast  [] Neurological  [] Endocrine  [] Heme/Lymph  [] Allergic/Immunologic    Explanation:     Vitals:  /75   Pulse 75   Temp 98.8 °F (37.1 °C) (Temporal)   Resp 16   Ht 5' 8\" (1.727 m)   Wt 200 lb (90.7 kg)   SpO2 96%   BMI 30.41 kg/m²      Physical Examination:   Head: x  Atraumatic: x normocephalic  Skin and Mucosa        Moist x  Dry   Pale  x Normal   Neck:  Thyroid  Palpable   x  Not palpable   venus distention   adenopathy   Chest: x Clear   Rhonchi     Wheezing   CV:  xS1   xS2    xNo murmer   Abdomen:  x  Soft    Tender    Viceromegaly   Extremities:  x No Edema Edema     Cranial Nerves Examination:   CN II:   xPupils are reactive to light  Pupils are non reactive to light  CN III, IV, VI:  xNo eye deviation    No diplopia or ptosis   CN V:    xFacial Sensation is intact     Facial Sensation is not intact   CN IIIV:   x Hearing is normal to rubbing fingers   CN IX, X:     xNormal gag reflex and phonation   CN XI:   xShoulder shrug and neck rotation is normal  CNXII:    xTongue is midline no deviation or atrophy    Mental Status Examination:    Level of consciousness:  within normal limits and awake   Appearance:  hospital attire, lying in bed, fair grooming and good hygiene  Behavior/Motor:  involuntary movements, patient has a noticeable tremor  Attitude toward examiner:  cooperative, attentive, good eye contact, evasive and guarded  Speech:  normal rate and normal volume   Mood: \"Tired\"  Affect:  Patient yawning, frowning and with blunted affect.  Mood congruent  Thought processes:  linear, coherent and thought blocking   Thought content:  Homocidal ideation denied  Suicidal Ideation:  denies suicidal ideation  Delusions:  no evidence of delusions  Cognition:  oriented to person, place, and time   Concentration intact  Memory intact  Insight poor   Judgement poor   Fund of Knowledge adequate    DIAGNOSIS:  Bipolar affective disorder, mixed, severe, with psychotic behavior ([facility]          LABS: REVIEWED TODAY:  Recent Labs     05/25/22  1029   WBC 11.0   HGB 14.5        Recent Labs     05/25/22  1029      K 4.0      CO2 22   BUN 11   CREATININE 0.8   GLUCOSE 98     Recent Labs     05/25/22  1029   BILITOT 0.3   ALKPHOS 71   AST 19   ALT 16     Lab Results   Component Value Date    LABAMPH NOT DETECTED 05/25/2022    BARBSCNU NOT DETECTED 05/25/2022    LABBENZ NOT DETECTED 05/25/2022    LABMETH NOT DETECTED 05/25/2022    OPIATESCREENURINE NOT DETECTED 05/25/2022    PHENCYCLIDINESCREENURINE NOT DETECTED 05/25/2022    ETOH <10 05/25/2022     Lab Results Component Value Date    TSH 2.180 05/08/2020     No results found for: LITHIUM  Lab Results   Component Value Date    VALPROATE 55 05/10/2021     Lab Results   Component Value Date    VALPROATE 55 05/10/2021         Radiology No results found. TREATMENT PLAN:  The patient's diagnosis, treatment plan, medication management were formulated after patient was seen directly by the attending physician and myself and all relevant documentation was reviewed. Risk, benefit, side effects, possible outcomes of the medication and alternatives discussed with the patient and the patient demonstrated understanding. The patient was also educated that the outcome of treatment will depend on the medication compliance as directed by the prescribers along with regular follow-up, compliance with the labs and other work-up, as clinically indicated. Risk Management: Based on the diagnosis and assessment biopsychosocial treatment model was presented to the patient and was given the opportunity to ask any question. The patient was agreeable to the plan and all the patient's questions were answered to the patient's satisfaction. I discussed with the patient the risk, benefit, alternative and common side effects for the proposed medication treatment. The patient is consenting to this treatment. This patient's risk factors have been mitigated as he has been admitted to inpatient behavioral health in emotionally supportive environment with every 15 minute safety checks. He has risk factors including chronic mental health diagnosis limited friend and family support resides in a group home setting, and has lack of some essential needs. He has protective factors including good rapport with an outpatient provider spiritual believes he is optimistic and future focused, he currently has stable housing at the group home and is steady and calm.       Collateral Information:  Will obtain collateral information from the family or friends. Will obtain medical records as appropriate from out patient providers  Will consult the hospitalist for a physical exam to rule out any co-morbid physical condition. Home medication Reconciled   Reviewed and continued as clinically indicated    New Medications started during this admission :    Begin Invega 3 mg daily will optimize as clinically indicated  Depakote 250 mg twice daily will optimize as clinically indicated  Cogentin 1 mg twice daily  Valproate level    Prn Haldol 5mg and Vistaril 50mg q6hr for extreme agitation. Trazodone as ordered for insomnia  Vistaril as ordered for anxiety      Psychotherapy:   Encourage participation in milieu and group therapy  Individual therapy as needed    NOTE: This report was transcribed using voice recognition software. Every effort was made to ensure accuracy; however, inadvertent computerized transcription errors may be present. Behavioral Services  Medicare Certification Upon Admission    I certify that this patient's inpatient psychiatric hospital admission is medically necessary for:    [x] (1) Treatment which could reasonably be expected to improve this patient's condition,       [x] (2) Or for diagnostic study;     AND     [x](2) The inpatient psychiatric services are provided while the individual is under the care of a physician and are included in the individualized plan of care.     Estimated length of stay/service 5 - 7 days based on stability     Plan for post-hospital care follow with OP provider        Electronically signed by FLORESITA Greer CNP on 5/26/2022 at 8:09 AM

## 2022-05-26 NOTE — PLAN OF CARE
Patient has been isolative to room this shift. Affect flat, blunt, sad. Evasive and withdrawn during conversation. Denies SI/HI/AVH. Patient rates depression 2/10 and anxiety 4/10. Patient reports he has been \"mixed up lately,\" and has had recent memory problems. Patient compliant with medications and in control of behaviors. Will continue to monitor and provide support. Problem: Depression  Goal: Will be euthymic at discharge  Description: INTERVENTIONS:  1. Administer medication as ordered  2. Provide emotional support via 1:1 interaction with staff  3. Encourage involvement in milieu/groups/activities  4. Monitor for social isolation  Outcome: Progressing     Problem: Behavior  Goal: Pt/Family maintain appropriate behavior and adhere to behavioral management agreement, if implemented  Description: INTERVENTIONS:  1. Assess patient/family's coping skills and  non-compliant behavior (including use of illegal substances)  2. Notify security of behavior or suspected illegal substances which indicate the need for search of the patient and/or belongings  3. Encourage verbalization of thoughts and concerns in a socially appropriate manner  4. Utilize positive, consistent limit setting strategies supporting safety of patient, staff and others  5. Encourage participation in the decision making process about the behavioral management agreement  6. Implement a Health Care Agreement if patient meets criteria  7. If a patient's behavior jeopardizes the safety of the patient, staff, or others refer to organization policy. If a visitor's behavior poses a threat to safety call refer to organization policy. 8. Initiate consult with , Psychosocial CNS, Spiritual Care as appropriate  Outcome: Progressing     Problem: Anxiety  Goal: Will report anxiety at manageable levels  Description: INTERVENTIONS:  1. Administer medication as ordered  2. Teach and rehearse alternative coping skills  3.  Provide emotional support with 1:1 interaction with staff  Outcome: Progressing

## 2022-05-26 NOTE — PLAN OF CARE
Problem: Self Harm/Suicidality  Goal: Will have no self-injury during hospital stay  Description: INTERVENTIONS:  1. Q 30 MINUTES: Routine safety checks  2. Q SHIFT & PRN: Assess risk to determine if routine checks are adequate to maintain patient safety  Outcome: Progressing     Problem: Depression  Goal: Will be euthymic at discharge  Description: INTERVENTIONS:  1. Administer medication as ordered  2. Provide emotional support via 1:1 interaction with staff  3. Encourage involvement in milieu/groups/activities  4. Monitor for social isolation  Outcome: Progressing     Problem: Psychosis  Goal: Will report no hallucinations or delusions  Description: INTERVENTIONS:  1. Administer medication as  ordered  2. Assist with reality testing to support increasing orientation  3. Assess if patient's hallucinations or delusions are encouraging self harm or harm to others and intervene as appropriate  Outcome: Progressing     Problem: Behavior  Goal: Pt/Family maintain appropriate behavior and adhere to behavioral management agreement, if implemented  Description: INTERVENTIONS:  1. Assess patient/family's coping skills and  non-compliant behavior (including use of illegal substances)  2. Notify security of behavior or suspected illegal substances which indicate the need for search of the patient and/or belongings  3. Encourage verbalization of thoughts and concerns in a socially appropriate manner  4. Utilize positive, consistent limit setting strategies supporting safety of patient, staff and others  5. Encourage participation in the decision making process about the behavioral management agreement  6. Implement a Health Care Agreement if patient meets criteria  7. If a patient's behavior jeopardizes the safety of the patient, staff, or others refer to organization policy. If a visitor's behavior poses a threat to safety call refer to organization policy.   8. Initiate consult with , Psychosocial CNS, Spiritual Care as appropriate  Outcome: Progressing  Flowsheets (Taken 5/26/2022 0800)  Patient/family maintains appropriate behavior and adheres to behavioral management agreement, if implemented: Assess patient/familys coping skills and  non-compliant behavior (including use of illegal substances)

## 2022-05-26 NOTE — CARE COORDINATION
Biopsychosocial Assessment Note    Social work met with patient to complete the biopsychosocial assessment and C-SSRS. Chief Complaint:Pt reported that he is here because he was brought to the hospital by an ambulance. Pt stated that he wasn't doing anything at his group home other than \"drinking coffee, smoking cigarettes, and doing chores. \" Per JOCELINE SW note pt's chief complaint is \"I don't want to take my meds\". Mental Status Exam: pt is alert and oriented x4. Pt's mood is depressed, anxious and sad, pt's affect is flat and blunt. Pt's insight and judgement is poor. Pt's recent memory is fair. Pt is calm and cooperative, but pt is withdrawn during assessment. Pt's eye contact is fair, speech is clear, rate and volume is normal. Pt's thought process is circumstantial, thought content is logical. Pt denied current SI, denied any HI, AVH. Clinical Summary: Pt reported that he is currently living a the [facility] and he wants to live in an apartment. Pt reported that his main support system is his cousin and his friend. Pt stated that he has no problems with his living situation but he would like to live on his own. Pt stated that he has access to his basic needs in the group home. Pt stated that he is currently on SSDI and is not getting any other government assistance. Pt stated that he does not have any problems with his living environment but he just wants to live independently. Pt reported that the group home has been helping him apply to get into apartments. Pt reported that he does not have any medical concerns but he has had seizures in the past. Pt reported that he is able to physically care for himself and has no problems with his ADLs. Pt denied having any barriers that would prevent him from getting to his appointments, then pt stated that he walks CSN and he has never drove before. Pt stated that he is currently single, has no children, and was raised by his biological parents.  Pt denied having any problems when he was a child and stated that his parents were supportive of him. Pt stated that his parents are  and he had one brother who is also . Pt stated that he has had many different mental health hospitalizations and he has been taking his medications but he feels like they are making him zone out and make him feel like he is moving slower. Pt stated that he missed his last appointment with CSN. Pt reported that he does have a past history of getting arrested for criminal trespassing and indecent exposure. Pt denied being on probation or parole at this time. Pt reported that he has smoked marijuana in the past when he was around 23, but denied any current marijuana use. Pt reported that he has done cocaine 2 times in the past but not recently. Pt denied any current substance use including drugs or alcohol. Pt stated that his highest level of education was high school and he has no  service. Pt denied any trauma, abuse, or PTSD. Pt stated that his main stressor is that \"I have things on my mind that I feel guilty about,\" pt would not specify what is on his mind. Pt reported that his sleep fluctuates and he has weird dreams sometimes. Pt reported that he is spiritual/ Latter day. Pt reported that he has had thoughts about wanting to end his life but he has never attempted. Pt stated that his deterrent is that he loves his life. Pt denied self injurious behaviors. Pt reported to having little interest and pleasure in doing things and feeling hopeless and helpless on occasion.      Risk Factors: Previous mental health diagnoses, previous mental health hospitalizations, pt has been isolated and has depression, pt is unable to identify stressors, pt does not like current medications, pt wants to live independently, pt is unable to drive, pt has a past history of seizures, pt has a legal history of being arrested, pt has a past history of substance use, pt is unable to live independently. Protective Factors: pt has safe and stable housing, pt is active with [facility], pt is spiritual/ Zoroastrian, pt has no previous suicide attempts, pt is able to identify deterrents, pt is future focused, pt has access to basic needs, pt has government assistance and a steady income, pt is able to care for himself, pt is open to taking medications.      Gender  [x] Male [] Female [] Transgender  [] Other    Sexual Orientation    [x] Heterosexual [] Homosexual [] Bisexual [] Other    Suicidal Ideation  [x] Past [] Present [] Denies     C-SSRS Screening Completed: Current Suicide Risk:  [] None  [x] Low [] Moderate [] High    Homicidal Ideation  [] Past [] Present [x] Denies     Hallucinations/Delusions (Specify type)  [] Reports [x] Denies     Current or Past Mental Health Treatment:  [x] Yes, When and Where: Currently at [facility]  [] No    Substance Use/Alcohol Use/Addiction  [] Reports [x] Denies     Tobacco Use (within the last 6 months)  [x] Reports [] Denies     Trauma History  [] Reports [x] Denies     Self Injurious/Self Mutilation Behaviors:   [] Reports:    [] Past [] Present   [x] Denies    Legal History:  [x]  Yes (Specify)  Arrests but no probation or parole  [] No    Collateral Contact (PERFECTO signed)  Name: Fortino Watkins  Relationship: Cousin   Number: [phone]    Collateral Information:      Access to Weapons per Collateral Contact: [] Reports [] Denies     After consideration of C-SSRS screening results, C-SSRS assessments, and this professional's assessment the patient's overall suicide risk assessed to be:  [] None   [x] Low   [] Moderate   [] High     [x] Discussed current suicide risk, protective and risk factors with RN and NP/Psychiatrist.    Discharge Plan:  [] Home:  [] Shelter:  [] Crisis Unit:  [] Substance Abuse Rehab:  [] Nursing Facility:  [x] Other (Specify):[facility]      Follow up Provider: SANDY

## 2022-05-27 PROCEDURE — 99231 SBSQ HOSP IP/OBS SF/LOW 25: CPT | Performed by: NURSE PRACTITIONER

## 2022-05-27 PROCEDURE — 1240000000 HC EMOTIONAL WELLNESS R&B

## 2022-05-27 PROCEDURE — 6370000000 HC RX 637 (ALT 250 FOR IP): Performed by: NURSE PRACTITIONER

## 2022-05-27 PROCEDURE — 6370000000 HC RX 637 (ALT 250 FOR IP): Performed by: PSYCHIATRY & NEUROLOGY

## 2022-05-27 RX ADMIN — PROPRANOLOL HYDROCHLORIDE 20 MG: 20 TABLET ORAL at 14:14

## 2022-05-27 RX ADMIN — PROPRANOLOL HYDROCHLORIDE 20 MG: 20 TABLET ORAL at 21:59

## 2022-05-27 RX ADMIN — DIVALPROEX SODIUM 250 MG: 250 TABLET, DELAYED RELEASE ORAL at 10:32

## 2022-05-27 RX ADMIN — PROPRANOLOL HYDROCHLORIDE 20 MG: 20 TABLET ORAL at 10:32

## 2022-05-27 RX ADMIN — DIVALPROEX SODIUM 250 MG: 250 TABLET, DELAYED RELEASE ORAL at 21:59

## 2022-05-27 RX ADMIN — Medication 3 MG: at 21:59

## 2022-05-27 RX ADMIN — PALIPERIDONE 3 MG: 3 TABLET, EXTENDED RELEASE ORAL at 10:32

## 2022-05-27 RX ADMIN — BENZTROPINE MESYLATE 2 MG: 2 TABLET ORAL at 21:59

## 2022-05-27 ASSESSMENT — PAIN SCALES - GENERAL: PAINLEVEL_OUTOF10: 0

## 2022-05-27 NOTE — PLAN OF CARE
Problem: Self Harm/Suicidality  Goal: Will have no self-injury during hospital stay  Description: INTERVENTIONS:  1. Q 30 MINUTES: Routine safety checks  2. Q SHIFT & PRN: Assess risk to determine if routine checks are adequate to maintain patient safety  Outcome: Progressing     Problem: Depression  Goal: Will be euthymic at discharge  Description: INTERVENTIONS:  1. Administer medication as ordered  2. Provide emotional support via 1:1 interaction with staff  3. Encourage involvement in milieu/groups/activities  4. Monitor for social isolation  Outcome: Progressing     Problem: Behavior  Goal: Pt/Family maintain appropriate behavior and adhere to behavioral management agreement, if implemented  Description: INTERVENTIONS:  1. Assess patient/family's coping skills and  non-compliant behavior (including use of illegal substances)  2. Notify security of behavior or suspected illegal substances which indicate the need for search of the patient and/or belongings  3. Encourage verbalization of thoughts and concerns in a socially appropriate manner  4. Utilize positive, consistent limit setting strategies supporting safety of patient, staff and others  5. Encourage participation in the decision making process about the behavioral management agreement  6. Implement a Health Care Agreement if patient meets criteria  7. If a patient's behavior jeopardizes the safety of the patient, staff, or others refer to organization policy. If a visitor's behavior poses a threat to safety call refer to organization policy. 8. Initiate consult with , Psychosocial CNS, Spiritual Care as appropriate  Outcome: Progressing     Problem: Anxiety  Goal: Will report anxiety at manageable levels  Description: INTERVENTIONS:  1. Administer medication as ordered  2. Teach and rehearse alternative coping skills  3.  Provide emotional support with 1:1 interaction with staff  Outcome: Progressing     Pt continues to isolate to his room most of th shift. He is out for meals and does attend groups. He is med compliant. He denies Si/HI and AVH. He is observed flat and sad. He reports some depression and loss of interest. Will continue to monitor and provide support.

## 2022-05-27 NOTE — GROUP NOTE
Group Therapy Note    Date: 5/27/2022    Group Start Time: 1000  Group End Time: 3840  Group Topic: Psychotherapy    [facility]  [address], [facility]        Group Therapy Note    Attendees: 10         Patient's Goal:  To increase social interaction and improve relationships with others.      Notes:  Pt was attentive in group and was able to identify and agenda. Pt was able to verbalize relating to other and made connections. Status After Intervention:  Improved    Participation Level:  Active Listener and Interactive    Participation Quality: Appropriate, Attentive, Sharing and Supportive      Speech:  normal      Thought Process/Content: Logical  Linear      Affective Functioning: Flat      Mood: depressed      Level of consciousness:  Alert, Oriented x4 and Attentive      Response to Learning: Able to verbalize current knowledge/experience, Able to verbalize/acknowledge new learning, Able to retain information and Capable of insight      Endings: None Reported    Modes of Intervention: Support, Socialization and Exploration      Discipline Responsible: /Counselor      Signature:  JOSIE Montanez, [place]

## 2022-05-27 NOTE — PLAN OF CARE
Patient resting quietly in bed this evening without complaints voiced. Patient denies SI/HI AVH rates anxiety 3/10 and depression 3/10. Patient is pleasant and compliant with medications. Will continue to monitor and assess q 15 min for safety throughout the shift. Problem: Self Harm/Suicidality  Goal: Will have no self-injury during hospital stay  Description: INTERVENTIONS:  1. Q 30 MINUTES: Routine safety checks  2. Q SHIFT & PRN: Assess risk to determine if routine checks are adequate to maintain patient safety  5/26/2022 2138 by Barbie Lombardo RN  Outcome: Progressing     Problem: Depression  Goal: Will be euthymic at discharge  Description: INTERVENTIONS:  1. Administer medication as ordered  2. Provide emotional support via 1:1 interaction with staff  3. Encourage involvement in milieu/groups/activities  4. Monitor for social isolation  5/26/2022 2138 by Barbie Lombardo RN  Outcome: Progressing     Problem: Psychosis  Goal: Will report no hallucinations or delusions  Description: INTERVENTIONS:  1. Administer medication as  ordered  2. Assist with reality testing to support increasing orientation  3. Assess if patient's hallucinations or delusions are encouraging self harm or harm to others and intervene as appropriate  5/26/2022 2138 by Barbie Lombardo RN  Outcome: Progressing     Problem: Behavior  Goal: Pt/Family maintain appropriate behavior and adhere to behavioral management agreement, if implemented  Description: INTERVENTIONS:  1. Assess patient/family's coping skills and  non-compliant behavior (including use of illegal substances)  2. Notify security of behavior or suspected illegal substances which indicate the need for search of the patient and/or belongings  3. Encourage verbalization of thoughts and concerns in a socially appropriate manner  4. Utilize positive, consistent limit setting strategies supporting safety of patient, staff and others  5.  Encourage participation in the decision making process about the behavioral management agreement  6. Implement a Health Care Agreement if patient meets criteria  7. If a patient's behavior jeopardizes the safety of the patient, staff, or others refer to organization policy. If a visitor's behavior poses a threat to safety call refer to organization policy. 8. Initiate consult with , Psychosocial CNS, Spiritual Care as appropriate  5/26/2022 2138 by Barbie Lombardo RN  Outcome: Progressing     Problem: Anxiety  Goal: Will report anxiety at manageable levels  Description: INTERVENTIONS:  1. Administer medication as ordered  2. Teach and rehearse alternative coping skills  3. Provide emotional support with 1:1 interaction with staff  Outcome: Glenny Tamez (Taken 5/26/2022 0800 by William Chacon RN)  Will report anxiety at manageable levels:   Administer medication as ordered   Provide emotional support with 1:1 interaction with staff     Problem: Sleep Disturbance  Goal: Will exhibit normal sleeping pattern  Description: INTERVENTIONS:  1. Administer medication as ordered  2. Decrease environmental stimuli, including noise, as appropriate  3.  Discourage social isolation and naps during the day  Outcome: Progressing     Problem: Pain  Goal: Verbalizes/displays adequate comfort level or baseline comfort level  Outcome: Progressing

## 2022-05-27 NOTE — PROGRESS NOTES
BEHAVIORAL HEALTH FOLLOW-UP NOTE     5/27/2022     Patient was seen and examined in person, Chart reviewed   Patient's case discussed with staff/team    Chief Complaint: depressed, anhedonic low energy   Interim History:     Patient seen in the dayroom during breakfast, he immediately retreated to his room when finished eating. He appears flat, low energy desphoric/despondent mood. He is not engaged in conversation, is dismissive. He has not attended any groups so far. Will consider therapeutic room lock out if he continues to isolate. Will encourage showering and self care. Denies psychotic symptoms, however is guarded and appears paranoid.        Appetite:   [x] Normal/Unchanged  [] Increased  [] Decreased      Sleep:       [x] Normal/Unchanged  [] Fair       [] Poor              Energy:    [x] Normal/Unchanged  [] Increased  [] Decreased        SI [] Present  [x] Absent    HI  []Present  [x] Absent     Aggression:  [] yes  [x] no    Patient is [x] able  [] unable to CONTRACT FOR SAFETY     PAST MEDICAL/PSYCHIATRIC HISTORY:   Past Medical History:   Diagnosis Date    Acute bacterial conjunctivitis of left eye 9/11/2016    Anticoagulated on Coumadin     per Dr. Shirin Alaniz Bipolar 1 disorder [facility])     goes to [address] [place] Bipolar affect, depressed ([facility] [address]     Cellulitis of face 9/11/2016    Full dentures     Hx of blood clots     DVT, PE hx of    Hypertension     on propanolol / patient states does not have high BP    Impetigo 9/11/2016    Palpitations     Screening     for colonoscopy    Seizure disorder ([facility] [address]     last seizure 2010 / cause unknown       FAMILY/SOCIAL HISTORY:  Family History   Problem Relation Age of Onset    Diabetes Brother      Social History     Socioeconomic History    Marital status: Single     Spouse name: Not on file    Number of children: Not on file    Years of education: Not on file    Highest education level: Not on file   Occupational History    Not on file Tobacco Use    Smoking status: Current Every Day Smoker     Packs/day: 1.00     Years: 44.00     Pack years: 44.00     Types: Cigarettes     Start date: 2/12/1975    Smokeless tobacco: Never Used    Tobacco comment: Interested in the patch   Substance and Sexual Activity    Alcohol use: Not Currently     Comment: occasional beer     Drug use: No    Sexual activity: Never   Other Topics Concern    Not on file   Social History Narrative    Not on file     Social Determinants of Health     Financial Resource Strain:     Difficulty of Paying Living Expenses: Not on file   Food Insecurity:     Worried About Running Out of Food in the Last Year: Not on file    Yarelis of Food in the Last Year: Not on file   Transportation Needs:     Lack of Transportation (Medical): Not on file    Lack of Transportation (Non-Medical): Not on file   Physical Activity:     Days of Exercise per Week: Not on file    Minutes of Exercise per Session: Not on file   Stress:     Feeling of Stress : Not on file   Social Connections:     Frequency of Communication with Friends and Family: Not on file    Frequency of Social Gatherings with Friends and Family: Not on file    Attends Confucianism Services: Not on file    Active Member of [address] [place] [address] ClassBadges or Organizations: Not on file    Attends Club or Organization Meetings: Not on file    Marital Status: Not on file   Intimate Partner Violence:     Fear of Current or Ex-Partner: Not on file    Emotionally Abused: Not on file    Physically Abused: Not on file    Sexually Abused: Not on file   Housing Stability:     Unable to Pay for Housing in the Last Year: Not on file    Number of [place] in the Last Year: Not on file    Unstable Housing in the Last Year: Not on file           ROS:  [x] All negative/unchanged except if checked.  Explain positive(checked items) below:  [] Constitutional  [] Eyes  [] Ear/Nose/Mouth/Throat  [] Respiratory  [] CV  [] GI  []   [] Musculoskeletal  [] Skin/Breast  [] Neurological  [] Endocrine  [] Heme/Lymph  [] Allergic/Immunologic    Explanation:     MEDICATIONS:    Current Facility-Administered Medications:     benztropine (COGENTIN) tablet 2 mg, 2 mg, Oral, Nightly, Héctor Lizet, APRN - CNP, 2 mg at 05/26/22 2057    calcium carbonate (TUMS) chewable tablet 1,000 mg, 1,000 mg, Oral, Q2H PRN, Héctor Hinds, APRN - CNP    latanoprost (XALATAN) 0.005 % ophthalmic solution 1 drop, 1 drop, Both Eyes, Nightly, Héctor Hinds, APRN - CNP    propranolol (INDERAL) tablet 20 mg, 20 mg, Oral, TID, Héctor Hnids, APRN - CNP, 20 mg at 05/26/22 2058    divalproex (DEPAKOTE) DR tablet 250 mg, 250 mg, Oral, 2 times per day, Michaelrosaura Barrosena, APRN - CNP, 250 mg at 05/26/22 2057    paliperidone (INVEGA) extended release tablet 3 mg, 3 mg, Oral, Daily, Héctor Lizet, APRN - CNP, 3 mg at 05/26/22 1423    acetaminophen (TYLENOL) tablet 650 mg, 650 mg, Oral, Q4H PRN, Irving Vazquez MD    magnesium hydroxide (MILK OF MAGNESIA) 400 MG/5ML suspension 30 mL, 30 mL, Oral, Daily PRN, Irving Vazquez MD    nicotine (NICODERM CQ) 21 MG/24HR 1 patch, 1 patch, TransDERmal, Daily, Irving Vazquez MD, 1 patch at 05/26/22 0900    aluminum & magnesium hydroxide-simethicone (MAALOX) 200-200-20 MG/5ML suspension 30 mL, 30 mL, Oral, PRN, Irving Vazquez MD    hydrOXYzine (ATARAX) tablet 50 mg, 50 mg, Oral, TID PRN, Irving Vazquez MD    haloperidol (HALDOL) tablet 3 mg, 3 mg, Oral, Q6H PRN **OR** haloperidol lactate (HALDOL) injection 3 mg, 3 mg, IntraMUSCular, Q6H PRN, Irving Vazquez MD    melatonin tablet 3 mg, 3 mg, Oral, Nightly, Irving Vazquez MD, 3 mg at 05/26/22 2057      Examination:  /74   Pulse 53   Temp 97.5 °F (36.4 °C) (Oral)   Resp 16   Ht 5' 8\" (1.727 m)   Wt 200 lb (90.7 kg)   SpO2 95%   BMI 30.41 kg/m²   Gait - steady  Medication side effects(SE): none reported     Mental Status Examination:    Level of consciousness:  within normal limits and awake   Appearance:  hospital attire, lying in bed, fair grooming and good hygiene  Behavior/Motor:  involuntary movements, patient has a noticeable tremor  Attitude toward examiner:  cooperative, attentive, good eye contact, evasive and guarded  Speech:  normal rate and normal volume   Mood: \"Tired\"  Affect:  Patient yawning, frowning and with blunted affect.  Mood congruent  Thought processes:  linear, coherent and thought blocking   Thought content:  Homocidal ideation denied  Suicidal Ideation:  denies suicidal ideation  Delusions:  no evidence of delusions  Cognition:  oriented to person, place, and time   Concentration intact  Memory intact  Insight poor   [address] [facility]:   Patient symptoms are:  [] Well controlled  [] Improving  [] Worsening  [x] No change      Diagnosis:   Principal Problem:    Bipolar affective disorder, mixed, severe, with psychotic behavior ([facility] [address]  Resolved Problems:    Suicidal ideation      LABS:    Recent Labs     05/25/22  1029   WBC 11.0   HGB 14.5        Recent Labs     05/25/22  1029      K 4.0      CO2 22   BUN 11   CREATININE 0.8   GLUCOSE 98     Recent Labs     05/25/22  1029   BILITOT 0.3   ALKPHOS 71   AST 19   ALT 16     Lab Results   Component Value Date    LABAMPH NOT DETECTED 05/25/2022    BARBSCNU NOT DETECTED 05/25/2022    LABBENZ NOT DETECTED 05/25/2022    LABMETH NOT DETECTED 05/25/2022    OPIATESCREENURINE NOT DETECTED 05/25/2022    PHENCYCLIDINESCREENURINE NOT DETECTED 05/25/2022    ETOH <10 05/25/2022     Lab Results   Component Value Date    TSH 2.180 05/08/2020     No results found for: LITHIUM  Lab Results   Component Value Date    VALPROATE 55 05/10/2021           Treatment Plan:  The patient's diagnosis, treatment plan, medication management were formulated after patient was seen directly by the attending physician and myself and all relevant documentation was reviewed.     Risk, benefit, side effects, possible outcomes of the medication and alternatives discussed with the patient and the patient demonstrated understanding. The patient was also educated that the outcome of treatment will depend on the medication compliance as directed by the prescribers along with regular follow-up, compliance with the labs and other work-up, as clinically indicated.     Risk Management: Based on the diagnosis and assessment biopsychosocial treatment model was presented to the patient and was given the opportunity to ask any question. The patient was agreeable to the plan and all the patient's questions were answered to the patient's satisfaction. I discussed with the patient the risk, benefit, alternative and common side effects for the proposed medication treatment. The patient is consenting to this treatment. Begin Invega 3 mg daily will optimize as clinically indicated  Depakote 250 mg twice daily will optimize as clinically indicated  Cogentin 1 mg twice daily  Valproate level    Collateral information: followed by social work  CD evaluation  Encourage patient to attend group and other milieu activities. Discharge planning discussed with the patient and treatment team.    PSYCHOTHERAPY/COUNSELING:  [x] Therapeutic interview  [x] Supportive  [] CBT  [] Ongoing  [] Other    [x] Patient continues to need, on a daily basis, active treatment furnished directly by or requiring the supervision of inpatient psychiatric personnel      Anticipated Length of stay: 5 - 10 days based on stability       NOTE: This report was transcribed using voice recognition software.  Every effort was made to ensure accuracy; however, inadvertent computerized transcription errors may be present.           Electronically signed by FLORESITA Cassidy CNP on 5/27/2022 at 8:14 AM

## 2022-05-27 NOTE — CARE COORDINATION
[place] attempted to call pt cousin Re Viveros [phone]. A woman answered the phone and informed sw that this was the wrong number. Sw did not disclose any pt information to this person. No other number for Dwight found on pt face sheet. [place] will attempt to meet with pt and find a different phone number.

## 2022-05-27 NOTE — CARE COORDINATION
[facility] Phone: [phone] who stated that the pt is able to return back to the home upon discharge from the hospital. Asiya Mccormick stated that the pt does not need to step down to CSU prior to returning to the home.

## 2022-05-28 PROCEDURE — 6370000000 HC RX 637 (ALT 250 FOR IP): Performed by: PSYCHIATRY & NEUROLOGY

## 2022-05-28 PROCEDURE — 1240000000 HC EMOTIONAL WELLNESS R&B

## 2022-05-28 PROCEDURE — 6370000000 HC RX 637 (ALT 250 FOR IP): Performed by: NURSE PRACTITIONER

## 2022-05-28 RX ADMIN — PROPRANOLOL HYDROCHLORIDE 20 MG: 20 TABLET ORAL at 16:23

## 2022-05-28 RX ADMIN — DIVALPROEX SODIUM 250 MG: 250 TABLET, DELAYED RELEASE ORAL at 10:39

## 2022-05-28 RX ADMIN — DIVALPROEX SODIUM 250 MG: 250 TABLET, DELAYED RELEASE ORAL at 20:20

## 2022-05-28 RX ADMIN — Medication 3 MG: at 20:22

## 2022-05-28 RX ADMIN — PROPRANOLOL HYDROCHLORIDE 20 MG: 20 TABLET ORAL at 10:11

## 2022-05-28 RX ADMIN — BENZTROPINE MESYLATE 2 MG: 2 TABLET ORAL at 20:20

## 2022-05-28 RX ADMIN — PALIPERIDONE 3 MG: 3 TABLET, EXTENDED RELEASE ORAL at 10:11

## 2022-05-28 ASSESSMENT — PAIN SCALES - GENERAL
PAINLEVEL_OUTOF10: 0
PAINLEVEL_OUTOF10: 0

## 2022-05-28 NOTE — PROGRESS NOTES
Pt denies si/hi and hallucinations. Pt denies anxiety but c/o depression 5/10 with decreased energy and being easily agitated. Pt is isolative to his room spending most of his day in bed. Pt has a flat affect. Pt states his sleep is good with night shift reporting he slept 6 hours last night. Pt makes good eye contact. Pt states his appetite is good and he is out in the dinning area for all three meals. Pt takes med's and attends select groups.

## 2022-05-28 NOTE — PLAN OF CARE
Problem: Self Harm/Suicidality  Goal: Will have no self-injury during hospital stay  Description: INTERVENTIONS:  1. Q 30 MINUTES: Routine safety checks  2. Q SHIFT & PRN: Assess risk to determine if routine checks are adequate to maintain patient safety  Outcome: Progressing     Problem: Depression  Goal: Will be euthymic at discharge  Description: INTERVENTIONS:  1. Administer medication as ordered  2. Provide emotional support via 1:1 interaction with staff  3. Encourage involvement in milieu/groups/activities  4.  Monitor for social isolation  Outcome: Progressing

## 2022-05-28 NOTE — PLAN OF CARE
Patient denies suicidal ideation, homicidal ideations and Critical access hospital rates anxiety and depression 5/10. Presents calm and cooperative during assessment. Patient is out on the unit and is social with peers. Medications taken without issue. No complaints or concerns verbalized at this time. No unit problems reported. Will continue to observe and support. Problem: Self Harm/Suicidality  Goal: Will have no self-injury during hospital stay  Description: INTERVENTIONS:  1. Q 30 MINUTES: Routine safety checks  2. Q SHIFT & PRN: Assess risk to determine if routine checks are adequate to maintain patient safety  5/27/2022 2133 by Cheko Parmar RN  Outcome: Progressing     Problem: Depression  Goal: Will be euthymic at discharge  Description: INTERVENTIONS:  1. Administer medication as ordered  2. Provide emotional support via 1:1 interaction with staff  3. Encourage involvement in milieu/groups/activities  4. Monitor for social isolation  5/27/2022 2133 by Cheko Parmar RN  Outcome: Progressing     Problem: Psychosis  Goal: Will report no hallucinations or delusions  Description: INTERVENTIONS:  1. Administer medication as  ordered  2. Assist with reality testing to support increasing orientation  3. Assess if patient's hallucinations or delusions are encouraging self harm or harm to others and intervene as appropriate  Outcome: Progressing     Problem: Behavior  Goal: Pt/Family maintain appropriate behavior and adhere to behavioral management agreement, if implemented  Description: INTERVENTIONS:  1. Assess patient/family's coping skills and  non-compliant behavior (including use of illegal substances)  2. Notify security of behavior or suspected illegal substances which indicate the need for search of the patient and/or belongings  3. Encourage verbalization of thoughts and concerns in a socially appropriate manner  4.  Utilize positive, consistent limit setting strategies supporting safety of patient, staff and others  5. Encourage participation in the decision making process about the behavioral management agreement  6. Implement a Health Care Agreement if patient meets criteria  7. If a patient's behavior jeopardizes the safety of the patient, staff, or others refer to organization policy. If a visitor's behavior poses a threat to safety call refer to organization policy. 8. Initiate consult with , Psychosocial CNS, Spiritual Care as appropriate  5/27/2022 2133 by Ho Medina RN  Outcome: Progressing     Problem: Anxiety  Goal: Will report anxiety at manageable levels  Description: INTERVENTIONS:  1. Administer medication as ordered  2. Teach and rehearse alternative coping skills  3. Provide emotional support with 1:1 interaction with staff  5/27/2022 2133 by Ho Medina RN  Outcome: Progressing     Problem: Sleep Disturbance  Goal: Will exhibit normal sleeping pattern  Description: INTERVENTIONS:  1. Administer medication as ordered  2. Decrease environmental stimuli, including noise, as appropriate  3.  Discourage social isolation and naps during the day  Outcome: Progressing     Problem: Pain  Goal: Verbalizes/displays adequate comfort level or baseline comfort level  Outcome: Progressing

## 2022-05-28 NOTE — PLAN OF CARE
[facility]  Day 3 Interdisciplinary Treatment Plan NOTE    Review Date & Time: 05/28/2022 0844     Patient was in treatment team    Estimated Length of Stay Update:  5-7 days   Estimated Discharge Date Update: 05/31/2022    EDUCATION:   Learner Progress Toward Treatment Goals: Reviewed results and recommendations of this team, Reviewed group plan and strategies and Reviewed goals and plan of care    Method: Small group    Outcome: Needs reinforcement    PATIENT GOALS: to come out of room more     PLAN/TREATMENT RECOMMENDATIONS UPDATE:05/31/2022    GOALS UPDATE:   Time frame for Short-Term Goals: 1-3 days       Lamonte Hagan RN

## 2022-05-29 LAB — VALPROIC ACID LEVEL: 32 MCG/ML (ref 50–100)

## 2022-05-29 PROCEDURE — 99231 SBSQ HOSP IP/OBS SF/LOW 25: CPT | Performed by: NURSE PRACTITIONER

## 2022-05-29 PROCEDURE — 6370000000 HC RX 637 (ALT 250 FOR IP): Performed by: NURSE PRACTITIONER

## 2022-05-29 PROCEDURE — 80164 ASSAY DIPROPYLACETIC ACD TOT: CPT

## 2022-05-29 PROCEDURE — 1240000000 HC EMOTIONAL WELLNESS R&B

## 2022-05-29 PROCEDURE — 36415 COLL VENOUS BLD VENIPUNCTURE: CPT

## 2022-05-29 PROCEDURE — 6370000000 HC RX 637 (ALT 250 FOR IP): Performed by: PSYCHIATRY & NEUROLOGY

## 2022-05-29 RX ORDER — DIVALPROEX SODIUM 500 MG/1
500 TABLET, DELAYED RELEASE ORAL EVERY 12 HOURS SCHEDULED
Status: DISCONTINUED | OUTPATIENT
Start: 2022-05-29 | End: 2022-06-02 | Stop reason: HOSPADM

## 2022-05-29 RX ORDER — PALIPERIDONE 6 MG/1
6 TABLET, EXTENDED RELEASE ORAL DAILY
Status: DISCONTINUED | OUTPATIENT
Start: 2022-05-30 | End: 2022-06-02 | Stop reason: HOSPADM

## 2022-05-29 RX ADMIN — BENZTROPINE MESYLATE 2 MG: 2 TABLET ORAL at 22:05

## 2022-05-29 RX ADMIN — PROPRANOLOL HYDROCHLORIDE 20 MG: 20 TABLET ORAL at 09:59

## 2022-05-29 RX ADMIN — DIVALPROEX SODIUM 250 MG: 250 TABLET, DELAYED RELEASE ORAL at 09:00

## 2022-05-29 RX ADMIN — PROPRANOLOL HYDROCHLORIDE 20 MG: 20 TABLET ORAL at 22:07

## 2022-05-29 RX ADMIN — PROPRANOLOL HYDROCHLORIDE 20 MG: 20 TABLET ORAL at 14:08

## 2022-05-29 RX ADMIN — PALIPERIDONE 3 MG: 3 TABLET, EXTENDED RELEASE ORAL at 09:00

## 2022-05-29 RX ADMIN — Medication 3 MG: at 22:05

## 2022-05-29 RX ADMIN — DIVALPROEX SODIUM 500 MG: 500 TABLET, DELAYED RELEASE ORAL at 22:05

## 2022-05-29 RX ADMIN — LATANOPROST 1 DROP: 50 SOLUTION/ DROPS OPHTHALMIC at 22:05

## 2022-05-29 ASSESSMENT — PAIN SCALES - GENERAL
PAINLEVEL_OUTOF10: 0
PAINLEVEL_OUTOF10: 0

## 2022-05-29 NOTE — PROGRESS NOTES
Pt denies si/hi and hallucinations. Pt states his depression is 3/10 but appears sad. Pt c/o anxiety 3/10 and appears worried. Pt is isolative to his room spending most of his day in bed. Pt states his sleep is good with night shift reporting pt slept 7.25 hours last night. Pt has a flat affect. Pt has poor ADL's and is encouraged to take a shower. Pt scored 26/30 on MOCA test. Pt states his appetite is good and pt is out in the dinning area for all three meals. Pt takes med's and attends select groups.

## 2022-05-29 NOTE — PROGRESS NOTES
BEHAVIORAL HEALTH FOLLOW-UP NOTE     5/29/2022     Patient was seen and examined in person, Chart reviewed   Patient's case discussed with staff/team    Chief Complaint: depressed, anhedonic low energy \"I can't do the things I would like to do\"  Interim History:     Patient seen in his room. He is sad, concerned about his mental illness. States that he would like to be able to do normal things like have a girlfriend, go to Spiritism, date. He appears flat, low energy desphoric/despondent mood. He has not attended any groups so far. Will consider therapeutic room lock out if he continues to isolate. Will encourage showering and self care. Denies psychotic symptoms, however is guarded and appears paranoid. He states that he knows what he needs, and feels that he is doing what he should to be normal, but still struggles with his self esteem.        Appetite:   [x] Normal/Unchanged  [] Increased  [] Decreased      Sleep:       [x] Normal/Unchanged  [] Fair       [] Poor              Energy:    [x] Normal/Unchanged  [] Increased  [] Decreased        SI [] Present  [x] Absent    HI  []Present  [x] Absent     Aggression:  [] yes  [x] no    Patient is [x] able  [] unable to CONTRACT FOR SAFETY     PAST MEDICAL/PSYCHIATRIC HISTORY:   Past Medical History:   Diagnosis Date    Acute bacterial conjunctivitis of left eye 9/11/2016    Anticoagulated on Coumadin     per Dr. Shari Moreira Bipolar 1 disorder [facility])     goes to [address] [place] Bipolar affect, depressed ([facility] [address]     Cellulitis of face 9/11/2016    Full dentures     Hx of blood clots     DVT, PE hx of    Hypertension     on propanolol / patient states does not have high BP    Impetigo 9/11/2016    Palpitations     Screening     for colonoscopy    Seizure disorder ([facility] [address]     last seizure 2010 / cause unknown       FAMILY/SOCIAL HISTORY:  Family History   Problem Relation Age of Onset    Diabetes Brother      Social History     Socioeconomic History    Marital status: Single     Spouse name: Not on file    Number of children: Not on file    Years of education: Not on file    Highest education level: Not on file   Occupational History    Not on file   Tobacco Use    Smoking status: Current Every Day Smoker     Packs/day: 1.00     Years: 44.00     Pack years: 44.00     Types: Cigarettes     Start date: 2/12/1975    Smokeless tobacco: Never Used    Tobacco comment: Interested in the patch   Substance and Sexual Activity    Alcohol use: Not Currently     Comment: occasional beer     Drug use: No    Sexual activity: Never   Other Topics Concern    Not on file   Social History Narrative    Not on file     Social Determinants of Health     Financial Resource Strain:     Difficulty of Paying Living Expenses: Not on file   Food Insecurity:     Worried About Running Out of Food in the Last Year: Not on file    Yarelis of Food in the Last Year: Not on file   Transportation Needs:     Lack of Transportation (Medical): Not on file    Lack of Transportation (Non-Medical):  Not on file   Physical Activity:     Days of Exercise per Week: Not on file    Minutes of Exercise per Session: Not on file   Stress:     Feeling of Stress : Not on file   Social Connections:     Frequency of Communication with Friends and Family: Not on file    Frequency of Social Gatherings with Friends and Family: Not on file    Attends [employer] Services: Not on file    Active Member of [address] [place] [address] or Organizations: Not on file    Attends Club or Organization Meetings: Not on file    Marital Status: Not on file   Intimate Partner Violence:     Fear of Current or Ex-Partner: Not on file    Emotionally Abused: Not on file    Physically Abused: Not on file    Sexually Abused: Not on file   Housing Stability:     Unable to Pay for Housing in the Last Year: Not on file    Number of [place] in the Last Year: Not on file    Unstable Housing in the Last Year: Not on file           ROS:  [x] All negative/unchanged except if checked.  Explain positive(checked items) below:  [] Constitutional  [] Eyes  [] Ear/Nose/Mouth/Throat  [] Respiratory  [] CV  [] GI  []   [] Musculoskeletal  [] Skin/Breast  [] Neurological  [] Endocrine  [] Heme/Lymph  [] Allergic/Immunologic    Explanation:     MEDICATIONS:    Current Facility-Administered Medications:     divalproex (DEPAKOTE) DR tablet 500 mg, 500 mg, Oral, 2 times per day, Dyke Pickle, APRN - CNP    [START ON 5/30/2022] paliperidone (INVEGA) extended release tablet 6 mg, 6 mg, Oral, Daily, Dyke Pickle, APRN - CNP    benztropine (COGENTIN) tablet 2 mg, 2 mg, Oral, Nightly, Dyke Pickle, APRN - CNP, 2 mg at 05/28/22 2020    calcium carbonate (TUMS) chewable tablet 1,000 mg, 1,000 mg, Oral, Q2H PRN, Dyke Pickle, APRN - CNP    latanoprost (XALATAN) 0.005 % ophthalmic solution 1 drop, 1 drop, Both Eyes, Nightly, Dyke Pickle, APRN - CNP    propranolol (INDERAL) tablet 20 mg, 20 mg, Oral, TID, Dyke Pickle, APRN - CNP, 20 mg at 05/29/22 0959    acetaminophen (TYLENOL) tablet 650 mg, 650 mg, Oral, Q4H PRN, Nicki Woods MD    magnesium hydroxide (MILK OF MAGNESIA) 400 MG/5ML suspension 30 mL, 30 mL, Oral, Daily PRN, Nicki Woods MD    nicotine (NICODERM CQ) 21 MG/24HR 1 patch, 1 patch, TransDERmal, Daily, Nicki Woods MD, 1 patch at 05/29/22 0959    aluminum & magnesium hydroxide-simethicone (MAALOX) 200-200-20 MG/5ML suspension 30 mL, 30 mL, Oral, PRN, Nicki Woods MD    hydrOXYzine (ATARAX) tablet 50 mg, 50 mg, Oral, TID PRN, Nicki Woods MD    haloperidol (HALDOL) tablet 3 mg, 3 mg, Oral, Q6H PRN **OR** haloperidol lactate (HALDOL) injection 3 mg, 3 mg, IntraMUSCular, Q6H PRN, Nicki Woods MD    melatonin tablet 3 mg, 3 mg, Oral, Nightly, Nicki Woods MD, 3 mg at 05/28/22 2022      Examination:  BP (!) 140/71   Pulse 94   Temp 98.7 °F (37.1 °C) (Temporal)   Resp 16   Ht 5' 8\" (1.727 m)   Wt 200 lb (90.7 kg) SpO2 96%   BMI 30.41 kg/m²   Gait - steady  Medication side effects(SE): none reported     Mental Status Examination:    Level of consciousness:  within normal limits and awake   Appearance:  hospital attire, lying in bed, fair grooming and good hygiene  Behavior/Motor:  involuntary movements, patient has a noticeable tremor  Attitude toward examiner:  cooperative, attentive, good eye contact, evasive and guarded  Speech:  normal rate and normal volume   Mood: \"Tired\"  Affect:  Patient yawning, frowning and with blunted affect. Mood congruent  Thought processes:  linear, coherent and thought blocking   Thought content:  Homocidal ideation denied  Suicidal Ideation:  denies suicidal ideation  Delusions:  no evidence of delusions  Cognition:  oriented to person, place, and time   Concentration intact  Memory intact  [address] [facility]    ASSESSMENT:   Patient symptoms are:  [] Well controlled  [] Improving  [] Worsening  [x] No change      Diagnosis:   Principal Problem:    Bipolar affective disorder, mixed, severe, with psychotic behavior ([facility] [address]  Resolved Problems:    Suicidal ideation      LABS:    No results for input(s): WBC, HGB, PLT in the last 72 hours. No results for input(s): NA, K, CL, CO2, BUN, CREATININE, GLUCOSE in the last 72 hours. No results for input(s): BILITOT, ALKPHOS, AST, ALT in the last 72 hours.   Lab Results   Component Value Date    LABAMPH NOT DETECTED 05/25/2022    BARBSCNU NOT DETECTED 05/25/2022    LABBENZ NOT DETECTED 05/25/2022    LABMETH NOT DETECTED 05/25/2022    OPIATESCREENURINE NOT DETECTED 05/25/2022    PHENCYCLIDINESCREENURINE NOT DETECTED 05/25/2022    ETOH <10 05/25/2022     Lab Results   Component Value Date    TSH 2.180 05/08/2020     No results found for: LITHIUM  Lab Results   Component Value Date    VALPROATE 32 (L) 05/29/2022           Treatment Plan:  The patient's diagnosis, treatment plan, medication management were formulated after patient was seen directly by the attending physician and myself and all relevant documentation was reviewed.     Risk, benefit, side effects, possible outcomes of the medication and alternatives discussed with the patient and the patient demonstrated understanding. The patient was also educated that the outcome of treatment will depend on the medication compliance as directed by the prescribers along with regular follow-up, compliance with the labs and other work-up, as clinically indicated.     Risk Management: Based on the diagnosis and assessment biopsychosocial treatment model was presented to the patient and was given the opportunity to ask any question. The patient was agreeable to the plan and all the patient's questions were answered to the patient's satisfaction. I discussed with the patient the risk, benefit, alternative and common side effects for the proposed medication treatment. The patient is consenting to this treatment. Begin Invega 3 mg daily will optimize as clinically indicated  Depakote 250 mg twice daily will optimize as clinically indicated  Cogentin 1 mg twice daily  Valproate level    Collateral information: followed by social work  CD evaluation  Encourage patient to attend group and other milieu activities. Discharge planning discussed with the patient and treatment team.    PSYCHOTHERAPY/COUNSELING:  [x] Therapeutic interview  [x] Supportive  [] CBT  [] Ongoing  [] Other    [x] Patient continues to need, on a daily basis, active treatment furnished directly by or requiring the supervision of inpatient psychiatric personnel      Anticipated Length of stay: 5 - 10 days based on stability       NOTE: This report was transcribed using voice recognition software.  Every effort was made to ensure accuracy; however, inadvertent computerized transcription errors may be present.           Electronically signed by FLORESITA Caruso CNP on 5/29/2022 at 11:44 AM

## 2022-05-29 NOTE — PLAN OF CARE
Patient denies suicidal ideation, homicidal ideations and [facility] rates anxiety and depression 3/10. Presents calm and cooperative during assessment. Patient is out on the unit and is social with peers. Medications taken without issue. No complaints or concerns verbalized at this time. No unit problems reported. Will continue to observe and support. Problem: Self Harm/Suicidality  Goal: Will have no self-injury during hospital stay  Description: INTERVENTIONS:  1. Q 30 MINUTES: Routine safety checks  2. Q SHIFT & PRN: Assess risk to determine if routine checks are adequate to maintain patient safety  5/28/2022 2105 by Roel Mackey RN  Outcome: Progressing     Problem: Depression  Goal: Will be euthymic at discharge  Description: INTERVENTIONS:  1. Administer medication as ordered  2. Provide emotional support via 1:1 interaction with staff  3. Encourage involvement in milieu/groups/activities  4. Monitor for social isolation  5/28/2022 2105 by Roel Mackey RN  Outcome: Progressing     Problem: Psychosis  Goal: Will report no hallucinations or delusions  Description: INTERVENTIONS:  1. Administer medication as  ordered  2. Assist with reality testing to support increasing orientation  3. Assess if patient's hallucinations or delusions are encouraging self harm or harm to others and intervene as appropriate  Outcome: Progressing     Problem: Behavior  Goal: Pt/Family maintain appropriate behavior and adhere to behavioral management agreement, if implemented  Description: INTERVENTIONS:  1. Assess patient/family's coping skills and  non-compliant behavior (including use of illegal substances)  2. Notify security of behavior or suspected illegal substances which indicate the need for search of the patient and/or belongings  3. Encourage verbalization of thoughts and concerns in a socially appropriate manner  4.  Utilize positive, consistent limit setting strategies supporting safety of patient, staff and others  5. Encourage participation in the decision making process about the behavioral management agreement  6. Implement a Health Care Agreement if patient meets criteria  7. If a patient's behavior jeopardizes the safety of the patient, staff, or others refer to organization policy. If a visitor's behavior poses a threat to safety call refer to organization policy. 8. Initiate consult with , Psychosocial CNS, Spiritual Care as appropriate  Outcome: Progressing     Problem: Anxiety  Goal: Will report anxiety at manageable levels  Description: INTERVENTIONS:  1. Administer medication as ordered  2. Teach and rehearse alternative coping skills  3. Provide emotional support with 1:1 interaction with staff  Outcome: Progressing     Problem: Sleep Disturbance  Goal: Will exhibit normal sleeping pattern  Description: INTERVENTIONS:  1. Administer medication as ordered  2. Decrease environmental stimuli, including noise, as appropriate  3.  Discourage social isolation and naps during the day  Outcome: Progressing     Problem: Pain  Goal: Verbalizes/displays adequate comfort level or baseline comfort level  Outcome: Progressing

## 2022-05-30 PROCEDURE — 6370000000 HC RX 637 (ALT 250 FOR IP): Performed by: PSYCHIATRY & NEUROLOGY

## 2022-05-30 PROCEDURE — 6370000000 HC RX 637 (ALT 250 FOR IP): Performed by: NURSE PRACTITIONER

## 2022-05-30 PROCEDURE — 99231 SBSQ HOSP IP/OBS SF/LOW 25: CPT | Performed by: NURSE PRACTITIONER

## 2022-05-30 PROCEDURE — 1240000000 HC EMOTIONAL WELLNESS R&B

## 2022-05-30 RX ADMIN — NICOTINE POLACRILEX 2 MG: 2 GUM, CHEWING BUCCAL at 22:33

## 2022-05-30 RX ADMIN — DIVALPROEX SODIUM 500 MG: 500 TABLET, DELAYED RELEASE ORAL at 20:51

## 2022-05-30 RX ADMIN — Medication 3 MG: at 20:51

## 2022-05-30 RX ADMIN — PROPRANOLOL HYDROCHLORIDE 20 MG: 20 TABLET ORAL at 14:23

## 2022-05-30 RX ADMIN — PROPRANOLOL HYDROCHLORIDE 20 MG: 20 TABLET ORAL at 10:11

## 2022-05-30 RX ADMIN — BENZTROPINE MESYLATE 2 MG: 2 TABLET ORAL at 20:51

## 2022-05-30 RX ADMIN — PALIPERIDONE 6 MG: 6 TABLET, EXTENDED RELEASE ORAL at 10:11

## 2022-05-30 RX ADMIN — DIVALPROEX SODIUM 500 MG: 500 TABLET, DELAYED RELEASE ORAL at 10:11

## 2022-05-30 RX ADMIN — LATANOPROST 1 DROP: 50 SOLUTION/ DROPS OPHTHALMIC at 21:01

## 2022-05-30 ASSESSMENT — PAIN SCALES - GENERAL
PAINLEVEL_OUTOF10: 0
PAINLEVEL_OUTOF10: 0

## 2022-05-30 NOTE — PLAN OF CARE
Patient denies suicidal ideation, homicidal ideations and [facility] rates anxiety and depression 5/10. Presents with increased irritability today but calm and cooperative during assessment. Patient is isolating to room tonight. Medications taken without issue. No complaints or concerns verbalized at this time. No unit problems reported. Will continue to observe and support. Problem: Self Harm/Suicidality  Goal: Will have no self-injury during hospital stay  Description: INTERVENTIONS:  1. Q 30 MINUTES: Routine safety checks  2. Q SHIFT & PRN: Assess risk to determine if routine checks are adequate to maintain patient safety  5/28/2022 2105 by Genna Lyle RN  Outcome: Progressing     Problem: Depression  Goal: Will be euthymic at discharge  Description: INTERVENTIONS:  1. Administer medication as ordered  2. Provide emotional support via 1:1 interaction with staff  3. Encourage involvement in milieu/groups/activities  4. Monitor for social isolation  5/28/2022 2105 by Genna Lyle RN  Outcome: Progressing     Problem: Psychosis  Goal: Will report no hallucinations or delusions  Description: INTERVENTIONS:  1. Administer medication as  ordered  2. Assist with reality testing to support increasing orientation  3. Assess if patient's hallucinations or delusions are encouraging self harm or harm to others and intervene as appropriate  Outcome: Progressing     Problem: Behavior  Goal: Pt/Family maintain appropriate behavior and adhere to behavioral management agreement, if implemented  Description: INTERVENTIONS:  1. Assess patient/family's coping skills and  non-compliant behavior (including use of illegal substances)  2. Notify security of behavior or suspected illegal substances which indicate the need for search of the patient and/or belongings  3. Encourage verbalization of thoughts and concerns in a socially appropriate manner  4.  Utilize positive, consistent limit setting strategies supporting safety of

## 2022-05-30 NOTE — GROUP NOTE
Group Therapy Note    Date: 5/30/2022    Group Start Time: 4849  Group End Time: 9369  Group Topic: Psychoeducation    [facility]    JOSIE Hargrove LSW        Group Therapy Note    Attendees: 14         Patient's Goal:  Pt will be able to verbalize the importance of self-care. Notes:  Pt made connections and participated in group. Status After Intervention:  Improved    Participation Level:  Active Listener and Interactive    Participation Quality: Appropriate, Attentive, Sharing and Supportive      Speech:  normal      Thought Process/Content: Logical  Linear      Affective Functioning: Congruent      Mood: depressed      Level of consciousness:  Alert, Oriented x4 and Attentive      Response to Learning: Able to verbalize current knowledge/experience      Endings: None Reported    Modes of Intervention: Education, Support, Socialization and Exploration      Discipline Responsible: /Counselor      Signature:  JOSIE Hargrove LSW

## 2022-05-30 NOTE — PLAN OF CARE
Problem: Self Harm/Suicidality  Goal: Will have no self-injury during hospital stay  Description: INTERVENTIONS:  1. Q 30 MINUTES: Routine safety checks  2. Q SHIFT & PRN: Assess risk to determine if routine checks are adequate to maintain patient safety  Outcome: Progressing     Problem: Psychosis  Goal: Will report no hallucinations or delusions  Description: INTERVENTIONS:  1. Administer medication as  ordered  2. Assist with reality testing to support increasing orientation  3. Assess if patient's hallucinations or delusions are encouraging self harm or harm to others and intervene as appropriate  Outcome: Progressing     Problem: Anxiety  Goal: Will report anxiety at manageable levels  Description: INTERVENTIONS:  1. Administer medication as ordered  2. Teach and rehearse alternative coping skills  3.  Provide emotional support with 1:1 interaction with staff  Outcome: Progressing     Problem: Pain  Goal: Verbalizes/displays adequate comfort level or baseline comfort level  Outcome: Progressing

## 2022-05-30 NOTE — PROGRESS NOTES
Patient denies SI/HI/Hallucinations. Patient complains of feeling anxious and depressed, rating both a 5/10. Patient appears flat and blunt, evasive during conversation. Patient keeps to himself while out on the unit. Patient taking prescribed medications and eating provided meals without issue.

## 2022-05-30 NOTE — PROGRESS NOTES
BEHAVIORAL HEALTH FOLLOW-UP NOTE     5/30/2022     Patient was seen and examined in person, Chart reviewed   Patient's case discussed with staff/team    Chief Complaint: \"I showered yesterday\"  Interim History:     Patient seen up in the dayroom. He is blunted concerned about his mental illness, however brightens and smiles on assessment. He has not attended any groups so far. Will encourage showering and self care. Denies psychotic symptoms, however is guarded and appears paranoid. He states that he knows what he needs, and feels that he is doing what he should to be normal, but still struggles with his self esteem.        Appetite:   [x] Normal/Unchanged  [] Increased  [] Decreased      Sleep:       [x] Normal/Unchanged  [] Fair       [] Poor              Energy:    [x] Normal/Unchanged  [] Increased  [] Decreased        SI [] Present  [x] Absent    HI  []Present  [x] Absent     Aggression:  [] yes  [x] no    Patient is [x] able  [] unable to CONTRACT FOR SAFETY     PAST MEDICAL/PSYCHIATRIC HISTORY:   Past Medical History:   Diagnosis Date    Acute bacterial conjunctivitis of left eye 9/11/2016    Anticoagulated on Coumadin     per Dr. Manpreet Walker Bipolar 1 disorder [facility])     goes to [address] [place] Bipolar affect, depressed ([facility] [address]     Cellulitis of face 9/11/2016    Full dentures     Hx of blood clots     DVT, PE hx of    Hypertension     on propanolol / patient states does not have high BP    Impetigo 9/11/2016    Palpitations     Screening     for colonoscopy    Seizure disorder ([facility] [address]     last seizure 2010 / cause unknown       FAMILY/SOCIAL HISTORY:  Family History   Problem Relation Age of Onset    Diabetes Brother      Social History     Socioeconomic History    Marital status: Single     Spouse name: Not on file    Number of children: Not on file    Years of education: Not on file    Highest education level: Not on file   Occupational History    Not on file   Tobacco Use    Smoking status: Current Every Day Smoker     Packs/day: 1.00     Years: 44.00     Pack years: 44.00     Types: Cigarettes     Start date: 2/12/1975    Smokeless tobacco: Never Used    Tobacco comment: Interested in the patch   Substance and Sexual Activity    Alcohol use: Not Currently     Comment: occasional beer     Drug use: No    Sexual activity: Never   Other Topics Concern    Not on file   Social History Narrative    Not on file     Social Determinants of Health     Financial Resource Strain:     Difficulty of Paying Living Expenses: Not on file   Food Insecurity:     Worried About Running Out of Food in the Last Year: Not on file    Yarelis of Food in the Last Year: Not on file   Transportation Needs:     Lack of Transportation (Medical): Not on file    Lack of Transportation (Non-Medical): Not on file   Physical Activity:     Days of Exercise per Week: Not on file    Minutes of Exercise per Session: Not on file   Stress:     Feeling of Stress : Not on file   Social Connections:     Frequency of Communication with Friends and Family: Not on file    Frequency of Social Gatherings with Friends and Family: Not on file    Attends Shinto Services: Not on file    Active Member of [address] [place] [address] [employer] or Organizations: Not on file    Attends Club or Organization Meetings: Not on file    Marital Status: Not on file   Intimate Partner Violence:     Fear of Current or Ex-Partner: Not on file    Emotionally Abused: Not on file    Physically Abused: Not on file    Sexually Abused: Not on file   Housing Stability:     Unable to Pay for Housing in the Last Year: Not on file    Number of [place] in the Last Year: Not on file    Unstable Housing in the Last Year: Not on file           ROS:  [x] All negative/unchanged except if checked.  Explain positive(checked items) below:  [] Constitutional  [] Eyes  [] Ear/Nose/Mouth/Throat  [] Respiratory  [] CV  [] GI  []   [] Musculoskeletal  [] Skin/Breast  [] Neurological  [] Endocrine  [] Heme/Lymph  [] Allergic/Immunologic    Explanation:     MEDICATIONS:    Current Facility-Administered Medications:     divalproex (DEPAKOTE) DR tablet 500 mg, 500 mg, Oral, 2 times per day, Marktushar Dunbar APRN - CNP, 500 mg at 05/29/22 2205    paliperidone (INVEGA) extended release tablet 6 mg, 6 mg, Oral, Daily, Marktushar Padgetto, APRN - CNP    benztropine (COGENTIN) tablet 2 mg, 2 mg, Oral, Nightly, Mark Medico, APRN - CNP, 2 mg at 05/29/22 2205    calcium carbonate (TUMS) chewable tablet 1,000 mg, 1,000 mg, Oral, Q2H PRN, Mark Padgetto, APRN - CNP    latanoprost (XALATAN) 0.005 % ophthalmic solution 1 drop, 1 drop, Both Eyes, Nightly, Mark Lorino, APRN - CNP, 1 drop at 05/29/22 2205    propranolol (INDERAL) tablet 20 mg, 20 mg, Oral, TID, Mark Lorino, APRN - CNP, 20 mg at 05/29/22 2207    acetaminophen (TYLENOL) tablet 650 mg, 650 mg, Oral, Q4H PRN, Justine Ochoa MD    magnesium hydroxide (MILK OF MAGNESIA) 400 MG/5ML suspension 30 mL, 30 mL, Oral, Daily PRN, Justine Ochoa MD    nicotine (NICODERM CQ) 21 MG/24HR 1 patch, 1 patch, TransDERmal, Daily, Justine Ochoa MD, 1 patch at 05/29/22 0959    aluminum & magnesium hydroxide-simethicone (MAALOX) 200-200-20 MG/5ML suspension 30 mL, 30 mL, Oral, PRN, Justine Ochoa MD    hydrOXYzine (ATARAX) tablet 50 mg, 50 mg, Oral, TID PRN, Justine Ochoa MD    haloperidol (HALDOL) tablet 3 mg, 3 mg, Oral, Q6H PRN **OR** haloperidol lactate (HALDOL) injection 3 mg, 3 mg, IntraMUSCular, Q6H PRN, Justine Ochoa MD    melatonin tablet 3 mg, 3 mg, Oral, Nightly, Curtis Gonzalez MD, 3 mg at 05/29/22 9697      Examination:  /70   Pulse 83   Temp 98.2 °F (36.8 °C) (Temporal)   Resp 18   Ht 5' 8\" (1.727 m)   Wt 200 lb (90.7 kg)   SpO2 94%   BMI 30.41 kg/m²   Gait - steady  Medication side effects(SE): none reported     Mental Status Examination:    Level of consciousness:  within normal limits and awake   Appearance:  hospital attire, lying in bed, fair grooming and good hygiene  Behavior/Motor:  involuntary movements, patient has a noticeable tremor  Attitude toward examiner:  cooperative, attentive, good eye contact, evasive and guarded  Speech:  normal rate and normal volume   Mood: \"Tired\"  Affect:  Patient yawning, frowning and with blunted affect. Mood congruent  Thought processes:  linear, coherent and thought blocking   Thought content:  Homocidal ideation denied  Suicidal Ideation:  denies suicidal ideation  Delusions:  no evidence of delusions  Cognition:  oriented to person, place, and time   Concentration intact  Memory intact  [address] [facility]    ASSESSMENT:   Patient symptoms are:  [] Well controlled  [] Improving  [] Worsening  [x] No change      Diagnosis:   Principal Problem:    Bipolar affective disorder, mixed, severe, with psychotic behavior ([facility] [address]  Resolved Problems:    Suicidal ideation      LABS:    No results for input(s): WBC, HGB, PLT in the last 72 hours. No results for input(s): NA, K, CL, CO2, BUN, CREATININE, GLUCOSE in the last 72 hours. No results for input(s): BILITOT, ALKPHOS, AST, ALT in the last 72 hours.   Lab Results   Component Value Date    LABAMPH NOT DETECTED 05/25/2022    BARBSCNU NOT DETECTED 05/25/2022    LABBENZ NOT DETECTED 05/25/2022    LABMETH NOT DETECTED 05/25/2022    OPIATESCREENURINE NOT DETECTED 05/25/2022    PHENCYCLIDINESCREENURINE NOT DETECTED 05/25/2022    ETOH <10 05/25/2022     Lab Results   Component Value Date    TSH 2.180 05/08/2020     No results found for: LITHIUM  Lab Results   Component Value Date    VALPROATE 32 (L) 05/29/2022           Treatment Plan:  The patient's diagnosis, treatment plan, medication management were formulated after patient was seen directly by the attending physician and myself and all relevant documentation was reviewed.     Risk, benefit, side effects, possible outcomes of the medication and alternatives discussed with the patient and the patient demonstrated understanding. The patient was also educated that the outcome of treatment will depend on the medication compliance as directed by the prescribers along with regular follow-up, compliance with the labs and other work-up, as clinically indicated.     Risk Management: Based on the diagnosis and assessment biopsychosocial treatment model was presented to the patient and was given the opportunity to ask any question. The patient was agreeable to the plan and all the patient's questions were answered to the patient's satisfaction. I discussed with the patient the risk, benefit, alternative and common side effects for the proposed medication treatment. The patient is consenting to this treatment. Begin Invega 3 mg daily will optimize as clinically indicated  Depakote 250 mg twice daily will optimize as clinically indicated  Cogentin 1 mg twice daily  Valproate level    Collateral information: followed by social work  CD evaluation  Encourage patient to attend group and other milieu activities. Discharge planning discussed with the patient and treatment team.    PSYCHOTHERAPY/COUNSELING:  [x] Therapeutic interview  [x] Supportive  [] CBT  [] Ongoing  [] Other    [x] Patient continues to need, on a daily basis, active treatment furnished directly by or requiring the supervision of inpatient psychiatric personnel      Anticipated Length of stay: 5 - 10 days based on stability. NOTE: This report was transcribed using voice recognition software.  Every effort was made to ensure accuracy; however, inadvertent computerized transcription errors may be present.           Electronically signed by FLORESITA Cassidy CNP on 5/30/2022 at 8:19 AM

## 2022-05-31 PROCEDURE — 99231 SBSQ HOSP IP/OBS SF/LOW 25: CPT | Performed by: NURSE PRACTITIONER

## 2022-05-31 PROCEDURE — 6370000000 HC RX 637 (ALT 250 FOR IP): Performed by: NURSE PRACTITIONER

## 2022-05-31 PROCEDURE — 1240000000 HC EMOTIONAL WELLNESS R&B

## 2022-05-31 RX ORDER — BENZTROPINE MESYLATE 2 MG/1
1 TABLET ORAL 2 TIMES DAILY
Status: DISCONTINUED | OUTPATIENT
Start: 2022-05-31 | End: 2022-06-02 | Stop reason: HOSPADM

## 2022-05-31 RX ORDER — PRAZOSIN HYDROCHLORIDE 1 MG/1
1 CAPSULE ORAL NIGHTLY
Status: DISCONTINUED | OUTPATIENT
Start: 2022-05-31 | End: 2022-06-02 | Stop reason: HOSPADM

## 2022-05-31 RX ADMIN — PROPRANOLOL HYDROCHLORIDE 20 MG: 20 TABLET ORAL at 20:59

## 2022-05-31 RX ADMIN — BENZTROPINE MESYLATE 1 MG: 2 TABLET ORAL at 21:00

## 2022-05-31 RX ADMIN — PALIPERIDONE 6 MG: 6 TABLET, EXTENDED RELEASE ORAL at 10:42

## 2022-05-31 RX ADMIN — PROPRANOLOL HYDROCHLORIDE 20 MG: 20 TABLET ORAL at 14:28

## 2022-05-31 RX ADMIN — DIVALPROEX SODIUM 500 MG: 500 TABLET, DELAYED RELEASE ORAL at 21:00

## 2022-05-31 RX ADMIN — NICOTINE POLACRILEX 2 MG: 2 GUM, CHEWING BUCCAL at 12:20

## 2022-05-31 RX ADMIN — PROPRANOLOL HYDROCHLORIDE 20 MG: 20 TABLET ORAL at 10:42

## 2022-05-31 RX ADMIN — LATANOPROST 1 DROP: 50 SOLUTION/ DROPS OPHTHALMIC at 21:01

## 2022-05-31 RX ADMIN — DIVALPROEX SODIUM 500 MG: 500 TABLET, DELAYED RELEASE ORAL at 10:41

## 2022-05-31 RX ADMIN — PRAZOSIN HYDROCHLORIDE 1 MG: 1 CAPSULE ORAL at 21:00

## 2022-05-31 RX ADMIN — NICOTINE POLACRILEX 2 MG: 2 GUM, CHEWING BUCCAL at 14:31

## 2022-05-31 ASSESSMENT — PAIN SCALES - GENERAL
PAINLEVEL_OUTOF10: 0
PAINLEVEL_OUTOF10: 0

## 2022-05-31 NOTE — PROGRESS NOTES
Attended morning community meeting. Updated on staffing and daily expectations. Shared goal for the day as to work towards getting back to [facility].

## 2022-05-31 NOTE — PLAN OF CARE
Patient denies suicidal ideation, homicidal ideations and [facility] rates anxiety and depression 3/10. Presents calm and cooperative during assessment. Patient is isolating to room tonight. Medications taken without issue. No complaints or concerns verbalized at this time. No unit problems reported. Will continue to observe and support. Problem: Self Harm/Suicidality  Goal: Will have no self-injury during hospital stay  Description: INTERVENTIONS:  1. Q 30 MINUTES: Routine safety checks  2. Q SHIFT & PRN: Assess risk to determine if routine checks are adequate to maintain patient safety  5/28/2022 2105 by Roel Mackey RN  Outcome: Progressing     Problem: Depression  Goal: Will be euthymic at discharge  Description: INTERVENTIONS:  1. Administer medication as ordered  2. Provide emotional support via 1:1 interaction with staff  3. Encourage involvement in milieu/groups/activities  4. Monitor for social isolation  5/28/2022 2105 by Roel Mackey RN  Outcome: Progressing     Problem: Psychosis  Goal: Will report no hallucinations or delusions  Description: INTERVENTIONS:  1. Administer medication as  ordered  2. Assist with reality testing to support increasing orientation  3. Assess if patient's hallucinations or delusions are encouraging self harm or harm to others and intervene as appropriate  Outcome: Progressing     Problem: Behavior  Goal: Pt/Family maintain appropriate behavior and adhere to behavioral management agreement, if implemented  Description: INTERVENTIONS:  1. Assess patient/family's coping skills and  non-compliant behavior (including use of illegal substances)  2. Notify security of behavior or suspected illegal substances which indicate the need for search of the patient and/or belongings  3. Encourage verbalization of thoughts and concerns in a socially appropriate manner  4. Utilize positive, consistent limit setting strategies supporting safety of patient, staff and others  5.  Encourage participation in the decision making process about the behavioral management agreement  6. Implement a Health Care Agreement if patient meets criteria  7. If a patient's behavior jeopardizes the safety of the patient, staff, or others refer to organization policy. If a visitor's behavior poses a threat to safety call refer to organization policy. 8. Initiate consult with , Psychosocial CNS, Spiritual Care as appropriate  Outcome: Progressing     Problem: Anxiety  Goal: Will report anxiety at manageable levels  Description: INTERVENTIONS:  1. Administer medication as ordered  2. Teach and rehearse alternative coping skills  3. Provide emotional support with 1:1 interaction with staff  Outcome: Progressing     Problem: Sleep Disturbance  Goal: Will exhibit normal sleeping pattern  Description: INTERVENTIONS:  1. Administer medication as ordered  2. Decrease environmental stimuli, including noise, as appropriate  3.  Discourage social isolation and naps during the day  Outcome: Progressing     Problem: Pain  Goal: Verbalizes/displays adequate comfort level or baseline comfort level  Outcome: Progressing

## 2022-05-31 NOTE — PROGRESS NOTES
Spiritual Support Group Note    Number of Participants in Group:  9                      Time:  2pm    Goal: Relief from isolation and loneliness             Stephanie Sharing             Self-understanding and gain insight              Acceptance and belonging            Recognize they are not alone                Socialization             Empowerment       Encouragement    Topic:  [] Spiritual Wellness and Self Care                  [] Hope                     [] Connecting with Divine/Others        [] Thankfulness and Gratitude               []  Meaningfulness and Purpose               [] Forgiveness               [] Peace               [] Connect to [employer]      [x] Other    Participation Level:   [x] Active Listener   [] Minimal   [] Monopolizing   [] Interactive   [] No Participation   []  Other:     Attention:   [x] Alert   [] Distractible   [] Drowsy   [] Poor   [] Other:    Manner:   [x] Cooperative   [] Suspicious   [] Withdrawn   [] Guarded   [] Irritable   [] Inhospitable   [] Other:     Others Comments from Group:

## 2022-05-31 NOTE — CARE COORDINATION
CHRIS called and spoke with Toño Wilson at [facility]. Toño Wilson states that this pt is able to return to [facility] at discharge. No concerns voiced. No access to weapons.

## 2022-05-31 NOTE — GROUP NOTE
Group Therapy Note    Date: 5/31/2022    Group Start Time: 1100  Group End Time: 1130  Group Topic: Cognitive Skills    [facility] ACUTE [facility] 2    NALINI Benitez        Group Therapy Note    Attendees: 10         Patient's Goal: Pt will be able to identify unhealthy coping skills and discuss ways to replace them with healthy coping skills. Notes:  Pt was an active participant in group discussion. Status After Intervention:  Improved    Participation Level: Active Listener and Interactive    Participation Quality: Appropriate, Attentive, Sharing and Supportive      Speech:  normal      Thought Process/Content: Logical  Linear      Affective Functioning: Congruent      Mood: anxious and depressed      Level of consciousness:  Alert and Attentive      Response to Learning: Able to verbalize current knowledge/experience, Able to verbalize/acknowledge new learning, Able to retain information, Capable of insight and Progressing to goal      Endings: None Reported    Modes of Intervention: Education, Support, Socialization, Exploration, Clarifying and Problem-solving      Discipline Responsible: /Counselor      Signature:   Magdi singh [facility]

## 2022-05-31 NOTE — PROGRESS NOTES
Tavares German NP requesting patient's medication list. This RN spoke with Faviola at [facility] who reports she will fax the patient's medication list over.

## 2022-05-31 NOTE — PROGRESS NOTES
Patient denies SI/HI/Hallucinations. Patient complains of feeling anxious and depressed, rating both a 5/10. Patient appears flat and sad, blunt and evasive during conversation. Patient keeps to himself while out on the unit. Patient taking prescribed medications, eating provided meals, and attending groups.

## 2022-05-31 NOTE — PROGRESS NOTES
BEHAVIORAL HEALTH FOLLOW-UP NOTE     5/31/2022     Patient was seen and examined in person, Chart reviewed   Patient's case discussed with staff/team    Chief Complaint: \"I think people are lying to me\"   Interim History:     Patient seen up in the dayroom. He is blunted concerned about his mental illness, however brightens and smiles on assessment. He has attended groups. Will encourage showering and self care. Denies psychotic symptoms, however is guarded and appears paranoid. He makes statements that lead me to believe he is misinterpreting, as he believes that people are lying to him. He states that he knows what he needs, and feels that he is doing what he should to be normal, but still struggles with his self esteem. He tells me that he has been having nightmares and bad sleep. Will discontinue the melatonin and provide prazosin 1 mg nightly for sleep disturbance.        Appetite:   [x] Normal/Unchanged  [] Increased  [] Decreased      Sleep:       [x] Normal/Unchanged  [] Fair       [] Poor              Energy:    [x] Normal/Unchanged  [] Increased  [] Decreased        SI [] Present  [x] Absent    HI  []Present  [x] Absent     Aggression:  [] yes  [x] no    Patient is [x] able  [] unable to CONTRACT FOR SAFETY     PAST MEDICAL/PSYCHIATRIC HISTORY:   Past Medical History:   Diagnosis Date    Acute bacterial conjunctivitis of left eye 9/11/2016    Anticoagulated on Coumadin     per Dr. Óscar Arvizu Bipolar 1 disorder [facility])     goes to [address] [place] Bipolar affect, depressed ([facility] [address]     Cellulitis of face 9/11/2016    Full dentures     Hx of blood clots     DVT, PE hx of    Hypertension     on propanolol / patient states does not have high BP    Impetigo 9/11/2016    Palpitations     Screening     for colonoscopy    Seizure disorder ([facility] [address]     last seizure 2010 / cause unknown       FAMILY/SOCIAL HISTORY:  Family History   Problem Relation Age of Onset    Diabetes Brother      Social History Socioeconomic History    Marital status: Single     Spouse name: Not on file    Number of children: Not on file    Years of education: Not on file    Highest education level: Not on file   Occupational History    Not on file   Tobacco Use    Smoking status: Current Every Day Smoker     Packs/day: 1.00     Years: 44.00     Pack years: 44.00     Types: Cigarettes     Start date: 2/12/1975    Smokeless tobacco: Never Used    Tobacco comment: Interested in the patch   Substance and Sexual Activity    Alcohol use: Not Currently     Comment: occasional beer     Drug use: No    Sexual activity: Never   Other Topics Concern    Not on file   Social History Narrative    Not on file     Social Determinants of Health     Financial Resource Strain:     Difficulty of Paying Living Expenses: Not on file   Food Insecurity:     Worried About Running Out of Food in the Last Year: Not on file    Yarelis of Food in the Last Year: Not on file   Transportation Needs:     Lack of Transportation (Medical): Not on file    Lack of Transportation (Non-Medical):  Not on file   Physical Activity:     Days of Exercise per Week: Not on file    Minutes of Exercise per Session: Not on file   Stress:     Feeling of Stress : Not on file   Social Connections:     Frequency of Communication with Friends and Family: Not on file    Frequency of Social Gatherings with Friends and Family: Not on file    Attends Congregational Services: Not on file    Active Member of Clubs or Organizations: Not on file    Attends Club or Organization Meetings: Not on file    Marital Status: Not on file   Intimate Partner Violence:     Fear of Current or Ex-Partner: Not on file    Emotionally Abused: Not on file    Physically Abused: Not on file    Sexually Abused: Not on file   Housing Stability:     Unable to Pay for Housing in the Last Year: Not on file    Number of [place] in the Last Year: Not on file    Unstable Housing in the Last Year: Not on file           ROS:  [x] All negative/unchanged except if checked.  Explain positive(checked items) below:  [] Constitutional  [] Eyes  [] Ear/Nose/Mouth/Throat  [] Respiratory  [] CV  [] GI  []   [] Musculoskeletal  [] Skin/Breast  [] Neurological  [] Endocrine  [] Heme/Lymph  [] Allergic/Immunologic    Explanation:     MEDICATIONS:    Current Facility-Administered Medications:     nicotine polacrilex (NICORETTE) gum 2 mg, 2 mg, Oral, Q2H PRN, FLORESITA Malloy - CNP, 2 mg at 05/31/22 1220    divalproex (DEPAKOTE) DR tablet 500 mg, 500 mg, Oral, 2 times per day, FLORESITA Malloy - CNP, 500 mg at 05/31/22 1041    paliperidone (INVEGA) extended release tablet 6 mg, 6 mg, Oral, Daily, FLORESITA Malloy - CNP, 6 mg at 05/31/22 1042    benztropine (COGENTIN) tablet 2 mg, 2 mg, Oral, Nightly, FLORESITA Malloy - CNP, 2 mg at 05/30/22 2051    calcium carbonate (TUMS) chewable tablet 1,000 mg, 1,000 mg, Oral, Q2H PRN, FLORESITA Malloy - CNP    latanoprost (XALATAN) 0.005 % ophthalmic solution 1 drop, 1 drop, Both Eyes, Nightly, FLORESITA Malloy - CNP, 1 drop at 05/30/22 2101    propranolol (INDERAL) tablet 20 mg, 20 mg, Oral, TID, FLORESITA Malloy - CNP, 20 mg at 05/31/22 1042    acetaminophen (TYLENOL) tablet 650 mg, 650 mg, Oral, Q4H PRN, Umm Herrera MD    magnesium hydroxide (MILK OF MAGNESIA) 400 MG/5ML suspension 30 mL, 30 mL, Oral, Daily PRN, Umm Herrera MD    aluminum & magnesium hydroxide-simethicone (MAALOX) 200-200-20 MG/5ML suspension 30 mL, 30 mL, Oral, PRN, Umm Herrera MD    hydrOXYzine (ATARAX) tablet 50 mg, 50 mg, Oral, TID PRN, Umm Herrera MD    haloperidol (HALDOL) tablet 3 mg, 3 mg, Oral, Q6H PRN **OR** haloperidol lactate (HALDOL) injection 3 mg, 3 mg, IntraMUSCular, Q6H PRN, Umm Herrera MD    melatonin tablet 3 mg, 3 mg, Oral, Nightly, Curtis Gonzalez MD, 3 mg at 05/30/22 2051      Examination:  BP (!) 138/91   Pulse 76   Temp 98 °F (36.7 °C) (Temporal)   Resp 17   Ht 5' 8\" (1.727 m)   Wt 200 lb (90.7 kg)   SpO2 97%   BMI 30.41 kg/m²   Gait - steady  Medication side effects(SE): none reported     Mental Status Examination:    Level of consciousness:  within normal limits and awake   Appearance:  hospital attire, lying in bed, fair grooming and good hygiene  Behavior/Motor:  involuntary movements, patient has a noticeable tremor  Attitude toward examiner:  cooperative, attentive, good eye contact, evasive and guarded  Speech:  normal rate and normal volume   Mood: \"Tired\"  Affect:  Patient yawning, frowning and with blunted affect. Mood congruent  Thought processes:  linear, coherent and thought blocking   Thought content:  Homocidal ideation denied  Suicidal Ideation:  denies suicidal ideation  Delusions:  no evidence of delusions  Cognition:  oriented to person, place, and time   Concentration intact  Memory intact  [address] [place] [address]    ASSESSMENT:   Patient symptoms are:  [] Well controlled  [] Improving  [] Worsening  [x] No change      Diagnosis:   Principal Problem:    Bipolar affective disorder, mixed, severe, with psychotic behavior ([facility] [address]  Resolved Problems:    Suicidal ideation      LABS:    No results for input(s): WBC, HGB, PLT in the last 72 hours. No results for input(s): NA, K, CL, CO2, BUN, CREATININE, GLUCOSE in the last 72 hours. No results for input(s): BILITOT, ALKPHOS, AST, ALT in the last 72 hours.   Lab Results   Component Value Date    LABAMPH NOT DETECTED 05/25/2022    BARBSCNU NOT DETECTED 05/25/2022    LABBENZ NOT DETECTED 05/25/2022    LABMETH NOT DETECTED 05/25/2022    OPIATESCREENURINE NOT DETECTED 05/25/2022    PHENCYCLIDINESCREENURINE NOT DETECTED 05/25/2022    ETOH <10 05/25/2022     Lab Results   Component Value Date    TSH 2.180 05/08/2020     No results found for: LITHIUM  Lab Results   Component Value Date    VALPROATE 32 (L) 05/29/2022           Treatment Plan:  The patient's diagnosis, treatment plan, medication management were formulated after patient was seen directly by the attending physician and myself and all relevant documentation was reviewed.     Risk, benefit, side effects, possible outcomes of the medication and alternatives discussed with the patient and the patient demonstrated understanding. The patient was also educated that the outcome of treatment will depend on the medication compliance as directed by the prescribers along with regular follow-up, compliance with the labs and other work-up, as clinically indicated.     Risk Management: Based on the diagnosis and assessment biopsychosocial treatment model was presented to the patient and was given the opportunity to ask any question. The patient was agreeable to the plan and all the patient's questions were answered to the patient's satisfaction. I discussed with the patient the risk, benefit, alternative and common side effects for the proposed medication treatment. The patient is consenting to this treatment. Begin Invega 6 mg daily will optimize as clinically indicated  Depakote 500 mg twice daily will optimize as clinically indicated  Cogentin 1 mg twice daily  Prazosin 1 mg nightly for nightmares    Valproate level    Collateral information: followed by social work  CD evaluation  Encourage patient to attend group and other milieu activities. Discharge planning discussed with the patient and treatment team.    PSYCHOTHERAPY/COUNSELING:  [x] Therapeutic interview  [x] Supportive  [] CBT  [] Ongoing  [] Other    [x] Patient continues to need, on a daily basis, active treatment furnished directly by or requiring the supervision of inpatient psychiatric personnel      Anticipated Length of stay: 5 - 10 days based on stability. NOTE: This report was transcribed using voice recognition software.  Every effort was made to ensure accuracy; however, inadvertent computerized transcription errors may be present.           Electronically signed by FLORESITA Lee CNP on 5/31/2022 at 1:35 PM

## 2022-05-31 NOTE — PLAN OF CARE
Progressing  5/31/2022 1428 by José Miguel Ritter RN  Outcome: Progressing     Problem: Anxiety  Goal: Will report anxiety at manageable levels  Description: INTERVENTIONS:  1. Administer medication as ordered  2. Teach and rehearse alternative coping skills  3.  Provide emotional support with 1:1 interaction with staff  5/31/2022 1428 by José Miguel Ritter RN  Outcome: Progressing  5/31/2022 1428 by José Miguel Ritter RN  Outcome: Progressing     Problem: Pain  Goal: Verbalizes/displays adequate comfort level or baseline comfort level  5/31/2022 1428 by José Miguel Ritter RN  Outcome: Progressing  5/31/2022 1428 by José Miguel Ritter RN  Outcome: Progressing

## 2022-06-01 PROCEDURE — 6370000000 HC RX 637 (ALT 250 FOR IP): Performed by: NURSE PRACTITIONER

## 2022-06-01 PROCEDURE — 1240000000 HC EMOTIONAL WELLNESS R&B

## 2022-06-01 RX ADMIN — PROPRANOLOL HYDROCHLORIDE 20 MG: 20 TABLET ORAL at 21:08

## 2022-06-01 RX ADMIN — PALIPERIDONE 6 MG: 6 TABLET, EXTENDED RELEASE ORAL at 09:46

## 2022-06-01 RX ADMIN — PROPRANOLOL HYDROCHLORIDE 20 MG: 20 TABLET ORAL at 14:18

## 2022-06-01 RX ADMIN — DIVALPROEX SODIUM 500 MG: 500 TABLET, DELAYED RELEASE ORAL at 09:45

## 2022-06-01 RX ADMIN — PROPRANOLOL HYDROCHLORIDE 20 MG: 20 TABLET ORAL at 09:48

## 2022-06-01 RX ADMIN — PRAZOSIN HYDROCHLORIDE 1 MG: 1 CAPSULE ORAL at 21:09

## 2022-06-01 RX ADMIN — NICOTINE POLACRILEX 2 MG: 2 GUM, CHEWING BUCCAL at 21:12

## 2022-06-01 RX ADMIN — NICOTINE POLACRILEX 2 MG: 2 GUM, CHEWING BUCCAL at 09:48

## 2022-06-01 RX ADMIN — BENZTROPINE MESYLATE 1 MG: 2 TABLET ORAL at 21:10

## 2022-06-01 RX ADMIN — BENZTROPINE MESYLATE 1 MG: 2 TABLET ORAL at 09:47

## 2022-06-01 RX ADMIN — DIVALPROEX SODIUM 500 MG: 500 TABLET, DELAYED RELEASE ORAL at 21:10

## 2022-06-01 NOTE — PROGRESS NOTES
Patient attended morning community meeting. Updated on staffing and daily expectations. Shared goal for the day as to make some phone calls.

## 2022-06-01 NOTE — PROGRESS NOTES
[facility] Interdisciplinary Treatment Plan Note     Review Date & Time: 06/01/2022 0900    Patient was in treatment team.    Estimated Length of Stay Update:  7-10   Estimated Discharge Date Update: 06/03/2022    EDUCATION:   Learner Progress Toward Treatment Goals: Reviewed results and recommendations of this team    Method: Small group    Outcome: Verbalized understanding    PATIENT GOALS: \"Make some phone calls\"    PLAN/TREATMENT RECOMMENDATIONS UPDATE:  Encourage patient to attend and participate in groups. Take medication as prescribed. GOALS UPDATE:  Time frame for Short-Term Goals: Prior to discharge.       Glory Garcia RN

## 2022-06-01 NOTE — PLAN OF CARE
Patient denies suicidal ideation, homicidal ideations and [facility] rates anxiety and depression 2/10. Presents calm and cooperative during assessment. Patient is isolating to room tonight. Medications taken without issue. No complaints or concerns verbalized at this time. No unit problems reported. Will continue to observe and support. Problem: Self Harm/Suicidality  Goal: Will have no self-injury during hospital stay  Description: INTERVENTIONS:  1. Q 30 MINUTES: Routine safety checks  2. Q SHIFT & PRN: Assess risk to determine if routine checks are adequate to maintain patient safety  5/28/2022 2105 by Ye Ceballos RN  Outcome: Progressing     Problem: Depression  Goal: Will be euthymic at discharge  Description: INTERVENTIONS:  1. Administer medication as ordered  2. Provide emotional support via 1:1 interaction with staff  3. Encourage involvement in milieu/groups/activities  4. Monitor for social isolation  5/28/2022 2105 by Ye Ceballos RN  Outcome: Progressing     Problem: Psychosis  Goal: Will report no hallucinations or delusions  Description: INTERVENTIONS:  1. Administer medication as  ordered  2. Assist with reality testing to support increasing orientation  3. Assess if patient's hallucinations or delusions are encouraging self harm or harm to others and intervene as appropriate  Outcome: Progressing     Problem: Behavior  Goal: Pt/Family maintain appropriate behavior and adhere to behavioral management agreement, if implemented  Description: INTERVENTIONS:  1. Assess patient/family's coping skills and  non-compliant behavior (including use of illegal substances)  2. Notify security of behavior or suspected illegal substances which indicate the need for search of the patient and/or belongings  3. Encourage verbalization of thoughts and concerns in a socially appropriate manner  4. Utilize positive, consistent limit setting strategies supporting safety of patient, staff and others  5.  Encourage participation in the decision making process about the behavioral management agreement  6. Implement a Health Care Agreement if patient meets criteria  7. If a patient's behavior jeopardizes the safety of the patient, staff, or others refer to organization policy. If a visitor's behavior poses a threat to safety call refer to organization policy. 8. Initiate consult with , Psychosocial CNS, Spiritual Care as appropriate  Outcome: Progressing     Problem: Anxiety  Goal: Will report anxiety at manageable levels  Description: INTERVENTIONS:  1. Administer medication as ordered  2. Teach and rehearse alternative coping skills  3. Provide emotional support with 1:1 interaction with staff  Outcome: Progressing     Problem: Sleep Disturbance  Goal: Will exhibit normal sleeping pattern  Description: INTERVENTIONS:  1. Administer medication as ordered  2. Decrease environmental stimuli, including noise, as appropriate  3.  Discourage social isolation and naps during the day  Outcome: Progressing     Problem: Pain  Goal: Verbalizes/displays adequate comfort level or baseline comfort level  Outcome: Progressing

## 2022-06-01 NOTE — PLAN OF CARE
Problem: Self Harm/Suicidality  Goal: Will have no self-injury during hospital stay  Description: INTERVENTIONS:  1. Q 30 MINUTES: Routine safety checks  2. Q SHIFT & PRN: Assess risk to determine if routine checks are adequate to maintain patient safety  Outcome: Progressing     Problem: Depression  Goal: Will be euthymic at discharge  Description: INTERVENTIONS:  1. Administer medication as ordered  2. Provide emotional support via 1:1 interaction with staff  3. Encourage involvement in milieu/groups/activities  4. Monitor for social isolation  Outcome: Progressing     Problem: Psychosis  Goal: Will report no hallucinations or delusions  Description: INTERVENTIONS:  1. Administer medication as  ordered  2. Assist with reality testing to support increasing orientation  3. Assess if patient's hallucinations or delusions are encouraging self harm or harm to others and intervene as appropriate  Outcome: Adequate for Discharge     Problem: Behavior  Goal: Pt/Family maintain appropriate behavior and adhere to behavioral management agreement, if implemented  Description: INTERVENTIONS:  1. Assess patient/family's coping skills and  non-compliant behavior (including use of illegal substances)  2. Notify security of behavior or suspected illegal substances which indicate the need for search of the patient and/or belongings  3. Encourage verbalization of thoughts and concerns in a socially appropriate manner  4. Utilize positive, consistent limit setting strategies supporting safety of patient, staff and others  5. Encourage participation in the decision making process about the behavioral management agreement  6. Implement a Health Care Agreement if patient meets criteria  7. If a patient's behavior jeopardizes the safety of the patient, staff, or others refer to organization policy. If a visitor's behavior poses a threat to safety call refer to organization policy.   8. Initiate consult with , Psychosocial CNS, Spiritual Care as appropriate  Outcome: Progressing     Problem: Anxiety  Goal: Will report anxiety at manageable levels  Description: INTERVENTIONS:  1. Administer medication as ordered  2. Teach and rehearse alternative coping skills  3. Provide emotional support with 1:1 interaction with staff  Outcome: Progressing     Pt denies SI/HI and AVH. He reports very mild depression and anxiety. He feels he is ready to go home. He is med complaint and attends groups. He stays to himself but will brighten with conversation. Affect remains flat and sad. Will continue to monitor and provide support.

## 2022-06-02 VITALS
HEIGHT: 68 IN | DIASTOLIC BLOOD PRESSURE: 71 MMHG | WEIGHT: 200 LBS | SYSTOLIC BLOOD PRESSURE: 107 MMHG | TEMPERATURE: 98.6 F | BODY MASS INDEX: 30.31 KG/M2 | RESPIRATION RATE: 16 BRPM | OXYGEN SATURATION: 96 % | HEART RATE: 66 BPM

## 2022-06-02 PROCEDURE — 6370000000 HC RX 637 (ALT 250 FOR IP): Performed by: NURSE PRACTITIONER

## 2022-06-02 PROCEDURE — 99239 HOSP IP/OBS DSCHRG MGMT >30: CPT | Performed by: NURSE PRACTITIONER

## 2022-06-02 RX ORDER — DIVALPROEX SODIUM 500 MG/1
500 TABLET, DELAYED RELEASE ORAL EVERY 12 HOURS SCHEDULED
Qty: 60 TABLET | Refills: 0 | Status: SHIPPED | OUTPATIENT
Start: 2022-06-02 | End: 2022-07-02

## 2022-06-02 RX ORDER — PALIPERIDONE 6 MG/1
6 TABLET, EXTENDED RELEASE ORAL DAILY
Qty: 30 TABLET | Refills: 3 | Status: SHIPPED | OUTPATIENT
Start: 2022-06-03

## 2022-06-02 RX ORDER — BENZTROPINE MESYLATE 1 MG/1
1 TABLET ORAL 2 TIMES DAILY
Qty: 60 TABLET | Refills: 3 | Status: SHIPPED | OUTPATIENT
Start: 2022-06-02

## 2022-06-02 RX ORDER — PRAZOSIN HYDROCHLORIDE 1 MG/1
1 CAPSULE ORAL NIGHTLY
Qty: 30 CAPSULE | Refills: 0 | Status: SHIPPED | OUTPATIENT
Start: 2022-06-02 | End: 2022-07-02

## 2022-06-02 RX ADMIN — PALIPERIDONE 6 MG: 6 TABLET, EXTENDED RELEASE ORAL at 08:50

## 2022-06-02 RX ADMIN — PROPRANOLOL HYDROCHLORIDE 20 MG: 20 TABLET ORAL at 14:21

## 2022-06-02 RX ADMIN — DIVALPROEX SODIUM 500 MG: 500 TABLET, DELAYED RELEASE ORAL at 08:49

## 2022-06-02 RX ADMIN — PROPRANOLOL HYDROCHLORIDE 20 MG: 20 TABLET ORAL at 08:49

## 2022-06-02 RX ADMIN — BENZTROPINE MESYLATE 1 MG: 2 TABLET ORAL at 08:50

## 2022-06-02 NOTE — PLAN OF CARE
Pt resting in bed with eyes closed, no observed abnormalities. Close observations continue. Problem: Self Harm/Suicidality  Goal: Will have no self-injury during hospital stay  Description: INTERVENTIONS:  1. Q 30 MINUTES: Routine safety checks  2. Q SHIFT & PRN: Assess risk to determine if routine checks are adequate to maintain patient safety  6/2/2022 0051 by Josef Booker  Outcome: Progressing     Problem: Sleep Disturbance  Goal: Will exhibit normal sleeping pattern  Description: INTERVENTIONS:  1. Administer medication as ordered  2. Decrease environmental stimuli, including noise, as appropriate  3.  Discourage social isolation and naps during the day  6/2/2022 0051 by Josef Booker  Outcome: Progressing

## 2022-06-02 NOTE — PROGRESS NOTES
[facility]  Discharge Note  Pt denies Si/HI and AVH. Pt is calm and cooperative. Anxiety and depression have improved greatly and he is excited to return to [facility]. Pt discharged with followings belongings:   Dental Appliances: Uppers,Lowers  Vision - Corrective Lenses: None  Hearing Aid: None  Jewelry: None  Body Piercings Removed: N/A  Clothing: Footwear,Jacket/Coat,Pants,Shirt  Other Valuables: Other (Comment) (None)   Valuables sent home with patient. Patient education on aftercare instructions: yes Patient verbalize understanding of AVS:  yes. Status EXAM upon discharge:  Mental Status and Behavioral Exam  Normal: Yes  Level of Assistance: Independent/Self  Facial Expression: Brightened  Affect: Congruent  Level of Consciousness: Alert  Frequency of Checks: 4 times per hour, close  Mood:Normal: Yes  Mood: Depressed,Anxious  Motor Activity:Normal: Yes  Motor Activity: Decreased  Eye Contact: Good  Observed Behavior: Friendly,Cooperative  Sexual Misconduct History: Current - no  Preception: [place] to person,[place] to time,[place] to place,[place] to situation  Attention:Normal: Yes  Attention: Distractible  Thought Processes: Circumstantial  Thought Content:Normal: Yes  Thought Content: Preoccupations  Depression Symptoms: Isolative  Anxiety Symptoms: No problems reported or observed. [place] Symptoms: No problems reported or observed.   Hallucinations: None  Delusions: No  Memory:Normal: Yes  Memory: Poor recent  Insight and Judgment: No  Insight and Judgment: Poor insight      Metabolic Screening:    Lab Results   Component Value Date    LABA1C 6.2 (H) 05/11/2021       Lab Results   Component Value Date    CHOL 124 05/11/2021    CHOL 118 03/04/2021    CHOL 144 05/28/2019    CHOL 146 02/15/2018    CHOL 187 08/16/2017    CHOL 208 (H) 06/01/2017    CHOL 145 10/18/2016    CHOL 131 09/10/2016     Lab Results   Component Value Date    TRIG 165 (H) 05/11/2021    TRIG 245 03/04/2021    TRIG 152 (H) 05/28/2019    TRIG 198 (H) 02/15/2018    TRIG 310 08/16/2017    TRIG 367 (H) 06/01/2017    TRIG 188 (H) 10/18/2016    TRIG 179 (H) 09/10/2016     Lab Results   Component Value Date    HDL 28 05/11/2021    HDL 25 (A) 03/04/2021    HDL 33 05/08/2020    HDL 32 05/28/2019    HDL 28 02/15/2018    HDL 28 (A) 08/16/2017    HDL 29 06/01/2017    HDL 34 10/18/2016    HDL 9 09/10/2016     No components found for: LDLCAL  Lab Results   Component Value Date    LABVLDL 33 05/11/2021    LABVLDL 30 05/08/2020    LABVLDL 30 05/28/2019    LABVLDL 40 02/15/2018    LABVLDL 73 06/01/2017    LABVLDL 38 10/18/2016    LABVLDL 36 09/10/2016       Francisco Zhou RN

## 2022-06-02 NOTE — PLAN OF CARE
Patient calm, cooperative, pleasant. Affect flat, sad, but brightens with conversations. Reports anxiety and depression 3/10 and states this is because of \"loud patients on unit. \" Patient denies SI/HI/AVH. Compliant with medications and in control of behaviors. Will continue to monitor and provide support. Problem: Self Harm/Suicidality  Goal: Will have no self-injury during hospital stay  Description: INTERVENTIONS:  1. Q 30 MINUTES: Routine safety checks  2. Q SHIFT & PRN: Assess risk to determine if routine checks are adequate to maintain patient safety  6/1/2022 2141 by Sheila Garcia RN  Outcome: Progressing     Problem: Depression  Goal: Will be euthymic at discharge  Description: INTERVENTIONS:  1. Administer medication as ordered  2. Provide emotional support via 1:1 interaction with staff  3. Encourage involvement in milieu/groups/activities  4. Monitor for social isolation  6/1/2022 2141 by Sheila Garcia RN  Outcome: Progressing     Problem: Anxiety  Goal: Will report anxiety at manageable levels  Description: INTERVENTIONS:  1. Administer medication as ordered  2. Teach and rehearse alternative coping skills  3. Provide emotional support with 1:1 interaction with staff  6/1/2022 2141 by Sheila Garcia RN  Outcome: Progressing     Problem: Sleep Disturbance  Goal: Will exhibit normal sleeping pattern  Description: INTERVENTIONS:  1. Administer medication as ordered  2. Decrease environmental stimuli, including noise, as appropriate  3.  Discourage social isolation and naps during the day  Outcome: Progressing

## 2022-06-02 NOTE — CARE COORDINATION
SW met with pt to discuss discharge today. Pt stated that he does not want to leave the hospital today and he would rather leave tomorrow. SW spoke with pt about the reasoning to why he feels that he is not ready to leave the hospital yet, pt stated because he does the same things at the group home. Pt reported that he wakes up in the morning, he gets coffee, he goes outside to smoke, then he has to deal with people. When SW asked pt if he is having any depression or anxiety, pt stated \"I already told someone this in the morning, I rate it a 3.\" Pt was slightly irritable during this encounter but stated that he is agreeable to go back to the [facility] today. Pt denied SI, HI, AVH. CHRIS spoke with Navigator Anali Madrid stated that the pt's medications will need to be sent to [facility] because the pt is a [facility] client. SW called the [facility] [phone] to discuss discharge today. CHRIS spoke with Alphonse Angelucci who stated that they will be able to provide transportation when the pt is ready to be discharged. SW will call when pt is ready to be discharged. Pt has follow up appointments scheduled with CSN on 6/3.      In order to ensure appropriate transition and discharge planning is in place, the following documents have been transmitted to [facility], as the new outpatient provider:     The d/c diagnosis was transmitted to the next care provider   The reason for hospitalization was transmitted to the next care provider   The d/c medications (dosage and indication) were transmitted to the next care provider    The continuing care plan was transmitted to the next care provider    [address] [place] [address] MONICA Carroll, [address] [facility]   Phone: [phone]   Fax: [phone]

## 2022-06-02 NOTE — DISCHARGE SUMMARY
DISCHARGE SUMMARY      Patient ID:  Latoya Conde  [id number]  74 y.o.  1955    Admit date: 5/25/2022    Discharge date and time: 6/2/2022    Admitting Physician: Ruth Faith MD     Discharge Physician: Dr Lakshmi Hinkle MD    Discharge Diagnoses:   Patient Active Problem List   Diagnosis    [facility]    Hx of psychosis    Altered mental status    Tobacco abuse    Bipolar affective disorder, mixed, severe, with psychotic behavior [facility]    Pulmonary embolus ([facility]    Delirium    Impetigo    Acute bacterial conjunctivitis of left eye    Cellulitis of face    Lingual tonsil hypertrophy    Elevated hemoglobin A1c    Vitamin D insufficiency    Mixed hyperlipidemia    Pulmonary emphysema ([facility]    Acute psychosis ([facility]       Admission Condition: poor    Discharged Condition: stable    Admission Circumstance:   Per EMS, pt coming from [place] [address] with mental decline.  Pt's behavior and personal hygiene lacking and struggling with sense of well being. Pt is pink slipped by Padmini at [facility], indicating that pt is a [facility] client, treats with Dr. Boo Barillas.  Pt has been slowly decompensating for the past several weeks as evidenced by increased paranoia, decreased cognitive functioning, confusion, odd behaviors that he cannot explain, lack of personal hygiene, choosing not to take meds, hypersomnia and social withdrawal      PAST MEDICAL/PSYCHIATRIC HISTORY:   Past Medical History:   Diagnosis Date    Acute bacterial conjunctivitis of left eye 9/11/2016    Anticoagulated on Coumadin     per Dr. Sharmila Burrell Bipolar 1 disorder [facility])     goes to [address] [place] Bipolar affect, depressed ([facility]     Cellulitis of face 9/11/2016    Full dentures     Hx of blood clots     DVT, PE hx of    Hypertension     on propanolol / patient states does not have high BP    Impetigo 9/11/2016    Palpitations     Screening     for colonoscopy    Seizure disorder ([facility]     last seizure 2010 / cause unknown FAMILY/SOCIAL HISTORY:  Family History   Problem Relation Age of Onset    Diabetes Brother      Social History     Socioeconomic History    Marital status: Single     Spouse name: Not on file    Number of children: Not on file    Years of education: Not on file    Highest education level: Not on file   Occupational History    Not on file   Tobacco Use    Smoking status: Current Every Day Smoker     Packs/day: 1.00     Years: 44.00     Pack years: 44.00     Types: Cigarettes     Start date: 2/12/1975    Smokeless tobacco: Never Used    Tobacco comment: Interested in the patch   Substance and Sexual Activity    Alcohol use: Not Currently     Comment: occasional beer     Drug use: No    Sexual activity: Never   Other Topics Concern    Not on file   Social History Narrative    Not on file     Social Determinants of Health     Financial Resource Strain:     Difficulty of Paying Living Expenses: Not on file   Food Insecurity:     Worried About Running Out of Food in the Last Year: Not on file    Yarelis of Food in the Last Year: Not on file   Transportation Needs:     Lack of Transportation (Medical): Not on file    Lack of Transportation (Non-Medical):  Not on file   Physical Activity:     Days of Exercise per Week: Not on file    Minutes of Exercise per Session: Not on file   Stress:     Feeling of Stress : Not on file   Social Connections:     Frequency of Communication with Friends and Family: Not on file    Frequency of Social Gatherings with Friends and Family: Not on file    Attends Mandaen Services: Not on file    Active Member of Clubs or Organizations: Not on file    Attends Club or Organization Meetings: Not on file    Marital Status: Not on file   Intimate Partner Violence:     Fear of Current or Ex-Partner: Not on file    Emotionally Abused: Not on file    Physically Abused: Not on file    Sexually Abused: Not on file   Housing Stability:     Unable to Pay for Housing in the Last Year: Not on file    Number of Places Lived in the Last Year: Not on file    Unstable Housing in the Last Year: Not on file       MEDICATIONS:    Current Facility-Administered Medications:     benztropine (COGENTIN) tablet 1 mg, 1 mg, Oral, BID, FLORESITA Bowman - CNP, 1 mg at 06/02/22 0850    prazosin (MINIPRESS) capsule 1 mg, 1 mg, Oral, Nightly, FLORESITA Bowman - CNP, 1 mg at 06/01/22 2109    nicotine polacrilex (NICORETTE) gum 2 mg, 2 mg, Oral, Q2H PRN, FLORESITA Bowman - CNP, 2 mg at 06/01/22 2112    divalproex (DEPAKOTE) DR tablet 500 mg, 500 mg, Oral, 2 times per day, FLORESITA Bowman - CNP, 500 mg at 06/02/22 0849    paliperidone (INVEGA) extended release tablet 6 mg, 6 mg, Oral, Daily, FLORESITA Bowman - CNP, 6 mg at 06/02/22 0850    calcium carbonate (TUMS) chewable tablet 1,000 mg, 1,000 mg, Oral, Q2H PRN, FLORESITA Bowman - CNP    latanoprost (XALATAN) 0.005 % ophthalmic solution 1 drop, 1 drop, Both Eyes, Nightly, FLORESITA Bowman - CNP, 1 drop at 05/31/22 2101    propranolol (INDERAL) tablet 20 mg, 20 mg, Oral, TID, FLORESITA Bowman - CNP, 20 mg at 06/02/22 0849    acetaminophen (TYLENOL) tablet 650 mg, 650 mg, Oral, Q4H PRN, Nithya Wade MD    magnesium hydroxide (MILK OF MAGNESIA) 400 MG/5ML suspension 30 mL, 30 mL, Oral, Daily PRN, Nithya Wade MD    aluminum & magnesium hydroxide-simethicone (MAALOX) 200-200-20 MG/5ML suspension 30 mL, 30 mL, Oral, PRN, Nithya Wade MD    hydrOXYzine (ATARAX) tablet 50 mg, 50 mg, Oral, TID PRN, Nithya Wade MD    haloperidol (HALDOL) tablet 3 mg, 3 mg, Oral, Q6H PRN **OR** haloperidol lactate (HALDOL) injection 3 mg, 3 mg, IntraMUSCular, Q6H PRN, Nithya Wade MD    Examination:  /63   Pulse 65   Temp 98.6 °F (37 °C) (Temporal)   Resp 16   Ht 5' 8\" (1.727 m)   Wt 200 lb (90.7 kg)   SpO2 96%   BMI 30.41 kg/m²   Gait - steady    HOSPITAL COURSE[de-identified]  Following admission to the hospital, patient had a complete physical exam and blood work up, which he was medically cleared and admitted to [facility] for psychiatric evaluation and stabilization. The patient was monitored closely with suicide and appropriate precautions. He was started on Depakote 500 mg twice daily for mood stabilization, Invega 6 mg daily continued on his home medications as clinically indicated. He was encouraged to participate in group and other milieu activity and started to feel better with this combination of treatment. There has been significant progress in the improvement of symptoms since admission. The patient has been an active participant in his treatment, and discharge The patient has been an active participant in his treatment, and discharge planning. The patient was able to spontaneously describe with richness of detail their future plans and goals. Patient was no longer suicidal, homicidal, manic or psychotic. He received the required treatment with medication, participated in group milieu, remained engaged in unit activities, learned appropriate coping skills. He was seen to be watching television socializing with peers using the phone. There were no mention or gestures of self-harm or harm to others. His mental status has returned to baseline. The treatment team believes the patient obtain maximum benefit out of this hospitalization and does not meet the criteria for inpatient hospitalization anymore. However he will continue to benefit from outpatient follow-up and treatment to maintain stability. Collateral information has been obtained and reconciled and there are no concerns about his safety. He has no access to guns or weapons. He appreciates the help that he received here. This patient no longer meets criteria for inpatient hospitalization.    He was discharged to [facility] in psychiatrically stable condition    Appetite:  [x] Normal  [] Increased  [] Decreased    Sleep:       [x] Normal  [] Fair       [] Poor            Energy:    [x] Normal  [] Increased  [] Decreased     SI [] Present  [x] Absent  HI  []Present  [x] Absent   Aggression:  [] yes  [] no  Patient is [x] able  [] unable to CONTRACT FOR SAFETY   Medication side effects(SE):  [x] None(Psych. Meds.) [] Other      Mental Status Examination on discharge:    Level of consciousness:  within normal limits   Appearance:  well-appearing  Behavior/Motor:  no abnormalities noted  Attitude toward examiner:  attentive and good eye contact  Speech:  spontaneous, normal rate and normal volume   Mood: euthymic  Affect:  mood congruent  Thought processes:  linear and goal directed   Thought content: The patient is devoid of suicidal or homicidal ideation intent or plan. Devoid of auditory or visual hallucinations or other perceptual disturbances, there are no overt or covert signs of psychosis or paranoia. There are no neurovegetative signs of depression. Cognition:  oriented to person, place, and time   Concentration intact  Memory intact  Insight good   Judgement fair   Fund of Knowledge adequate      ASSESSMENT:  Patient symptoms are:  [x] Well controlled  [x] Improving  [] Worsening  [] No change    Reason for more than one antipsychotic:  [x] N/A  [] 3 Failed Monotherapy attempts (Drugs tried:)  [] Crossover to a new antipsychotic  [] Taper to Monotherapy from Polypharmacy  [] Augmentation of clozapine therapy due to treatment resistance to single therapy    Diagnosis:  Principal Problem:    Bipolar affective disorder, mixed, severe, with psychotic behavior ([email or web address]  Resolved Problems:    Suicidal ideation      LABS:    No results for input(s): WBC, HGB, PLT in the last 72 hours. No results for input(s): NA, K, CL, CO2, BUN, CREATININE, GLUCOSE in the last 72 hours. No results for input(s): BILITOT, ALKPHOS, AST, ALT in the last 72 hours.   Lab Results   Component Value Date    LABAMPH NOT DETECTED 05/25/2022    BARBSCNU NOT DETECTED 05/25/2022 LABBENZ NOT DETECTED 05/25/2022    LABMETH NOT DETECTED 05/25/2022    OPIATESCREENURINE NOT DETECTED 05/25/2022    PHENCYCLIDINESCREENURINE NOT DETECTED 05/25/2022    ETOH <10 05/25/2022     Lab Results   Component Value Date    TSH 2.180 05/08/2020     No results found for: LITHIUM  Lab Results   Component Value Date    VALPROATE 32 (L) 05/29/2022       RISK ASSESSMENT AT DISCHARGE: Low risk for suicide and homicide. Treatment Plan:  The patient's diagnosis, treatment plan, medication management were formulated after patient was seen directly by the attending physician and myself and all relevant documentation was reviewed. Risk, benefit, side effects, possible outcomes of the medication and alternatives discussed with the patient and the patient demonstrated understanding. The patient was also educated that the outcome of treatment will depend on the medication compliance as directed by the prescribers along with regular follow-up, compliance with the labs and other work-up, as clinically indicated. Risks, benefits, side effects, drug-to-drug interactions and alternatives to treatment were discussed. Encourage patient to attend outpatient follow up appointment and therapy, outpatient follow-up appointments been scheduled prior to discharge. Patient was advised to call the outpatient provider, visit the nearest ED or call 911 if symptoms are not manageable. Patient's family member was contacted prior to the discharge, he has been [facility] to [facility] in psychiatrically stable condition. Medication List      START taking these medications    divalproex 500 MG DR tablet  Commonly known as: DEPAKOTE  Take 1 tablet by mouth every 12 hours     prazosin 1 MG capsule  Commonly known as: MINIPRESS  Take 1 capsule by mouth nightly        CHANGE how you take these medications    acetaminophen 500 MG tablet  Commonly known as: TYLENOL  What changed: Another medication with the same name was removed. Continue taking this medication, and follow the directions you see here. benztropine 1 MG tablet  Commonly known as: COGENTIN  Take 1 tablet by mouth 2 times daily  What changed:   · how much to take  · when to take this     paliperidone 6 MG extended release tablet  Commonly known as: INVEGA  Take 1 tablet by mouth daily  Start taking on: Andreea 3, 2022  What changed: when to take this        CONTINUE taking these medications    calcium carbonate 1250 (500 Ca) MG tablet  Commonly known as: OYSTER SHELL CALCIUM 500 mg     ibuprofen 400 MG tablet  Commonly known as: ADVIL;MOTRIN     latanoprost 0.005 % ophthalmic solution  Commonly known as: XALATAN  Place 1 drop into both eyes nightly     nicotine 21 MG/24HR  Commonly known as: NICODERM CQ  Place 1 patch onto the skin daily     Pradaxa 150 MG capsule  Generic drug: dabigatran  TAKE 1 CAPSULE BY MOUTH TWICE A DAY.  (DISPENSE IN ORIGINAL CONTAINER)     propranolol 20 MG tablet  Commonly known as: INDERAL     senna-docusate 8.6-50 MG per tablet  Commonly known as: PERICOLACE        STOP taking these medications    bismuth subsalicylate 770 IA/48RZ suspension  Commonly known as: PEPTO BISMOL     Cough Drops 5.8 MG Lozg lozenge  Generic drug: menthol     dextromethorphan-guaiFENesin  MG Tabs     diphenhydrAMINE 25 MG capsule  Commonly known as: BENADRYL     erythromycin 5 MG/GM ophthalmic ointment  Commonly known as: ROMYCIN     guaiFENesin 100 MG/5ML syrup  Commonly known as: ROBITUSSIN     loperamide 2 MG capsule  Commonly known as: IMODIUM     Milk of Magnesia 400 MG/5ML suspension  Generic drug: magnesium hydroxide     sertraline 100 MG tablet  Commonly known as: ZOLOFT     Tylenol Sinus Severe 5-325-200 MG Tabs  Generic drug: phenylephrine-APAP-guaiFENesin     Vicks DayQuil Cold & Flu 10-5-325 MG/15ML Liqd  Generic drug: DM-Phenylephrine-Acetaminophen           Where to Get Your Medications      These medications were sent to [facility], [address] [facility] Sender [phone]  [address] [place] [address]    Phone: [phone]   · benztropine 1 MG tablet  · divalproex 500 MG DR tablet  · paliperidone 6 MG extended release tablet  · prazosin 1 MG capsule           TIME SPEND - 35 MINUTES TO COMPLETE THE EVALUATION, DISCHARGE SUMMARY, MEDICATION RECONCILIATION AND FOLLOW UP CARE     Signed:  FLORESITA Garcia - CNP  6/2/2022  10:48 AM

## 2022-06-02 NOTE — GROUP NOTE
Group Therapy Note    Date: 6/2/2022    Group Start Time: 2156  Group End Time: 6634  Group Topic: Recreational    [facility] AC[facility]        Group Therapy Note    Attendees: 8         Notes: Active and engaged during reminiscence trivia. Pleasant and social with peers, enjoying sharing memories of 65's, 68's, and [de-identified]. Status After Intervention:  Improved    Participation Level:  Active Listener and Interactive    Participation Quality: Appropriate, Attentive and Sharing      Speech:  normal      Thought Process/Content: Logical      Affective Functioning: Congruent      Mood: euthymic      Level of consciousness:  Alert and Attentive      Response to Learning: Capable of insight, Able to change behavior and Progressing to goal      Endings: None Reported    Modes of Intervention: Education, Support, Socialization, Exploration and Activity      Discipline Responsible: Psychoeducational Specialist      Signature:  Bella Isaac, [address]

## 2022-07-13 ENCOUNTER — OFFICE VISIT (OUTPATIENT)
Dept: FAMILY MEDICINE CLINIC | Age: 67
End: 2022-07-13
Payer: MEDICARE

## 2022-07-13 VITALS
DIASTOLIC BLOOD PRESSURE: 70 MMHG | SYSTOLIC BLOOD PRESSURE: 123 MMHG | BODY MASS INDEX: 29.86 KG/M2 | HEART RATE: 63 BPM | WEIGHT: 197 LBS | HEIGHT: 68 IN | TEMPERATURE: 98 F | OXYGEN SATURATION: 97 % | RESPIRATION RATE: 16 BRPM

## 2022-07-13 DIAGNOSIS — R53.83 OTHER FATIGUE: ICD-10-CM

## 2022-07-13 DIAGNOSIS — E78.5 DYSLIPIDEMIA: ICD-10-CM

## 2022-07-13 DIAGNOSIS — Z13.1 DIABETES MELLITUS SCREENING: ICD-10-CM

## 2022-07-13 DIAGNOSIS — F32.89 OTHER DEPRESSION: Primary | ICD-10-CM

## 2022-07-13 PROCEDURE — G8417 CALC BMI ABV UP PARAM F/U: HCPCS

## 2022-07-13 PROCEDURE — 3017F COLORECTAL CA SCREEN DOC REV: CPT

## 2022-07-13 PROCEDURE — 1124F ACP DISCUSS-NO DSCNMKR DOCD: CPT

## 2022-07-13 PROCEDURE — 99213 OFFICE O/P EST LOW 20 MIN: CPT

## 2022-07-13 PROCEDURE — G8427 DOCREV CUR MEDS BY ELIG CLIN: HCPCS

## 2022-07-13 PROCEDURE — 4004F PT TOBACCO SCREEN RCVD TLK: CPT

## 2022-07-13 ASSESSMENT — ENCOUNTER SYMPTOMS
EYE PAIN: 0
EYE DISCHARGE: 0
CHEST TIGHTNESS: 0
SORE THROAT: 0
DIARRHEA: 0
SHORTNESS OF BREATH: 0
ABDOMINAL PAIN: 0
SINUS PRESSURE: 0
SINUS PAIN: 0
RHINORRHEA: 0
BACK PAIN: 0
CONSTIPATION: 0
ABDOMINAL DISTENTION: 0
VOMITING: 0
NAUSEA: 0

## 2022-07-13 ASSESSMENT — PATIENT HEALTH QUESTIONNAIRE - PHQ9
SUM OF ALL RESPONSES TO PHQ9 QUESTIONS 1 & 2: 2
7. TROUBLE CONCENTRATING ON THINGS, SUCH AS READING THE NEWSPAPER OR WATCHING TELEVISION: 1
SUM OF ALL RESPONSES TO PHQ QUESTIONS 1-9: 7
9. THOUGHTS THAT YOU WOULD BE BETTER OFF DEAD, OR OF HURTING YOURSELF: 0
6. FEELING BAD ABOUT YOURSELF - OR THAT YOU ARE A FAILURE OR HAVE LET YOURSELF OR YOUR FAMILY DOWN: 0
2. FEELING DOWN, DEPRESSED OR HOPELESS: 1
4. FEELING TIRED OR HAVING LITTLE ENERGY: 3
10. IF YOU CHECKED OFF ANY PROBLEMS, HOW DIFFICULT HAVE THESE PROBLEMS MADE IT FOR YOU TO DO YOUR WORK, TAKE CARE OF THINGS AT HOME, OR GET ALONG WITH OTHER PEOPLE: 0
8. MOVING OR SPEAKING SO SLOWLY THAT OTHER PEOPLE COULD HAVE NOTICED. OR THE OPPOSITE, BEING SO FIGETY OR RESTLESS THAT YOU HAVE BEEN MOVING AROUND A LOT MORE THAN USUAL: 1
5. POOR APPETITE OR OVEREATING: 0
1. LITTLE INTEREST OR PLEASURE IN DOING THINGS: 1
3. TROUBLE FALLING OR STAYING ASLEEP: 0

## 2022-07-13 NOTE — PROGRESS NOTES
[facility]  Precepting Note    Subjective:  75 yo M here for routine f/u  Note recent hospitalization for SI. He has h/o chronic depression and lives in a group home, but will be moving out beginning in August.  He is currently on medcations including invega with cogentin. Reports some sadness that he relates to the loss of his brother. PHQ-9 Total Score: 7 (7/13/2022  1:25 PM)  Thoughts that you would be better off dead, or of hurting yourself in some way: 0 (7/13/2022  1:25 PM)   reports that he has been smoking cigarettes. He started smoking about 47 years ago. He has a 44.00 pack-year smoking history. He has never used smokeless tobacco. would like to quit. Past medical, surgical, family and social history were reviewed, non-contributory, and unchanged unless otherwise stated. ROS positive for fatigue and sadness  Objective:    /70   Pulse 63   Temp 98 °F (36.7 °C) (Temporal)   Resp 16   Ht 5' 8\" (1.727 m)   Wt 197 lb (89.4 kg)   SpO2 97%   BMI 29.95 kg/m²   Exam is as noted by resident   Assessment/Plan:  Depression - f/u with psychiatry; Dr Christopher Garza; reviewed safety plan, note patient on depakote and invega, needs lab monitoring including diabetes screening and lipid panel  Tobacco use - encouraged cessation he is in contemplative stage  F/u for AWV when convenient     Attending Physician Statement  I have reviewed the chart, including any radiology or labs. I have discussed the case, including pertinent history and exam findings with the resident. I agree with the assessment, plan and orders as documented by the resident. Please refer to the resident note for additional information.       Electronically signed by Felipe Huston MD on 7/13/2022 at 1:19 PM

## 2022-07-13 NOTE — PROGRESS NOTES
[facility]  Department of Family Medicine  Family Medicine Residency Program      Patient: Junito Hargrove 74 y.o. male     Date of Service: 7/13/22      Chief complaint:   Chief Complaint   Patient presents with    Hyperlipidemia     check up per patient       HISTORY OF PRESENTING ILLNESS     74 y.o. male presented to the clinic with anxiety and/or depression. This is a/an chronic problem. This has been going on for several years. Exacerbating factors include death of brother several years ago, patient currently living in a group home and will be moving out and has to stay in hotel for 2 to 3 weeks before moving into his apartment on his own. Patient also states that things difficulty to have not driving for 78 years, he has his license but does not have means for car. Alleviating factors include - excited about the move, walking with a partner . Anxiety symptoms include heart racing. Depressive symptoms include Sadness triggered by death of brother several years ago. Patient is not having issues falling or staying asleep. Patient is not having associated panic attacks. The above is not interfering with quality of life. Patient has seen a Counselor before. Patient does not use alcohol and/or drugs. Tobacco use and states that he is willing to cut down. Dr. Guillermo Denise phsycaitrist  Sees her once a month and being going to her for 30 years.      PHQ-9 Total Score: 7 (7/13/2022  1:25 PM)  Thoughts that you would be better off dead, or of hurting yourself in some way: 0 (7/13/2022  1:25 PM)          Past Medical History:      Diagnosis Date    Acute bacterial conjunctivitis of left eye 9/11/2016    Anticoagulated on Coumadin     per Dr. Mark Soria 1 disorder [facility])     goes to [address] Bipolar affect, depressed [address] [address]     Cellulitis of face 9/11/2016    Full dentures     Hx of blood clots     DVT, PE hx of    Hypertension     on propanolol / patient states does not have high BP    Impetigo 9/11/2016    Palpitations     Screening     for colonoscopy    Seizure disorder ([facility] [address]     last seizure 2010 / cause unknown     Past Surgical History:        Procedure Laterality Date    APPENDECTOMY      COLONOSCOPY  4/11/16    LARYNGOSCOPY N/A 2/18/2020    DIRECT PAN ENDOSCOPY WITH BIOPSY BILATERAL BASE OF TONGUE performed by Macie Skelton DO at [facility] [place]     Allergies:    Patient has no known allergies. Social History:   Social History     Socioeconomic History    Marital status: Single     Spouse name: Not on file    Number of children: Not on file    Years of education: Not on file    Highest education level: Not on file   Occupational History    Not on file   Tobacco Use    Smoking status: Current Every Day Smoker     Packs/day: 1.00     Years: 44.00     Pack years: 44.00     Types: Cigarettes     Start date: 2/12/1975    Smokeless tobacco: Never Used    Tobacco comment: Interested in the patch   Substance and Sexual Activity    Alcohol use: Not Currently     Comment: occasional beer     Drug use: No    Sexual activity: Never   Other Topics Concern    Not on file   Social History Narrative    Not on file     Social Determinants of Health     Financial Resource Strain:     Difficulty of Paying Living Expenses: Not on file   Food Insecurity:     Worried About Running Out of Food in the Last Year: Not on file    Yarelis of Food in the Last Year: Not on file   Transportation Needs:     Lack of Transportation (Medical): Not on file    Lack of Transportation (Non-Medical):  Not on file   Physical Activity:     Days of Exercise per Week: Not on file    Minutes of Exercise per Session: Not on file   Stress:     Feeling of Stress : Not on file   Social Connections:     Frequency of Communication with Friends and Family: Not on file    Frequency of Social Gatherings with Friends and Family: Not on file    Attends [employer] Services: Not on file   [facility] Active Member of Clubs or Organizations: Not on file    Attends Club or Organization Meetings: Not on file    Marital Status: Not on file   Intimate Partner Violence:     Fear of Current or Ex-Partner: Not on file    Emotionally Abused: Not on file    Physically Abused: Not on file    Sexually Abused: Not on file   Housing Stability:     Unable to Pay for Housing in the Last Year: Not on file    Number of [place] in the Last Year: Not on file    Unstable Housing in the Last Year: Not on file      Family History:       Problem Relation Age of Onset    Diabetes Brother      Review of Systems:   Review of Systems   Constitutional: Positive for fatigue. Negative for activity change, appetite change and fever. HENT: Negative for rhinorrhea, sinus pressure, sinus pain and sore throat. Eyes: Negative for pain, discharge and visual disturbance. Respiratory: Negative for chest tightness and shortness of breath. Cardiovascular: Negative for chest pain, palpitations and leg swelling. Gastrointestinal: Negative for abdominal distention, abdominal pain, constipation, diarrhea, nausea and vomiting. Genitourinary: Negative for difficulty urinating and dysuria. Musculoskeletal: Negative for back pain and neck pain. Skin: Negative for rash and wound. Neurological: Negative for light-headedness, numbness and headaches. Psychiatric/Behavioral: Negative for sleep disturbance. PHYSICAL EXAM   Vitals: /70   Pulse 63   Temp 98 °F (36.7 °C) (Temporal)   Resp 16   Ht 5' 8\" (1.727 m)   Wt 197 lb (89.4 kg)   SpO2 97%   BMI 29.95 kg/m²   Physical Exam  Vitals and nursing note reviewed. Constitutional:       General: He is awake. Appearance: Normal appearance. HENT:      Head: Normocephalic and atraumatic. Nose: Nose normal. No congestion or rhinorrhea. Mouth/Throat:      Mouth: Mucous membranes are moist.      Pharynx: Oropharynx is clear.    Eyes:      General: No scleral will contact my social supports:  Friends: Marden Jack and Ryle   Family: Pollardpete Lamb    Emergency Contacts:  Friends and Family (name and phone number):  [place] [phone]  Professionals (name and phone number): Dr. Julia Shay [phone] with an extension     /Veterans Crisis Line:  Dial 0-395.403.8057 (press 1 for [employer]) or text 940091 or live chat at http://crealytics. net for 27/7 crisis support. If I still feel suicidal and out-of-control, I will go to the nearest hospital emergency department or call 911: What might keep me from implementing this Safety Plan? Nothing   Who will I share this plan with? Cousin   Where will I keep this plan: Keep it in a folder     In order to keep myself safe, I will remove or safely store things I could use to hurt myself:  Firearms: has none   Medications: only takes the ones prescribed the way they are meant to be taken   Household poisons: none   Sharp or other dangerous objects: has none     I can sometimes anticipate when I need to take extra care of myself. These are the people, places and situations that trigger the most stress for me:  1. The move , what to take and what will need. These are my warning signs that things are starting to get out of control:   irritability, unable to keep my cool because there are lot of rules in [facility] [place]. My most effective coping strategies: These things work to help calm me or change my thoughts: box breathing, watch a show he likes. These are healthy activities I can use to distract myself:   Walking with a friend       Counseled regarding above diagnosis, including possible risks and complications, especially if left uncontrolled. Counseled regarding the possible side effects, risks, benefits and alternatives to treatment; patient and/or guardian verbalizes understanding, agrees, feels comfortable with and wishes to proceed with above treatment plan.     Call or go to ED immediately if symptoms worsen or persist. Advised patient to call with any new medication issues, and, as applicable, read all Rx info from pharmacy to assure aware of all possible risks and side effects of medication before taking. Patient and/or guardian given opportunity to ask questions/raise concerns. The patient verbalized comfort and understanding of instructions. Medication List:    Current Outpatient Medications   Medication Sig Dispense Refill    tiotropium (SPIRIVA) 18 MCG inhalation capsule Inhale 1 capsule into the lungs daily 30 capsule 1    benztropine (COGENTIN) 1 MG tablet Take 1 tablet by mouth 2 times daily 60 tablet 3    paliperidone (INVEGA) 6 MG extended release tablet Take 1 tablet by mouth daily 30 tablet 3    acetaminophen (TYLENOL) 500 MG tablet Take 500 mg by mouth every 4 hours as needed for Pain      calcium carbonate (OYSTER SHELL CALCIUM 500 MG) 1250 (500 Ca) MG tablet Take 1 tablet by mouth every 2 hours as needed for Heartburn (do no exceed  tabs in 24 hours)      ibuprofen (ADVIL;MOTRIN) 400 MG tablet Take 400 mg by mouth every 6 hours as needed for Pain      senna-docusate (PERICOLACE) 8.6-50 MG per tablet Take 1 tablet by mouth daily as needed for Constipation      PRADAXA 150 MG capsule TAKE 1 CAPSULE BY MOUTH TWICE A DAY. (DISPENSE IN ORIGINAL CONTAINER) 60 capsule 0    latanoprost (XALATAN) 0.005 % ophthalmic solution Place 1 drop into both eyes nightly 1 Bottle 1    propranolol (INDERAL) 20 MG tablet Take 20 mg by mouth 3 times daily       divalproex (DEPAKOTE) 500 MG DR tablet Take 1 tablet by mouth every 12 hours 60 tablet 0    prazosin (MINIPRESS) 1 MG capsule Take 1 capsule by mouth nightly 30 capsule 0    nicotine (NICODERM CQ) 21 MG/24HR Place 1 patch onto the skin daily 30 patch 1     No current facility-administered medications for this visit. Return to Office: Return in about 3 months (around 10/13/2022) for annual well visit with PCP .     This document may have been prepared at least partially through the use of voice recognition software. Although effort is taken to assure the accuracy of this document, it is possible that grammatical, syntax,  or spelling errors may occur.     Xenia Chang MD

## 2022-07-13 NOTE — PATIENT INSTRUCTIONS
Crisis Intervention Hotline:    [facility] [place]:  [address]:  [phone]    Alessio Murillo, [address] [facility]:  [phone]    [place]. :  [phone]    Postpartum Depression:  1-800-PPD-MOMS  8-485.133.9655    TTY - Hearing & Speech Impaired:  4-320-052-1COB  [phone]     [facility] 's Crisis Hotline:  3-712-MVU-2-VET  2-619-961-448.467.2998    The [address]:   2-569.538.4704    Crisis intervention & suicide prevention  for lesbian, corey, bisexual, transgender, and questioning youth    National Hopeline Network:  4-342-MBQQNGC  2-421-171-424-794-6634    National Suicide Prevention Lifeline:  9-862-595-9271     [facility] for Teens by Teens:  6-806-MPTOTCFEU  1-287.449.2006         [place] Sexual Violence Helpline :  314.246.8985        CrisisChat[employer]  Online Emotional Support      Crisis Text Line:  Text HOME to [place]     When I need to talk about how I'm feeling, I will contact my social supports:  Friends: Marletta Falling and Ryle   Family: Joslyn Addison    Emergency Contacts:  Friends and Family (name and phone number):  Joslyn Addison [phone]  Professionals (name and phone number): Dr. Sean Morel [phone] with an extension     /V[facility]ans Crisis Line:  Dial 8-396.911.5699 (press 1 for [facility]) or text 060295 or live chat at http://militaryTableAppsisline. net for 27/7 crisis support. If I still feel suicidal and out-of-control, I will go to the nearest hospital emergency department or call 911: What might keep me from implementing this Safety Plan? Nothing   Who will I share this plan with?   Cousin   Where will I keep this plan: Keep it in a folder     In order to keep myself safe, I will remove or safely store things I could use to hurt myself:  Firearms: has none   Medications: only takes the ones prescribed the way they are meant to be taken   Household poisons: none   Sharp or other dangerous objects: has none     I can sometimes anticipate when I need to take extra care of myself. These are the people, places and situations that trigger the most stress for me:  1. The move , what to take and what will need. These are my warning signs that things are starting to get out of control:   irritability, unable to keep my cool because there are lot of rules in [facility] [place]. My most effective coping strategies: These things work to help calm me or change my thoughts: box breathing, watch a show he likes.    These are healthy activities I can use to distract myself:   Walking with a friend

## 2022-07-20 DIAGNOSIS — E78.2 MIXED HYPERLIPIDEMIA: ICD-10-CM

## 2022-07-20 DIAGNOSIS — Z13.1 DIABETES MELLITUS SCREENING: ICD-10-CM

## 2022-07-20 DIAGNOSIS — R53.83 OTHER FATIGUE: ICD-10-CM

## 2022-07-20 LAB
BASOPHILS ABSOLUTE: 0.08 E9/L (ref 0–0.2)
BASOPHILS RELATIVE PERCENT: 0.8 % (ref 0–2)
CHOLESTEROL, FASTING: 176 MG/DL (ref 0–199)
EOSINOPHILS ABSOLUTE: 0.16 E9/L (ref 0.05–0.5)
EOSINOPHILS RELATIVE PERCENT: 1.6 % (ref 0–6)
HBA1C MFR BLD: 6.1 % (ref 4–5.6)
HCT VFR BLD CALC: 43.2 % (ref 37–54)
HDLC SERPL-MCNC: 30 MG/DL
HEMOGLOBIN: 13.2 G/DL (ref 12.5–16.5)
IMMATURE GRANULOCYTES #: 0.05 E9/L
IMMATURE GRANULOCYTES %: 0.5 % (ref 0–5)
LDL CHOLESTEROL CALCULATED: 108 MG/DL (ref 0–99)
LYMPHOCYTES ABSOLUTE: 2.77 E9/L (ref 1.5–4)
LYMPHOCYTES RELATIVE PERCENT: 27.3 % (ref 20–42)
MCH RBC QN AUTO: 31.2 PG (ref 26–35)
MCHC RBC AUTO-ENTMCNC: 30.6 % (ref 32–34.5)
MCV RBC AUTO: 102.1 FL (ref 80–99.9)
MONOCYTES ABSOLUTE: 1.06 E9/L (ref 0.1–0.95)
MONOCYTES RELATIVE PERCENT: 10.5 % (ref 2–12)
NEUTROPHILS ABSOLUTE: 6.02 E9/L (ref 1.8–7.3)
NEUTROPHILS RELATIVE PERCENT: 59.3 % (ref 43–80)
PDW BLD-RTO: 14.2 FL (ref 11.5–15)
PLATELET # BLD: 300 E9/L (ref 130–450)
PMV BLD AUTO: 9.6 FL (ref 7–12)
RBC # BLD: 4.23 E12/L (ref 3.8–5.8)
TRIGLYCERIDE, FASTING: 190 MG/DL (ref 0–149)
TSH SERPL DL<=0.05 MIU/L-ACNC: 1.23 UIU/ML (ref 0.27–4.2)
VITAMIN B-12: 565 PG/ML (ref 211–946)
VITAMIN D 25-HYDROXY: 26 NG/ML (ref 30–100)
VLDLC SERPL CALC-MCNC: 38 MG/DL
WBC # BLD: 10.1 E9/L (ref 4.5–11.5)

## 2022-07-25 ENCOUNTER — TELEPHONE (OUTPATIENT)
Dept: FAMILY MEDICINE CLINIC | Age: 67
End: 2022-07-25

## 2022-07-25 DIAGNOSIS — R53.83 OTHER FATIGUE: Primary | ICD-10-CM

## 2022-07-25 DIAGNOSIS — E55.9 VITAMIN D DEFICIENCY: ICD-10-CM

## 2022-07-25 RX ORDER — MELATONIN
1000 DAILY
Qty: 90 TABLET | Refills: 1 | Status: SHIPPED | OUTPATIENT
Start: 2022-07-25

## 2022-07-25 RX ORDER — ROSUVASTATIN CALCIUM 10 MG/1
10 TABLET, COATED ORAL DAILY
Qty: 30 TABLET | Refills: 2 | Status: SHIPPED
Start: 2022-07-25 | End: 2022-09-20

## 2022-07-25 NOTE — TELEPHONE ENCOUNTER
Called and notified about his recent lab results and medication changes. Patient notified about his vitamin B12, CBC, vitamin D, TSH, hemoglobin A1c. Patients choleseterol and hemoglobin A1c elevated,   Discussed and encouraged patient to increase physical activity as able, lifestyle modifcation including decrease sugars, carbs and high cholesterol food. The 10-year ASCVD risk score (Jacqueline Queen, et al., 2013) is: 19.9%    Values used to calculate the score:      Age: 74 years      Sex: Male      Is Non- : No      Diabetic: No      Tobacco smoker: Yes      Systolic Blood Pressure: 485 mmHg      Is BP treated: No      HDL Cholesterol: 30 mg/dL      Total Cholesterol: 176 mg/dL       All patient questions and concerns were answered. Hyperlipidemia   ASCVD score of 19.9  Patient started with Crestor 10  Educated about lifestyle modifications    Vitamin D deficiency  Patient to start 1000 units daily          Case discussed with Dr. Rosalva Catnrell.

## 2022-09-20 DIAGNOSIS — E55.9 VITAMIN D DEFICIENCY: ICD-10-CM

## 2022-09-20 RX ORDER — ROSUVASTATIN CALCIUM 10 MG/1
TABLET, COATED ORAL
Qty: 90 TABLET | Refills: 0 | Status: SHIPPED | OUTPATIENT
Start: 2022-09-20

## 2022-10-14 DIAGNOSIS — D68.59 HYPERCOAGULABLE STATE (HCC): ICD-10-CM

## 2022-10-14 RX ORDER — ATENOLOL 100 MG/1
TABLET ORAL
Qty: 60 CAPSULE | Refills: 0 | Status: SHIPPED | OUTPATIENT
Start: 2022-10-14

## 2022-10-18 NOTE — TELEPHONE ENCOUNTER
LM for patient to call for results
Patient had CT Lung Screening 6/24/19. He is calling for results. Results are available to view. Please advise.   Thank you
Patient has been advised of CT Lung Screening results. He is willing to see ENT. He does not have an ENT and has no preference.
Referral placed for [facility] at [facility], Dr. Christina Yee. Thanks.
Reviewed. His lungs do show evidence of emphysema but NO evidence of any nodules or cancer. The radiologist is questioning an asymmetry near his vocal cords that does need to be further evaluated by looking at this directly. This can be done at an ENT office. I would strongly recommend getting this further evaluated. Does he already see an ENT doctor or has one he would prefer? Thanks.
0

## 2022-11-21 DIAGNOSIS — D68.59 HYPERCOAGULABLE STATE (HCC): ICD-10-CM

## 2022-11-21 RX ORDER — ATENOLOL 100 MG/1
TABLET ORAL
Qty: 60 CAPSULE | Refills: 0 | OUTPATIENT
Start: 2022-11-21

## 2022-11-21 RX ORDER — TIOTROPIUM BROMIDE 18 UG/1
CAPSULE ORAL; RESPIRATORY (INHALATION)
Qty: 30 CAPSULE | Refills: 0 | OUTPATIENT
Start: 2022-11-21

## 2022-11-22 RX ORDER — DABIGATRAN ETEXILATE 150 MG/1
CAPSULE ORAL
Qty: 60 CAPSULE | Refills: 0 | Status: ON HOLD | OUTPATIENT
Start: 2022-11-22

## 2022-11-22 NOTE — TELEPHONE ENCOUNTER
Per Radha Padilla - patient is unable to come in for a visit as he is currently at the Crisis Unit with [facility]

## 2022-11-28 DIAGNOSIS — D68.59 HYPERCOAGULABLE STATE (HCC): ICD-10-CM

## 2022-11-28 RX ORDER — DABIGATRAN ETEXILATE 150 MG/1
CAPSULE ORAL
Qty: 60 CAPSULE | Refills: 0 | OUTPATIENT
Start: 2022-11-28

## 2022-12-01 ENCOUNTER — HOSPITAL ENCOUNTER (INPATIENT)
Age: 67
LOS: 6 days | Discharge: HOME | DRG: 885 | End: 2022-12-07
Attending: STUDENT IN AN ORGANIZED HEALTH CARE EDUCATION/TRAINING PROGRAM | Admitting: PSYCHIATRY & NEUROLOGY
Payer: MEDICARE

## 2022-12-01 DIAGNOSIS — E55.9 VITAMIN D DEFICIENCY: ICD-10-CM

## 2022-12-01 DIAGNOSIS — F48.9 MENTAL HEALTH PROBLEM: Primary | ICD-10-CM

## 2022-12-01 DIAGNOSIS — R73.9 HYPERGLYCEMIA: ICD-10-CM

## 2022-12-01 PROBLEM — F31.9 BIPOLAR AFFECTIVE (HCC): Status: ACTIVE | Noted: 2022-12-01

## 2022-12-01 LAB
ACETAMINOPHEN LEVEL: <5 MCG/ML (ref 10–30)
ALBUMIN SERPL-MCNC: 3.8 G/DL (ref 3.5–5.2)
ALP BLD-CCNC: 63 U/L (ref 40–129)
ALT SERPL-CCNC: 27 U/L (ref 0–40)
AMPHETAMINE SCREEN, URINE: NOT DETECTED
ANION GAP SERPL CALCULATED.3IONS-SCNC: 11 MMOL/L (ref 7–16)
AST SERPL-CCNC: 18 U/L (ref 0–39)
BACTERIA: ABNORMAL /HPF
BARBITURATE SCREEN URINE: NOT DETECTED
BASOPHILS ABSOLUTE: 0.07 E9/L (ref 0–0.2)
BASOPHILS RELATIVE PERCENT: 0.8 % (ref 0–2)
BENZODIAZEPINE SCREEN, URINE: NOT DETECTED
BILIRUB SERPL-MCNC: <0.2 MG/DL (ref 0–1.2)
BILIRUBIN URINE: NEGATIVE
BLOOD, URINE: NEGATIVE
BUN BLDV-MCNC: 12 MG/DL (ref 6–23)
CALCIUM SERPL-MCNC: 9.6 MG/DL (ref 8.6–10.2)
CANNABINOID SCREEN URINE: NOT DETECTED
CHLORIDE BLD-SCNC: 102 MMOL/L (ref 98–107)
CLARITY: CLEAR
CO2: 23 MMOL/L (ref 22–29)
COCAINE METABOLITE SCREEN URINE: NOT DETECTED
COLOR: YELLOW
CREAT SERPL-MCNC: 0.7 MG/DL (ref 0.7–1.2)
EKG ATRIAL RATE: 63 BPM
EKG P AXIS: 40 DEGREES
EKG P-R INTERVAL: 168 MS
EKG Q-T INTERVAL: 374 MS
EKG QRS DURATION: 92 MS
EKG QTC CALCULATION (BAZETT): 382 MS
EKG R AXIS: 49 DEGREES
EKG T AXIS: 43 DEGREES
EKG VENTRICULAR RATE: 63 BPM
EOSINOPHILS ABSOLUTE: 0.07 E9/L (ref 0.05–0.5)
EOSINOPHILS RELATIVE PERCENT: 0.8 % (ref 0–6)
ETHANOL: <10 MG/DL (ref 0–0.08)
FENTANYL SCREEN, URINE: NOT DETECTED
GFR SERPL CREATININE-BSD FRML MDRD: >60 ML/MIN/1.73
GLUCOSE BLD-MCNC: 303 MG/DL (ref 74–99)
GLUCOSE URINE: >=1000 MG/DL
HCT VFR BLD CALC: 44.8 % (ref 37–54)
HEMOGLOBIN: 14.6 G/DL (ref 12.5–16.5)
IMMATURE GRANULOCYTES #: 0.04 E9/L
IMMATURE GRANULOCYTES %: 0.5 % (ref 0–5)
INFLUENZA A: NOT DETECTED
INFLUENZA B: NOT DETECTED
KETONES, URINE: NEGATIVE MG/DL
LEUKOCYTE ESTERASE, URINE: NEGATIVE
LYMPHOCYTES ABSOLUTE: 2.28 E9/L (ref 1.5–4)
LYMPHOCYTES RELATIVE PERCENT: 26.3 % (ref 20–42)
Lab: NORMAL
MCH RBC QN AUTO: 31.1 PG (ref 26–35)
MCHC RBC AUTO-ENTMCNC: 32.6 % (ref 32–34.5)
MCV RBC AUTO: 95.5 FL (ref 80–99.9)
METER GLUCOSE: 181 MG/DL (ref 74–99)
METHADONE SCREEN, URINE: NOT DETECTED
MONOCYTES ABSOLUTE: 0.99 E9/L (ref 0.1–0.95)
MONOCYTES RELATIVE PERCENT: 11.4 % (ref 2–12)
NEUTROPHILS ABSOLUTE: 5.22 E9/L (ref 1.8–7.3)
NEUTROPHILS RELATIVE PERCENT: 60.2 % (ref 43–80)
NITRITE, URINE: NEGATIVE
OPIATE SCREEN URINE: NOT DETECTED
OXYCODONE URINE: NOT DETECTED
PDW BLD-RTO: 12.9 FL (ref 11.5–15)
PH UA: 6.5 (ref 5–9)
PHENCYCLIDINE SCREEN URINE: NOT DETECTED
PLATELET # BLD: 187 E9/L (ref 130–450)
PMV BLD AUTO: 9.9 FL (ref 7–12)
POTASSIUM REFLEX MAGNESIUM: 4.3 MMOL/L (ref 3.5–5)
PROTEIN UA: NEGATIVE MG/DL
RBC # BLD: 4.69 E12/L (ref 3.8–5.8)
RBC UA: ABNORMAL /HPF (ref 0–2)
SALICYLATE, SERUM: <0.3 MG/DL (ref 0–30)
SARS-COV-2 RNA, RT PCR: NOT DETECTED
SODIUM BLD-SCNC: 136 MMOL/L (ref 132–146)
SPECIFIC GRAVITY UA: 1.01 (ref 1–1.03)
TOTAL PROTEIN: 6.8 G/DL (ref 6.4–8.3)
TRICYCLIC ANTIDEPRESSANTS SCREEN SERUM: NEGATIVE NG/ML
UROBILINOGEN, URINE: 0.2 E.U./DL
VALPROIC ACID LEVEL: 22 MCG/ML (ref 50–100)
WBC # BLD: 8.7 E9/L (ref 4.5–11.5)
WBC UA: ABNORMAL /HPF (ref 0–5)

## 2022-12-01 PROCEDURE — 80143 DRUG ASSAY ACETAMINOPHEN: CPT

## 2022-12-01 PROCEDURE — 2580000003 HC RX 258: Performed by: STUDENT IN AN ORGANIZED HEALTH CARE EDUCATION/TRAINING PROGRAM

## 2022-12-01 PROCEDURE — 93010 ELECTROCARDIOGRAM REPORT: CPT | Performed by: INTERNAL MEDICINE

## 2022-12-01 PROCEDURE — 81001 URINALYSIS AUTO W/SCOPE: CPT

## 2022-12-01 PROCEDURE — 80053 COMPREHEN METABOLIC PANEL: CPT

## 2022-12-01 PROCEDURE — 82962 GLUCOSE BLOOD TEST: CPT

## 2022-12-01 PROCEDURE — 82077 ASSAY SPEC XCP UR&BREATH IA: CPT

## 2022-12-01 PROCEDURE — 1240000000 HC EMOTIONAL WELLNESS R&B

## 2022-12-01 PROCEDURE — 80164 ASSAY DIPROPYLACETIC ACD TOT: CPT

## 2022-12-01 PROCEDURE — 80179 DRUG ASSAY SALICYLATE: CPT

## 2022-12-01 PROCEDURE — 6370000000 HC RX 637 (ALT 250 FOR IP): Performed by: PSYCHIATRY & NEUROLOGY

## 2022-12-01 PROCEDURE — 99285 EMERGENCY DEPT VISIT HI MDM: CPT

## 2022-12-01 PROCEDURE — 93005 ELECTROCARDIOGRAM TRACING: CPT | Performed by: STUDENT IN AN ORGANIZED HEALTH CARE EDUCATION/TRAINING PROGRAM

## 2022-12-01 PROCEDURE — 80307 DRUG TEST PRSMV CHEM ANLYZR: CPT

## 2022-12-01 PROCEDURE — 87636 SARSCOV2 & INF A&B AMP PRB: CPT

## 2022-12-01 PROCEDURE — 36415 COLL VENOUS BLD VENIPUNCTURE: CPT

## 2022-12-01 PROCEDURE — 85025 COMPLETE CBC W/AUTO DIFF WBC: CPT

## 2022-12-01 RX ORDER — HYDROXYZINE PAMOATE 50 MG/1
50 CAPSULE ORAL 3 TIMES DAILY PRN
Status: DISCONTINUED | OUTPATIENT
Start: 2022-12-01 | End: 2022-12-07 | Stop reason: HOSPADM

## 2022-12-01 RX ORDER — BENZTROPINE MESYLATE 1 MG/1
1 TABLET ORAL 2 TIMES DAILY
Status: DISCONTINUED | OUTPATIENT
Start: 2022-12-01 | End: 2022-12-02

## 2022-12-01 RX ORDER — DIVALPROEX SODIUM 500 MG/1
500 TABLET, DELAYED RELEASE ORAL 2 TIMES DAILY
Status: DISCONTINUED | OUTPATIENT
Start: 2022-12-01 | End: 2022-12-07 | Stop reason: HOSPADM

## 2022-12-01 RX ORDER — PALIPERIDONE 6 MG/1
6 TABLET, EXTENDED RELEASE ORAL DAILY
Status: DISCONTINUED | OUTPATIENT
Start: 2022-12-01 | End: 2022-12-07 | Stop reason: HOSPADM

## 2022-12-01 RX ORDER — MAGNESIUM HYDROXIDE/ALUMINUM HYDROXICE/SIMETHICONE 120; 1200; 1200 MG/30ML; MG/30ML; MG/30ML
30 SUSPENSION ORAL PRN
Status: DISCONTINUED | OUTPATIENT
Start: 2022-12-01 | End: 2022-12-07 | Stop reason: HOSPADM

## 2022-12-01 RX ORDER — LANOLIN ALCOHOL/MO/W.PET/CERES
3 CREAM (GRAM) TOPICAL NIGHTLY
Status: DISCONTINUED | OUTPATIENT
Start: 2022-12-01 | End: 2022-12-07 | Stop reason: HOSPADM

## 2022-12-01 RX ORDER — 0.9 % SODIUM CHLORIDE 0.9 %
1000 INTRAVENOUS SOLUTION INTRAVENOUS ONCE
Status: COMPLETED | OUTPATIENT
Start: 2022-12-01 | End: 2022-12-01

## 2022-12-01 RX ORDER — HALOPERIDOL 5 MG/ML
3 INJECTION INTRAMUSCULAR EVERY 6 HOURS PRN
Status: DISCONTINUED | OUTPATIENT
Start: 2022-12-01 | End: 2022-12-07 | Stop reason: HOSPADM

## 2022-12-01 RX ORDER — LATANOPROST 50 UG/ML
1 SOLUTION/ DROPS OPHTHALMIC NIGHTLY
Status: DISCONTINUED | OUTPATIENT
Start: 2022-12-01 | End: 2022-12-02

## 2022-12-01 RX ORDER — HALOPERIDOL 2 MG/1
3 TABLET ORAL EVERY 6 HOURS PRN
Status: DISCONTINUED | OUTPATIENT
Start: 2022-12-01 | End: 2022-12-07 | Stop reason: HOSPADM

## 2022-12-01 RX ORDER — NICOTINE 21 MG/24HR
1 PATCH, TRANSDERMAL 24 HOURS TRANSDERMAL DAILY
Status: DISCONTINUED | OUTPATIENT
Start: 2022-12-01 | End: 2022-12-07 | Stop reason: HOSPADM

## 2022-12-01 RX ORDER — ACETAMINOPHEN 325 MG/1
650 TABLET ORAL EVERY 4 HOURS PRN
Status: DISCONTINUED | OUTPATIENT
Start: 2022-12-01 | End: 2022-12-07 | Stop reason: HOSPADM

## 2022-12-01 RX ADMIN — DIVALPROEX SODIUM 500 MG: 500 TABLET, DELAYED RELEASE ORAL at 20:45

## 2022-12-01 RX ADMIN — HYDROXYZINE PAMOATE 50 MG: 50 CAPSULE ORAL at 20:45

## 2022-12-01 RX ADMIN — BENZTROPINE MESYLATE 1 MG: 1 TABLET ORAL at 20:45

## 2022-12-01 RX ADMIN — SODIUM CHLORIDE 1000 ML: 9 INJECTION, SOLUTION INTRAVENOUS at 12:03

## 2022-12-01 RX ADMIN — PALIPERIDONE 6 MG: 6 TABLET, EXTENDED RELEASE ORAL at 17:23

## 2022-12-01 RX ADMIN — MELATONIN 3 MG ORAL TABLET 3 MG: 3 TABLET ORAL at 20:45

## 2022-12-01 ASSESSMENT — PATIENT HEALTH QUESTIONNAIRE - PHQ9: SUM OF ALL RESPONSES TO PHQ QUESTIONS 1-9: 13

## 2022-12-01 ASSESSMENT — ENCOUNTER SYMPTOMS
ABDOMINAL DISTENTION: 0
CHEST TIGHTNESS: 0
COUGH: 0
PHOTOPHOBIA: 0
BACK PAIN: 0
SHORTNESS OF BREATH: 0
ABDOMINAL PAIN: 0
VOMITING: 0
DIARRHEA: 0
NAUSEA: 0

## 2022-12-01 ASSESSMENT — LIFESTYLE VARIABLES
HOW OFTEN DO YOU HAVE A DRINK CONTAINING ALCOHOL: MONTHLY OR LESS
HOW MANY STANDARD DRINKS CONTAINING ALCOHOL DO YOU HAVE ON A TYPICAL DAY: PATIENT DOES NOT DRINK
HOW OFTEN DO YOU HAVE A DRINK CONTAINING ALCOHOL: MONTHLY OR LESS
HOW MANY STANDARD DRINKS CONTAINING ALCOHOL DO YOU HAVE ON A TYPICAL DAY: 3 OR 4

## 2022-12-01 ASSESSMENT — PAIN - FUNCTIONAL ASSESSMENT: PAIN_FUNCTIONAL_ASSESSMENT: NONE - DENIES PAIN

## 2022-12-01 ASSESSMENT — SLEEP AND FATIGUE QUESTIONNAIRES
SLEEP PATTERN: DIFFICULTY FALLING ASLEEP;DISTURBED/INTERRUPTED SLEEP
AVERAGE NUMBER OF SLEEP HOURS: 3
DO YOU HAVE DIFFICULTY SLEEPING: YES
DO YOU USE A SLEEP AID: NO

## 2022-12-01 NOTE — ED NOTES
Nurse to nurse report given to [facility] on [address], [address] [facility]  12/01/22 [id number]

## 2022-12-01 NOTE — ED NOTES
Spoke to ED MD regarding Blood Glucose being over 300  ED MD ordering fluids and considering Metformin.       Gia Live RN  12/01/22 [id number]

## 2022-12-01 NOTE — ED PROVIDER NOTES
Anastacia Dennison is a 53-year-old male with past medical history of bipolar disorder hyperlipidemia, emphysema who presents emergency department with concern for noncompliance of medication and psychosis. Patient was sent in from the crisis center for admission. Patient has not been compliant with medication. Patient is unable to care for self and would benefit for inpatient admission. Patient was pink slipped by police patient denies suicidal homicidal ideations patient denies auditory or visual hallucinations at the make symptoms better or worse symptoms are moderate severity and constant and worsening    The history is provided by the patient and medical records. Review of Systems   Constitutional:  Negative for chills, diaphoresis, fatigue and fever. Eyes:  Negative for photophobia and visual disturbance. Respiratory:  Negative for cough, chest tightness and shortness of breath. Cardiovascular:  Negative for chest pain, palpitations and leg swelling. Gastrointestinal:  Negative for abdominal distention, abdominal pain, diarrhea, nausea and vomiting. Genitourinary:  Negative for dysuria. Musculoskeletal:  Negative for back pain, neck pain and neck stiffness. Skin:  Negative for pallor and rash. Neurological:  Negative for headaches. Psychiatric/Behavioral:  Negative for confusion, hallucinations and suicidal ideas. Physical Exam  Vitals and nursing note reviewed. Constitutional:       General: He is not in acute distress. Appearance: Normal appearance. He is not ill-appearing. HENT:      Head: Normocephalic and atraumatic. Eyes:      General: No scleral icterus. Conjunctiva/sclera: Conjunctivae normal.      Pupils: Pupils are equal, round, and reactive to light. Cardiovascular:      Rate and Rhythm: Normal rate and regular rhythm. Pulmonary:      Effort: Pulmonary effort is normal.      Breath sounds: Normal breath sounds.    Abdominal:      General: Bowel sounds are normal. There is no distension. Palpations: Abdomen is soft. Tenderness: There is no abdominal tenderness. There is no guarding or rebound. Musculoskeletal:      Cervical back: Normal range of motion and neck supple. No rigidity. No muscular tenderness. Right lower leg: No edema. Left lower leg: No edema. Skin:     General: Skin is warm and dry. Capillary Refill: Capillary refill takes less than 2 seconds. Coloration: Skin is not pale. Findings: No erythema or rash. Neurological:      Mental Status: He is alert and oriented to person, place, and time. Psychiatric:         Attention and Perception: He is inattentive. Speech: Speech normal.         Behavior: Behavior is cooperative. Thought Content: Thought content is paranoid. Thought content does not include homicidal or suicidal ideation. Thought content does not include homicidal or suicidal plan. Procedures     MDM  Number of Diagnoses or Management Options  Diagnosis management comments: Rhonda Black is a 62-year-old male who presents emergency department with concern for mental health evaluation. Patient was stable in emergency department patient is initially hyperglycemic. Patient was given IV fluids and after patient had eaten patient had improvement of hyperglycemia. Patient does have a history of elevated A1c. Patient denies chest pain, shortness of breath, nausea, vomiting patient is medically cleared for inpatient admission            --------------------------------------------- PAST HISTORY ---------------------------------------------  Past Medical History:  has a past medical history of Acute bacterial conjunctivitis of left eye, Anticoagulated on Coumadin, Bipolar 1 disorder ([address] Bipolar affect, depressed ([address] Cellulitis of face, Full dentures, Hx of blood clots, Hypertension, Impetigo, Palpitations, Screening, and Seizure disorder ([address]     Past Surgical History:  has a past surgical history that includes Appendectomy; Colonoscopy (4/11/16); and laryngoscopy (N/A, 2/18/2020). Social History:  reports that he has been smoking cigarettes. He started smoking about 47 years ago. He has been smoking an average of 1 pack per day. He has never used smokeless tobacco. He reports that he does not currently use alcohol. He reports that he does not use drugs. Family History: family history includes Diabetes in his brother. The patients home medications have been reviewed. Allergies: Patient has no known allergies.     -------------------------------------------------- RESULTS -------------------------------------------------    LABS:  Results for orders placed or performed during the hospital encounter of 12/01/22   COVID-19 & Influenza Combo    Specimen: Nasopharyngeal Swab   Result Value Ref Range    SARS-CoV-2 RNA, RT PCR NOT DETECTED NOT DETECTED    INFLUENZA A NOT DETECTED NOT DETECTED    INFLUENZA B NOT DETECTED NOT DETECTED   CBC with Auto Differential   Result Value Ref Range    WBC 8.7 4.5 - 11.5 E9/L    RBC 4.69 3.80 - 5.80 E12/L    Hemoglobin 14.6 12.5 - 16.5 g/dL    Hematocrit 44.8 37.0 - 54.0 %    MCV 95.5 80.0 - 99.9 fL    MCH 31.1 26.0 - 35.0 pg    MCHC 32.6 32.0 - 34.5 %    RDW 12.9 11.5 - 15.0 fL    Platelets 369 432 - 149 E9/L    MPV 9.9 7.0 - 12.0 fL    Neutrophils % 60.2 43.0 - 80.0 %    Immature Granulocytes % 0.5 0.0 - 5.0 %    Lymphocytes % 26.3 20.0 - 42.0 %    Monocytes % 11.4 2.0 - 12.0 %    Eosinophils % 0.8 0.0 - 6.0 %    Basophils % 0.8 0.0 - 2.0 %    Neutrophils Absolute 5.22 1.80 - 7.30 E9/L    Immature Granulocytes # 0.04 E9/L    Lymphocytes Absolute 2.28 1.50 - 4.00 E9/L    Monocytes Absolute 0.99 (H) 0.10 - 0.95 E9/L    Eosinophils Absolute 0.07 0.05 - 0.50 E9/L    Basophils Absolute 0.07 0.00 - 0.20 E9/L   Comprehensive Metabolic Panel w/ Reflex to MG   Result Value Ref Range    Sodium 136 132 - 146 mmol/L    Potassium reflex Magnesium 4.3 3.5 - 5.0 mmol/L    Chloride 102 98 - 107 mmol/L    CO2 23 22 - 29 mmol/L    Anion Gap 11 7 - 16 mmol/L    Glucose 303 (H) 74 - 99 mg/dL    BUN 12 6 - 23 mg/dL    Creatinine 0.7 0.7 - 1.2 mg/dL    Est, Glom Filt Rate >60 >=60 mL/min/1.73    Calcium 9.6 8.6 - 10.2 mg/dL    Total Protein 6.8 6.4 - 8.3 g/dL    Albumin 3.8 3.5 - 5.2 g/dL    Total Bilirubin <0.2 0.0 - 1.2 mg/dL    Alkaline Phosphatase 63 40 - 129 U/L    ALT 27 0 - 40 U/L    AST 18 0 - 39 U/L   Serum Drug Screen   Result Value Ref Range    Ethanol Lvl <10 mg/dL    Acetaminophen Level <5.0 (L) 10.0 - 64.9 mcg/mL    Salicylate, Serum <4.1 0.0 - 30.0 mg/dL    TCA Scrn NEGATIVE Cutoff:300 ng/mL   Urine Drug Screen   Result Value Ref Range    Amphetamine Screen, Urine NOT DETECTED Negative <1000 ng/mL    Barbiturate Screen, Ur NOT DETECTED Negative < 200 ng/mL    Benzodiazepine Screen, Urine NOT DETECTED Negative < 200 ng/mL    Cannabinoid Scrn, Ur NOT DETECTED Negative < 50ng/mL    Cocaine Metabolite Screen, Urine NOT DETECTED Negative < 300 ng/mL    Opiate Scrn, Ur NOT DETECTED Negative < 300ng/mL    PCP Screen, Urine NOT DETECTED Negative < 25 ng/mL    Methadone Screen, Urine NOT DETECTED Negative <300 ng/mL    Oxycodone Urine NOT DETECTED Negative <100 ng/mL    FENTANYL SCREEN, URINE NOT DETECTED Negative <1 ng/mL    Drug Screen Comment: see below    Urinalysis with Microscopic   Result Value Ref Range    Color, UA Yellow Straw/Yellow    Clarity, UA Clear Clear    Glucose, Ur >=1000 (A) Negative mg/dL    Bilirubin Urine Negative Negative    Ketones, Urine Negative Negative mg/dL    Specific Gravity, UA 1.015 1.005 - 1.030    Blood, Urine Negative Negative    pH, UA 6.5 5.0 - 9.0    Protein, UA Negative Negative mg/dL    Urobilinogen, Urine 0.2 <2.0 E.U./dL    Nitrite, Urine Negative Negative    Leukocyte Esterase, Urine Negative Negative    WBC, UA NONE 0 - 5 /HPF    RBC, UA NONE 0 - 2 /HPF    Bacteria, UA NONE SEEN None Seen /HPF   POCT Glucose   Result Value Ref Range    Meter Glucose 181 (H) 74 - 99 mg/dL   EKG 12 Lead   Result Value Ref Range    Ventricular Rate 63 BPM    Atrial Rate 63 BPM    P-R Interval 168 ms    QRS Duration 92 ms    Q-T Interval 374 ms    QTc Calculation (Bazett) 382 ms    P Axis 40 degrees    R Axis 49 degrees    T Axis 43 degrees       RADIOLOGY:  No orders to display       EKG: This EKG is signed and interpreted by me. Rate: 63  Rhythm: Sinus  Interpretation: non-specific EKG, no St elevation or depression  Comparison: stable as compared to patient's most recent EKG      ------------------------- NURSING NOTES AND VITALS REVIEWED ---------------------------  Date / Time Roomed:  12/1/2022  9:36 AM  ED Bed Assignment:  29/-29    The nursing notes within the ED encounter and vital signs as below have been reviewed. Patient Vitals for the past 24 hrs:   BP Temp Temp src Pulse Resp SpO2 Height Weight   12/01/22 0952 (!) 140/89 97.9 °F (36.6 °C) Oral 83 17 94 % 5' 8\" (1.727 m) 200 lb (90.7 kg)   12/01/22 0935 -- 97.7 °F (36.5 °C) Temporal 88 18 98 % -- --       Oxygen Saturation Interpretation: Normal    ------------------------------------------ PROGRESS NOTES ------------------------------------------  Re-evaluation(s):  Time: 2pm  Patients symptoms show no change  Repeat physical examination is not changed    Counseling:  I have spoken with the patient and discussed todays results, in addition to providing specific details for the plan of care and counseling regarding the diagnosis and prognosis.   Their questions are answered at this time and they are agreeable with the plan of admission.    --------------------------------- ADDITIONAL PROVIDER NOTES ---------------------------------  Consultations:  Social work  This patient's ED course included: a personal history and physicial examination, re-evaluation prior to disposition, multiple bedside re-evaluations, and IV medications    This patient has remained hemodynamically stable during their ED course. Diagnosis:  1. Mental health problem    2. Hyperglycemia        Disposition:  Patient's disposition: Admit to mental health unit - medically cleared for admission  Patient's condition is stable.                Kimberlee Srivastava MD  12/01/22 0408

## 2022-12-01 NOTE — ED NOTES
JOCELINE nurse will review for admission to  once medically cleared     NALINI Godinez  12/01/22 [id number]

## 2022-12-01 NOTE — PROGRESS NOTES
[facility]  Admission Note   Pt admitted with depression and poor self care. He has not been taking his medications and is not sleeping well at night with dreams of loved ones that have passed. He feels like he is in a fog with confusion at times and paranoia. He has been cooperative with admission assessment but ha underlying irritability. He has a flat affect and appears sad and depressed. Admission Type:   Admission Type:  Involuntary    Reason for admission:  Reason for Admission: Mixed episode of Bipolar with paranoia, not caring for himself, depression, and not taking his medications      Addictive Behavior:   Addictive Behavior  In the Past 3 Months, Have You Felt or Has Someone Told You That You Have a Problem With  : None    Medical Problems:   Past Medical History:   Diagnosis Date    Acute bacterial conjunctivitis of left eye 9/11/2016    Anticoagulated on Coumadin     per Dr. Freddy Mejia 1 disorder [facility])     goes to [facility]    Bipolar affect, depressed [address] [address]     Cellulitis of face 9/11/2016    Full dentures     Hx of blood clots     DVT, PE hx of    Hypertension     on propanolol / patient states does not have high BP    Impetigo 9/11/2016    Palpitations     Screening     for colonoscopy    Seizure disorder ([facility] [address]     last seizure 2010 / cause unknown       Status EXAM:  Mental Status and Behavioral Exam  Normal: No  Level of Assistance: Independent/Self  Facial Expression: Flat, Sad  Affect: Blunt  Level of Consciousness: Alert  Frequency of Checks: 4 times per hour, close  Mood:Normal: No  Mood: Depressed, Anxious, Guilty, Suspicious  Motor Activity:Normal: No  Motor Activity: Decreased  Eye Contact: Fair  Observed Behavior: Friendly, Cooperative  Sexual Misconduct History: Current - no  Preception: Carlyle to person, Carlyle to time, Carlyle to place, Carlyle to situation  Attention:Normal: No  Attention: Unable to concentrate  Thought Processes: Circumstantial  Thought Content:Normal: No  Thought Content: Paranoia  Depression Symptoms: Feelings of helplessness, Feelings of hopelessess, Loss of interest, Change in energy level, Sleep disturbance  Anxiety Symptoms: Generalized  Mary Jane Symptoms: No problems reported or observed.   Hallucinations: None  Delusions: No  Memory:Normal: No  Memory: Poor recent  Insight and Judgment: No  Insight and Judgment: Poor judgment, Poor insight    Tobacco Screening:  Practical Counseling, on admission, marcelino X, if applicable and completed (first 3 are required if patient doesn't refuse):            ( ) Recognizing danger situations (included triggers and roadblocks)                    ( ) Coping skills (new ways to manage stress,relaxation techniques, changing routine, distraction)                                                           ( ) Basic information about quitting (benefits of quitting, techniques in how to quit, available resources  ( ) Referral for counseling faxed to [facility]                                                                                                                   (X ) Patient refused counseling  ( ) Patient has not smoked in the last 30 days    Metabolic Screening:    Lab Results   Component Value Date    LABA1C 6.1 (H) 07/20/2022       Lab Results   Component Value Date    CHOL 124 05/11/2021    CHOL 118 03/04/2021    CHOL 144 05/28/2019    CHOL 146 02/15/2018    CHOL 187 08/16/2017    CHOL 208 (H) 06/01/2017    CHOL 145 10/18/2016    CHOL 131 09/10/2016     Lab Results   Component Value Date    TRIG 165 (H) 05/11/2021    TRIG 245 03/04/2021    TRIG 152 (H) 05/28/2019    TRIG 198 (H) 02/15/2018    TRIG 310 08/16/2017    TRIG 367 (H) 06/01/2017    TRIG 188 (H) 10/18/2016    TRIG 179 (H) 09/10/2016     Lab Results   Component Value Date    HDL 30 07/20/2022    HDL 28 05/11/2021    HDL 25 (A) 03/04/2021    HDL 33 05/08/2020    HDL 32 05/28/2019    HDL 28 02/15/2018    HDL 28 (A) 08/16/2017    HDL 29 06/01/2017    HDL 34 10/18/2016    HDL 9 09/10/2016     No components found for: LDLCAL  Lab Results   Component Value Date    LABVLDL 38 07/20/2022    LABVLDL 33 05/11/2021    LABVLDL 30 05/08/2020    LABVLDL 30 05/28/2019    LABVLDL 40 02/15/2018    LABVLDL 73 06/01/2017    LABVLDL 38 10/18/2016    LABVLDL 36 09/10/2016         Body mass index is 30.41 kg/m². BP Readings from Last 2 Encounters:   12/01/22 (!) 171/77   07/13/22 123/70           Pt admitted with followings belongings:  Dental Appliances: None  Vision - Corrective Lenses: None  Hearing Aid: None  Jewelry: None  Body Piercings Removed: N/A  Clothing:  Footwear, Jacket/Coat, Shirt, Pants, Floyd[place], Socks  Other Valuables: Mike Rouse (No money)    Palak Willingham RN

## 2022-12-01 NOTE — ED TRIAGE NOTES
Pinks lipped by [employer]  at crisis unit after patient not taking care of himself and not taking his medications.

## 2022-12-01 NOTE — ED NOTES
Behavioral Health Crisis Assessment    Chief Complaint:    Mental Health Problem Pinks slipped by [employer] not taking his medications at the crisis unit denies SI/HI or A/V hallucinations. Pre Pink slip patient is paranoid not taking care of himself and not taking his medications. Mental Status Exam:  admits to paranoid and delusional thoughts, unstable mood, get frustrated easily, denies SI, no HI, internal stimulation, shared good eye contact, clear speech, communicates well, behavior controlled, non aggressive, has poor insight and judgement at this time. Legal Status  [] Voluntary:  [x] Involuntary, Issued by:  [facility]    Gender  [x] Male [] Female [] Transgender  [] Other    Sexual Orientation    [x] Heterosexual [] Homosexual [] Bisexual [] Other    Brief Clinical Summary & collateral:    Pt has been pink slipped by [facility] mental health officer, [employer], indicating that pt has been at [facility] for over 2 weeks and has been refusing his medications for approximately 1 week. Pt is agitated, snapping at staff, tearful, sobbing at times, experiencing excessive feelings of guilt and remorse. Hygiene is poor, he doesn't admit to suicidal ideations. He left [facility] over 1 month ago after being there for over 1 year. He moved in to an apartment and ended up going to [facility] after 3 weeks due to excessive paranoia. Client has a long hx of bi-polar. He is He appears to be suffering from a mixed episode of bi-polar. He is unable to adequately care for himself, even in a supervised environment. Per CSN notes:  Pt admits to having a lot of depression. Pt said that he feels confused and said \"I don't know what is right or wrong\". Said that he feels like he is floating. Pt is having difficulty sleeping with weird and strange dreams. Pt admits that he feels as if people are getting inside of his head. I met with pt, who said that [facility] sent him here and he dopes not know why.   Pt admitted to me that he is not med complaint and that he refuses to take his medications at times. He is not sure why. Pt admits that he feels \"confused\", explained that his mind wanders and that he easily confused. Risk Factors:  Mental Health Diagnosis  Limited family/friend support  Lack of self-care  Off prescribed Psych meds  Gender risk (male over 61)    Protective Factors: Outpatient provider  Help-seeking behavior  Safe and stable housing  Has access to essential needs  Steady income    Suicidal Ideations:   [] Reports:    [] Past [] Present   [x] Denies    Suicide Attempts:  [] Reports:   [x] Denies    C-SSRS Screening Completed by RN: Current Suicide Risk:  [x] No Risk [] Low [] Moderate [] High    Homicidal Ideations  [] Reports:   [] Past [] Present   [x] Denies     Self Injurious/Self Mutilation Behaviors:   [] Reports:    [] Past [] Present   [x] Denies    Hallucinations/Delusions   [x] Reports:   [] Denies     Substance Use/Alcohol Use/Addiction:   [] Reports:   [x] Denies   [x] SBIRT Screen Complete. Current or Past Substance Abuse Treatment  [] Yes, When and Where:  [x] No    Current or Past Mental Health Treatment:  [x] Yes, When and Where:  [] No    Legal Issues:  []  Yes (Specify)  [x]  No    Access to Weapons:  []  Yes (Specify)  [x]  No    Trauma History  [] Reports:  [x] Denies     Living Situation:  alone     Employment: disabled    Education Level:      Violence Risk Screening:        Have you ever thought about hurting someone? [x]  No  []  Yes (Ask the questions listed below)   When? Did you follow through with the thoughts? [] No     [] Yes- When and what happened? 2.  Have you ever threatened anyone? [x]  No  []  Yes (Ask the questions listed below)   When and what happened? Have you ever threatened someone with a gun, knife or other weapon? []  No  []  Yes - When and what happened? 2. Have you ever had an order of protection taken out against you? []  Yes [x]  No  3.  Have you ever been arrested due to violence? []  Yes [x]  No  4. Have you ever been cruel to animals?  []  Yes [x]  No    After consideration of C-SSRS screening results, C-SSRS assessments, and this professional's assessment the patient's overall suicide risk assessed to be:  [x] No Risk  [] Low   [] Moderate   [] High     [x] Discussed current suicide risk, protective and risk factors with RN and ED Physician     Disposition   [] Home:   [] Outpatient Provider:   [] Crisis Unit:   [x] Inpatient Psychiatric Unit:  [] Other:                    NALINI Godinez  12/01/22 [id number]

## 2022-12-01 NOTE — PROGRESS NOTES
Verified pt's medications with [facility]. Pt has not been med compliant for over a week.  The following medications are what the pt should have been taking:    Spiriva 18 mcg 1 puff daily  Pradaxa 150 mg BID  Depakote  mg BID  Crestor 10 mg daily  Prazosin 1 mg QHS  Cogentin 1 mg BID  Vit D3 1000 units daily  Lantaprost 0.005% 1 drop     Pt is due for his Invega Sustenna 156 mg on 12/12/22 per his pharmacy

## 2022-12-01 NOTE — PLAN OF CARE
Problem: Depression  Goal: Will be euthymic at discharge  Description: INTERVENTIONS:  1. Administer medication as ordered  2. Provide emotional support via 1:1 interaction with staff  3. Encourage involvement in milieu/groups/activities  4. Monitor for social isolation  Outcome: Progressing     Problem: Behavior  Goal: Pt/Family maintain appropriate behavior and adhere to behavioral management agreement, if implemented  Description: INTERVENTIONS:  1. Assess patient/family's coping skills and  non-compliant behavior (including use of illegal substances)  2. Notify security of behavior or suspected illegal substances which indicate the need for search of the family and/or belongings  3. Encourage verbalization of thoughts and concerns in a socially appropriate manner  4. Utilize positive, consistent limit setting strategies supporting safety of patient, staff and others  5. Encourage participation in the decision making process about the behavioral management agreement  6. If a visitor's behavior poses a threat to safety call refer to organization policy. 7. Initiate consult with , Psychosocial CNS, Spiritual Care as appropriate  Outcome: Progressing     Problem: Anxiety  Goal: Will report anxiety at manageable levels  Description: INTERVENTIONS:  1. Administer medication as ordered  2. Teach and rehearse alternative coping skills  3. Provide emotional support with 1:1 interaction with staff  Outcome: Progressing     Problem: Sleep Disturbance  Goal: Will exhibit normal sleeping pattern  Description: INTERVENTIONS:  1. Administer medication as ordered  2. Decrease environmental stimuli, including noise, as appropriate  3. Discourage social isolation and naps during the day  Outcome: Progressing     Problem: Involuntary Admit  Goal: Will cooperate with staff recommendations and doctor's orders and will demonstrate appropriate behavior  Description: INTERVENTIONS:  1.  Treat underlying conditions and offer medication as ordered  2. Educate regarding involuntary admission procedures and rules  3.  Contain excessive/inappropriate behavior per unit and hospital policies  Outcome: Progressing

## 2022-12-02 PROBLEM — F31.9 BIPOLAR AFFECTIVE (HCC): Status: RESOLVED | Noted: 2022-12-01 | Resolved: 2022-12-02

## 2022-12-02 PROCEDURE — 6370000000 HC RX 637 (ALT 250 FOR IP): Performed by: NURSE PRACTITIONER

## 2022-12-02 PROCEDURE — 94664 DEMO&/EVAL PT USE INHALER: CPT

## 2022-12-02 PROCEDURE — 6360000002 HC RX W HCPCS: Performed by: NURSE PRACTITIONER

## 2022-12-02 PROCEDURE — 90792 PSYCH DIAG EVAL W/MED SRVCS: CPT | Performed by: NURSE PRACTITIONER

## 2022-12-02 PROCEDURE — 1240000000 HC EMOTIONAL WELLNESS R&B

## 2022-12-02 PROCEDURE — 6370000000 HC RX 637 (ALT 250 FOR IP): Performed by: PSYCHIATRY & NEUROLOGY

## 2022-12-02 RX ORDER — ROSUVASTATIN CALCIUM 10 MG/1
10 TABLET, COATED ORAL NIGHTLY
Status: DISCONTINUED | OUTPATIENT
Start: 2022-12-02 | End: 2022-12-07 | Stop reason: HOSPADM

## 2022-12-02 RX ORDER — BENZTROPINE MESYLATE 0.5 MG/1
1 TABLET ORAL 2 TIMES DAILY
Status: DISCONTINUED | OUTPATIENT
Start: 2022-12-02 | End: 2022-12-07 | Stop reason: HOSPADM

## 2022-12-02 RX ORDER — DABIGATRAN ETEXILATE 75 MG/1
150 CAPSULE ORAL 2 TIMES DAILY
Status: DISCONTINUED | OUTPATIENT
Start: 2022-12-02 | End: 2022-12-07 | Stop reason: HOSPADM

## 2022-12-02 RX ORDER — PRAZOSIN HYDROCHLORIDE 1 MG/1
1 CAPSULE ORAL NIGHTLY
Status: DISCONTINUED | OUTPATIENT
Start: 2022-12-02 | End: 2022-12-07 | Stop reason: HOSPADM

## 2022-12-02 RX ORDER — VITAMIN B COMPLEX
1000 TABLET ORAL DAILY
Status: DISCONTINUED | OUTPATIENT
Start: 2022-12-02 | End: 2022-12-07 | Stop reason: HOSPADM

## 2022-12-02 RX ORDER — LATANOPROST 50 UG/ML
1 SOLUTION/ DROPS OPHTHALMIC NIGHTLY
Status: DISCONTINUED | OUTPATIENT
Start: 2022-12-02 | End: 2022-12-07 | Stop reason: HOSPADM

## 2022-12-02 RX ORDER — PROPRANOLOL HYDROCHLORIDE 20 MG/1
20 TABLET ORAL 3 TIMES DAILY
Status: DISCONTINUED | OUTPATIENT
Start: 2022-12-02 | End: 2022-12-02

## 2022-12-02 RX ADMIN — PALIPERIDONE 6 MG: 6 TABLET, EXTENDED RELEASE ORAL at 08:57

## 2022-12-02 RX ADMIN — DIVALPROEX SODIUM 500 MG: 500 TABLET, DELAYED RELEASE ORAL at 08:57

## 2022-12-02 RX ADMIN — BENZTROPINE MESYLATE 1 MG: 0.5 TABLET ORAL at 21:10

## 2022-12-02 RX ADMIN — PRAZOSIN HYDROCHLORIDE 1 MG: 1 CAPSULE ORAL at 21:10

## 2022-12-02 RX ADMIN — IPRATROPIUM BROMIDE 0.5 MG: 0.5 SOLUTION RESPIRATORY (INHALATION) at 13:04

## 2022-12-02 RX ADMIN — DABIGATRAN ETEXILATE MESYLATE 150 MG: 75 CAPSULE ORAL at 21:10

## 2022-12-02 RX ADMIN — ROSUVASTATIN 10 MG: 10 TABLET, FILM COATED ORAL at 21:10

## 2022-12-02 RX ADMIN — DABIGATRAN ETEXILATE MESYLATE 150 MG: 75 CAPSULE ORAL at 12:28

## 2022-12-02 RX ADMIN — MELATONIN 3 MG ORAL TABLET 3 MG: 3 TABLET ORAL at 21:10

## 2022-12-02 RX ADMIN — BENZTROPINE MESYLATE 1 MG: 0.5 TABLET ORAL at 11:28

## 2022-12-02 RX ADMIN — HYDROXYZINE PAMOATE 50 MG: 50 CAPSULE ORAL at 21:10

## 2022-12-02 RX ADMIN — DIVALPROEX SODIUM 500 MG: 500 TABLET, DELAYED RELEASE ORAL at 21:10

## 2022-12-02 RX ADMIN — Medication 1000 UNITS: at 12:30

## 2022-12-02 RX ADMIN — BENZTROPINE MESYLATE 1 MG: 1 TABLET ORAL at 08:57

## 2022-12-02 ASSESSMENT — SLEEP AND FATIGUE QUESTIONNAIRES
SLEEP PATTERN: DIFFICULTY FALLING ASLEEP
AVERAGE NUMBER OF SLEEP HOURS: 3
DO YOU HAVE DIFFICULTY SLEEPING: YES
DO YOU USE A SLEEP AID: YES

## 2022-12-02 ASSESSMENT — LIFESTYLE VARIABLES
HOW MANY STANDARD DRINKS CONTAINING ALCOHOL DO YOU HAVE ON A TYPICAL DAY: PATIENT DOES NOT DRINK
HOW OFTEN DO YOU HAVE A DRINK CONTAINING ALCOHOL: NEVER

## 2022-12-02 ASSESSMENT — PATIENT HEALTH QUESTIONNAIRE - PHQ9: SUM OF ALL RESPONSES TO PHQ QUESTIONS 1-9: 13

## 2022-12-02 NOTE — CARE COORDINATION
CHRIS spoke with pt's cousin, Violet Denton. Violet Denton states that her main concern for this pt is that he has not been taking his medication. Violet Denton states that she has no other concerns at this time, and hopes he will be medication compliant while here. No access to weapons per Violet Denton. SW offered to assist as needed.

## 2022-12-02 NOTE — PLAN OF CARE
Patient up and about the unit patient denies SI/HI AVH, rates anxiety 3/10 and depression 2/10. Patient is smiling and friendly and interacting with peers well. Patient compliant with medications. Will continue to monitor and assess q 15 min for safety throughout the shift. Problem: Depression  Goal: Will be euthymic at discharge  Description: INTERVENTIONS:  1. Administer medication as ordered  2. Provide emotional support via 1:1 interaction with staff  3. Encourage involvement in milieu/groups/activities  4. Monitor for social isolation  12/1/2022 2007 by Ilia Infante RN  Outcome: Progressing     Problem: Behavior  Goal: Pt/Family maintain appropriate behavior and adhere to behavioral management agreement, if implemented  Description: INTERVENTIONS:  1. Assess patient/family's coping skills and  non-compliant behavior (including use of illegal substances)  2. Notify security of behavior or suspected illegal substances which indicate the need for search of the family and/or belongings  3. Encourage verbalization of thoughts and concerns in a socially appropriate manner  4. Utilize positive, consistent limit setting strategies supporting safety of patient, staff and others  5. Encourage participation in the decision making process about the behavioral management agreement  6. If a visitor's behavior poses a threat to safety call refer to organization policy. 7. Initiate consult with , Psychosocial CNS, Spiritual Care as appropriate  12/1/2022 2007 by Ilia Infante RN  Outcome: Progressing     Problem: Anxiety  Goal: Will report anxiety at manageable levels  Description: INTERVENTIONS:  1. Administer medication as ordered  2. Teach and rehearse alternative coping skills  3. Provide emotional support with 1:1 interaction with staff  12/1/2022 2007 by Ilia Infante RN  Outcome: Progressing     Problem: Sleep Disturbance  Goal: Will exhibit normal sleeping pattern  Description: INTERVENTIONS:  1. Administer medication as ordered  2. Decrease environmental stimuli, including noise, as appropriate  3. Discourage social isolation and naps during the day  12/1/2022 2007 by Padmini Andre RN  Outcome: Progressing     Problem: Involuntary Admit  Goal: Will cooperate with staff recommendations and doctor's orders and will demonstrate appropriate behavior  Description: INTERVENTIONS:  1. Treat underlying conditions and offer medication as ordered  2. Educate regarding involuntary admission procedures and rules  3.  Contain excessive/inappropriate behavior per unit and hospital policies  [id number]/2055 [id number] by Padmini Andre RN  Outcome: Progressing

## 2022-12-02 NOTE — PROGRESS NOTES
Pt did did not attend morning community meeting after encouragement. Pt was not updated on staffing and daily expectations. Pt identified goal for today as to figure out the medications I am being prescribed.

## 2022-12-02 NOTE — PROGRESS NOTES
Pt attended afternoon smoking cessation group. Pt was 1 out of 5 participants. Pt was an active listener/ participant. Pt is able to share appropriately when prompted and ask facilitator relevant questions.

## 2022-12-02 NOTE — PLAN OF CARE
Problem: Depression  Goal: Will be euthymic at discharge  Description: INTERVENTIONS:  1. Administer medication as ordered  2. Provide emotional support via 1:1 interaction with staff  3. Encourage involvement in milieu/groups/activities  4. Monitor for social isolation  Outcome: Progressing     Problem: Behavior  Goal: Pt/Family maintain appropriate behavior and adhere to behavioral management agreement, if implemented  Description: INTERVENTIONS:  1. Assess patient/family's coping skills and  non-compliant behavior (including use of illegal substances)  2. Notify security of behavior or suspected illegal substances which indicate the need for search of the family and/or belongings  3. Encourage verbalization of thoughts and concerns in a socially appropriate manner  4. Utilize positive, consistent limit setting strategies supporting safety of patient, staff and others  5. Encourage participation in the decision making process about the behavioral management agreement  6. If a visitor's behavior poses a threat to safety call refer to organization policy. 7. Initiate consult with , Psychosocial CNS, Spiritual Care as appropriate  Outcome: Progressing     Problem: Anxiety  Goal: Will report anxiety at manageable levels  Description: INTERVENTIONS:  1. Administer medication as ordered  2. Teach and rehearse alternative coping skills  3. Provide emotional support with 1:1 interaction with staff  Outcome: Progressing    Patient denies SI/HI and hallucinations. Patient is pleasant, calm, and cooperative. Patient is out on the unit but isolative to self. Patient takes prescribed medications without issue. Will continue to offer support and comfort to patient.

## 2022-12-02 NOTE — CARE COORDINATION
Biopsychosocial Assessment Note    Social work met with patient to complete the biopsychosocial assessment and C-SSRS. Chief Complaint: Per pt report, \"I don't know why I'm here. The lady from [facility] sent me\"    Mental Status Exam: Pt appeared to be alert and oriented x 4. Pt was somewhat somewhat suspicious and labile throughout this 's assessment. Pt's thought process is preoccupied, speech is low and somewhat mumbled. Pt's eye-contact is fair. Pt appears flat. Clinical Summary: Pt's last admission to this psychiatric facility was in May 2022. Pt states that he does not know why he is in the hospital, and  states that the Jaime from [facility] sent him here. However, per ED SW note, \"Pinks slipped by [employer] not taking his medications at the crisis unit denies SI/HI or A/V hallucinations. Pre Pink slip patient is paranoid not taking care of himself and not taking his medications. \" Pt denied a history of suicide attempts. Pt denied a history of self-injurious behaviors. Pt is currently denying SI/ HI/ hallucinations/ delusions. Pt denied substance use. Pt denied a trauma history. Pt states that he plans to return to the [address] [place] [address] Unit at discharge. Pt will continue to treat outpatient with [facility]. Risk Factors: Not medication compliant, suspicious, paranoid, labile, and past psych hospitalizations. Protective Factors: Connected outpatient, no substance use, and no hx of suicide attempts.     Gender  [x] Male [] Female [] Transgender  [] Other    Sexual Orientation    [x] Heterosexual [] Homosexual [] Bisexual [] Other    Suicidal Ideation  [] Past [] Present [x] Denies     C-SSRS Screening Completed: Current Suicide Risk:  [x] No Risk  [] Low [] Moderate [] High    Homicidal Ideation  [] Past [] Present [x] Denies     Hallucinations/Delusions (Specify type)  [] Reports [x] Denies     Current or Past Mental Health Treatment:  [x] Yes, When and Where: currently active with CSN  [] No    Substance Use/Alcohol Use/Addiction  [] Reports [x] Denies     Tobacco Use (within the last 6 months)  [x] Reports [] Denies     Trauma History  [] Reports [x] Denies     Self Injurious/Self Mutilation Behaviors:   [] Reports:    [] Past [] Present   [x] Denies    Legal History:  [x]  Yes (Specify)    [] No    Collateral Contact (PERFECTO signed)  Name: Dinesh Moe   Relationship: Cousin  Number: [phone]    Collateral Information:      Access to Weapons per Collateral Contact: [] Reports [x] Denies     After consideration of C-SSRS screening results, C-SSRS assessments, and this professional's assessment the patient's overall suicide risk assessed to be:  [x] None   [] Low   [] Moderate   [] High     [x] Discussed current suicide risk, protective and risk factors with RN and NP/Psychiatrist.    Discharge Plan:  [] Home:   [] Shelter:  [x] Crisis Unit: [facility]SU  [] Substance Abuse Rehab:  [] Nursing Facility:  [] Other (Specify):     Follow up Provider: [employer]

## 2022-12-02 NOTE — DISCHARGE INSTRUCTIONS
[facility]  Location: [address] [place] [address] [address]   Phone: [phone]  Fax number: [phone]

## 2022-12-02 NOTE — GROUP NOTE
Group Therapy Note    Date: 12/2/2022    Group Start Time: 1100  Group End Time: 0491  Group Topic: Psychoeducation    [facility] AC[facility] 2    JOSIE Harman, LSW        Group Therapy Note    Attendees: 7       Patient's Goal:  Pt will be able to understand active listening and communication skills and they currently have and new skills they can use when communicating with others. Pt will be able to understand the different communication styles. Notes:  Pt participated in group and made connections. Status After Intervention:  Improved    Participation Level:  Active Listener and Interactive    Participation Quality: Appropriate, Attentive, Sharing, and Supportive      Speech:  normal      Thought Process/Content: Logical  Linear      Affective Functioning: Congruent      Mood: anxious      Level of consciousness:  Alert, Oriented x4, and Attentive      Response to Learning: Able to verbalize current knowledge/experience, Able to verbalize/acknowledge new learning, Able to retain information, and Capable of insight      Endings: None Reported    Modes of Intervention: Education, Support, Socialization, Exploration, Clarifying, and Problem-solving      Discipline Responsible: /Counselor      Signature:  JOSIE Harman, Herlinda White

## 2022-12-02 NOTE — H&P
Department of Psychiatry  History and Physical - Adult     CHIEF COMPLAINT:  \"I am paranoid\"     Patient was seen after discussing with the treatment team and reviewing the chart    CIRCUMSTANCES OF ADMISSION:   Patient was pink slipped from [facility] and sent to the ED for psychiatric evaluation, However, per ED SW note, \"Pinks slipped by [employer] not taking his medications at the crisis unit denies SI/HI or A/V hallucinations. Pre Pink slip patient is paranoid not taking care of himself and not taking his medications. \"     HISTORY OF PRESENT ILLNESS:      The patient is a 74 y.o. male with history of bipolar disorder was staying at the crisis unit is well-known to these providers with multiple past inpatient psychiatric hospitalizations, with significant past history of however, per ED SW note, \"Pinks slipped by [employer] not taking his medications at the crisis unit denies SI/HI or A/V hallucinations. Pre Pink slip patient is paranoid not taking care of himself and not taking his medications. \"  He was medically cleared in the ED, UDS in ED negative, . Admitted to [address] [place] [address] for psychiatric evaluation in stabilization. Upon assessment today the patient does not offer much conversation, he is extremely depressed, flat affect. He denies all psychotic symptoms, except paranoia, however states he is not experiencing any paranoia at this time. He is significantly minimizing the circumstances of his hospitalization. He is significantly decompensated, depressed. He presents unkempt and disheveled. Appears preoccupied, and seems to be thought blocking on assessment, he has a slowed thought process, and there is a latency of response. Poor insight into his mental illness and the circumstances of his hospitalization. He is paranoid, poor judgement and impulsive. He is preoccupied with his medications. He apparently has not been taking his medications or caring for himself. He is agreeable to medication adjustments and treatment. He offers little for assessment, is clearly psychiatrically decompensated. Poor awareness of self, and situation related to this hospitalization. Past psychiatric History  Patient has been diagnosed with Bipolar 1 disorder and schizophrenia. Patient is currently prescribed Depakote, propranolol and paxel, which says he is compliant with. Patient is seen at [facility] for psychiatric care. He states he has been admitted 6-7 times into inpatient psych care at numerous institutions for episodes of psychosis and suicidal ideation. Patient presented to the crisis unit at [facility] [place] on 4/18/2021 as instructed by his  CSN , Marisela. He was feeling extremely paranoid at the time. Family Psychiatric History  Patient states his father had depression and saw a counselor for alcoholl and valium abuse. He denies any family history of suicidal ideation/attempt. Substance abuse History  Patient states he has smoked 1.5 packs of cigarettes daily since he was 18years old. Patient drinks 1-2 times a year. Legal History  Patient was arrested and charged with criminal trespassing 4 years ago. He broke into his girlfriends house. Personal, family and social History  Patient was born and raised by his two parents in [place]. He grew up with his older brother and describes his family relationships as good. He states his brother passed away in march of 2020. His parents passed away as well and he currently lives in a home in [place]. Patient does not work and is on disability. He graduated highschool ([employer]). Patients current support system are his friends, specifically Savanna Brandyn and his cousin [place]. Patient denies ownership of or access to firearmsPast psychiatric History  Patient has been diagnosed with Bipolar 1 disorder and schizophrenia. Patient is currently prescribed Depakote, propranolol and paxel, which says he is compliant with. Patient is seen at [facility] for psychiatric care.  He states he has been admitted 6-7 times into inpatient psych care at numerous institutions for episodes of psychosis and suicidal ideation. Past Medical History:        Diagnosis Date    Acute bacterial conjunctivitis of left eye 9/11/2016    Anticoagulated on Coumadin     per Dr. Augustina Chapman 1 disorder [facility])     goes to [facility]    Bipolar affect, depressed ([facility]     Cellulitis of face 9/11/2016    Full dentures     Hx of blood clots     DVT, PE hx of    Hypertension     on propanolol / patient states does not have high BP    Impetigo 9/11/2016    Palpitations     Screening     for colonoscopy    [facility]     last seizure 2010 / cause unknown       Medications Prior to Admission:   Medications Prior to Admission: dabigatran (PRADAXA) 150 MG capsule, TAKE 1 CAPSULE BY MOUTH TWICE A DAY. (DISPENSE IN ORIGINAL CONTAINER)  tiotropium (SPIRIVA) 18 MCG inhalation capsule, Inhale 1 capsule into the lungs daily  rosuvastatin (CRESTOR) 10 MG tablet, TAKE 1 TABLET BY MOUTH ONCE DAILY IN THE MORNING. (Patient not taking: Reported on 12/1/2022)  vitamin D3 (CHOLECALCIFEROL) 25 MCG (1000 UT) TABS tablet, Take 1 tablet by mouth in the morning.  (Patient not taking: Reported on 12/1/2022)  divalproex (DEPAKOTE) 500 MG DR tablet, Take 1 tablet by mouth every 12 hours  prazosin (MINIPRESS) 1 MG capsule, Take 1 capsule by mouth nightly  benztropine (COGENTIN) 1 MG tablet, Take 1 tablet by mouth 2 times daily  paliperidone (INVEGA) 6 MG extended release tablet, Take 1 tablet by mouth daily  acetaminophen (TYLENOL) 500 MG tablet, Take 500 mg by mouth every 4 hours as needed for Pain  calcium carbonate (OYSTER SHELL CALCIUM 500 MG) 1250 (500 Ca) MG tablet, Take 1 tablet by mouth every 2 hours as needed for Heartburn (do no exceed  tabs in 24 hours) (Patient not taking: Reported on 12/1/2022)  senna-docusate (PERICOLACE) 8.6-50 MG per tablet, Take 1 tablet by mouth daily as needed for Constipation (Patient not taking: Reported on 12/1/2022)  nicotine (NICODERM CQ) 21 MG/24HR, Place 1 patch onto the skin daily  latanoprost (XALATAN) 0.005 % ophthalmic solution, Place 1 drop into both eyes nightly (Patient not taking: Reported on 12/1/2022)  propranolol (INDERAL) 20 MG tablet, Take 20 mg by mouth 3 times daily  (Patient not taking: Reported on 12/1/2022)    Past Surgical History:        Procedure Laterality Date    APPENDECTOMY      COLONOSCOPY  4/11/16    LARYNGOSCOPY N/A 2/18/2020    DIRECT PAN ENDOSCOPY WITH BIOPSY BILATERAL BASE OF TONGUE performed by Markell Rudd DO at [facility] OR       Allergies:   Patient has no known allergies. Family History  Family History   Problem Relation Age of Onset    Diabetes Brother              EXAMINATION:    REVIEW OF SYSTEMS:    ROS:  [x] All negative/unchanged except if checked.  Explain positive(checked items) below:  [] Constitutional  [] Eyes  [] Ear/Nose/Mouth/Throat  [] Respiratory  [] CV  [] GI  []   [] Musculoskeletal  [] Skin/Breast  [] Neurological  [] Endocrine  [] Heme/Lymph  [] Allergic/Immunologic    Explanation:     Vitals:  /73   Pulse 84   Temp 98 °F (36.7 °C) (Temporal)   Resp 16   Ht 5' 8\" (1.727 m)   Wt 200 lb (90.7 kg)   SpO2 98%   BMI 30.41 kg/m²      Physical Examination:   Head: x  Atraumatic: x normocephalic  Skin and Mucosa        Moist x  Dry   Pale  x Normal   Neck:  Thyroid  Palpable   x  Not palpable   venus distention   adenopathy   Chest: x Clear   Rhonchi     Wheezing   CV:  xS1   xS2    xNo murmer   Abdomen:  x  Soft    Tender    Viceromegaly   Extremities:  x No Edema     Edema     Cranial Nerves Examination:   CN II:   xPupils are reactive to light  Pupils are non reactive to light  CN III, IV, VI:  xNo eye deviation    No diplopia or ptosis   CN V:    xFacial Sensation is intact     Facial Sensation is not intact   CN IIIV:   x Hearing is normal to rubbing fingers   CN IX, X:     xNormal gag reflex and phonation   CN XI:   xShoulder shrug and neck rotation is normal  CNXII:    xTongue is midline no deviation or atrophy    Mental Status Examination:    Level of consciousness:  within normal limits   Appearance:  well-appearing  Behavior/Motor:  no abnormalities noted  Attitude toward examiner:  cooperative  Speech:  normal rate, normal volume, and well articulated   Mood: irritable  Affect:  blunted  Thought processes:  illogical, thought blocking, and poverty of thought   Thought content:  Paranoid, misinterpreting devoid of auditory or visual hallucinations. Denies sucidal or homicidal ideation intent or plan. Cognition:  oriented to person, place, and time   Concentration poor  Memory intact  Insight poor   Judgement poor   Fund of Knowledge adequate      DIAGNOSIS:  Bipolar affective disorder, mixed, severe, with psychotic behavior ([facility]          LABS: REVIEWED TODAY:  Recent Labs     12/01/22  1013   WBC 8.7   HGB 14.6        Recent Labs     12/01/22  1013      K 4.3      CO2 23   BUN 12   CREATININE 0.7   GLUCOSE 303*     Recent Labs     12/01/22  1013   BILITOT <0.2   ALKPHOS 63   AST 18   ALT 27     Lab Results   Component Value Date/Time    LABAMPH NOT DETECTED 12/01/2022 10:13 AM    BARBSCNU NOT DETECTED 12/01/2022 10:13 AM    LABBENZ NOT DETECTED 12/01/2022 10:13 AM    LABMETH NOT DETECTED 12/01/2022 10:13 AM    OPIATESCREENURINE NOT DETECTED 12/01/2022 10:13 AM    PHENCYCLIDINESCREENURINE NOT DETECTED 12/01/2022 10:13 AM    ETOH <10 12/01/2022 10:13 AM     Lab Results   Component Value Date/Time    TSH 1.230 07/20/2022 03:00 PM     No results found for: LITHIUM  Lab Results   Component Value Date    VALPROATE 22 (L) 12/01/2022     Lab Results   Component Value Date/Time    VALPROATE 22 12/01/2022 10:13 AM         Radiology No results found.       TREATMENT PLAN:  The patient's diagnosis, treatment plan, medication management were formulated after patient was seen directly by the attending physician and myself and all relevant documentation was reviewed. Risk, benefit, side effects, possible outcomes of the medication and alternatives discussed with the patient and the patient demonstrated understanding. The patient was also educated that the outcome of treatment will depend on the medication compliance as directed by the prescribers along with regular follow-up, compliance with the labs and other work-up, as clinically indicated. Risk Management: Based on the diagnosis and assessment biopsychosocial treatment model was presented to the patient and was given the opportunity to ask any question. The patient was agreeable to the plan and all the patient's questions were answered to the patient's satisfaction. I discussed with the patient the risk, benefit, alternative and common side effects for the proposed medication treatment. The patient is consenting to this treatment. The patients risk factors have been mitigated as they have been admitted to inpatient behavioral health in an emotionally supportive environment with q 15 minute safety checks. Okay to discontinue the 1:1. They have the following: Risk Factors: Not medication compliant, suspicious, paranoid, labile, and past psych hospitalizations. Protective Factors: Connected outpatient, no substance use, and no hx of suicide attempts. Collateral Information:  Will obtain collateral information from the family or friends. Will obtain medical records as appropriate from out patient providers  Will consult the hospitalist for a physical exam to rule out any co-morbid physical condition. Home medication Reconciled   Continue home medications as clinically indicated    New Medications started during this admission :    Cogentin 1 mg twice daily  Depakote  mg twice daily for mood stabilization  Invega 6 mg daily   Valproate level  Prn Haldol 5mg and Vistaril 50mg q6hr for extreme agitation.   Trazodone as ordered for insomnia  Vistaril as ordered for anxiety      Psychotherapy:   Encourage participation in milieu and group therapy  Individual therapy as needed      NOTE: This report was transcribed using voice recognition software. Every effort was made to ensure accuracy; however, inadvertent computerized transcription errors may be present. Behavioral Services  Medicare Certification Upon Admission    I certify that this patient's inpatient psychiatric hospital admission is medically necessary for:    [x] (1) Treatment which could reasonably be expected to improve this patient's condition,       [x] (2) Or for diagnostic study;     AND     [x](2) The inpatient psychiatric services are provided while the individual is under the care of a physician and are included in the individualized plan of care.     Estimated length of stay/service 5 - 7 days based on stability     Plan for post-hospital care follow with OP provider     Electronically signed by FLORESITA Toussaint CNP on 12/2/2022 at 10:53 AM          Electronically signed by FLORESITA Toussaint CNP on 12/2/2022 at 10:53 AM

## 2022-12-02 NOTE — PROGRESS NOTES
[facility]  Initial Interdisciplinary Treatment Plan NOTE    Review Date & Time: 12/2/2022 0900    Patient was in treatment team    Admission Type:   Admission Type: Involuntary    Reason for admission:  Reason for Admission: Mixed episode of Bipolar with paranoia, not caring for himself, depression, and not taking his medications      Estimated Length of Stay Update:  1-3 days  Estimated Discharge Date Update: 12/5/2022    EDUCATION:   Learner Progress Toward Treatment Goals: Reviewed signs, symptoms and risk of self harm and violent behavior and Reviewed goals and plan of care    Method: Small group    Outcome: Verbalized understanding    PATIENT GOALS: \"figure out medications\"    PLAN/TREATMENT RECOMMENDATIONS UPDATE: encourage patient to attend and participate in groups.  Tale medications as prescribed     GOALS UPDATE:   Time frame for Short-Term Goals: prior to discharge    Zeynep Peñaloza RN

## 2022-12-03 PROCEDURE — 6370000000 HC RX 637 (ALT 250 FOR IP): Performed by: PSYCHIATRY & NEUROLOGY

## 2022-12-03 PROCEDURE — 1240000000 HC EMOTIONAL WELLNESS R&B

## 2022-12-03 PROCEDURE — 99232 SBSQ HOSP IP/OBS MODERATE 35: CPT | Performed by: NURSE PRACTITIONER

## 2022-12-03 PROCEDURE — 6370000000 HC RX 637 (ALT 250 FOR IP): Performed by: NURSE PRACTITIONER

## 2022-12-03 PROCEDURE — 6360000002 HC RX W HCPCS: Performed by: NURSE PRACTITIONER

## 2022-12-03 RX ADMIN — IPRATROPIUM BROMIDE 0.5 MG: 0.5 SOLUTION RESPIRATORY (INHALATION) at 08:17

## 2022-12-03 RX ADMIN — MELATONIN 3 MG ORAL TABLET 3 MG: 3 TABLET ORAL at 20:42

## 2022-12-03 RX ADMIN — DIVALPROEX SODIUM 500 MG: 500 TABLET, DELAYED RELEASE ORAL at 08:54

## 2022-12-03 RX ADMIN — BENZTROPINE MESYLATE 1 MG: 0.5 TABLET ORAL at 20:41

## 2022-12-03 RX ADMIN — DIVALPROEX SODIUM 500 MG: 500 TABLET, DELAYED RELEASE ORAL at 20:41

## 2022-12-03 RX ADMIN — PALIPERIDONE 6 MG: 6 TABLET, EXTENDED RELEASE ORAL at 08:54

## 2022-12-03 RX ADMIN — ROSUVASTATIN 10 MG: 10 TABLET, FILM COATED ORAL at 20:42

## 2022-12-03 RX ADMIN — LATANOPROST 1 DROP: 50 SOLUTION OPHTHALMIC at 20:44

## 2022-12-03 RX ADMIN — BENZTROPINE MESYLATE 1 MG: 0.5 TABLET ORAL at 08:54

## 2022-12-03 RX ADMIN — DABIGATRAN ETEXILATE MESYLATE 150 MG: 75 CAPSULE ORAL at 20:41

## 2022-12-03 RX ADMIN — DABIGATRAN ETEXILATE MESYLATE 150 MG: 75 CAPSULE ORAL at 08:54

## 2022-12-03 RX ADMIN — PRAZOSIN HYDROCHLORIDE 1 MG: 1 CAPSULE ORAL at 20:41

## 2022-12-03 NOTE — PLAN OF CARE
Problem: Depression  Goal: Will be euthymic at discharge  Description: INTERVENTIONS:  1. Administer medication as ordered  2. Provide emotional support via 1:1 interaction with staff  3. Encourage involvement in milieu/groups/activities  4. Monitor for social isolation  Outcome: Progressing     Problem: Behavior  Goal: Pt/Family maintain appropriate behavior and adhere to behavioral management agreement, if implemented  Description: INTERVENTIONS:  1. Assess patient/family's coping skills and  non-compliant behavior (including use of illegal substances)  2. Notify security of behavior or suspected illegal substances which indicate the need for search of the family and/or belongings  3. Encourage verbalization of thoughts and concerns in a socially appropriate manner  4. Utilize positive, consistent limit setting strategies supporting safety of patient, staff and others  5. Encourage participation in the decision making process about the behavioral management agreement  6. If a visitor's behavior poses a threat to safety call refer to organization policy. 7. Initiate consult with , Psychosocial CNS, Spiritual Care as appropriate  Outcome: Progressing     Problem: Anxiety  Goal: Will report anxiety at manageable levels  Description: INTERVENTIONS:  1. Administer medication as ordered  2. Teach and rehearse alternative coping skills  3. Provide emotional support with 1:1 interaction with staff  Outcome: Progressing     Problem: Involuntary Admit  Goal: Will cooperate with staff recommendations and doctor's orders and will demonstrate appropriate behavior  Description: INTERVENTIONS:  1. Treat underlying conditions and offer medication as ordered  2. Educate regarding involuntary admission procedures and rules  3.  Contain excessive/inappropriate behavior per unit and hospital policies  Outcome: Progressing

## 2022-12-03 NOTE — PROGRESS NOTES
Patient A+Ox 4. Patient denies SI, HI, and internal stimulation. Both anxiety and depression are rated 2/10. Patient presents with a calm, pleasant demeanor. Patient is cooperative with staff and compliant with the medication regimen. No behaviors, patient is observed watching television. Patient will continue to work toward discharge goals which includes medication compliance and group participation. Staff will continue to offer support and interventions per request and as required.

## 2022-12-03 NOTE — GROUP NOTE
Group Therapy Note    Date: 12/3/2022    Group Start Time: 0200  Group End Time: 0506  Group Topic: Psychoeducation    [facility]  [address] [address] [facility], [facility]                                                                        Group Therapy Note      Wellness Binder Information  Module Name:  the importance of schedule     Patient's Goal:  Patient will be able to id core components of having a schedule. Notes: Willing to be an active listener and participate when prompted to share. Status After Intervention:  Improved    Participation Level:  Active Listener and Interactive    Participation Quality: Appropriate, Attentive, and Sharing      Speech:  normal      Thought Process/Content: Logical      Affective Functioning: Congruent      Mood: euthymic      Level of consciousness:  Alert, Oriented x4, and Attentive      Response to Learning: Able to verbalize/acknowledge new learning, Able to retain information, and Progressing to goal      Endings: None Reported    Modes of Intervention: Education, Support, Socialization, and Clarifying      Discipline Responsible: Psychoeducational Specialist      Signature:  Lakisha Alvarez

## 2022-12-03 NOTE — PLAN OF CARE
Denies SI/HI  denies hallucinations  mood is irritable this a.m.  states he took his meds this a.m. but didn't want to because it makes his brain foggy and tired   attended group  cooperative with meds  will continue to monitor

## 2022-12-03 NOTE — GROUP NOTE
Group Therapy Note    Date: 12/3/2022    Group Start Time: 1000  Group End Time: 6353  Group Topic: Cognitive Skills    [facility]    Thankful JOSIE Schaffer LSW        Group Therapy Note    Attendees: 7       Patient's Goal:  Pt will learn about social anxiety and the safety behaviors we place to avoid anxiety in social situations. We will also explore how our lives would be different if we did not have social anxiety. Notes:  Pt was an active participant in group therapy. Status After Intervention:  Unchanged    Participation Level: Active Listener and Interactive    Participation Quality: Appropriate, Attentive, and Sharing      Speech:  normal      Thought Process/Content: Logical      Affective Functioning: Congruent      Mood: euthymic      Level of consciousness:  Alert and Attentive      Response to Learning: Able to verbalize current knowledge/experience and Able to retain information      Endings: None Reported    Modes of Intervention: Education, Support, Socialization, Exploration, Clarifying, and Problem-solving      Discipline Responsible: /Counselor      Signature:   JOSIE Stack LSW

## 2022-12-03 NOTE — PLAN OF CARE
Problem: Depression  Goal: Will be euthymic at discharge  Description: INTERVENTIONS:  1. Administer medication as ordered  2. Provide emotional support via 1:1 interaction with staff  3. Encourage involvement in milieu/groups/activities  4. Monitor for social isolation  12/3/2022 1423 by Patricia Kennedy RN  Outcome: Progressing     Problem: Behavior  Goal: Pt/Family maintain appropriate behavior and adhere to behavioral management agreement, if implemented  Description: INTERVENTIONS:  1. Assess patient/family's coping skills and  non-compliant behavior (including use of illegal substances)  2. Notify security of behavior or suspected illegal substances which indicate the need for search of the family and/or belongings  3. Encourage verbalization of thoughts and concerns in a socially appropriate manner  4. Utilize positive, consistent limit setting strategies supporting safety of patient, staff and others  5. Encourage participation in the decision making process about the behavioral management agreement  6. If a visitor's behavior poses a threat to safety call refer to organization policy. 7. Initiate consult with , Psychosocial CNS, Spiritual Care as appropriate  12/3/2022 1423 by Patricia Kennedy RN  Outcome: Progressing     Problem: Anxiety  Goal: Will report anxiety at manageable levels  Description: INTERVENTIONS:  1. Administer medication as ordered  2. Teach and rehearse alternative coping skills  3. Provide emotional support with 1:1 interaction with staff  12/3/2022 1423 by Patricia Kennedy RN  Outcome: Progressing     Problem: Sleep Disturbance  Goal: Will exhibit normal sleeping pattern  Description: INTERVENTIONS:  1. Administer medication as ordered  2. Decrease environmental stimuli, including noise, as appropriate  3.  Discourage social isolation and naps during the day  12/3/2022 1423 by Patricia Kennedy RN  Outcome: Progressing     Problem: Involuntary Admit  Goal: Will cooperate with staff recommendations and doctor's orders and will demonstrate appropriate behavior  Description: INTERVENTIONS:  1. Treat underlying conditions and offer medication as ordered  2. Educate regarding involuntary admission procedures and rules  3.  Contain excessive/inappropriate behavior per unit and hospital policies  [id number]/1/[id number] [id number] by Akua Morales RN  Outcome: Progressing

## 2022-12-03 NOTE — PROGRESS NOTES
Attended community meeting. Updated on staffing and daily expectations. Shared goal for the day as to shower.

## 2022-12-04 PROCEDURE — 6370000000 HC RX 637 (ALT 250 FOR IP): Performed by: NURSE PRACTITIONER

## 2022-12-04 PROCEDURE — 6360000002 HC RX W HCPCS: Performed by: NURSE PRACTITIONER

## 2022-12-04 PROCEDURE — 1240000000 HC EMOTIONAL WELLNESS R&B

## 2022-12-04 PROCEDURE — 6370000000 HC RX 637 (ALT 250 FOR IP): Performed by: PSYCHIATRY & NEUROLOGY

## 2022-12-04 PROCEDURE — 94640 AIRWAY INHALATION TREATMENT: CPT

## 2022-12-04 PROCEDURE — 99232 SBSQ HOSP IP/OBS MODERATE 35: CPT | Performed by: NURSE PRACTITIONER

## 2022-12-04 RX ADMIN — PRAZOSIN HYDROCHLORIDE 1 MG: 1 CAPSULE ORAL at 21:22

## 2022-12-04 RX ADMIN — BENZTROPINE MESYLATE 1 MG: 0.5 TABLET ORAL at 08:59

## 2022-12-04 RX ADMIN — PALIPERIDONE 6 MG: 6 TABLET, EXTENDED RELEASE ORAL at 08:59

## 2022-12-04 RX ADMIN — HYDROXYZINE PAMOATE 50 MG: 50 CAPSULE ORAL at 21:23

## 2022-12-04 RX ADMIN — Medication 1000 UNITS: at 09:32

## 2022-12-04 RX ADMIN — MELATONIN 3 MG ORAL TABLET 3 MG: 3 TABLET ORAL at 21:29

## 2022-12-04 RX ADMIN — BENZTROPINE MESYLATE 1 MG: 0.5 TABLET ORAL at 21:26

## 2022-12-04 RX ADMIN — IPRATROPIUM BROMIDE 0.5 MG: 0.5 SOLUTION RESPIRATORY (INHALATION) at 10:07

## 2022-12-04 RX ADMIN — ROSUVASTATIN 10 MG: 10 TABLET, FILM COATED ORAL at 21:23

## 2022-12-04 RX ADMIN — DIVALPROEX SODIUM 500 MG: 500 TABLET, DELAYED RELEASE ORAL at 21:27

## 2022-12-04 RX ADMIN — LATANOPROST 1 DROP: 50 SOLUTION OPHTHALMIC at 21:22

## 2022-12-04 RX ADMIN — DABIGATRAN ETEXILATE MESYLATE 150 MG: 75 CAPSULE ORAL at 21:22

## 2022-12-04 RX ADMIN — DABIGATRAN ETEXILATE MESYLATE 150 MG: 75 CAPSULE ORAL at 08:59

## 2022-12-04 RX ADMIN — DIVALPROEX SODIUM 500 MG: 500 TABLET, DELAYED RELEASE ORAL at 08:59

## 2022-12-04 NOTE — PROGRESS NOTES
BEHAVIORAL HEALTH FOLLOW-UP NOTE     12/3/2022     Patient was seen and examined in person, Chart reviewed   Patient's case discussed with staff/team    Chief Complaint: \"I am okay. \"    Interim History: Patient up on the unit guarded somewhat paranoia poor insight and judgment he is attending groups social select peers denies SI/HI intent or plan denies auditory or visual hallucinations no behavioral disturbances noted      Appetite:   [x] Normal/Unchanged  [] Increased  [] Decreased      Sleep:       [x] Normal/Unchanged  [] Fair       [] Poor              Energy:    [x] Normal/Unchanged  [] Increased  [] Decreased        SI [] Present  [x] Absent    HI  []Present  [x] Absent     Aggression:  [] yes  [x] no    Patient is [x] able  [] unable to CONTRACT FOR SAFETY     PAST MEDICAL/PSYCHIATRIC HISTORY:   Past Medical History:   Diagnosis Date    Acute bacterial conjunctivitis of left eye 9/11/2016    Anticoagulated on Coumadin     per Dr. Fifi Quintana 1 disorder [facility])     goes to [facility]    Bipolar affect, depressed ([facility] [address]     Cellulitis of face 9/11/2016    Full dentures     Hx of blood clots     DVT, PE hx of    Hypertension     on propanolol / patient states does not have high BP    Impetigo 9/11/2016    Palpitations     Screening     for colonoscopy    Seizure disorder ([facility] [address]     last seizure 2010 / cause unknown       FAMILY/SOCIAL HISTORY:  Family History   Problem Relation Age of Onset    Diabetes Brother      Social History     Socioeconomic History    Marital status: Single     Spouse name: Not on file    Number of children: Not on file    Years of education: Not on file    Highest education level: Not on file   Occupational History    Not on file   Tobacco Use    Smoking status: Every Day     Packs/day: 1.00     Types: Cigarettes     Start date: 2/12/1975    Smokeless tobacco: Never    Tobacco comments:     Interested in the patch   Vaping Use    Vaping Use: Some days    Substances: Flavoring Substance and Sexual Activity    Alcohol use: Not Currently     Comment: occasional beer     Drug use: No    Sexual activity: Never   Other Topics Concern    Not on file   Social History Narrative    Not on file     Social Determinants of Health     Financial Resource Strain: Not on file   Food Insecurity: Not on file   Transportation Needs: Not on file   Physical Activity: Not on file   Stress: Not on file   Social Connections: Not on file   Intimate Partner Violence: Not on file   Housing Stability: Not on file           ROS:  [x] All negative/unchanged except if checked.  Explain positive(checked items) below:  [] Constitutional  [] Eyes  [] Ear/Nose/Mouth/Throat  [] Respiratory  [] CV  [] GI  []   [] Musculoskeletal  [] Skin/Breast  [] Neurological  [] Endocrine  [] Heme/Lymph  [] Allergic/Immunologic    Explanation:     MEDICATIONS:    Current Facility-Administered Medications:     benztropine (COGENTIN) tablet 1 mg, 1 mg, Oral, BID, Ulromi Anne APRN - CNP, 1 mg at 12/03/22 2041    latanoprost (XALATAN) 0.005 % ophthalmic solution 1 drop, 1 drop, Both Eyes, Nightly, Ulromi Anne APRN - CNP, 1 drop at 12/03/22 2044    rosuvastatin (CRESTOR) tablet 10 mg, 10 mg, Oral, Nightly, Ulice Omid, APRN - CNP, 10 mg at 12/03/22 2042    ipratropium (ATROVENT) 0.02 % nebulizer solution 0.5 mg, 0.5 mg, Nebulization, Daily, Ulice Omid, APRN - CNP, 0.5 mg at 12/03/22 5423    dabigatran (PRADAXA) capsule 150 mg, 150 mg, Oral, BID, Ulice Omid APRN - CNP, 150 mg at 12/03/22 2041    prazosin (MINIPRESS) capsule 1 mg, 1 mg, Oral, Nightly, Ulice Omid, APRN - CNP, 1 mg at 12/03/22 2041    Vitamin D (CHOLECALCIFEROL) tablet 1,000 Units, 1,000 Units, Oral, Daily, Ulice Omid APRN - CNP, 1,000 Units at 12/02/22 1230    acetaminophen (TYLENOL) tablet 650 mg, 650 mg, Oral, Q4H PRN, Queen Aida MD    magnesium hydroxide (MILK OF MAGNESIA) 400 MG/5ML suspension 30 mL, 30 mL, Oral, Daily PRN, Antoinette Knows Rosaline Noble MD    nicotine (NICODERM CQ) 21 MG/24HR 1 patch, 1 patch, TransDERmal, Daily, Rick Lloyd MD, 1 patch at 12/03/22 0854    aluminum & magnesium hydroxide-simethicone (MAALOX) 200-200-20 MG/5ML suspension 30 mL, 30 mL, Oral, PRN, Rick Llyod MD    hydrOXYzine pamoate (VISTARIL) capsule 50 mg, 50 mg, Oral, TID PRN, Rick Lloyd MD, 50 mg at 12/02/22 2110    haloperidol (HALDOL) tablet 3 mg, 3 mg, Oral, Q6H PRN **OR** haloperidol lactate (HALDOL) injection 3 mg, 3 mg, IntraMUSCular, Q6H PRN, Rick Lloyd MD    melatonin tablet 3 mg, 3 mg, Oral, Nightly, Rick Lloyd MD, 3 mg at 12/03/22 2042    divalproex (DEPAKOTE) DR tablet 500 mg, 500 mg, Oral, BID, Rick Lloyd MD, 500 mg at 12/03/22 2041    paliperidone (INVEGA) extended release tablet 6 mg, 6 mg, Oral, Daily, Rick Lloyd MD, 6 mg at 12/03/22 1315      Examination:  /88   Pulse 80   Temp 98.5 °F (36.9 °C) (Temporal)   Resp 16   Ht 5' 8\" (1.727 m)   Wt 200 lb (90.7 kg)   SpO2 97%   BMI 30.41 kg/m²   Gait - steady  Medication side effects(SE): denies    Mental Status Examination:    Level of consciousness:  within normal limits   Appearance:  fair grooming and fair hygiene  Behavior/Motor:  no abnormalities noted  Attitude toward examiner:  cooperative  Speech:  spontaneous, normal rate and normal volume   Mood: \" Im ok\"  Affect: Irritable  Thought processes: Linear thought flight of ideas loose associations  Thought content: Devoid of any auditory visual hallucinations guarded with paranoia.   Denies SI/HI intent or plan   Language: able to name objects and repeate phrases  Remote Memory: intact  Recent Memory: intact  Cognition:  oriented to person, place, and time   Fund of Knowledge: Vocabulary intact, pt is aware of current events and past history  Attetion and Concentration intact  Insight Limited  Judgement been      ASSESSMENT:   Patient symptoms are:  [] Well controlled  [] Improving  [] Worsening  [x] No change      Diagnosis:   Principal Problem:    Bipolar affective disorder, mixed, severe, with psychotic behavior ([facility] [address]  Resolved Problems:    Bipolar affective ([facility] [address]      LABS:    Recent Labs     12/01/22  1013   WBC 8.7   HGB 14.6        Recent Labs     12/01/22  1013      K 4.3      CO2 23   BUN 12   CREATININE 0.7   GLUCOSE 303*     Recent Labs     12/01/22  1013   BILITOT <0.2   ALKPHOS 63   AST 18   ALT 27     Lab Results   Component Value Date/Time    LABAMPH NOT DETECTED 12/01/2022 10:13 AM    BARBSCNU NOT DETECTED 12/01/2022 10:13 AM    LABBENZ NOT DETECTED 12/01/2022 10:13 AM    LABMETH NOT DETECTED 12/01/2022 10:13 AM    OPIATESCREENURINE NOT DETECTED 12/01/2022 10:13 AM    PHENCYCLIDINESCREENURINE NOT DETECTED 12/01/2022 10:13 AM    ETOH <10 12/01/2022 10:13 AM     Lab Results   Component Value Date/Time    TSH 1.230 07/20/2022 03:00 PM     No results found for: LITHIUM  Lab Results   Component Value Date    VALPROATE 22 (L) 12/01/2022           Treatment Plan:  Reviewed current Medications with the patient. Risks, benefits, side effects, drug-to-drug interactions and alternatives to treatment were discussed. Collateral information:   CD evaluation  Encourage patient to attend group and other milieu activities.   Discharge planning discussed with the patient and treatment team.    Continue Invega 6 mg daily  Continue prazosin 1 mg nightly  Continue Depakote 500 mg twice daily  Continue Cogentin 1 mg twice daily    PSYCHOTHERAPY/COUNSELING:  [x] Therapeutic interview  [x] Supportive  [] CBT  [] Ongoing  [] Other    [x] Patient continues to need, on a daily basis, active treatment furnished directly by or requiring the supervision of inpatient psychiatric personnel      Anticipated Length of stay: 3 to 7 days based on stability            Electronically signed by FLORESITA Palencia CNP on 59/0/4314 at 9:39 PM

## 2022-12-04 NOTE — PLAN OF CARE
Problem: Depression  Goal: Will be euthymic at discharge  Description: INTERVENTIONS:  1. Administer medication as ordered  2. Provide emotional support via 1:1 interaction with staff  3. Encourage involvement in milieu/groups/activities  4. Monitor for social isolation  12/4/2022 0851 by Frances Aguirre RN  Outcome: Progressing     Problem: Behavior  Goal: Pt/Family maintain appropriate behavior and adhere to behavioral management agreement, if implemented  Description: INTERVENTIONS:  1. Assess patient/family's coping skills and  non-compliant behavior (including use of illegal substances)  2. Notify security of behavior or suspected illegal substances which indicate the need for search of the family and/or belongings  3. Encourage verbalization of thoughts and concerns in a socially appropriate manner  4. Utilize positive, consistent limit setting strategies supporting safety of patient, staff and others  5. Encourage participation in the decision making process about the behavioral management agreement  6. If a visitor's behavior poses a threat to safety call refer to organization policy. 7. Initiate consult with , Psychosocial CNS, Spiritual Care as appropriate  12/4/2022 0546 by Frances Aguirre RN  Outcome: Progressing     Problem: Anxiety  Goal: Will report anxiety at manageable levels  Description: INTERVENTIONS:  1. Administer medication as ordered  2. Teach and rehearse alternative coping skills  3. Provide emotional support with 1:1 interaction with staff  12/4/2022 0851 by Frances Aguirre RN  Outcome: Progressing     Problem: Sleep Disturbance  Goal: Will exhibit normal sleeping pattern  Description: INTERVENTIONS:  1. Administer medication as ordered  2. Decrease environmental stimuli, including noise, as appropriate  3.  Discourage social isolation and naps during the day  12/4/2022 0851 by Frances Aguirre RN  Outcome: Progressing     Problem: Involuntary Admit  Goal: Will cooperate with staff recommendations and doctor's orders and will demonstrate appropriate behavior  Description: INTERVENTIONS:  1. Treat underlying conditions and offer medication as ordered  2. Educate regarding involuntary admission procedures and rules  3. Contain excessive/inappropriate behavior per unit and hospital policies  95/3/8980 [id number] by Phoenix Carlson RN  Outcome: Progressing    Patient denies SI/HI and hallucinations. Patient rates anxiety and depression 5/10. Patient is flat, blunt, and evasive. Patient is isolative to room but does attend provided meals. Patient takes prescribed medications without issue. Will continue to offer support and comfort to patient.

## 2022-12-04 NOTE — PLAN OF CARE
Problem: Depression  Goal: Will be euthymic at discharge  Description: INTERVENTIONS:  1. Administer medication as ordered  2. Provide emotional support via 1:1 interaction with staff  3. Encourage involvement in milieu/groups/activities  4. Monitor for social isolation  12/4/2022 0040 by Herlinda Alan RN  Outcome: Progressing  12/3/2022 1423 by Patricia Kennedy RN  Outcome: Progressing     Problem: Anxiety  Goal: Will report anxiety at manageable levels  Description: INTERVENTIONS:  1. Administer medication as ordered  2. Teach and rehearse alternative coping skills  3. Provide emotional support with 1:1 interaction with staff  12/4/2022 0040 by Herlinda Alan RN  Outcome: Progressing  12/3/2022 1423 by Patricia Kennedy RN  Outcome: Progressing     Problem: Involuntary Admit  Goal: Will cooperate with staff recommendations and doctor's orders and will demonstrate appropriate behavior  Description: INTERVENTIONS:  1. Treat underlying conditions and offer medication as ordered  2. Educate regarding involuntary admission procedures and rules  3.  Contain excessive/inappropriate behavior per unit and hospital policies  27/1/4170 0071 by Herlinda Alan RN  Outcome: Progressing  12/3/2022 1423 by Patricia Kennedy RN  Outcome: Progressing

## 2022-12-04 NOTE — PROGRESS NOTES
Patient A+Ox 4. Both anxiety and depression are rated 3/10. Patient is calm, pleasant, and cooperative with staff. He brightens with conversation. Concentration is poor. Patient complies with the medication regimen and demonstrates good eye contact when communicating. No behaviors, patient is observed out on the unit watching television and eating. Patient will continue to work toward discharge goals which includes medication compliance and group participation. Staff will continue to offer support and interventions per request and as required.

## 2022-12-04 NOTE — PROGRESS NOTES
BEHAVIORAL HEALTH FOLLOW-UP NOTE     12/4/2022     Patient was seen and examined in person, Chart reviewed   Patient's case discussed with staff/team    Chief Complaint: \"I am good. \"    Interim History: Patient seen in his room lying in bed states he is doing \"good. \"  Denies suicidal homicidal ideations intent or plan denies auditory visual hallucinations he is isolate to his room not offer much conversation    Appetite:   [x] Normal/Unchanged  [] Increased  [] Decreased      Sleep:       [x] Normal/Unchanged  [] Fair       [] Poor              Energy:    [x] Normal/Unchanged  [] Increased  [] Decreased        SI [] Present  [x] Absent    HI  []Present  [x] Absent     Aggression:  [] yes  [x] no    Patient is [x] able  [] unable to CONTRACT FOR SAFETY     PAST MEDICAL/PSYCHIATRIC HISTORY:   Past Medical History:   Diagnosis Date    Acute bacterial conjunctivitis of left eye 9/11/2016    Anticoagulated on Coumadin     per Dr. Melvi Canas 1 disorder [facility])     goes to [facility]    Bipolar affect, depressed ([facility] [address]     Cellulitis of face 9/11/2016    Full dentures     Hx of blood clots     DVT, PE hx of    Hypertension     on propanolol / patient states does not have high BP    Impetigo 9/11/2016    Palpitations     Screening     for colonoscopy    Seizure disorder ([facility] [address]     last seizure 2010 / cause unknown       FAMILY/SOCIAL HISTORY:  Family History   Problem Relation Age of Onset    Diabetes Brother      Social History     Socioeconomic History    Marital status: Single     Spouse name: Not on file    Number of children: Not on file    Years of education: Not on file    Highest education level: Not on file   Occupational History    Not on file   Tobacco Use    Smoking status: Every Day     Packs/day: 1.00     Types: Cigarettes     Start date: 2/12/1975    Smokeless tobacco: Never    Tobacco comments:     Interested in the patch   Vaping Use    Vaping Use: Some days    Substances: Flavoring   Substance and Sexual Activity    Alcohol use: Not Currently     Comment: occasional beer     Drug use: No    Sexual activity: Never   Other Topics Concern    Not on file   Social History Narrative    Not on file     Social Determinants of Health     Financial Resource Strain: Not on file   Food Insecurity: Not on file   Transportation Needs: Not on file   Physical Activity: Not on file   Stress: Not on file   Social Connections: Not on file   Intimate Partner Violence: Not on file   Housing Stability: Not on file           ROS:  [x] All negative/unchanged except if checked.  Explain positive(checked items) below:  [] Constitutional  [] Eyes  [] Ear/Nose/Mouth/Throat  [] Respiratory  [] CV  [] GI  []   [] Musculoskeletal  [] Skin/Breast  [] Neurological  [] Endocrine  [] Heme/Lymph  [] Allergic/Immunologic    Explanation:     MEDICATIONS:    Current Facility-Administered Medications:     benztropine (COGENTIN) tablet 1 mg, 1 mg, Oral, BID, Harolyn Carne, APRN - CNP, 1 mg at 12/04/22 0859    latanoprost (XALATAN) 0.005 % ophthalmic solution 1 drop, 1 drop, Both Eyes, Nightly, Harolyn Carne, APRN - CNP, 1 drop at 12/03/22 2044    rosuvastatin (CRESTOR) tablet 10 mg, 10 mg, Oral, Nightly, Harolyn Carne, APRN - CNP, 10 mg at 12/03/22 2042    ipratropium (ATROVENT) 0.02 % nebulizer solution 0.5 mg, 0.5 mg, Nebulization, Daily, Harolyn Carne, APRN - CNP, 0.5 mg at 12/04/22 1007    dabigatran (PRADAXA) capsule 150 mg, 150 mg, Oral, BID, Harolyn Carne, APRN - CNP, 150 mg at 12/04/22 0859    prazosin (MINIPRESS) capsule 1 mg, 1 mg, Oral, Nightly, Harolyn Carne, APRN - CNP, 1 mg at 12/03/22 2041    Vitamin D (CHOLECALCIFEROL) tablet 1,000 Units, 1,000 Units, Oral, Daily, Harolyn Carne, APRN - CNP, 1,000 Units at 12/04/22 0932    acetaminophen (TYLENOL) tablet 650 mg, 650 mg, Oral, Q4H PRN, Tanja Gutierrez MD    magnesium hydroxide (MILK OF MAGNESIA) 400 MG/5ML suspension 30 mL, 30 mL, Oral, Daily PRN, Tanja Gutierrez MD nicotine (NICODERM CQ) 21 MG/24HR 1 patch, 1 patch, TransDERmal, Daily, Jessica Triplett MD, 1 patch at 12/04/22 0859    aluminum & magnesium hydroxide-simethicone (MAALOX) 200-200-20 MG/5ML suspension 30 mL, 30 mL, Oral, PRN, Jessica Triplett MD    hydrOXYzine pamoate (VISTARIL) capsule 50 mg, 50 mg, Oral, TID PRN, Jessica Triplett MD, 50 mg at 12/02/22 2110    haloperidol (HALDOL) tablet 3 mg, 3 mg, Oral, Q6H PRN **OR** haloperidol lactate (HALDOL) injection 3 mg, 3 mg, IntraMUSCular, Q6H PRN, Jessica Triplett MD    melatonin tablet 3 mg, 3 mg, Oral, Nightly, Jessica Triplett MD, 3 mg at 12/03/22 2042    divalproex (DEPAKOTE) DR tablet 500 mg, 500 mg, Oral, BID, Jessica Triplett MD, 500 mg at 12/04/22 0859    paliperidone (INVEGA) extended release tablet 6 mg, 6 mg, Oral, Daily, Jessica Triplett MD, 6 mg at 12/04/22 0218      Examination:  /85   Pulse 79   Temp 97.1 °F (36.2 °C) (Temporal)   Resp 16   Ht 5' 8\" (1.727 m)   Wt 200 lb (90.7 kg)   SpO2 98%   BMI 30.41 kg/m²   Gait - steady  Medication side effects(SE): denies    Mental Status Examination:    Level of consciousness:  within normal limits   Appearance:  fair grooming and fair hygiene  Behavior/Motor:  no abnormalities noted  Attitude toward examiner:  cooperative  Speech:  spontaneous, normal rate and normal volume   Mood: \" Im ok\"  Affect: Irritable  Thought processes: Linear thought flight of ideas loose associations  Thought content: Devoid of any auditory visual hallucinations guarded with paranoia.   Denies SI/HI intent or plan   Language: able to name objects and repeate phrases  Remote Memory: intact  Recent Memory: intact  Cognition:  oriented to person, place, and time   Fund of Knowledge: Vocabulary intact, pt is aware of current events and past history  Attetion and Concentration intact  Insight Limited  Judgement been      ASSESSMENT:   Patient symptoms are:  [] Well controlled  [] Improving  [] Worsening  [x] No change      Diagnosis:   Principal Problem:    Bipolar affective disorder, mixed, severe, with psychotic behavior ([facility] [address]  Resolved Problems:    Bipolar affective ([facility] [address]      LABS:    No results for input(s): WBC, HGB, PLT in the last 72 hours. No results for input(s): NA, K, CL, CO2, BUN, CREATININE, GLUCOSE in the last 72 hours. No results for input(s): BILITOT, ALKPHOS, AST, ALT in the last 72 hours. Lab Results   Component Value Date/Time    LABAMPH NOT DETECTED 12/01/2022 10:13 AM    BARBSCNU NOT DETECTED 12/01/2022 10:13 AM    LABBENZ NOT DETECTED 12/01/2022 10:13 AM    LABMETH NOT DETECTED 12/01/2022 10:13 AM    OPIATESCREENURINE NOT DETECTED 12/01/2022 10:13 AM    PHENCYCLIDINESCREENURINE NOT DETECTED 12/01/2022 10:13 AM    ETOH <10 12/01/2022 10:13 AM     Lab Results   Component Value Date/Time    TSH 1.230 07/20/2022 03:00 PM     No results found for: LITHIUM  Lab Results   Component Value Date    VALPROATE 22 (L) 12/01/2022           Treatment Plan:  Reviewed current Medications with the patient. Risks, benefits, side effects, drug-to-drug interactions and alternatives to treatment were discussed. Collateral information:   CD evaluation  Encourage patient to attend group and other milieu activities.   Discharge planning discussed with the patient and treatment team.    Continue Invega 6 mg daily  Continue prazosin 1 mg nightly  Continue Depakote 500 mg twice daily  Continue Cogentin 1 mg twice daily    PSYCHOTHERAPY/COUNSELING:  [x] Therapeutic interview  [x] Supportive  [] CBT  [] Ongoing  [] Other    [x] Patient continues to need, on a daily basis, active treatment furnished directly by or requiring the supervision of inpatient psychiatric personnel      Anticipated Length of stay: 3 to 7 days based on stability            Electronically signed by Pieter Runner, APRN - CNP on 65/0/2461 at 4:29 PM

## 2022-12-05 PROCEDURE — 6370000000 HC RX 637 (ALT 250 FOR IP): Performed by: PSYCHIATRY & NEUROLOGY

## 2022-12-05 PROCEDURE — 1240000000 HC EMOTIONAL WELLNESS R&B

## 2022-12-05 PROCEDURE — 6360000002 HC RX W HCPCS: Performed by: NURSE PRACTITIONER

## 2022-12-05 PROCEDURE — 6370000000 HC RX 637 (ALT 250 FOR IP): Performed by: NURSE PRACTITIONER

## 2022-12-05 PROCEDURE — 99232 SBSQ HOSP IP/OBS MODERATE 35: CPT | Performed by: NURSE PRACTITIONER

## 2022-12-05 PROCEDURE — 94640 AIRWAY INHALATION TREATMENT: CPT

## 2022-12-05 RX ADMIN — BENZTROPINE MESYLATE 1 MG: 0.5 TABLET ORAL at 20:51

## 2022-12-05 RX ADMIN — DIVALPROEX SODIUM 500 MG: 500 TABLET, DELAYED RELEASE ORAL at 10:01

## 2022-12-05 RX ADMIN — ROSUVASTATIN 10 MG: 10 TABLET, FILM COATED ORAL at 20:51

## 2022-12-05 RX ADMIN — PALIPERIDONE 6 MG: 6 TABLET, EXTENDED RELEASE ORAL at 10:02

## 2022-12-05 RX ADMIN — PRAZOSIN HYDROCHLORIDE 1 MG: 1 CAPSULE ORAL at 20:51

## 2022-12-05 RX ADMIN — MELATONIN 3 MG ORAL TABLET 3 MG: 3 TABLET ORAL at 20:51

## 2022-12-05 RX ADMIN — DABIGATRAN ETEXILATE MESYLATE 150 MG: 75 CAPSULE ORAL at 09:59

## 2022-12-05 RX ADMIN — Medication 1000 UNITS: at 12:02

## 2022-12-05 RX ADMIN — BENZTROPINE MESYLATE 1 MG: 0.5 TABLET ORAL at 10:01

## 2022-12-05 RX ADMIN — IPRATROPIUM BROMIDE 0.5 MG: 0.5 SOLUTION RESPIRATORY (INHALATION) at 08:14

## 2022-12-05 RX ADMIN — DABIGATRAN ETEXILATE MESYLATE 150 MG: 75 CAPSULE ORAL at 20:51

## 2022-12-05 RX ADMIN — DIVALPROEX SODIUM 500 MG: 500 TABLET, DELAYED RELEASE ORAL at 20:51

## 2022-12-05 NOTE — PROGRESS NOTES
Patient A+Ox 4, denies SI, HI and internal stimulation. Patient rates anxiety 4/10 and depression 3/10. Patient is calm, pleasant, and cooperative with staff. He's compliant with the medication regimen. Affect is normal. No behaviors, patient is observed resting in bed during assessment. Patient will continue to work toward discharge goals which includes medication compliance and group participation. Staff will continue to offer support and interventions per request and as required.

## 2022-12-05 NOTE — PROGRESS NOTES
[facility]  Day 3 Interdisciplinary Treatment Plan NOTE    Review Date & Time: 12/4/2022 0900    Patient was in treatment team    Estimated Length of Stay Update:  5-7 days   Estimated Discharge Date Update: 12/7/2022    EDUCATION:   Learner Progress Toward Treatment Goals: Reviewed results and recommendations of this team, Reviewed signs, symptoms and risk of self harm and violent behavior, and Reviewed goals and plan of care    Method: Small group    Outcome: Verbalized understanding    PATIENT GOALS: none at this time     PLAN/TREATMENT RECOMMENDATIONS UPDATE:encourage patient to participate and attend groups.  Take medications as prescribed     GOALS UPDATE:   Time frame for Short-Term Goals: prior to discharge      Esau Nunez RN

## 2022-12-05 NOTE — PROGRESS NOTES
BEHAVIORAL HEALTH FOLLOW-UP NOTE     12/5/2022     Patient was seen and examined in person, Chart reviewed   Patient's case discussed with staff/team    Chief Complaint: \"I am sad\"    Interim History: Patient seen in his room lying in bed states he is \"sad\" Patient has mostly been isolative to his room, has some paranoia. He has low threshold for stimulation. He has difficulty tolerating another peer therefore he isolates away from this peer. Denies suicidal homicidal ideations intent or plan denies auditory visual hallucinations he is isolate to his room not offer much conversation. Guarded, evasive. Significantly minimizing circumstances of hospitalization. He has poor insight into his problems and need for treatment. Patient had stopped taking his medications while at the crisis unit, APA level subtherapeutic on intake. Repeat VPA tomorrow morning.      Appetite:   [x] Normal/Unchanged  [] Increased  [] Decreased      Sleep:       [x] Normal/Unchanged  [] Fair       [] Poor              Energy:    [x] Normal/Unchanged  [] Increased  [] Decreased        SI [] Present  [x] Absent    HI  []Present  [x] Absent     Aggression:  [] yes  [x] no    Patient is [x] able  [] unable to CONTRACT FOR SAFETY     PAST MEDICAL/PSYCHIATRIC HISTORY:   Past Medical History:   Diagnosis Date    Acute bacterial conjunctivitis of left eye 9/11/2016    Anticoagulated on Coumadin     per Dr. Gregorio Maravilla 1 disorder [facility])     goes to [facility]    Bipolar affect, depressed ([facility] [address]     Cellulitis of face 9/11/2016    Full dentures     Hx of blood clots     DVT, PE hx of    Hypertension     on propanolol / patient states does not have high BP    Impetigo 9/11/2016    Palpitations     Screening     for colonoscopy    Seizure disorder ([facility] [address]     last seizure 2010 / cause unknown       FAMILY/SOCIAL HISTORY:  Family History   Problem Relation Age of Onset    Diabetes Brother      Social History     Socioeconomic History    Marital status: Single     Spouse name: Not on file    Number of children: Not on file    Years of education: Not on file    Highest education level: Not on file   Occupational History    Not on file   Tobacco Use    Smoking status: Every Day     Packs/day: 1.00     Types: Cigarettes     Start date: 2/12/1975    Smokeless tobacco: Never    Tobacco comments:     Interested in the patch   Vaping Use    Vaping Use: Some days    Substances: Flavoring   Substance and Sexual Activity    Alcohol use: Not Currently     Comment: occasional beer     Drug use: No    Sexual activity: Never   Other Topics Concern    Not on file   Social History Narrative    Not on file     Social Determinants of Health     Financial Resource Strain: Not on file   Food Insecurity: Not on file   Transportation Needs: Not on file   Physical Activity: Not on file   Stress: Not on file   Social Connections: Not on file   Intimate Partner Violence: Not on file   Housing Stability: Not on file           ROS:  [x] All negative/unchanged except if checked.  Explain positive(checked items) below:  [] Constitutional  [] Eyes  [] Ear/Nose/Mouth/Throat  [] Respiratory  [] CV  [] GI  []   [] Musculoskeletal  [] Skin/Breast  [] Neurological  [] Endocrine  [] Heme/Lymph  [] Allergic/Immunologic    Explanation:     MEDICATIONS:    Current Facility-Administered Medications:     benztropine (COGENTIN) tablet 1 mg, 1 mg, Oral, BID, FLORESITA Mueller - CNP, 1 mg at 12/05/22 1001    latanoprost (XALATAN) 0.005 % ophthalmic solution 1 drop, 1 drop, Both Eyes, Nightly, FLORESITA Mueller - CNP, 1 drop at 12/04/22 2122    rosuvastatin (CRESTOR) tablet 10 mg, 10 mg, Oral, Nightly, FLORESITA Mueller - CNP, 10 mg at 12/04/22 2123    ipratropium (ATROVENT) 0.02 % nebulizer solution 0.5 mg, 0.5 mg, Nebulization, Daily, FLORESITA Mueller - CNP, 0.5 mg at 12/05/22 0414    dabigatran (PRADAXA) capsule 150 mg, 150 mg, Oral, BID, [place] Li APRN - CNP, 150 mg at 12/05/22 0830    prazosin (MINIPRESS) capsule 1 mg, 1 mg, Oral, Nightly, FLORESITA Anderson - CNP, 1 mg at 12/04/22 2122    Vitamin D (CHOLECALCIFEROL) tablet 1,000 Units, 1,000 Units, Oral, Daily, FLORESITA Anderson CNP, 1,000 Units at 12/05/22 1202    acetaminophen (TYLENOL) tablet 650 mg, 650 mg, Oral, Q4H PRN, Alicja Rivero MD    magnesium hydroxide (MILK OF MAGNESIA) 400 MG/5ML suspension 30 mL, 30 mL, Oral, Daily PRN, Alicja Rivero MD    nicotine (NICODERM CQ) 21 MG/24HR 1 patch, 1 patch, TransDERmal, Daily, Alicja Rivero MD, 1 patch at 12/05/22 1377    aluminum & magnesium hydroxide-simethicone (MAALOX) 200-200-20 MG/5ML suspension 30 mL, 30 mL, Oral, PRN, Alicja Rivero MD    hydrOXYzine pamoate (VISTARIL) capsule 50 mg, 50 mg, Oral, TID PRN, Alicja Rivero MD, 50 mg at 12/04/22 2123    haloperidol (HALDOL) tablet 3 mg, 3 mg, Oral, Q6H PRN **OR** haloperidol lactate (HALDOL) injection 3 mg, 3 mg, IntraMUSCular, Q6H PRN, Alicja Rivero MD    melatonin tablet 3 mg, 3 mg, Oral, Nightly, Alicja Rivero MD, 3 mg at 12/04/22 2129    divalproex (DEPAKOTE) DR tablet 500 mg, 500 mg, Oral, BID, Alicja Rivero MD, 500 mg at 12/05/22 1001    paliperidone (INVEGA) extended release tablet 6 mg, 6 mg, Oral, Daily, Alicja Rivero MD, 6 mg at 12/05/22 1002      Examination:  BP (!) 167/71   Pulse 65   Temp 97 °F (36.1 °C) (Temporal)   Resp 16   Ht 5' 8\" (1.727 m)   Wt 200 lb (90.7 kg)   SpO2 96%   BMI 30.41 kg/m²   Gait - steady  Medication side effects(SE): denies    Mental Status Examination:    Level of consciousness:  within normal limits   Appearance:  fair grooming and fair hygiene  Behavior/Motor:  no abnormalities noted  Attitude toward examiner:  cooperative  Speech:  spontaneous, normal rate and normal volume   Mood: \" Im ok\"  Affect: Irritable  Thought processes: Linear thought flight of ideas loose associations  Thought content: Devoid of any auditory visual hallucinations guarded with paranoia. Denies SI/HI intent or plan   Language: able to name objects and repeate phrases  Remote Memory: intact  Recent Memory: intact  Cognition:  oriented to person, place, and time   Fund of Knowledge: Vocabulary intact, pt is aware of current events and past history  Attetion and Concentration intact  Insight Limited  Judgement been      ASSESSMENT:   Patient symptoms are:  [] Well controlled  [] Improving  [] Worsening  [x] No change      Diagnosis:   Principal Problem:    Bipolar affective disorder, mixed, severe, with psychotic behavior ([facility] [address]  Resolved Problems:    Bipolar affective ([facility] [address]      LABS:    No results for input(s): WBC, HGB, PLT in the last 72 hours. No results for input(s): NA, K, CL, CO2, BUN, CREATININE, GLUCOSE in the last 72 hours. No results for input(s): BILITOT, ALKPHOS, AST, ALT in the last 72 hours. Lab Results   Component Value Date/Time    LABAMPH NOT DETECTED 12/01/2022 10:13 AM    BARBSCNU NOT DETECTED 12/01/2022 10:13 AM    LABBENZ NOT DETECTED 12/01/2022 10:13 AM    LABMETH NOT DETECTED 12/01/2022 10:13 AM    OPIATESCREENURINE NOT DETECTED 12/01/2022 10:13 AM    PHENCYCLIDINESCREENURINE NOT DETECTED 12/01/2022 10:13 AM    ETOH <10 12/01/2022 10:13 AM     Lab Results   Component Value Date/Time    TSH 1.230 07/20/2022 03:00 PM     No results found for: LITHIUM  Lab Results   Component Value Date    VALPROATE 22 (L) 12/01/2022           Treatment Plan:  Reviewed current Medications with the patient. Risks, benefits, side effects, drug-to-drug interactions and alternatives to treatment were discussed. Collateral information:   CD evaluation  Encourage patient to attend group and other milieu activities.   Discharge planning discussed with the patient and treatment team.    Continue Invega 6 mg daily  Continue prazosin 1 mg nightly  Continue Depakote 500 mg twice daily  Continue Cogentin 1 mg twice daily    PSYCHOTHERAPY/COUNSELING:  [x] Therapeutic interview  [x] Supportive  [] CBT  [] Ongoing  [] Other    [x] Patient continues to need, on a daily basis, active treatment furnished directly by or requiring the supervision of inpatient psychiatric personnel      Anticipated Length of stay: 3 to 7 days based on stability            Electronically signed by FLORESITA Hutton CNP on 12/5/2022 at 2:21 PM

## 2022-12-05 NOTE — GROUP NOTE
Group Therapy Note    Date: 12/5/2022    Group Start Time: 1500  Group End Time: 1900  Group Topic: Cognitive Skills    [facility]    JOSIE Yee LSW        Group Therapy Note    Attendees: 8       Patient's Goal:  Pt will be able to verbalize understanding when it comes to gratitude exercises'    Notes:  Pt made connections and participated in group. Status After Intervention:  Improved    Participation Level:  Active Listener and Interactive    Participation Quality: Appropriate, Attentive, Sharing, and Supportive      Speech:  normal      Thought Process/Content: Logical  Linear      Affective Functioning: Congruent      Mood: euthymic      Level of consciousness:  Alert, Oriented x4, and Attentive      Response to Learning: Able to verbalize current knowledge/experience      Endings: None Reported    Modes of Intervention: Education, Support, Socialization, and Exploration      Discipline Responsible: /Counselor      Signature:  JOSIE Yee LSW

## 2022-12-06 LAB — VALPROIC ACID LEVEL: 57 MCG/ML (ref 50–100)

## 2022-12-06 PROCEDURE — 99232 SBSQ HOSP IP/OBS MODERATE 35: CPT | Performed by: NURSE PRACTITIONER

## 2022-12-06 PROCEDURE — 94640 AIRWAY INHALATION TREATMENT: CPT

## 2022-12-06 PROCEDURE — 80164 ASSAY DIPROPYLACETIC ACD TOT: CPT

## 2022-12-06 PROCEDURE — 6370000000 HC RX 637 (ALT 250 FOR IP): Performed by: NURSE PRACTITIONER

## 2022-12-06 PROCEDURE — 36415 COLL VENOUS BLD VENIPUNCTURE: CPT

## 2022-12-06 PROCEDURE — 1240000000 HC EMOTIONAL WELLNESS R&B

## 2022-12-06 PROCEDURE — 6370000000 HC RX 637 (ALT 250 FOR IP): Performed by: PSYCHIATRY & NEUROLOGY

## 2022-12-06 PROCEDURE — 6360000002 HC RX W HCPCS: Performed by: NURSE PRACTITIONER

## 2022-12-06 RX ORDER — PALIPERIDONE 6 MG/1
6 TABLET, EXTENDED RELEASE ORAL DAILY
Qty: 30 TABLET | Refills: 0 | Status: SHIPPED | OUTPATIENT
Start: 2022-12-07 | End: 2022-12-07 | Stop reason: SDUPTHER

## 2022-12-06 RX ORDER — DIVALPROEX SODIUM 500 MG/1
500 TABLET, DELAYED RELEASE ORAL 2 TIMES DAILY
Qty: 60 TABLET | Refills: 0 | Status: SHIPPED | OUTPATIENT
Start: 2022-12-06 | End: 2022-12-07 | Stop reason: SDUPTHER

## 2022-12-06 RX ORDER — LANOLIN ALCOHOL/MO/W.PET/CERES
3 CREAM (GRAM) TOPICAL NIGHTLY
Refills: 3 | COMMUNITY
Start: 2022-12-06 | End: 2022-12-07 | Stop reason: SDUPTHER

## 2022-12-06 RX ADMIN — MELATONIN 3 MG ORAL TABLET 3 MG: 3 TABLET ORAL at 21:34

## 2022-12-06 RX ADMIN — DABIGATRAN ETEXILATE MESYLATE 150 MG: 75 CAPSULE ORAL at 21:34

## 2022-12-06 RX ADMIN — HYDROXYZINE PAMOATE 50 MG: 50 CAPSULE ORAL at 21:35

## 2022-12-06 RX ADMIN — BENZTROPINE MESYLATE 1 MG: 0.5 TABLET ORAL at 21:35

## 2022-12-06 RX ADMIN — BENZTROPINE MESYLATE 1 MG: 0.5 TABLET ORAL at 09:19

## 2022-12-06 RX ADMIN — LATANOPROST 1 DROP: 50 SOLUTION OPHTHALMIC at 22:00

## 2022-12-06 RX ADMIN — PRAZOSIN HYDROCHLORIDE 1 MG: 1 CAPSULE ORAL at 21:34

## 2022-12-06 RX ADMIN — DABIGATRAN ETEXILATE MESYLATE 150 MG: 75 CAPSULE ORAL at 09:19

## 2022-12-06 RX ADMIN — IPRATROPIUM BROMIDE 0.5 MG: 0.5 SOLUTION RESPIRATORY (INHALATION) at 09:45

## 2022-12-06 RX ADMIN — PALIPERIDONE 6 MG: 6 TABLET, EXTENDED RELEASE ORAL at 09:19

## 2022-12-06 RX ADMIN — DIVALPROEX SODIUM 500 MG: 500 TABLET, DELAYED RELEASE ORAL at 21:35

## 2022-12-06 RX ADMIN — Medication 1000 UNITS: at 09:19

## 2022-12-06 RX ADMIN — ROSUVASTATIN 10 MG: 10 TABLET, FILM COATED ORAL at 21:34

## 2022-12-06 RX ADMIN — DIVALPROEX SODIUM 500 MG: 500 TABLET, DELAYED RELEASE ORAL at 09:19

## 2022-12-06 NOTE — GROUP NOTE
Group Therapy Note    Date: 12/6/2022    Group Start Time: 1135  Group End Time: 1200  Group Topic: Cognitive Skills    [facility]    JOSIE Grant LSW        Group Therapy Note    Attendees: 5       Patient's Goal:  Pt will be able to verbalize understanding of self-care. Notes:  Pt made connections and participated in group.     Status After Intervention:  Improved    Participation Level: Minimal    Participation Quality: Appropriate and Attentive      Speech:  normal      Thought Process/Content: Logical  Linear      Affective Functioning: Congruent      Mood: euthymic      Level of consciousness:  Alert, Oriented x4, and Attentive      Response to Learning: Able to verbalize current knowledge/experience      Endings: None Reported    Modes of Intervention: Education, Support, and Socialization      Discipline Responsible: /Counselor      Signature:  JOSIE Grant LSW

## 2022-12-06 NOTE — PROGRESS NOTES
Patient attended afternoon spiritual care group. Patient appeared to be actively listening and engaged in group. Patient was 1 of 6 in attendance.

## 2022-12-06 NOTE — PLAN OF CARE
Problem: Depression  Goal: Will be euthymic at discharge  Description: INTERVENTIONS:  1. Administer medication as ordered  2. Provide emotional support via 1:1 interaction with staff  3. Encourage involvement in milieu/groups/activities  4. Monitor for social isolation  12/5/2022 2034 by Blanche Mendoza RN  Outcome: Progressing     Problem: Behavior  Goal: Pt/Family maintain appropriate behavior and adhere to behavioral management agreement, if implemented  Description: INTERVENTIONS:  1. Assess patient/family's coping skills and  non-compliant behavior (including use of illegal substances)  2. Notify security of behavior or suspected illegal substances which indicate the need for search of the family and/or belongings  3. Encourage verbalization of thoughts and concerns in a socially appropriate manner  4. Utilize positive, consistent limit setting strategies supporting safety of patient, staff and others  5. Encourage participation in the decision making process about the behavioral management agreement  6. If a visitor's behavior poses a threat to safety call refer to organization policy. 7. Initiate consult with , Psychosocial CNS, Spiritual Care as appropriate  12/5/2022 2034 by Blanche Mendoza RN  Outcome: Progressing     Problem: Anxiety  Goal: Will report anxiety at manageable levels  Description: INTERVENTIONS:  1. Administer medication as ordered  2. Teach and rehearse alternative coping skills  3. Provide emotional support with 1:1 interaction with staff  12/5/2022 2034 by Blanche Mendoza RN  Outcome: Progressing     Problem: Sleep Disturbance  Goal: Will exhibit normal sleeping pattern  Description: INTERVENTIONS:  1. Administer medication as ordered  2. Decrease environmental stimuli, including noise, as appropriate  3.  Discourage social isolation and naps during the day  12/5/2022 2034 by Blanche Mendoza RN  Outcome: Progressing     Problem: Involuntary Admit  Goal: Will cooperate with staff recommendations and doctor's orders and will demonstrate appropriate behavior  Description: INTERVENTIONS:  1. Treat underlying conditions and offer medication as ordered  2. Educate regarding involuntary admission procedures and rules  3. Contain excessive/inappropriate behavior per unit and hospital policies  [id number]/9/[id number] by Esau Huntley RN  Outcome: Progressing     Patient up and about the unit patient denies SI/HI AVH, rates anxiety 1/10 and depression 1/10. Patient is smiling and friendly and interacting with peers well. Patient compliant with medications and groups and is discharged focused. Will continue to monitor and assess q 15 min for safety throughout the shift.

## 2022-12-06 NOTE — PLAN OF CARE
Problem: Depression  Goal: Will be euthymic at discharge  Description: INTERVENTIONS:  1. Administer medication as ordered  2. Provide emotional support via 1:1 interaction with staff  3. Encourage involvement in milieu/groups/activities  4. Monitor for social isolation  Outcome: Progressing     Problem: Behavior  Goal: Pt/Family maintain appropriate behavior and adhere to behavioral management agreement, if implemented  Description: INTERVENTIONS:  1. Assess patient/family's coping skills and  non-compliant behavior (including use of illegal substances)  2. Notify security of behavior or suspected illegal substances which indicate the need for search of the family and/or belongings  3. Encourage verbalization of thoughts and concerns in a socially appropriate manner  4. Utilize positive, consistent limit setting strategies supporting safety of patient, staff and others  5. Encourage participation in the decision making process about the behavioral management agreement  6. If a visitor's behavior poses a threat to safety call refer to organization policy. 7. Initiate consult with , Psychosocial CNS, Spiritual Care as appropriate  Outcome: Progressing     Problem: Anxiety  Goal: Will report anxiety at manageable levels  Description: INTERVENTIONS:  1. Administer medication as ordered  2. Teach and rehearse alternative coping skills  3. Provide emotional support with 1:1 interaction with staff  Outcome: Progressing     Pt is pleasant and cooperative. Med compliant. He is still depressed but rates a 1/10. He is isolative and has a flat affect but brightens with conversation. He denies SI/HI and AVH. Will continue to monitor and provide support.

## 2022-12-06 NOTE — CARE COORDINATION
SW met with this pt for a daily check in. Pt observed sitting in the dinning room, eating breakfast. Pt states that he is doing much better, and is looking forward to discharge in the near future. Pt appears flat, but brightens slightly with conversation. Pt's eye-contact is fair. Pt's thought process is discharged focused. Pt is currently denying SI/ HI/ hallucinations/ delusions. Pt will return to the [address] [place] [address] at discharge. [facility] to provide transportation. Collateral gained from pt's cousin, Justice De. Pt will continue to follow up outpatient with [facility]. SW will continue to monitor this pt's moods and behaviors.

## 2022-12-06 NOTE — PROGRESS NOTES
BEHAVIORAL HEALTH FOLLOW-UP NOTE     12/6/2022     Patient was seen and examined in person, Chart reviewed   Patient's case discussed with staff/team    Chief Complaint: \"I would like to leave\"     Interim History:  Patient has mostly been isolative to his room, has some paranoia. He has low threshold for stimulation. He has difficulty tolerating another peer therefore he isolates away from this peer. Denies suicidal homicidal ideations intent or plan denies auditory visual hallucinations he is isolate to his room not offer much conversation. He has poor insight into his problems and need for treatment. Patient follows with [facility], his compliance has been an ongoing problem. He has shown improvement, he is brighter, he has been attempting to attend groups and is taking his medications.          Appetite:   [x] Normal/Unchanged  [] Increased  [] Decreased      Sleep:       [x] Normal/Unchanged  [] Fair       [] Poor              Energy:    [x] Normal/Unchanged  [] Increased  [] Decreased        SI [] Present  [x] Absent    HI  []Present  [x] Absent     Aggression:  [] yes  [x] no    Patient is [x] able  [] unable to CONTRACT FOR SAFETY     PAST MEDICAL/PSYCHIATRIC HISTORY:   Past Medical History:   Diagnosis Date    Acute bacterial conjunctivitis of left eye 9/11/2016    Anticoagulated on Coumadin     per Dr. Herbert [place] [address] disorder [facility])     goes to [facility]    Bipolar affect, depressed ([address]     Cellulitis of face 9/11/2016    Full dentures     Hx of blood clots     DVT, PE hx of    Hypertension     on propanolol / patient states does not have high BP    Impetigo 9/11/2016    Palpitations     Screening     for colonoscopy    Seizure disorder ([facility] [address]     last seizure 2010 / cause unknown       FAMILY/SOCIAL HISTORY:  Family History   Problem Relation Age of Onset    Diabetes Brother      Social History     Socioeconomic History    Marital status: Single     Spouse name: Not on file    Number of children: Not on file    Years of education: Not on file    Highest education level: Not on file   Occupational History    Not on file   Tobacco Use    Smoking status: Every Day     Packs/day: 1.00     Types: Cigarettes     Start date: 2/12/1975    Smokeless tobacco: Never    Tobacco comments:     Interested in the patch   Vaping Use    Vaping Use: Some days    Substances: Flavoring   Substance and Sexual Activity    Alcohol use: Not Currently     Comment: occasional beer     Drug use: No    Sexual activity: Never   Other Topics Concern    Not on file   Social History Narrative    Not on file     Social Determinants of Health     Financial Resource Strain: Not on file   Food Insecurity: Not on file   Transportation Needs: Not on file   Physical Activity: Not on file   Stress: Not on file   Social Connections: Not on file   Intimate Partner Violence: Not on file   Housing Stability: Not on file           ROS:  [x] All negative/unchanged except if checked.  Explain positive(checked items) below:  [] Constitutional  [] Eyes  [] Ear/Nose/Mouth/Throat  [] Respiratory  [] CV  [] GI  []   [] Musculoskeletal  [] Skin/Breast  [] Neurological  [] Endocrine  [] Heme/Lymph  [] Allergic/Immunologic    Explanation:     MEDICATIONS:    Current Facility-Administered Medications:     benztropine (COGENTIN) tablet 1 mg, 1 mg, Oral, BID, [place] Guillermo, APRN - CNP, 1 mg at 12/05/22 2051    latanoprost (XALATAN) 0.005 % ophthalmic solution 1 drop, 1 drop, Both Eyes, Nightly, [place] Guillermo, APRN - CNP, 1 drop at 12/04/22 2122    rosuvastatin (CRESTOR) tablet 10 mg, 10 mg, Oral, Nightly, [place] iL APRN - CNP, 10 mg at 12/05/22 2051    ipratropium (ATROVENT) 0.02 % nebulizer solution 0.5 mg, 0.5 mg, Nebulization, Daily, [place] Li, APRN - CNP, 0.5 mg at 12/05/22 3624    dabigatran (PRADAXA) capsule 150 mg, 150 mg, Oral, BID, [place] Guillermo, APRN - CNP, 150 mg at 12/05/22 2051    prazosin (MINIPRESS) capsule 1 mg, 1 mg, Oral, Nightly, [facility] FLORESITA Mendez - CNP, 1 mg at 12/05/22 2051    Vitamin D (CHOLECALCIFEROL) tablet 1,000 Units, 1,000 Units, Oral, Daily, FLORESITA Mueller CNP, 1,000 Units at 12/05/22 1202    acetaminophen (TYLENOL) tablet 650 mg, 650 mg, Oral, Q4H PRN, Keila Rojas MD    magnesium hydroxide (MILK OF MAGNESIA) 400 MG/5ML suspension 30 mL, 30 mL, Oral, Daily PRN, Keila Rojas MD    nicotine (NICODERM CQ) 21 MG/24HR 1 patch, 1 patch, TransDERmal, Daily, Keila Rojas MD, 1 patch at 12/05/22 7644    aluminum & magnesium hydroxide-simethicone (MAALOX) 200-200-20 MG/5ML suspension 30 mL, 30 mL, Oral, PRN, Keila Rojas MD    hydrOXYzine pamoate (VISTARIL) capsule 50 mg, 50 mg, Oral, TID PRN, Keila Rojas MD, 50 mg at 12/04/22 2123    haloperidol (HALDOL) tablet 3 mg, 3 mg, Oral, Q6H PRN **OR** haloperidol lactate (HALDOL) injection 3 mg, 3 mg, IntraMUSCular, Q6H PRN, Keila Rojas MD    melatonin tablet 3 mg, 3 mg, Oral, Nightly, Keila Rojas MD, 3 mg at 12/05/22 2051    divalproex (DEPAKOTE) DR tablet 500 mg, 500 mg, Oral, BID, Keila Rojas MD, 500 mg at 12/05/22 2051    paliperidone (INVEGA) extended release tablet 6 mg, 6 mg, Oral, Daily, Keila Rojas MD, 6 mg at 12/05/22 1002      Examination:  /76   Pulse 76   Temp 97.8 °F (36.6 °C) (Temporal)   Resp 16   Ht 5' 8\" (1.727 m)   Wt 200 lb (90.7 kg)   SpO2 96%   BMI 30.41 kg/m²   Gait - steady  Medication side effects(SE): denies    Mental Status Examination:    Level of consciousness:  within normal limits   Appearance:  fair grooming and fair hygiene  Behavior/Motor:  no abnormalities noted  Attitude toward examiner:  cooperative  Speech:  spontaneous, normal rate and normal volume   Mood: \" Im ok\"  Affect: Irritable  Thought processes: Linear thought flight of ideas loose associations  Thought content: Devoid of any auditory visual hallucinations guarded with paranoia.   Denies SI/HI intent or plan   Language: able to name objects and repeate phrases  Remote Memory: intact  Recent Memory: intact  Cognition:  oriented to person, place, and time   Fund of Knowledge: Vocabulary intact, pt is aware of current events and past history  Attetion and Concentration intact  Insight Limited  Judgement been      ASSESSMENT:   Patient symptoms are:  [] Well controlled  [] Improving  [] Worsening  [x] No change      Diagnosis:   Principal Problem:    Bipolar affective disorder, mixed, severe, with psychotic behavior ([facility] [address]  Resolved Problems:    Bipolar affective ([facility] [address]      LABS:    No results for input(s): WBC, HGB, PLT in the last 72 hours. No results for input(s): NA, K, CL, CO2, BUN, CREATININE, GLUCOSE in the last 72 hours. No results for input(s): BILITOT, ALKPHOS, AST, ALT in the last 72 hours. Lab Results   Component Value Date/Time    LABAMPH NOT DETECTED 12/01/2022 10:13 AM    BARBSCNU NOT DETECTED 12/01/2022 10:13 AM    LABBENZ NOT DETECTED 12/01/2022 10:13 AM    LABMETH NOT DETECTED 12/01/2022 10:13 AM    OPIATESCREENURINE NOT DETECTED 12/01/2022 10:13 AM    PHENCYCLIDINESCREENURINE NOT DETECTED 12/01/2022 10:13 AM    ETOH <10 12/01/2022 10:13 AM     Lab Results   Component Value Date/Time    TSH 1.230 07/20/2022 03:00 PM     No results found for: LITHIUM  Lab Results   Component Value Date    VALPROATE 22 (L) 12/01/2022           Treatment Plan:  Reviewed current Medications with the patient. Risks, benefits, side effects, drug-to-drug interactions and alternatives to treatment were discussed. Collateral information:   CD evaluation  Encourage patient to attend group and other milieu activities.   Discharge planning discussed with the patient and treatment team.    Continue Invega 6 mg daily  Continue prazosin 1 mg nightly  Continue Depakote 500 mg twice daily  Continue Cogentin 1 mg twice daily    PSYCHOTHERAPY/COUNSELING:  [x] Therapeutic interview  [x] Supportive  [] CBT  [] Ongoing  [] Other    [x] Patient continues to need, on a daily basis, active treatment furnished directly by or requiring the supervision of inpatient psychiatric personnel      Anticipated Length of stay: 3 to 7 days based on stability            Electronically signed by FLORESITA Alejandro CNP on 12/6/2022 at 8:19 AM

## 2022-12-06 NOTE — PROGRESS NOTES
Declined to attend morning community meeting. Did not attend psycho-education group. Will continue to encourage thru stay. Shared goal for the day as to get my eyes taken care of. Encouraged to tell his RN. Notified RN of patients complaints.

## 2022-12-07 VITALS
DIASTOLIC BLOOD PRESSURE: 75 MMHG | HEIGHT: 68 IN | BODY MASS INDEX: 30.31 KG/M2 | WEIGHT: 200 LBS | SYSTOLIC BLOOD PRESSURE: 128 MMHG | OXYGEN SATURATION: 98 % | RESPIRATION RATE: 16 BRPM | TEMPERATURE: 97.9 F | HEART RATE: 72 BPM

## 2022-12-07 PROCEDURE — 6360000002 HC RX W HCPCS: Performed by: NURSE PRACTITIONER

## 2022-12-07 PROCEDURE — 99239 HOSP IP/OBS DSCHRG MGMT >30: CPT | Performed by: NURSE PRACTITIONER

## 2022-12-07 PROCEDURE — 6370000000 HC RX 637 (ALT 250 FOR IP): Performed by: NURSE PRACTITIONER

## 2022-12-07 PROCEDURE — 94640 AIRWAY INHALATION TREATMENT: CPT

## 2022-12-07 PROCEDURE — 6370000000 HC RX 637 (ALT 250 FOR IP): Performed by: PSYCHIATRY & NEUROLOGY

## 2022-12-07 RX ORDER — BENZTROPINE MESYLATE 1 MG/1
1 TABLET ORAL 2 TIMES DAILY
Qty: 60 TABLET | Refills: 0 | Status: SHIPPED | OUTPATIENT
Start: 2022-12-07 | End: 2023-01-06

## 2022-12-07 RX ORDER — PRAZOSIN HYDROCHLORIDE 1 MG/1
1 CAPSULE ORAL NIGHTLY
Qty: 30 CAPSULE | Refills: 0 | Status: SHIPPED | OUTPATIENT
Start: 2022-12-07 | End: 2023-01-06

## 2022-12-07 RX ORDER — LANOLIN ALCOHOL/MO/W.PET/CERES
3 CREAM (GRAM) TOPICAL NIGHTLY
Refills: 3 | COMMUNITY
Start: 2022-12-07

## 2022-12-07 RX ORDER — DIVALPROEX SODIUM 500 MG/1
500 TABLET, DELAYED RELEASE ORAL 2 TIMES DAILY
Qty: 60 TABLET | Refills: 0 | Status: SHIPPED | OUTPATIENT
Start: 2022-12-07 | End: 2023-01-06

## 2022-12-07 RX ORDER — PALIPERIDONE 6 MG/1
6 TABLET, EXTENDED RELEASE ORAL DAILY
Qty: 30 TABLET | Refills: 0 | Status: SHIPPED | OUTPATIENT
Start: 2022-12-07 | End: 2023-01-06

## 2022-12-07 RX ADMIN — PALIPERIDONE 6 MG: 6 TABLET, EXTENDED RELEASE ORAL at 09:39

## 2022-12-07 RX ADMIN — Medication 1000 UNITS: at 10:46

## 2022-12-07 RX ADMIN — IPRATROPIUM BROMIDE 0.5 MG: 0.5 SOLUTION RESPIRATORY (INHALATION) at 08:17

## 2022-12-07 RX ADMIN — BENZTROPINE MESYLATE 1 MG: 0.5 TABLET ORAL at 09:40

## 2022-12-07 RX ADMIN — DABIGATRAN ETEXILATE MESYLATE 150 MG: 75 CAPSULE ORAL at 09:38

## 2022-12-07 RX ADMIN — DIVALPROEX SODIUM 500 MG: 500 TABLET, DELAYED RELEASE ORAL at 09:40

## 2022-12-07 NOTE — PROGRESS NOTES
Spiritual Support Group Note    Number of Participants in Group:      7                  Time:  3 pm    Goal: Relief from isolation and loneliness             Stephanie Sharing             Self-understanding and gain insight              Acceptance and belonging            Recognize they are not alone                Socialization             Empowerment       Encouragement    Topic:  [] Spiritual Wellness and Self Care                  [x] Hope                     [] Connecting with Divine/Others        [] Thankfulness and Gratitude               []  Meaningfulness and Purpose               [] Forgiveness               [] Peace               [] Connect to [facility]      [] Other    Participation Level:   [x] Active Listener   [] Minimal   [] Monopolizing   [] Interactive   [] No Participation   []  Other:     Attention:   [x] Alert   [] Distractible   [] Drowsy   [] Poor   [] Other:    Manner:   [x] Cooperative   [] Suspicious   [] Withdrawn   [] Guarded   [] Irritable   [] Inhospitable   [] Other:     Others Comments from Group:

## 2022-12-07 NOTE — PLAN OF CARE
Problem: Depression  Goal: Will be euthymic at discharge  Description: INTERVENTIONS:  1. Administer medication as ordered  2. Provide emotional support via 1:1 interaction with staff  3. Encourage involvement in milieu/groups/activities  4. Monitor for social isolation  12/7/2022 1140 by Kathleen Lombardo RN  Outcome: Completed     Problem: Behavior  Goal: Pt/Family maintain appropriate behavior and adhere to behavioral management agreement, if implemented  Description: INTERVENTIONS:  1. Assess patient/family's coping skills and  non-compliant behavior (including use of illegal substances)  2. Notify security of behavior or suspected illegal substances which indicate the need for search of the family and/or belongings  3. Encourage verbalization of thoughts and concerns in a socially appropriate manner  4. Utilize positive, consistent limit setting strategies supporting safety of patient, staff and others  5. Encourage participation in the decision making process about the behavioral management agreement  6. If a visitor's behavior poses a threat to safety call refer to organization policy. 7. Initiate consult with , Psychosocial CNS, Spiritual Care as appropriate  12/7/2022 1140 by Kathleen Lombardo RN  Outcome: Completed     Problem: Anxiety  Goal: Will report anxiety at manageable levels  Description: INTERVENTIONS:  1. Administer medication as ordered  2. Teach and rehearse alternative coping skills  3. Provide emotional support with 1:1 interaction with staff  Outcome: Completed     Problem: Sleep Disturbance  Goal: Will exhibit normal sleeping pattern  Description: INTERVENTIONS:  1. Administer medication as ordered  2. Decrease environmental stimuli, including noise, as appropriate  3.  Discourage social isolation and naps during the day  12/7/2022 1140 by Kathleen Lombardo RN  Outcome: Completed     Problem: Involuntary Admit  Goal: Will cooperate with staff recommendations and doctor's orders and will demonstrate appropriate behavior  Description: INTERVENTIONS:  1. Treat underlying conditions and offer medication as ordered  2. Educate regarding involuntary admission procedures and rules  3.  Contain excessive/inappropriate behavior per unit and hospital policies  [id number] by Lyn Garza RN  Outcome: Completed
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
## 2022-12-07 NOTE — PROGRESS NOTES
Patient resting quiet to self at this time, respirations are even and unlabored, no signs or symptoms of distress or discomfort. PRN Vistaril 50 mg po given at HS for anxiety. Staff will continue to conduct environmental rounds to ensure the safety of everyone on the unit. Staff will provide support and interventions as requested or required.

## 2022-12-07 NOTE — PROGRESS NOTES
[facility]  Discharge Note    Pt discharged with followings belongings:   Dental Appliances: None  Vision - Corrective Lenses: None  Hearing Aid: None  Jewelry: None  Body Piercings Removed: N/A  Clothing: Footwear, Jacket/Coat, Shirt, Pants, Barto, Socks  Other Valuables: Wallet (No money)   Valuables sent home with patient. Patient educated on aftercare instructions: yes  Patient verbalize understanding of AVS:  yes. Status EXAM upon discharge:  Mental Status and Behavioral Exam  Normal: Yes  Level of Assistance: Independent/Self  Facial Expression: Brightened  Affect: Congruent  Level of Consciousness: Alert  Frequency of Checks: 4 times per hour, close  Mood:Normal: No  Mood: Depressed  Motor Activity:Normal: No  Motor Activity: Decreased  Eye Contact: Good  Observed Behavior: Friendly, Cooperative  Sexual Misconduct History: Current - no  Preception: [place] to person, [place] to time, [place] to place, [place] to situation  Attention:Normal: Yes  Attention: Distractible  Thought Processes: Circumstantial  Thought Content:Normal: Yes  Thought Content: Preoccupations  Depression Symptoms: Impaired concentration  Anxiety Symptoms: No problems reported or observed. Mary Jane Symptoms: No problems reported or observed. Hallucinations: None  Delusions: No  Memory:Normal: Yes  Memory: Confabulation  Insight and Judgment: No  Insight and Judgment: Poor insight    Tobacco Screening:  Practical Counseling, on admission, marcelino X, if applicable and completed (first 3 are required if patient doesn't refuse):             ( X) Recognizing danger situations (included triggers and roadblocks)                    ( X) Coping skills (new ways to manage stress,relaxation techniques, changing routine, distraction)                                                           (X ) Basic information about quitting (benefits of quitting, techniques in how to quit, available resources  ( ) [facility]                                                                                                                   ( ) Patient refused counseling  ( X) Patient refused referral  ( X) Patient refused prescription upon discharge  ( ) Patient has not smoked in the last 30 days    Metabolic Screening:    Lab Results   Component Value Date    LABA1C 6.1 (H) 07/20/2022       Lab Results   Component Value Date    CHOL 124 05/11/2021    CHOL 118 03/04/2021    CHOL 144 05/28/2019    CHOL 146 02/15/2018    CHOL 187 08/16/2017    CHOL 208 (H) 06/01/2017    CHOL 145 10/18/2016    CHOL 131 09/10/2016     Lab Results   Component Value Date    TRIG 165 (H) 05/11/2021    TRIG 245 03/04/2021    TRIG 152 (H) 05/28/2019    TRIG 198 (H) 02/15/2018    TRIG 310 08/16/2017    TRIG 367 (H) 06/01/2017    TRIG 188 (H) 10/18/2016    TRIG 179 (H) 09/10/2016     Lab Results   Component Value Date    HDL 30 07/20/2022    HDL 28 05/11/2021    HDL 25 (A) 03/04/2021    HDL 33 05/08/2020    HDL 32 05/28/2019    HDL 28 02/15/2018    HDL 28 (A) 08/16/2017    HDL 29 06/01/2017    HDL 34 10/18/2016    HDL 9 09/10/2016     No components found for: LDLCAL  Lab Results   Component Value Date    LABVLDL 38 07/20/2022    LABVLDL 33 05/11/2021    LABVLDL 30 05/08/2020    LABVLDL 30 05/28/2019    LABVLDL 40 02/15/2018    LABVLDL 73 06/01/2017    LABVLDL 38 10/18/2016    LABVLDL 36 09/10/2016       Maurita Bence, RN

## 2022-12-07 NOTE — PROGRESS NOTES
Declined to attend community meeting. Declined to attend psycho-education meeting. Will continue to encourage group attendance thru-out stay.
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
## 2022-12-07 NOTE — PLAN OF CARE
Problem: Depression  Goal: Will be euthymic at discharge  Description: INTERVENTIONS:  1. Administer medication as ordered  2. Provide emotional support via 1:1 interaction with staff  3. Encourage involvement in milieu/groups/activities  4. Monitor for social isolation  12/6/2022 2141 by Tyler Milan RN  Outcome: Progressing     Problem: Behavior  Goal: Pt/Family maintain appropriate behavior and adhere to behavioral management agreement, if implemented  Description: INTERVENTIONS:  1. Assess patient/family's coping skills and  non-compliant behavior (including use of illegal substances)  2. Notify security of behavior or suspected illegal substances which indicate the need for search of the family and/or belongings  3. Encourage verbalization of thoughts and concerns in a socially appropriate manner  4. Utilize positive, consistent limit setting strategies supporting safety of patient, staff and others  5. Encourage participation in the decision making process about the behavioral management agreement  6. If a visitor's behavior poses a threat to safety call refer to organization policy. 7. Initiate consult with , Psychosocial CNS, Spiritual Care as appropriate  12/6/2022 2141 by Tyler Milan RN  Outcome: Progressing     Problem: Sleep Disturbance  Goal: Will exhibit normal sleeping pattern  Description: INTERVENTIONS:  1. Administer medication as ordered  2. Decrease environmental stimuli, including noise, as appropriate  3. Discourage social isolation and naps during the day  Outcome: Progressing     Problem: Involuntary Admit  Goal: Will cooperate with staff recommendations and doctor's orders and will demonstrate appropriate behavior  Description: INTERVENTIONS:  1. Treat underlying conditions and offer medication as ordered  2. Educate regarding involuntary admission procedures and rules  3.  Contain excessive/inappropriate behavior per unit and hospital policies  Outcome: Progressing Patient is in room, resting in bed, awake. Pt denies SI, HI, AVH and states that depression and anxiety are both a \"2 or 3\". Pt states that he is ready go home and discussed several coping skills used to help with depression. No issues are noted at this time. Q 15 min rounds continue.

## 2022-12-13 DIAGNOSIS — R53.83 OTHER FATIGUE: ICD-10-CM

## 2022-12-13 DIAGNOSIS — E55.9 VITAMIN D DEFICIENCY: ICD-10-CM

## 2022-12-13 RX ORDER — ROSUVASTATIN CALCIUM 10 MG/1
TABLET, COATED ORAL
Qty: 90 TABLET | Refills: 0 | Status: SHIPPED | OUTPATIENT
Start: 2022-12-13

## 2022-12-13 RX ORDER — MULTIVIT-MIN/IRON/FOLIC ACID/K 18-600-40
CAPSULE ORAL
Qty: 90 TABLET | Refills: 0 | Status: SHIPPED | OUTPATIENT
Start: 2022-12-13

## 2022-12-28 DIAGNOSIS — D68.59 HYPERCOAGULABLE STATE (HCC): ICD-10-CM

## 2022-12-28 RX ORDER — DABIGATRAN ETEXILATE 150 MG/1
CAPSULE ORAL
Qty: 60 CAPSULE | Refills: 0 | Status: SHIPPED | OUTPATIENT
Start: 2022-12-28

## 2023-02-03 ENCOUNTER — OFFICE VISIT (OUTPATIENT)
Dept: FAMILY MEDICINE CLINIC | Age: 68
End: 2023-02-03

## 2023-02-03 VITALS
SYSTOLIC BLOOD PRESSURE: 121 MMHG | BODY MASS INDEX: 31.07 KG/M2 | DIASTOLIC BLOOD PRESSURE: 86 MMHG | OXYGEN SATURATION: 94 % | HEIGHT: 68 IN | HEART RATE: 73 BPM | RESPIRATION RATE: 18 BRPM | TEMPERATURE: 97.3 F | WEIGHT: 205 LBS

## 2023-02-03 DIAGNOSIS — Z87.891 PERSONAL HISTORY OF TOBACCO USE, PRESENTING HAZARDS TO HEALTH: ICD-10-CM

## 2023-02-03 DIAGNOSIS — R53.83 OTHER FATIGUE: ICD-10-CM

## 2023-02-03 DIAGNOSIS — Z23 NEED FOR VACCINATION: ICD-10-CM

## 2023-02-03 DIAGNOSIS — J43.9 PULMONARY EMPHYSEMA, UNSPECIFIED EMPHYSEMA TYPE (HCC): ICD-10-CM

## 2023-02-03 DIAGNOSIS — Z13.6 SCREENING FOR AAA (ABDOMINAL AORTIC ANEURYSM): ICD-10-CM

## 2023-02-03 DIAGNOSIS — D68.59 HYPERCOAGULABLE STATE (HCC): ICD-10-CM

## 2023-02-03 DIAGNOSIS — Z71.6 ENCOUNTER FOR SMOKING CESSATION COUNSELING: ICD-10-CM

## 2023-02-03 DIAGNOSIS — Z00.00 MEDICARE ANNUAL WELLNESS VISIT, SUBSEQUENT: Primary | ICD-10-CM

## 2023-02-03 RX ORDER — DIVALPROEX SODIUM 250 MG/1
TABLET, DELAYED RELEASE ORAL
COMMUNITY
Start: 2022-11-14 | End: 2023-02-03

## 2023-02-03 RX ORDER — DABIGATRAN ETEXILATE 150 MG/1
CAPSULE ORAL
Qty: 60 CAPSULE | Refills: 0 | Status: CANCELLED | OUTPATIENT
Start: 2023-02-03

## 2023-02-03 RX ORDER — PALIPERIDONE PALMITATE 156 MG/ML
INJECTION INTRAMUSCULAR
COMMUNITY
Start: 2023-01-06

## 2023-02-03 RX ORDER — MULTIVIT-MIN/IRON/FOLIC ACID/K 18-600-40
CAPSULE ORAL
Qty: 90 TABLET | Refills: 0 | Status: SHIPPED | OUTPATIENT
Start: 2023-02-03

## 2023-02-03 SDOH — ECONOMIC STABILITY: FOOD INSECURITY: WITHIN THE PAST 12 MONTHS, THE FOOD YOU BOUGHT JUST DIDN'T LAST AND YOU DIDN'T HAVE MONEY TO GET MORE.: NEVER TRUE

## 2023-02-03 SDOH — ECONOMIC STABILITY: FOOD INSECURITY: WITHIN THE PAST 12 MONTHS, YOU WORRIED THAT YOUR FOOD WOULD RUN OUT BEFORE YOU GOT MONEY TO BUY MORE.: NEVER TRUE

## 2023-02-03 SDOH — ECONOMIC STABILITY: INCOME INSECURITY: HOW HARD IS IT FOR YOU TO PAY FOR THE VERY BASICS LIKE FOOD, HOUSING, MEDICAL CARE, AND HEATING?: SOMEWHAT HARD

## 2023-02-03 SDOH — ECONOMIC STABILITY: HOUSING INSECURITY
IN THE LAST 12 MONTHS, WAS THERE A TIME WHEN YOU DID NOT HAVE A STEADY PLACE TO SLEEP OR SLEPT IN A SHELTER (INCLUDING NOW)?: NO

## 2023-02-03 ASSESSMENT — PATIENT HEALTH QUESTIONNAIRE - PHQ9
8. MOVING OR SPEAKING SO SLOWLY THAT OTHER PEOPLE COULD HAVE NOTICED. OR THE OPPOSITE, BEING SO FIGETY OR RESTLESS THAT YOU HAVE BEEN MOVING AROUND A LOT MORE THAN USUAL: 0
9. THOUGHTS THAT YOU WOULD BE BETTER OFF DEAD, OR OF HURTING YOURSELF: 0
6. FEELING BAD ABOUT YOURSELF - OR THAT YOU ARE A FAILURE OR HAVE LET YOURSELF OR YOUR FAMILY DOWN: 0
SUM OF ALL RESPONSES TO PHQ QUESTIONS 1-9: 2
SUM OF ALL RESPONSES TO PHQ QUESTIONS 1-9: 1
SUM OF ALL RESPONSES TO PHQ9 QUESTIONS 1 & 2: 1
SUM OF ALL RESPONSES TO PHQ QUESTIONS 1-9: 1
10. IF YOU CHECKED OFF ANY PROBLEMS, HOW DIFFICULT HAVE THESE PROBLEMS MADE IT FOR YOU TO DO YOUR WORK, TAKE CARE OF THINGS AT HOME, OR GET ALONG WITH OTHER PEOPLE: 0
SUM OF ALL RESPONSES TO PHQ QUESTIONS 1-9: 2
7. TROUBLE CONCENTRATING ON THINGS, SUCH AS READING THE NEWSPAPER OR WATCHING TELEVISION: 0
5. POOR APPETITE OR OVEREATING: 0
5. POOR APPETITE OR OVEREATING: 0
2. FEELING DOWN, DEPRESSED OR HOPELESS: 0
4. FEELING TIRED OR HAVING LITTLE ENERGY: 1
SUM OF ALL RESPONSES TO PHQ QUESTIONS 1-9: 1
SUM OF ALL RESPONSES TO PHQ QUESTIONS 1-9: 1
3. TROUBLE FALLING OR STAYING ASLEEP: 0
3. TROUBLE FALLING OR STAYING ASLEEP: 0
10. IF YOU CHECKED OFF ANY PROBLEMS, HOW DIFFICULT HAVE THESE PROBLEMS MADE IT FOR YOU TO DO YOUR WORK, TAKE CARE OF THINGS AT HOME, OR GET ALONG WITH OTHER PEOPLE: 0
6. FEELING BAD ABOUT YOURSELF - OR THAT YOU ARE A FAILURE OR HAVE LET YOURSELF OR YOUR FAMILY DOWN: 0
2. FEELING DOWN, DEPRESSED OR HOPELESS: 0
4. FEELING TIRED OR HAVING LITTLE ENERGY: 1
SUM OF ALL RESPONSES TO PHQ9 QUESTIONS 1 & 2: 0
1. LITTLE INTEREST OR PLEASURE IN DOING THINGS: 0
SUM OF ALL RESPONSES TO PHQ QUESTIONS 1-9: 2
SUM OF ALL RESPONSES TO PHQ QUESTIONS 1-9: 2
8. MOVING OR SPEAKING SO SLOWLY THAT OTHER PEOPLE COULD HAVE NOTICED. OR THE OPPOSITE, BEING SO FIGETY OR RESTLESS THAT YOU HAVE BEEN MOVING AROUND A LOT MORE THAN USUAL: 0
7. TROUBLE CONCENTRATING ON THINGS, SUCH AS READING THE NEWSPAPER OR WATCHING TELEVISION: 0
9. THOUGHTS THAT YOU WOULD BE BETTER OFF DEAD, OR OF HURTING YOURSELF: 0
1. LITTLE INTEREST OR PLEASURE IN DOING THINGS: 1

## 2023-02-03 ASSESSMENT — LIFESTYLE VARIABLES
HOW MANY STANDARD DRINKS CONTAINING ALCOHOL DO YOU HAVE ON A TYPICAL DAY: 1 OR 2
HOW OFTEN DO YOU HAVE A DRINK CONTAINING ALCOHOL: NEVER

## 2023-02-03 NOTE — PROGRESS NOTES
S: 74 y.o. male with   Chief Complaint   Patient presents with    Medicare AWV       AWV - reviewed health risk factors - has fatigue - wants to stop smoking     O: VS:  height is 5' 8\" (1.727 m) and weight is 205 lb (93 kg). His temporal temperature is 97.3 °F (36.3 °C). His blood pressure is 121/86 and his pulse is 73. His respiration is 18 and oxygen saturation is 94%. BP Readings from Last 3 Encounters:   02/03/23 121/86   07/13/22 123/70   09/21/21 118/78     See resident note      Impression/Plan:   1) AWV - recommended dentist/smoking cessation (stratifies discussed), vaccines and health screenings. Health Maintenance Due   Topic Date Due    COVID-19 Vaccine (1) Never done    Hepatitis C screen  Never done    DTaP/Tdap/Td vaccine (1 - Tdap) Never done    Shingles vaccine (2 of 3) 05/30/2018    AAA screen  04/20/2022    Flu vaccine (1) 08/01/2022         Attending Physician Statement  I have discussed the case, including pertinent history and exam findings with the resident. I agree with the documented assessment and plan.       Daniel Burciaga MD

## 2023-02-03 NOTE — PROGRESS NOTES
Medicare Annual Wellness Visit    Alexandra Meredith is here for Medicare AWV    Assessment & Plan   Pulmonary emphysema, unspecified emphysema type ([facility] [address]  -     tiotropium (SPIRIVA) 18 MCG inhalation capsule; Inhale 1 capsule into the lungs daily, Disp-30 capsule, R-1Normal, refill   Hypercoagulable state (HCC)  -     apixaban (ELIQUIS) 5 MG TABS tablet; Take 1 tablet by mouth 2 times daily, Disp-180 tablet, R-1Normal switched from pradaxa which is no longer covered by insurance   Other fatigue  -     vitamin D (D3-1000) 25 MCG (1000 UT) TABS tablet; TAKE 1 TABLET BY MOUTH ONCE DAILY IN THE MORNING., Disp-90 tablet, R-0Normal refill   Need for vaccination  -     Influenza, FLUAD, (age 72 y+), IM, Preservative Free, 0.5 mL  Encounter for smoking cessation counseling  -     nicotine polacrilex (NICORETTE) 2 MG gum; Take 1 each by mouth as needed for Smoking cessation, Disp-110 each, R-3Normal  Screening for AAA (abdominal aortic aneurysm)  -     US SCREENING FOR AAA; Future  Personal history of tobacco use, presenting hazards to health  -     [place] Smoking and Tobacco Use Cessation (Intermediate): 3-10 MINUTES [12126]  -Advised to bring pill bottles to next visit   Medicare annual wellness visit, subsequent    Recommendations for Preventive Services Due: see orders and patient instructions/AVS.  Recommended screening schedule for the next 5-10 years is provided to the patient in written form: see Patient Instructions/AVS.     Return in about 3 months (around 5/3/2023) for smoking cessation . Subjective     Patient's complete Health Risk Assessment and screening values have been reviewed and are found in Flowsheets. The following problems were reviewed today and where indicated follow up appointments were made and/or referrals ordered.     Positive Risk Factor Screenings with Interventions:               General HRA Questions:  Select all that apply: (!) New or Increased Fatigue    Fatigue Interventions:  Patient advised to follow up in the office for further evaluation and treatment       Weight and Activity:  Physical Activity: Insufficiently Active    Days of Exercise per Week: 2 days    Minutes of Exercise per Session: 30 min     On average, how many days per week do you engage in moderate to strenuous exercise (like a brisk walk)?: 2 days  Have you lost any weight without trying in the past 3 months?: No  Body mass index: (!) 31.17  Obesity Interventions:  Patient advised to follow-up in this office for further evaluation and treatment          Dentist Screen:  Have you seen the dentist within the past year?: (!) No    Intervention:  Advised to schedule with their dentist     Vision Screen:  Do you have difficulty driving, watching TV, or doing any of your daily activities because of your eyesight?: No  Have you had an eye exam within the past year?: (!) No  No results found. Interventions:   Patient encouraged to make appointment with their eye specialist       Tobacco Use:  Tobacco Use: High Risk    Smoking Tobacco Use: Every Day    Smokeless Tobacco Use: Never    Passive Exposure: Not on file     E-cigarette/Vaping       Questions Responses    E-cigarette/Vaping Use Current Some Day User    Start Date     Passive Exposure     Quit Date     Counseling Given     Comments         Interventions:  Patient advised to follow up in the office for further evalution and treatment                  Objective   Vitals:    02/03/23 1319 02/03/23 1324   BP: (!) 124/93 121/86   Pulse: 73    Resp: 18    Temp: 97.3 °F (36.3 °C)    TempSrc: Temporal    SpO2: 94%    Weight: 205 lb (93 kg)    Height: 5' 8\" (1.727 m)       Body mass index is 31.17 kg/m². No Known Allergies  Prior to Visit Medications    Medication Sig Taking?  Authorizing Provider   tiotropium (SPIRIVA) 18 MCG inhalation capsule Inhale 1 capsule into the lungs daily Yes Amilcar Heard MD   vitamin D (D3-1000) 25 MCG (1000 UT) TABS tablet TAKE 1 TABLET BY MOUTH ONCE DAILY IN THE MORNING. Yes Chelsea Reece MD   divalproex (DEPAKOTE) 250 MG DR tablet  Yes Historical Provider, MD   INVEGA SUSTENNA 156 MG/ML LEANDRA IM injection  Yes Historical Provider, MD   apixaban (ELIQUIS) 5 MG TABS tablet Take 1 tablet by mouth 2 times daily Yes Chelsea Reece MD   nicotine polacrilex (NICORETTE) 2 MG gum Take 1 each by mouth as needed for Smoking cessation Yes Chelsea Reece MD   rosuvastatin (CRESTOR) 10 MG tablet TAKE 1 TABLET BY MOUTH ONCE DAILY IN THE MORNING.  Yes Nedra Kelley MD   melatonin 3 MG TABS tablet Take 1 tablet by mouth nightly Yes FLORESITA Solomon CNP   acetaminophen (TYLENOL) 500 MG tablet Take 500 mg by mouth every 4 hours as needed for Pain Yes Historical Provider, MD   senna-docusate (PERICOLACE) 8.6-50 MG per tablet Take 1 tablet by mouth daily as needed for Constipation Yes Historical Provider, MD   latanoprost (XALATAN) 0.005 % ophthalmic solution Place 1 drop into both eyes nightly Yes Nedra Kelley MD   prazosin (MINIPRESS) 1 MG capsule Take 1 capsule by mouth nightly  FLORESITA Solomon CNP   benztropine (COGENTIN) 1 MG tablet Take 1 tablet by mouth 2 times daily  FLORESITA Solomon CNP   paliperidone (INVEGA) 6 MG extended release tablet Take 1 tablet by mouth daily  FLORESITA Solomon CNP   divalproex (DEPAKOTE) 500 MG DR tablet Take 1 tablet by mouth in the morning and at bedtime  FLORESITA Solomon CNP   nicotine (NICODERM CQ) 21 MG/24HR Place 1 patch onto the skin daily  Nedra Kelley MD       [facility] (Including outside providers/suppliers regularly involved in providing care):   Patient Care Team:  Nedra Kelley MD as PCP - General (Family Medicine)  Nedra Kelley MD as PCP - Empaneled Provider  Moisés Temple as Imaging Navigator     Reviewed and updated this visit:  Tobacco  Allergies  Meds  Med Hx  Surg Hx  Soc Hx  Fam Hx             Chelsea Reece MD Medicare Annual Wellness Visit    Wendy Mcdonald is here for Medicare AWV    Assessment & Plan   Pulmonary emphysema, unspecified emphysema type ([facility] [address]  -     tiotropium (SPIRIVA) 18 MCG inhalation capsule; Inhale 1 capsule into the lungs daily, Disp-30 capsule, R-1Normal  Hypercoagulable state ([facility] [address]  -     apixaban (ELIQUIS) 5 MG TABS tablet; Take 1 tablet by mouth 2 times daily, Disp-180 tablet, R-1Normal  Other fatigue  -     vitamin D (D3-1000) 25 MCG (1000 UT) TABS tablet; TAKE 1 TABLET BY MOUTH ONCE DAILY IN THE MORNING., Disp-90 tablet, R-0Normal  Need for vaccination  -     Influenza, FLUAD, (age 72 y+), IM, Preservative Free, 0.5 mL  Encounter for smoking cessation counseling  -     nicotine polacrilex (NICORETTE) 2 MG gum; Take 1 each by mouth as needed for Smoking cessation, Disp-110 each, R-3Normal  Screening for AAA (abdominal aortic aneurysm)  -     US SCREENING FOR AAA; Future  Personal history of tobacco use, presenting hazards to health  -     [place] Smoking and Tobacco Use Cessation (Intermediate): 3-10 MINUTES [60864]  Medicare annual wellness visit, subsequent      Recommendations for Preventive Services Due: see orders and patient instructions/AVS.  Recommended screening schedule for the next 5-10 years is provided to the patient in written form: see Patient Instructions/AVS.     Return in about 3 months (around 5/3/2023) for smoking cessation . Subjective       Patient's complete Health Risk Assessment and screening values have been reviewed and are found in Flowsheets. The following problems were reviewed today and where indicated follow up appointments were made and/or referrals ordered.     Positive Risk Factor Screenings with Interventions:               General HRA Questions:  Select all that apply: (!) New or Increased Fatigue     Weight and Activity:  Physical Activity: Insufficiently Active    Days of Exercise per Week: 2 days    Minutes of Exercise per Session: 30 min     On average, how many days per week do you engage in moderate to strenuous exercise (like a brisk walk)?: 2 days  Have you lost any weight without trying in the past 3 months?: No  Body mass index: (!) 31.17    Obesity Interventions:  Patient advised to follow-up in this office for further evaluation and treatment        Dentist Screen:  Have you seen the dentist within the past year?: (!) No    Intervention:  Patient declines any further evaluation or treatment     Vision Screen:  Do you have difficulty driving, watching TV, or doing any of your daily activities because of your eyesight?: No  Have you had an eye exam within the past year?: (!) No  No results found. Interventions:   Patient encouraged to make appointment with their eye specialist       Tobacco Use:  Tobacco Use: High Risk    Smoking Tobacco Use: Every Day    Smokeless Tobacco Use: Never    Passive Exposure: Not on file     E-cigarette/Vaping       Questions Responses    E-cigarette/Vaping Use Current Some Day User    Start Date     Passive Exposure     Quit Date     Counseling Given     Comments           Interventions:  Nicorrette gum, discussed weaning methods               Objective   Vitals:    02/03/23 1319 02/03/23 1324   BP: (!) 124/93 121/86   Pulse: 73    Resp: 18    Temp: 97.3 °F (36.3 °C)    TempSrc: Temporal    SpO2: 94%    Weight: 205 lb (93 kg)    Height: 5' 8\" (1.727 m)       Body mass index is 31.17 kg/m².         General Appearance: alert and oriented to person, place and time, well developed and well- nourished, in no acute distress  Skin: warm and dry, no rash or erythema  Head: normocephalic and atraumatic  Eyes: pupils equal, round, and reactive to light, extraocular eye movements intact, conjunctivae normal  ENT: tympanic membrane, external ear and ear canal normal bilaterally, nose without deformity, nasal mucosa and turbinates normal without polyps  Neck: supple and non-tender without mass, no thyromegaly or thyroid nodules, no cervical lymphadenopathy  Pulmonary/Chest: Diffuse wheezes, normal air movement, no respiratory distress  Cardiovascular: normal rate, regular rhythm, normal S1 and S2, no murmurs, rubs, clicks, or gallops, distal pulses intact, no carotid bruits  Abdomen: soft, non-tender, non-distended, normal bowel sounds, no masses or organomegaly  Extremities: no cyanosis, clubbing or edema  Musculoskeletal: normal range of motion, no joint swelling, deformity or tenderness  Neurologic: reflexes normal and symmetric, no cranial nerve deficit, gait, coordination and speech normal. Mild tremors at baseline       No Known Allergies  Prior to Visit Medications    Medication Sig Taking? Authorizing Provider   tiotropium (SPIRIVA) 18 MCG inhalation capsule Inhale 1 capsule into the lungs daily Yes Clarita Livingston MD   vitamin D (D3-1000) 25 MCG (1000 UT) TABS tablet TAKE 1 TABLET BY MOUTH ONCE DAILY IN THE MORNING. Yes Clarita Livingston MD   divalproex (DEPAKOTE) 250 MG DR tablet  Yes Historical Provider, MD   INVEGA SUSTENNA 156 MG/ML LEANDRA IM injection  Yes Historical Provider, MD   apixaban (ELIQUIS) 5 MG TABS tablet Take 1 tablet by mouth 2 times daily Yes Clarita Livingston MD   nicotine polacrilex (NICORETTE) 2 MG gum Take 1 each by mouth as needed for Smoking cessation Yes Clarita Livingston MD   rosuvastatin (CRESTOR) 10 MG tablet TAKE 1 TABLET BY MOUTH ONCE DAILY IN THE MORNING.  Yes Ibeth Long MD   melatonin 3 MG TABS tablet Take 1 tablet by mouth nightly Yes FLORESITA Toussaint CNP   acetaminophen (TYLENOL) 500 MG tablet Take 500 mg by mouth every 4 hours as needed for Pain Yes Historical Provider, MD   senna-docusate (PERICOLACE) 8.6-50 MG per tablet Take 1 tablet by mouth daily as needed for Constipation Yes Historical Provider, MD   latanoprost (XALATAN) 0.005 % ophthalmic solution Place 1 drop into both eyes nightly Yes Ibeth Long MD   prazosin (MINIPRESS) 1 MG capsule Take 1 capsule by mouth nightly  FLORESITA Toussaint CNP   benztropine (COGENTIN) 1 MG tablet Take 1 tablet by mouth 2 times daily  FLORESITA Deleon CNP   paliperidone (INVEGA) 6 MG extended release tablet Take 1 tablet by mouth daily  FLORESITA Deleon CNP   divalproex (DEPAKOTE) 500 MG DR tablet Take 1 tablet by mouth in the morning and at bedtime  FLORESITA Deleon CNP   nicotine (NICODERM CQ) 21 MG/24HR Place 1 patch onto the skin daily  Estephania Bocanegra MD       [facility] (Including outside providers/suppliers regularly involved in providing care):   Patient Care Team:  Estephania Bocanegra MD as PCP - General (Family Medicine)  Estephania Bocanegra MD as PCP - Empaneled Provider  Moisés Melton as Imaging Navigator     Reviewed and updated this visit:  Tobacco  Allergies  Meds  Med Hx  Surg Hx  Soc Hx  Fam Hx             Epifanio Felton MD

## 2023-02-03 NOTE — PATIENT INSTRUCTIONS
Fatigue: Care Instructions  Your Care Instructions     Fatigue is a feeling of tiredness, exhaustion, or lack of energy. You may feel fatigue because of too much or not enough activity. It can also come from stress, lack of sleep, boredom, and poor diet. Many medical problems, such as viral infections, can cause fatigue. Emotional problems, especially depression, are often the cause of fatigue. Fatigue is most often a symptom of another problem. Treatment for fatigue depends on the cause. For example, if you have fatigue because you have a certain health problem, treating this problem also treats your fatigue. If depression or anxiety is the cause, treatment may help. Follow-up care is a key part of your treatment and safety. Be sure to make and go to all appointments, and call your doctor if you are having problems. It's also a good idea to know your test results and keep a list of the medicines you take. How can you care for yourself at home? Get regular exercise. But don't overdo it. Go back and forth between rest and exercise. Get plenty of rest.  Eat a healthy diet. Do not skip meals, especially breakfast.  Reduce your use of caffeine, tobacco, and alcohol. Caffeine is most often found in coffee, tea, cola drinks, and chocolate. Limit medicines that can cause fatigue. This includes tranquilizers and cold and allergy medicines. When should you call for help? Watch closely for changes in your health, and be sure to contact your doctor if:    You have new symptoms such as fever or a rash.     Your fatigue gets worse.     You have been feeling down, depressed, or hopeless. Or you may have lost interest in things that you usually enjoy.     You are not getting better as expected. Where can you learn more? Go to http://www.woods.com/ and enter L872 to learn more about \"Fatigue: Care Instructions. \"  Current as of: February 9, 2022               Content Version: 13.5  © 6512-8823 Healthwise, Inc[employer]. Care instructions adapted under license by [facility] ([facility]). If you have questions about a medical condition or this instruction, always ask your healthcare professional. [address] any warranty or liability for your use of this information. Learning About Dental Care for Older Adults  Dental care for older adults: Overview  Dental care for older people is much the same as for younger adults. But older adults do have concerns that younger adults do not. Older adults may have problems with gum disease and decay on the roots of their teeth. They may need missing teeth replaced or broken fillings fixed. Or they may have dentures that need to be cared for. Some older adults may have trouble holding a toothbrush. You can help remind the person you are caring for to brush and floss their teeth or to clean their dentures. In some cases, you may need to do the brushing and other dental care tasks. People who have trouble using their hands or who have dementia may need this extra help. How can you help with dental care? Normal dental care  To keep the teeth and gums healthy:  Brush the teeth with fluoride toothpaste twice a day--in the morning and at night--and floss at least once a day. Plaque can quickly build up on the teeth of older adults. Watch for the signs of gum disease. These signs include gums that bleed after brushing or after eating hard foods, such as apples. See a dentist regularly. Many experts recommend checkups every 6 months. Keep the dentist up to date on any new medications the person is taking. Encourage a balanced diet that includes whole grains, vegetables, and fruits, and that is low in saturated fat and sodium. Encourage the person you're caring for not to use tobacco products. They can affect dental and general health. Many older adults have a fixed income and feel that they can't afford dental care.  But most towns and [facility] have programs in which dentists help older adults by lowering fees. Contact your area's public health offices or  for information about dental care in your area. Using a toothbrush  Older adults with arthritis sometimes have trouble brushing their teeth because they can't easily hold the toothbrush. Their hands and fingers may be stiff, painful, or weak. If this is the case, you can: Offer an electric toothbrush. Enlarge the handle of a non-electric toothbrush by wrapping a sponge, an elastic bandage, or adhesive tape around it. Push the toothbrush handle through a ball made of rubber or soft foam.  Make the handle longer and thicker by taping Popsicle sticks or tongue depressors to it. You may also be able to buy special toothbrushes, toothpaste dispensers, and floss holders. Your doctor may recommend a soft-bristle toothbrush if the person you care for bleeds easily. Bleeding can happen because of a health problem or from certain medicines. A toothpaste for sensitive teeth may help if the person you care for has sensitive teeth. How do you brush and floss someone's teeth? If the person you are caring for has a hard time cleaning their teeth on their own, you may need to brush and floss their teeth for them. It may be easiest to have the person sit and face away from you, and to sit or stand behind them. That way you can steady their head against your arm as you reach around to floss and brush their teeth. Choose a place that has good lighting and is comfortable for both of you. Before you begin, gather your supplies. You will need gloves, floss, a toothbrush, and a container to hold water if you are not near a sink. Wash and dry your hands well and put on gloves. Start by flossing:  Gently work a piece of floss between each of the teeth toward the gums. A plastic flossing tool may make this easier, and they are available at most [facility].   Curve the floss around each tooth into a U-shape and gently slide it under the gum line. Move the floss firmly up and down several times to scrape off the plaque. After you've finished flossing, throw away the used floss and begin brushing:  Wet the brush and apply toothpaste. Place the brush at a 45-degree angle where the teeth meet the gums. Press firmly, and move the brush in small circles over the surface of the teeth. Be careful not to brush too hard. Vigorous brushing can make the gums pull away from the teeth and can scratch the tooth enamel. Brush all surfaces of the teeth, on the tongue side and on the cheek side. Pay special attention to the front teeth and all surfaces of the back teeth. Brush chewing surfaces with short back-and-forth strokes. After you've finished, help the person rinse the remaining toothpaste from their mouth. Where can you learn more? Go to http://www.woods.com/ and enter F944 to learn more about \"Learning About Dental Care for Older Adults. \"  Current as of: June 16, 2022               Content Version: 13.5  © 6159-4450 Healthwise, Incorporated. Care instructions adapted under license by [facility] ([facility]). If you have questions about a medical condition or this instruction, always ask your healthcare professional. [place] [address] any warranty or liability for your use of this information. Learning About Vision Tests  What are vision tests? The four most common vision tests are visual acuity tests, refraction, visual field tests, and color vision tests. Visual acuity (sharpness) tests  These tests are used: To see if you need glasses or contact lenses. To monitor an eye problem. To check an eye injury. Visual acuity tests are done as part of routine exams. You may also have this test when you get your 's license or apply for some types of jobs. Visual field tests  These tests are used: To check for vision loss in any area of your range of vision.   To screen for certain eye diseases. To look for nerve damage after a stroke, head injury, or other problem that could reduce blood flow to the brain. Refraction and color tests  A refraction test is done to find the right prescription for glasses and contact lenses. A color vision test is done to check for color blindness. Color vision is often tested as part of a routine exam. You may also have this test when you apply for a job where recognizing different colors is important, such as , electronics, or the [employer]. How are vision tests done? Visual acuity test   You cover one eye at a time. You read aloud from a wall chart across the room. You read aloud from a small card that you hold in your hand. Refraction   You look into a special device. The device puts lenses of different strengths in front of each eye to see how strong your glasses or contact lenses need to be. Visual field tests   Your doctor may have you look through special machines. Or your doctor may simply have you stare straight ahead while they move a finger into and out of your field of vision. Color vision test   You look at pieces of printed test patterns in various colors. You say what number or symbol you see. Your doctor may have you trace the number or symbol using a pointer. How do these tests feel? There is very little chance of having a problem from this test. If dilating drops are used for a vision test, they may make the eyes sting and cause a medicine taste in the mouth. Follow-up care is a key part of your treatment and safety. Be sure to make and go to all appointments, and call your doctor if you are having problems. It's also a good idea to know your test results and keep a list of the medicines you take. Where can you learn more? Go to http://www.lawrence.com/ and enter G551 to learn more about \"Learning About Vision Tests. \"  Current as of: October 12, 2022               Content Version: 13.5  © 6513-5617 Healthwise, Incorporated. Care instructions adapted under license by [facility] ([facility]). If you have questions about a medical condition or this instruction, always ask your healthcare professional. [address] any warranty or liability for your use of this information. Advance Directives: Care Instructions  Overview  An advance directive is a legal way to state your wishes at the end of your life. It tells your family and your doctor what to do if you can't say what you want. There are two main types of advance directives. You can change them any time your wishes change. Living will. This form tells your family and your doctor your wishes about life support and other treatment. The form is also called a declaration. Medical power of . This form lets you name a person to make treatment decisions for you when you can't speak for yourself. This person is called a health care agent (health care proxy, health care surrogate). The form is also called a durable power of  for health care. If you do not have an advance directive, decisions about your medical care may be made by a family member, or by a doctor or a  who doesn't know you. It may help to think of an advance directive as a gift to the people who care for you. If you have one, they won't have to make tough decisions by themselves. For more information, including forms for your state, see the [address] website (www.caringinfo.org/planning/advance-directives/). Follow-up care is a key part of your treatment and safety. Be sure to make and go to all appointments, and call your doctor if you are having problems. It's also a good idea to know your test results and keep a list of the medicines you take. What should you include in an advance directive? Many states have a unique advance directive form.  (It may ask you to address specific issues.) Or you might use a universal form that's approved by many states. If your form doesn't tell you what to address, it may be hard to know what to include in your advance directive. Use the questions below to help you get started. Who do you want to make decisions about your medical care if you are not able to? What life-support measures do you want if you have a serious illness that gets worse over time or can't be cured? What are you most afraid of that might happen? (Maybe you're afraid of having pain, losing your independence, or being kept alive by machines.)  Where would you prefer to die? (Your home? A hospital? A nursing home?)  Do you want to donate your organs when you die? Do you want certain Alevism practices performed before you die? When should you call for help? Be sure to contact your doctor if you have any questions. Where can you learn more? Go to http://www.lawrence.com/ and enter R264 to learn more about \"Advance Directives: Care Instructions. \"  Current as of: June 16, 2022               Content Version: 13.5  © 2817-5616 [employer]. Care instructions adapted under license by [facility] ([facility]). If you have questions about a medical condition or this instruction, always ask your healthcare professional. [address] any warranty or liability for your use of this information. Starting a Weight Loss Plan: Care Instructions  Overview     If you're thinking about losing weight, it can be hard to know where to start. Your doctor can help you set up a weight loss plan that best meets your needs. You may want to take a class on nutrition or exercise, or you could join a weight loss support group. If you have questions about how to make changes to your eating or exercise habits, ask your doctor about seeing a registered dietitian or an exercise specialist.  It can be a big challenge to lose weight. But you don't have to make huge changes at once. Make small changes, and stick with them.  When those changes become habit, add a few more changes. If you don't think you're ready to make changes right now, try to pick a date in the future. Make an appointment to see your doctor to discuss whether the time is right for you to start a plan. Follow-up care is a key part of your treatment and safety. Be sure to make and go to all appointments, and call your doctor if you are having problems. It's also a good idea to know your test results and keep a list of the medicines you take. How can you care for yourself at home? Set realistic goals. Many people expect to lose much more weight than is likely. A weight loss of 5% to 10% of your body weight may be enough to improve your health. Get family and friends involved to provide support. Talk to them about why you are trying to lose weight, and ask them to help. They can help by participating in exercise and having meals with you, even if they may be eating something different. Find what works best for you. If you do not have time or do not like to cook, a program that offers meal replacement bars or shakes may be better for you. Or if you like to prepare meals, finding a plan that includes daily menus and recipes may be best.  Ask your doctor about other health professionals who can help you achieve your weight loss goals. A dietitian can help you make healthy changes in your diet. An exercise specialist or  can help you develop a safe and effective exercise program.  A counselor or psychiatrist can help you cope with issues such as depression, anxiety, or family problems that can make it hard to focus on weight loss. Consider joining a support group for people who are trying to lose weight. Your doctor can suggest groups in your area. Where can you learn more? Go to http://www.woods.com/ and enter U357 to learn more about \"Starting a Weight Loss Plan: Care Instructions. \"  Current as of: August 25, 1467               EARLENE Version: 13.5  © 1742-4711 [employer]. Care instructions adapted under license by [facility] ([facility]). If you have questions about a medical condition or this instruction, always ask your healthcare professional. [address] any warranty or liability for your use of this information. A Healthy Heart: Care Instructions  Your Care Instructions     Coronary artery disease, also called heart disease, occurs when a substance called plaque builds up in the vessels that supply oxygen-rich blood to your heart muscle. This can narrow the blood vessels and reduce blood flow. A heart attack happens when blood flow is completely blocked. A high-fat diet, smoking, and other factors increase the risk of heart disease. Your doctor has found that you have a chance of having heart disease. You can do lots of things to keep your heart healthy. It may not be easy, but you can change your diet, exercise more, and quit smoking. These steps really work to lower your chance of heart disease. Follow-up care is a key part of your treatment and safety. Be sure to make and go to all appointments, and call your doctor if you are having problems. It's also a good idea to know your test results and keep a list of the medicines you take. How can you care for yourself at home? Diet    Use less salt when you cook and eat. This helps lower your blood pressure. Taste food before salting. Add only a little salt when you think you need it. With time, your taste buds will adjust to less salt.     Eat fewer snack items, fast foods, canned soups, and other high-salt, high-fat, processed foods.     Read food labels and try to avoid saturated and trans fats. They increase your risk of heart disease by raising cholesterol levels.     Limit the amount of solid fat-butter, margarine, and shortening-you eat. Use olive, peanut, or canola oil when you cook.  Bake, broil, and steam foods instead of frying them.     Eat a variety of fruit and vegetables every day. Dark green, deep orange, red, or yellow fruits and vegetables are especially good for you. Examples include spinach, carrots, peaches, and berries.     Foods high in fiber can reduce your cholesterol and provide important vitamins and minerals. High-fiber foods include whole-grain cereals and breads, oatmeal, beans, brown rice, citrus fruits, and apples.     Eat lean proteins. Heart-healthy proteins include seafood, lean meats and poultry, eggs, beans, peas, nuts, seeds, and soy products.     Limit drinks and foods with added sugar. These include candy, desserts, and soda pop. Lifestyle changes    If your doctor recommends it, get more exercise. Walking is a good choice. Bit by bit, increase the amount you walk every day. Try for at least 30 minutes on most days of the week. You also may want to swim, bike, or do other activities.     Do not smoke. If you need help quitting, talk to your doctor about stop-smoking programs and medicines. These can increase your chances of quitting for good. Quitting smoking may be the most important step you can take to protect your heart. It is never too late to quit.     Limit alcohol to 2 drinks a day for men and 1 drink a day for women. Too much alcohol can cause health problems.     Manage other health problems such as diabetes, high blood pressure, and high cholesterol. If you think you may have a problem with alcohol or drug use, talk to your doctor. Medicines    Take your medicines exactly as prescribed. Call your doctor if you think you are having a problem with your medicine.     If your doctor recommends aspirin, take the amount directed each day. Make sure you take aspirin and not another kind of pain reliever, such as acetaminophen (Tylenol). When should you call for help? Call 911 if you have symptoms of a heart attack.  These may include:    Chest pain or pressure, or a strange feeling in the chest.     Sweating.     Shortness of breath.     Pain, pressure, or a strange feeling in the back, neck, jaw, or upper belly or in one or both shoulders or arms.     Lightheadedness or sudden weakness.     A fast or irregular heartbeat. After you call 911, the  may tell you to chew 1 adult-strength or 2 to 4 low-dose aspirin. Wait for an ambulance. Do not try to drive yourself. Watch closely for changes in your health, and be sure to contact your doctor if you have any problems. Where can you learn more? Go to http://www.lawrence.com/ and enter F075 to learn more about \"A Healthy Heart: Care Instructions. \"  Current as of: September 7, 2022               Content Version: 13.5  © 7100-3651 Healthwise, [employer]. Care instructions adapted under license by [facility] ([facility]). If you have questions about a medical condition or this instruction, always ask your healthcare professional. [place] [address] any warranty or liability for your use of this information. Personalized Preventive Plan for Rohit Vincent - 2/3/2023  Medicare offers a range of preventive health benefits. Some of the tests and screenings are paid in full while other may be subject to a deductible, co-insurance, and/or copay. Some of these benefits include a comprehensive review of your medical history including lifestyle, illnesses that may run in your family, and various assessments and screenings as appropriate. After reviewing your medical record and screening and assessments performed today your provider may have ordered immunizations, labs, imaging, and/or referrals for you. A list of these orders (if applicable) as well as your Preventive Care list are included within your After Visit Summary for your review. Other Preventive Recommendations:    A preventive eye exam performed by an eye specialist is recommended every 1-2 years to screen for glaucoma; cataracts, macular degeneration, and other eye disorders.   A preventive dental visit is recommended every 6 months. Try to get at least 150 minutes of exercise per week or 10,000 steps per day on a pedometer . Order or download the FREE \"Exercise & Physical Activity: Your Everyday Guide\" from [employer]. Call 2-893.563.1158 or search [employer] online. You need 4327-6816 mg of calcium and 5431-9332 IU of vitamin D per day. It is possible to meet your calcium requirement with diet alone, but a vitamin D supplement is usually necessary to meet this goal.  When exposed to the sun, use a sunscreen that protects against both UVA and UVB radiation with an SPF of 30 or greater. Reapply every 2 to 3 hours or after sweating, drying off with a towel, or swimming. Always wear a seat belt when traveling in a car. Always wear a helmet when riding a bicycle or motorcycle.

## 2023-02-22 ENCOUNTER — HOSPITAL ENCOUNTER (OUTPATIENT)
Dept: ULTRASOUND IMAGING | Age: 68
Discharge: HOME | End: 2023-02-24
Payer: MEDICAID

## 2023-02-22 DIAGNOSIS — Z13.6 SCREENING FOR AAA (ABDOMINAL AORTIC ANEURYSM): ICD-10-CM

## 2023-02-22 PROCEDURE — 76706 US ABDL AORTA SCREEN AAA: CPT

## 2023-03-16 DIAGNOSIS — E55.9 VITAMIN D DEFICIENCY: ICD-10-CM

## 2023-03-17 RX ORDER — ROSUVASTATIN CALCIUM 10 MG/1
TABLET, COATED ORAL
Qty: 90 TABLET | Refills: 0 | Status: SHIPPED | OUTPATIENT
Start: 2023-03-17

## 2023-04-15 ENCOUNTER — APPOINTMENT (OUTPATIENT)
Dept: ULTRASOUND IMAGING | Age: 68
DRG: 982 | End: 2023-04-15
Payer: MEDICARE

## 2023-04-15 ENCOUNTER — HOSPITAL ENCOUNTER (INPATIENT)
Age: 68
LOS: 9 days | Discharge: HOME | DRG: 982 | End: 2023-04-24
Attending: EMERGENCY MEDICINE | Admitting: FAMILY MEDICINE
Payer: MEDICARE

## 2023-04-15 ENCOUNTER — APPOINTMENT (OUTPATIENT)
Dept: CT IMAGING | Age: 68
DRG: 982 | End: 2023-04-15
Payer: MEDICARE

## 2023-04-15 DIAGNOSIS — R52 PAIN: ICD-10-CM

## 2023-04-15 DIAGNOSIS — R11.0 NAUSEA: ICD-10-CM

## 2023-04-15 DIAGNOSIS — J98.4 CAVITARY LESION OF LUNG: ICD-10-CM

## 2023-04-15 DIAGNOSIS — K81.9 CHOLECYSTITIS: Primary | ICD-10-CM

## 2023-04-15 PROBLEM — K81.0 ACUTE CHOLECYSTITIS: Status: ACTIVE | Noted: 2023-04-15

## 2023-04-15 LAB
ALBUMIN SERPL-MCNC: 3.7 G/DL (ref 3.5–5.2)
ALP SERPL-CCNC: 58 U/L (ref 40–129)
ALT SERPL-CCNC: 31 U/L (ref 0–40)
ANION GAP SERPL CALCULATED.3IONS-SCNC: 12 MMOL/L (ref 7–16)
AST SERPL-CCNC: 20 U/L (ref 0–39)
BASOPHILS # BLD: 0.07 E9/L (ref 0–0.2)
BASOPHILS NFR BLD: 0.6 % (ref 0–2)
BILIRUB DIRECT SERPL-MCNC: <0.2 MG/DL (ref 0–0.3)
BILIRUB INDIRECT SERPL-MCNC: NORMAL MG/DL (ref 0–1)
BILIRUB SERPL-MCNC: 0.2 MG/DL (ref 0–1.2)
BUN SERPL-MCNC: 12 MG/DL (ref 6–23)
CALCIUM SERPL-MCNC: 9.1 MG/DL (ref 8.6–10.2)
CHLORIDE SERPL-SCNC: 101 MMOL/L (ref 98–107)
CO2 SERPL-SCNC: 23 MMOL/L (ref 22–29)
CREAT SERPL-MCNC: 0.8 MG/DL (ref 0.7–1.2)
EOSINOPHIL # BLD: 0.24 E9/L (ref 0.05–0.5)
EOSINOPHIL NFR BLD: 2.2 % (ref 0–6)
ERYTHROCYTE [DISTWIDTH] IN BLOOD BY AUTOMATED COUNT: 12.3 FL (ref 11.5–15)
GLUCOSE SERPL-MCNC: 173 MG/DL (ref 74–99)
HCT VFR BLD AUTO: 41.6 % (ref 37–54)
HGB BLD-MCNC: 13.8 G/DL (ref 12.5–16.5)
IMM GRANULOCYTES # BLD: 0.05 E9/L
IMM GRANULOCYTES NFR BLD: 0.5 % (ref 0–5)
LACTATE BLDV-SCNC: 2.8 MMOL/L (ref 0.5–2.2)
LIPASE: 23 U/L (ref 13–60)
LYMPHOCYTES # BLD: 2.28 E9/L (ref 1.5–4)
LYMPHOCYTES NFR BLD: 20.8 % (ref 20–42)
MCH RBC QN AUTO: 31.6 PG (ref 26–35)
MCHC RBC AUTO-ENTMCNC: 33.2 % (ref 32–34.5)
MCV RBC AUTO: 95.2 FL (ref 80–99.9)
MONOCYTES # BLD: 1.04 E9/L (ref 0.1–0.95)
MONOCYTES NFR BLD: 9.5 % (ref 2–12)
NEUTROPHILS # BLD: 7.29 E9/L (ref 1.8–7.3)
NEUTS SEG NFR BLD: 66.4 % (ref 43–80)
PLATELET # BLD AUTO: 205 E9/L (ref 130–450)
PMV BLD AUTO: 10 FL (ref 7–12)
POTASSIUM SERPL-SCNC: 4.3 MMOL/L (ref 3.5–5)
PROCALCITONIN: 0.04 NG/ML (ref 0–0.08)
PROT SERPL-MCNC: 7 G/DL (ref 6.4–8.3)
RBC # BLD AUTO: 4.37 E12/L (ref 3.8–5.8)
SODIUM SERPL-SCNC: 136 MMOL/L (ref 132–146)
TROPONIN, HIGH SENSITIVITY: 11 NG/L (ref 0–11)
TROPONIN, HIGH SENSITIVITY: 12 NG/L (ref 0–11)
WBC # BLD: 11 E9/L (ref 4.5–11.5)

## 2023-04-15 PROCEDURE — 99222 1ST HOSP IP/OBS MODERATE 55: CPT | Performed by: FAMILY MEDICINE

## 2023-04-15 PROCEDURE — 96374 THER/PROPH/DIAG INJ IV PUSH: CPT

## 2023-04-15 PROCEDURE — 93005 ELECTROCARDIOGRAM TRACING: CPT

## 2023-04-15 PROCEDURE — 74177 CT ABD & PELVIS W/CONTRAST: CPT

## 2023-04-15 PROCEDURE — 76705 ECHO EXAM OF ABDOMEN: CPT

## 2023-04-15 PROCEDURE — 6360000004 HC RX CONTRAST MEDICATION: Performed by: RADIOLOGY

## 2023-04-15 PROCEDURE — 2060000000 HC ICU INTERMEDIATE R&B

## 2023-04-15 PROCEDURE — 6370000000 HC RX 637 (ALT 250 FOR IP): Performed by: INTERNAL MEDICINE

## 2023-04-15 PROCEDURE — 84145 PROCALCITONIN (PCT): CPT

## 2023-04-15 PROCEDURE — 87449 NOS EACH ORGANISM AG IA: CPT

## 2023-04-15 PROCEDURE — 85025 COMPLETE CBC W/AUTO DIFF WBC: CPT

## 2023-04-15 PROCEDURE — 83605 ASSAY OF LACTIC ACID: CPT

## 2023-04-15 PROCEDURE — 80048 BASIC METABOLIC PNL TOTAL CA: CPT

## 2023-04-15 PROCEDURE — 80076 HEPATIC FUNCTION PANEL: CPT

## 2023-04-15 PROCEDURE — 6360000002 HC RX W HCPCS

## 2023-04-15 PROCEDURE — 2580000003 HC RX 258

## 2023-04-15 PROCEDURE — 87040 BLOOD CULTURE FOR BACTERIA: CPT

## 2023-04-15 PROCEDURE — 36415 COLL VENOUS BLD VENIPUNCTURE: CPT

## 2023-04-15 PROCEDURE — 6370000000 HC RX 637 (ALT 250 FOR IP)

## 2023-04-15 PROCEDURE — 83690 ASSAY OF LIPASE: CPT

## 2023-04-15 PROCEDURE — 71260 CT THORAX DX C+: CPT

## 2023-04-15 PROCEDURE — 96375 TX/PRO/DX INJ NEW DRUG ADDON: CPT

## 2023-04-15 PROCEDURE — 84484 ASSAY OF TROPONIN QUANT: CPT

## 2023-04-15 PROCEDURE — 99285 EMERGENCY DEPT VISIT HI MDM: CPT

## 2023-04-15 RX ORDER — LATANOPROST 50 UG/ML
1 SOLUTION/ DROPS OPHTHALMIC NIGHTLY
Status: DISCONTINUED | OUTPATIENT
Start: 2023-04-15 | End: 2023-04-24 | Stop reason: HOSPADM

## 2023-04-15 RX ORDER — LATANOPROST 50 UG/ML
1 SOLUTION/ DROPS OPHTHALMIC NIGHTLY
Status: CANCELLED | OUTPATIENT
Start: 2023-04-15

## 2023-04-15 RX ORDER — ONDANSETRON 4 MG/1
4 TABLET, ORALLY DISINTEGRATING ORAL EVERY 8 HOURS PRN
Status: DISCONTINUED | OUTPATIENT
Start: 2023-04-15 | End: 2023-04-24 | Stop reason: HOSPADM

## 2023-04-15 RX ORDER — GUAIFENESIN 400 MG/1
400 TABLET ORAL 3 TIMES DAILY
Status: DISCONTINUED | OUTPATIENT
Start: 2023-04-15 | End: 2023-04-24 | Stop reason: HOSPADM

## 2023-04-15 RX ORDER — BENZTROPINE MESYLATE 1 MG/1
1 TABLET ORAL 2 TIMES DAILY
Status: DISCONTINUED | OUTPATIENT
Start: 2023-04-15 | End: 2023-04-24 | Stop reason: HOSPADM

## 2023-04-15 RX ORDER — 0.9 % SODIUM CHLORIDE 0.9 %
1000 INTRAVENOUS SOLUTION INTRAVENOUS ONCE
Status: COMPLETED | OUTPATIENT
Start: 2023-04-15 | End: 2023-04-15

## 2023-04-15 RX ORDER — POLYETHYLENE GLYCOL 3350 17 G/17G
17 POWDER, FOR SOLUTION ORAL DAILY PRN
Status: DISCONTINUED | OUTPATIENT
Start: 2023-04-15 | End: 2023-04-18

## 2023-04-15 RX ORDER — NICOTINE 21 MG/24HR
1 PATCH, TRANSDERMAL 24 HOURS TRANSDERMAL DAILY
Status: DISCONTINUED | OUTPATIENT
Start: 2023-04-15 | End: 2023-04-24 | Stop reason: HOSPADM

## 2023-04-15 RX ORDER — ENOXAPARIN SODIUM 100 MG/ML
40 INJECTION SUBCUTANEOUS DAILY
Status: DISCONTINUED | OUTPATIENT
Start: 2023-04-15 | End: 2023-04-15

## 2023-04-15 RX ORDER — ACETAMINOPHEN 650 MG/1
650 SUPPOSITORY RECTAL EVERY 6 HOURS PRN
Status: DISCONTINUED | OUTPATIENT
Start: 2023-04-15 | End: 2023-04-24 | Stop reason: HOSPADM

## 2023-04-15 RX ORDER — SODIUM CHLORIDE 0.9 % (FLUSH) 0.9 %
5-40 SYRINGE (ML) INJECTION EVERY 12 HOURS SCHEDULED
Status: DISCONTINUED | OUTPATIENT
Start: 2023-04-15 | End: 2023-04-24 | Stop reason: HOSPADM

## 2023-04-15 RX ORDER — SODIUM CHLORIDE 9 MG/ML
INJECTION, SOLUTION INTRAVENOUS CONTINUOUS
Status: DISCONTINUED | OUTPATIENT
Start: 2023-04-15 | End: 2023-04-19

## 2023-04-15 RX ORDER — SODIUM CHLORIDE 9 MG/ML
INJECTION, SOLUTION INTRAVENOUS PRN
Status: DISCONTINUED | OUTPATIENT
Start: 2023-04-15 | End: 2023-04-24 | Stop reason: HOSPADM

## 2023-04-15 RX ORDER — LANOLIN ALCOHOL/MO/W.PET/CERES
3 CREAM (GRAM) TOPICAL NIGHTLY
Status: DISCONTINUED | OUTPATIENT
Start: 2023-04-15 | End: 2023-04-24 | Stop reason: HOSPADM

## 2023-04-15 RX ORDER — ROSUVASTATIN CALCIUM 10 MG/1
10 TABLET, COATED ORAL EVERY MORNING
Status: DISCONTINUED | OUTPATIENT
Start: 2023-04-16 | End: 2023-04-24 | Stop reason: HOSPADM

## 2023-04-15 RX ORDER — ONDANSETRON 2 MG/ML
4 INJECTION INTRAMUSCULAR; INTRAVENOUS EVERY 6 HOURS PRN
Status: DISCONTINUED | OUTPATIENT
Start: 2023-04-15 | End: 2023-04-24 | Stop reason: HOSPADM

## 2023-04-15 RX ORDER — MORPHINE SULFATE 4 MG/ML
4 INJECTION, SOLUTION INTRAMUSCULAR; INTRAVENOUS
Status: COMPLETED | OUTPATIENT
Start: 2023-04-15 | End: 2023-04-15

## 2023-04-15 RX ORDER — GUAIFENESIN/DEXTROMETHORPHAN 100-10MG/5
5 SYRUP ORAL EVERY 4 HOURS PRN
Status: DISCONTINUED | OUTPATIENT
Start: 2023-04-15 | End: 2023-04-24 | Stop reason: HOSPADM

## 2023-04-15 RX ORDER — ACETAMINOPHEN 325 MG/1
650 TABLET ORAL EVERY 6 HOURS PRN
Status: DISCONTINUED | OUTPATIENT
Start: 2023-04-15 | End: 2023-04-24 | Stop reason: HOSPADM

## 2023-04-15 RX ORDER — SODIUM CHLORIDE 0.9 % (FLUSH) 0.9 %
5-40 SYRINGE (ML) INJECTION PRN
Status: DISCONTINUED | OUTPATIENT
Start: 2023-04-15 | End: 2023-04-24 | Stop reason: HOSPADM

## 2023-04-15 RX ORDER — ROSUVASTATIN CALCIUM 10 MG/1
10 TABLET, COATED ORAL EVERY MORNING
Status: CANCELLED | OUTPATIENT
Start: 2023-04-15

## 2023-04-15 RX ORDER — PALIPERIDONE 3 MG/1
6 TABLET, EXTENDED RELEASE ORAL DAILY
Status: DISCONTINUED | OUTPATIENT
Start: 2023-04-15 | End: 2023-04-24 | Stop reason: HOSPADM

## 2023-04-15 RX ORDER — ONDANSETRON 2 MG/ML
4 INJECTION INTRAMUSCULAR; INTRAVENOUS ONCE
Status: COMPLETED | OUTPATIENT
Start: 2023-04-15 | End: 2023-04-15

## 2023-04-15 RX ORDER — DIVALPROEX SODIUM 250 MG/1
500 TABLET, DELAYED RELEASE ORAL 2 TIMES DAILY
Status: DISCONTINUED | OUTPATIENT
Start: 2023-04-15 | End: 2023-04-24 | Stop reason: HOSPADM

## 2023-04-15 RX ADMIN — Medication 3 MG: at 22:10

## 2023-04-15 RX ADMIN — VANCOMYCIN HYDROCHLORIDE 2000 MG: 10 INJECTION, POWDER, LYOPHILIZED, FOR SOLUTION INTRAVENOUS at 09:20

## 2023-04-15 RX ADMIN — ACETAMINOPHEN 650 MG: 325 TABLET ORAL at 22:10

## 2023-04-15 RX ADMIN — PIPERACILLIN AND TAZOBACTAM 4500 MG: 4; .5 INJECTION, POWDER, LYOPHILIZED, FOR SOLUTION INTRAVENOUS at 11:41

## 2023-04-15 RX ADMIN — ONDANSETRON 4 MG: 2 INJECTION INTRAMUSCULAR; INTRAVENOUS at 05:02

## 2023-04-15 RX ADMIN — PALIPERIDONE 6 MG: 3 TABLET, EXTENDED RELEASE ORAL at 15:13

## 2023-04-15 RX ADMIN — GUAIFENESIN 400 MG: 400 TABLET ORAL at 22:10

## 2023-04-15 RX ADMIN — PIPERACILLIN AND TAZOBACTAM 4500 MG: 4; .5 INJECTION, POWDER, LYOPHILIZED, FOR SOLUTION INTRAVENOUS at 22:43

## 2023-04-15 RX ADMIN — SODIUM CHLORIDE: 9 INJECTION, SOLUTION INTRAVENOUS at 14:58

## 2023-04-15 RX ADMIN — IOPAMIDOL 75 ML: 755 INJECTION, SOLUTION INTRAVENOUS at 21:49

## 2023-04-15 RX ADMIN — SODIUM CHLORIDE 1000 ML: 9 INJECTION, SOLUTION INTRAVENOUS at 05:01

## 2023-04-15 RX ADMIN — ONDANSETRON 4 MG: 2 INJECTION INTRAMUSCULAR; INTRAVENOUS at 14:56

## 2023-04-15 RX ADMIN — MORPHINE SULFATE 4 MG: 4 INJECTION, SOLUTION INTRAMUSCULAR; INTRAVENOUS at 05:02

## 2023-04-15 RX ADMIN — LATANOPROST 1 DROP: 50 SOLUTION OPHTHALMIC at 22:09

## 2023-04-15 RX ADMIN — DIVALPROEX SODIUM 500 MG: 250 TABLET, DELAYED RELEASE ORAL at 22:10

## 2023-04-15 RX ADMIN — IOPAMIDOL 75 ML: 755 INJECTION, SOLUTION INTRAVENOUS at 06:19

## 2023-04-15 RX ADMIN — BENZTROPINE MESYLATE 1 MG: 1 TABLET ORAL at 22:10

## 2023-04-15 RX ADMIN — VANCOMYCIN HYDROCHLORIDE 1750 MG: 5 INJECTION, POWDER, LYOPHILIZED, FOR SOLUTION INTRAVENOUS at 19:51

## 2023-04-15 ASSESSMENT — ENCOUNTER SYMPTOMS
SHORTNESS OF BREATH: 1
EYE REDNESS: 0
ABDOMINAL PAIN: 1
ABDOMINAL PAIN: 0
SORE THROAT: 0
NAUSEA: 0
NAUSEA: 1
DIARRHEA: 0
COUGH: 0
VOMITING: 0
EYES NEGATIVE: 1
CONSTIPATION: 0
COLOR CHANGE: 0
RHINORRHEA: 0
COUGH: 1
CHEST TIGHTNESS: 0

## 2023-04-15 ASSESSMENT — PAIN DESCRIPTION - ORIENTATION
ORIENTATION: RIGHT;UPPER
ORIENTATION: RIGHT;UPPER
ORIENTATION: UPPER;RIGHT

## 2023-04-15 ASSESSMENT — PAIN SCALES - GENERAL
PAINLEVEL_OUTOF10: 3
PAINLEVEL_OUTOF10: 2
PAINLEVEL_OUTOF10: 5
PAINLEVEL_OUTOF10: 8
PAINLEVEL_OUTOF10: 5
PAINLEVEL_OUTOF10: 0

## 2023-04-15 ASSESSMENT — PAIN DESCRIPTION - LOCATION
LOCATION: BACK
LOCATION: ABDOMEN
LOCATION: ABDOMEN
LOCATION: HEAD

## 2023-04-15 ASSESSMENT — PAIN - FUNCTIONAL ASSESSMENT
PAIN_FUNCTIONAL_ASSESSMENT: 0-10
PAIN_FUNCTIONAL_ASSESSMENT: PREVENTS OR INTERFERES SOME ACTIVE ACTIVITIES AND ADLS
PAIN_FUNCTIONAL_ASSESSMENT: 0-10
PAIN_FUNCTIONAL_ASSESSMENT: ACTIVITIES ARE NOT PREVENTED

## 2023-04-15 ASSESSMENT — PAIN DESCRIPTION - PAIN TYPE
TYPE: ACUTE PAIN
TYPE: ACUTE PAIN

## 2023-04-15 ASSESSMENT — PAIN DESCRIPTION - DESCRIPTORS
DESCRIPTORS: DULL;ACHING
DESCRIPTORS: ACHING

## 2023-04-15 ASSESSMENT — PAIN DESCRIPTION - FREQUENCY: FREQUENCY: CONTINUOUS

## 2023-04-15 ASSESSMENT — PAIN DESCRIPTION - ONSET: ONSET: ON-GOING

## 2023-04-16 LAB
ALBUMIN SERPL-MCNC: 3.4 G/DL (ref 3.5–5.2)
ALP SERPL-CCNC: 48 U/L (ref 40–129)
ALT SERPL-CCNC: 22 U/L (ref 0–40)
ANION GAP SERPL CALCULATED.3IONS-SCNC: 10 MMOL/L (ref 7–16)
AST SERPL-CCNC: 13 U/L (ref 0–39)
BASOPHILS # BLD: 0.04 E9/L (ref 0–0.2)
BASOPHILS NFR BLD: 0.3 % (ref 0–2)
BILIRUB SERPL-MCNC: 0.4 MG/DL (ref 0–1.2)
BUN SERPL-MCNC: 8 MG/DL (ref 6–23)
CALCIUM SERPL-MCNC: 8.7 MG/DL (ref 8.6–10.2)
CHLORIDE SERPL-SCNC: 103 MMOL/L (ref 98–107)
CO2 SERPL-SCNC: 24 MMOL/L (ref 22–29)
CREAT SERPL-MCNC: 0.8 MG/DL (ref 0.7–1.2)
CRP SERPL HS-MCNC: 2.3 MG/DL (ref 0–0.4)
EKG ATRIAL RATE: 59 BPM
EKG P AXIS: 38 DEGREES
EKG P-R INTERVAL: 172 MS
EKG Q-T INTERVAL: 424 MS
EKG QRS DURATION: 102 MS
EKG QTC CALCULATION (BAZETT): 419 MS
EKG R AXIS: 25 DEGREES
EKG T AXIS: 44 DEGREES
EKG VENTRICULAR RATE: 59 BPM
EOSINOPHIL # BLD: 0.05 E9/L (ref 0.05–0.5)
EOSINOPHIL NFR BLD: 0.4 % (ref 0–6)
ERYTHROCYTE [DISTWIDTH] IN BLOOD BY AUTOMATED COUNT: 12.4 FL (ref 11.5–15)
ERYTHROCYTE [SEDIMENTATION RATE] IN BLOOD BY WESTERGREN METHOD: 34 MM/HR (ref 0–15)
GLUCOSE SERPL-MCNC: 147 MG/DL (ref 74–99)
HCT VFR BLD AUTO: 38.5 % (ref 37–54)
HGB BLD-MCNC: 12.9 G/DL (ref 12.5–16.5)
IMM GRANULOCYTES # BLD: 0.03 E9/L
IMM GRANULOCYTES NFR BLD: 0.2 % (ref 0–5)
LEGIONELLA AG UR QL: NORMAL
LYMPHOCYTES # BLD: 2.4 E9/L (ref 1.5–4)
LYMPHOCYTES NFR BLD: 19.8 % (ref 20–42)
MCH RBC QN AUTO: 31.2 PG (ref 26–35)
MCHC RBC AUTO-ENTMCNC: 33.5 % (ref 32–34.5)
MCV RBC AUTO: 93 FL (ref 80–99.9)
MONOCYTES # BLD: 1.63 E9/L (ref 0.1–0.95)
MONOCYTES NFR BLD: 13.4 % (ref 2–12)
NEUTROPHILS # BLD: 7.99 E9/L (ref 1.8–7.3)
NEUTS SEG NFR BLD: 65.9 % (ref 43–80)
PLATELET # BLD AUTO: 191 E9/L (ref 130–450)
PMV BLD AUTO: 9.3 FL (ref 7–12)
POTASSIUM SERPL-SCNC: 4.4 MMOL/L (ref 3.5–5)
PROCALCITONIN: 0.06 NG/ML (ref 0–0.08)
PROT SERPL-MCNC: 6.5 G/DL (ref 6.4–8.3)
RBC # BLD AUTO: 4.14 E12/L (ref 3.8–5.8)
RBC MORPH BLD: NORMAL
S PNEUM AG SPEC QL: NORMAL
SODIUM SERPL-SCNC: 137 MMOL/L (ref 132–146)
WBC # BLD: 12.1 E9/L (ref 4.5–11.5)

## 2023-04-16 PROCEDURE — 85651 RBC SED RATE NONAUTOMATED: CPT

## 2023-04-16 PROCEDURE — 2580000003 HC RX 258

## 2023-04-16 PROCEDURE — 6360000002 HC RX W HCPCS

## 2023-04-16 PROCEDURE — 85025 COMPLETE CBC W/AUTO DIFF WBC: CPT

## 2023-04-16 PROCEDURE — 6370000000 HC RX 637 (ALT 250 FOR IP)

## 2023-04-16 PROCEDURE — 84145 PROCALCITONIN (PCT): CPT

## 2023-04-16 PROCEDURE — 2060000000 HC ICU INTERMEDIATE R&B

## 2023-04-16 PROCEDURE — 36415 COLL VENOUS BLD VENIPUNCTURE: CPT

## 2023-04-16 PROCEDURE — 86140 C-REACTIVE PROTEIN: CPT

## 2023-04-16 PROCEDURE — 6370000000 HC RX 637 (ALT 250 FOR IP): Performed by: INTERNAL MEDICINE

## 2023-04-16 PROCEDURE — 6370000000 HC RX 637 (ALT 250 FOR IP): Performed by: FAMILY MEDICINE

## 2023-04-16 PROCEDURE — 80053 COMPREHEN METABOLIC PANEL: CPT

## 2023-04-16 PROCEDURE — 99232 SBSQ HOSP IP/OBS MODERATE 35: CPT | Performed by: FAMILY MEDICINE

## 2023-04-16 PROCEDURE — 93010 ELECTROCARDIOGRAM REPORT: CPT | Performed by: INTERNAL MEDICINE

## 2023-04-16 RX ORDER — AMLODIPINE BESYLATE 5 MG/1
5 TABLET ORAL DAILY
Status: DISCONTINUED | OUTPATIENT
Start: 2023-04-16 | End: 2023-04-24 | Stop reason: HOSPADM

## 2023-04-16 RX ORDER — IPRATROPIUM BROMIDE AND ALBUTEROL SULFATE 2.5; .5 MG/3ML; MG/3ML
1 SOLUTION RESPIRATORY (INHALATION) EVERY 4 HOURS PRN
Status: DISCONTINUED | OUTPATIENT
Start: 2023-04-16 | End: 2023-04-24 | Stop reason: HOSPADM

## 2023-04-16 RX ADMIN — AMLODIPINE BESYLATE 5 MG: 5 TABLET ORAL at 09:48

## 2023-04-16 RX ADMIN — PIPERACILLIN AND TAZOBACTAM 4500 MG: 4; .5 INJECTION, POWDER, LYOPHILIZED, FOR SOLUTION INTRAVENOUS at 05:35

## 2023-04-16 RX ADMIN — GUAIFENESIN 400 MG: 400 TABLET ORAL at 20:43

## 2023-04-16 RX ADMIN — BENZTROPINE MESYLATE 1 MG: 1 TABLET ORAL at 20:43

## 2023-04-16 RX ADMIN — SODIUM CHLORIDE, PRESERVATIVE FREE 10 ML: 5 INJECTION INTRAVENOUS at 09:19

## 2023-04-16 RX ADMIN — PIPERACILLIN AND TAZOBACTAM 4500 MG: 4; .5 INJECTION, POWDER, LYOPHILIZED, FOR SOLUTION INTRAVENOUS at 22:33

## 2023-04-16 RX ADMIN — DIVALPROEX SODIUM 500 MG: 250 TABLET, DELAYED RELEASE ORAL at 09:49

## 2023-04-16 RX ADMIN — ACETAMINOPHEN 650 MG: 325 TABLET ORAL at 15:42

## 2023-04-16 RX ADMIN — LATANOPROST 1 DROP: 50 SOLUTION OPHTHALMIC at 20:43

## 2023-04-16 RX ADMIN — ROSUVASTATIN CALCIUM 10 MG: 10 TABLET, FILM COATED ORAL at 09:49

## 2023-04-16 RX ADMIN — VANCOMYCIN HYDROCHLORIDE 1750 MG: 5 INJECTION, POWDER, LYOPHILIZED, FOR SOLUTION INTRAVENOUS at 09:19

## 2023-04-16 RX ADMIN — DIVALPROEX SODIUM 500 MG: 250 TABLET, DELAYED RELEASE ORAL at 20:43

## 2023-04-16 RX ADMIN — PIPERACILLIN AND TAZOBACTAM 4500 MG: 4; .5 INJECTION, POWDER, LYOPHILIZED, FOR SOLUTION INTRAVENOUS at 14:42

## 2023-04-16 RX ADMIN — SODIUM CHLORIDE: 9 INJECTION, SOLUTION INTRAVENOUS at 05:34

## 2023-04-16 RX ADMIN — VANCOMYCIN HYDROCHLORIDE 1750 MG: 5 INJECTION, POWDER, LYOPHILIZED, FOR SOLUTION INTRAVENOUS at 19:49

## 2023-04-16 RX ADMIN — PALIPERIDONE 6 MG: 3 TABLET, EXTENDED RELEASE ORAL at 09:50

## 2023-04-16 RX ADMIN — Medication 3 MG: at 20:43

## 2023-04-16 RX ADMIN — BENZTROPINE MESYLATE 1 MG: 1 TABLET ORAL at 09:50

## 2023-04-16 RX ADMIN — GUAIFENESIN 400 MG: 400 TABLET ORAL at 14:42

## 2023-04-16 RX ADMIN — ACETAMINOPHEN 650 MG: 325 TABLET ORAL at 20:46

## 2023-04-16 ASSESSMENT — ENCOUNTER SYMPTOMS
RHINORRHEA: 0
ABDOMINAL PAIN: 1
COUGH: 0
VOMITING: 0
DIARRHEA: 0
SORE THROAT: 0
CHEST TIGHTNESS: 0
SHORTNESS OF BREATH: 0
NAUSEA: 0
CONSTIPATION: 0

## 2023-04-16 ASSESSMENT — PAIN SCALES - GENERAL
PAINLEVEL_OUTOF10: 0
PAINLEVEL_OUTOF10: 4

## 2023-04-16 ASSESSMENT — PAIN DESCRIPTION - DESCRIPTORS: DESCRIPTORS: ACHING

## 2023-04-16 ASSESSMENT — PAIN - FUNCTIONAL ASSESSMENT: PAIN_FUNCTIONAL_ASSESSMENT: ACTIVITIES ARE NOT PREVENTED

## 2023-04-16 ASSESSMENT — PAIN DESCRIPTION - LOCATION: LOCATION: HEAD

## 2023-04-17 ENCOUNTER — APPOINTMENT (OUTPATIENT)
Dept: GENERAL RADIOLOGY | Age: 68
DRG: 982 | End: 2023-04-17
Payer: MEDICARE

## 2023-04-17 ENCOUNTER — APPOINTMENT (OUTPATIENT)
Dept: CT IMAGING | Age: 68
DRG: 982 | End: 2023-04-17
Payer: MEDICARE

## 2023-04-17 LAB
ALBUMIN SERPL-MCNC: 3.2 G/DL (ref 3.5–5.2)
ALP SERPL-CCNC: 48 U/L (ref 40–129)
ALT SERPL-CCNC: 20 U/L (ref 0–40)
ANION GAP SERPL CALCULATED.3IONS-SCNC: 9 MMOL/L (ref 7–16)
AST SERPL-CCNC: 16 U/L (ref 0–39)
BASOPHILS # BLD: 0.05 E9/L (ref 0–0.2)
BASOPHILS NFR BLD: 0.4 % (ref 0–2)
BILIRUB SERPL-MCNC: 0.5 MG/DL (ref 0–1.2)
BUN SERPL-MCNC: 9 MG/DL (ref 6–23)
CALCIUM SERPL-MCNC: 8.8 MG/DL (ref 8.6–10.2)
CHLORIDE SERPL-SCNC: 108 MMOL/L (ref 98–107)
CO2 SERPL-SCNC: 24 MMOL/L (ref 22–29)
CREAT SERPL-MCNC: 0.9 MG/DL (ref 0.7–1.2)
EOSINOPHIL # BLD: 0.13 E9/L (ref 0.05–0.5)
EOSINOPHIL NFR BLD: 1.1 % (ref 0–6)
ERYTHROCYTE [DISTWIDTH] IN BLOOD BY AUTOMATED COUNT: 12.6 FL (ref 11.5–15)
GLUCOSE SERPL-MCNC: 164 MG/DL (ref 74–99)
HCT VFR BLD AUTO: 40 % (ref 37–54)
HGB BLD-MCNC: 13.1 G/DL (ref 12.5–16.5)
IMM GRANULOCYTES # BLD: 0.07 E9/L
IMM GRANULOCYTES NFR BLD: 0.6 % (ref 0–5)
INR BLD: 1.1
LYMPHOCYTES # BLD: 1.83 E9/L (ref 1.5–4)
LYMPHOCYTES NFR BLD: 15 % (ref 20–42)
MCH RBC QN AUTO: 31.1 PG (ref 26–35)
MCHC RBC AUTO-ENTMCNC: 32.8 % (ref 32–34.5)
MCV RBC AUTO: 95 FL (ref 80–99.9)
MONOCYTES # BLD: 2.03 E9/L (ref 0.1–0.95)
MONOCYTES NFR BLD: 16.7 % (ref 2–12)
NEUTROPHILS # BLD: 8.08 E9/L (ref 1.8–7.3)
NEUTS SEG NFR BLD: 66.2 % (ref 43–80)
PLATELET # BLD AUTO: 181 E9/L (ref 130–450)
PMV BLD AUTO: 9.3 FL (ref 7–12)
POTASSIUM SERPL-SCNC: 3.7 MMOL/L (ref 3.5–5)
PROT SERPL-MCNC: 6.6 G/DL (ref 6.4–8.3)
PROTHROMBIN TIME: 12.2 SEC (ref 9.3–12.4)
RBC # BLD AUTO: 4.21 E12/L (ref 3.8–5.8)
RBC MORPH BLD: NORMAL
SODIUM SERPL-SCNC: 141 MMOL/L (ref 132–146)
VANCOMYCIN TROUGH SERPL-MCNC: 13.9 MCG/ML (ref 5–16)
WBC # BLD: 12.2 E9/L (ref 4.5–11.5)

## 2023-04-17 PROCEDURE — 80053 COMPREHEN METABOLIC PANEL: CPT

## 2023-04-17 PROCEDURE — 80202 ASSAY OF VANCOMYCIN: CPT

## 2023-04-17 PROCEDURE — 6370000000 HC RX 637 (ALT 250 FOR IP): Performed by: FAMILY MEDICINE

## 2023-04-17 PROCEDURE — 85025 COMPLETE CBC W/AUTO DIFF WBC: CPT

## 2023-04-17 PROCEDURE — 2580000003 HC RX 258

## 2023-04-17 PROCEDURE — 71045 X-RAY EXAM CHEST 1 VIEW: CPT

## 2023-04-17 PROCEDURE — 6360000002 HC RX W HCPCS

## 2023-04-17 PROCEDURE — 6360000002 HC RX W HCPCS: Performed by: RADIOLOGY

## 2023-04-17 PROCEDURE — 88305 TISSUE EXAM BY PATHOLOGIST: CPT

## 2023-04-17 PROCEDURE — 6370000000 HC RX 637 (ALT 250 FOR IP): Performed by: INTERNAL MEDICINE

## 2023-04-17 PROCEDURE — 99232 SBSQ HOSP IP/OBS MODERATE 35: CPT | Performed by: FAMILY MEDICINE

## 2023-04-17 PROCEDURE — 36415 COLL VENOUS BLD VENIPUNCTURE: CPT

## 2023-04-17 PROCEDURE — 6370000000 HC RX 637 (ALT 250 FOR IP)

## 2023-04-17 PROCEDURE — 85610 PROTHROMBIN TIME: CPT

## 2023-04-17 PROCEDURE — 2060000000 HC ICU INTERMEDIATE R&B

## 2023-04-17 PROCEDURE — 2709999900 CT NEEDLE BIOPSY LUNG PERCUTANEOUS W IMAGING GUIDANCE

## 2023-04-17 RX ORDER — FENTANYL CITRATE 50 UG/ML
INJECTION, SOLUTION INTRAMUSCULAR; INTRAVENOUS PRN
Status: COMPLETED | OUTPATIENT
Start: 2023-04-17 | End: 2023-04-17

## 2023-04-17 RX ORDER — MIDAZOLAM HYDROCHLORIDE 2 MG/2ML
INJECTION, SOLUTION INTRAMUSCULAR; INTRAVENOUS PRN
Status: COMPLETED | OUTPATIENT
Start: 2023-04-17 | End: 2023-04-17

## 2023-04-17 RX ADMIN — AMLODIPINE BESYLATE 5 MG: 5 TABLET ORAL at 12:44

## 2023-04-17 RX ADMIN — FENTANYL CITRATE 50 MCG: 50 INJECTION, SOLUTION INTRAMUSCULAR; INTRAVENOUS at 11:40

## 2023-04-17 RX ADMIN — GUAIFENESIN 400 MG: 400 TABLET ORAL at 21:30

## 2023-04-17 RX ADMIN — PIPERACILLIN AND TAZOBACTAM 4500 MG: 4; .5 INJECTION, POWDER, LYOPHILIZED, FOR SOLUTION INTRAVENOUS at 23:51

## 2023-04-17 RX ADMIN — Medication 3 MG: at 21:30

## 2023-04-17 RX ADMIN — VANCOMYCIN HYDROCHLORIDE 1750 MG: 5 INJECTION, POWDER, LYOPHILIZED, FOR SOLUTION INTRAVENOUS at 21:34

## 2023-04-17 RX ADMIN — ROSUVASTATIN CALCIUM 10 MG: 10 TABLET, FILM COATED ORAL at 12:45

## 2023-04-17 RX ADMIN — PIPERACILLIN AND TAZOBACTAM 4500 MG: 4; .5 INJECTION, POWDER, LYOPHILIZED, FOR SOLUTION INTRAVENOUS at 05:22

## 2023-04-17 RX ADMIN — MIDAZOLAM HYDROCHLORIDE 1 MG: 1 INJECTION, SOLUTION INTRAMUSCULAR; INTRAVENOUS at 11:40

## 2023-04-17 RX ADMIN — DIVALPROEX SODIUM 500 MG: 250 TABLET, DELAYED RELEASE ORAL at 21:30

## 2023-04-17 RX ADMIN — VANCOMYCIN HYDROCHLORIDE 1750 MG: 5 INJECTION, POWDER, LYOPHILIZED, FOR SOLUTION INTRAVENOUS at 10:04

## 2023-04-17 RX ADMIN — SODIUM CHLORIDE, PRESERVATIVE FREE 10 ML: 5 INJECTION INTRAVENOUS at 20:00

## 2023-04-17 RX ADMIN — LATANOPROST 1 DROP: 50 SOLUTION OPHTHALMIC at 21:30

## 2023-04-17 RX ADMIN — GUAIFENESIN 400 MG: 400 TABLET ORAL at 12:47

## 2023-04-17 RX ADMIN — BENZTROPINE MESYLATE 1 MG: 1 TABLET ORAL at 21:30

## 2023-04-17 RX ADMIN — PIPERACILLIN AND TAZOBACTAM 4500 MG: 4; .5 INJECTION, POWDER, LYOPHILIZED, FOR SOLUTION INTRAVENOUS at 14:52

## 2023-04-17 RX ADMIN — PALIPERIDONE 6 MG: 3 TABLET, EXTENDED RELEASE ORAL at 12:44

## 2023-04-17 RX ADMIN — SODIUM CHLORIDE: 9 INJECTION, SOLUTION INTRAVENOUS at 15:26

## 2023-04-17 RX ADMIN — SODIUM CHLORIDE, PRESERVATIVE FREE 10 ML: 5 INJECTION INTRAVENOUS at 12:45

## 2023-04-17 RX ADMIN — SODIUM CHLORIDE, PRESERVATIVE FREE 10 ML: 5 INJECTION INTRAVENOUS at 21:30

## 2023-04-17 ASSESSMENT — PAIN - FUNCTIONAL ASSESSMENT: PAIN_FUNCTIONAL_ASSESSMENT: NONE - DENIES PAIN

## 2023-04-17 ASSESSMENT — PAIN SCALES - GENERAL
PAINLEVEL_OUTOF10: 0

## 2023-04-18 ENCOUNTER — APPOINTMENT (OUTPATIENT)
Dept: ULTRASOUND IMAGING | Age: 68
DRG: 982 | End: 2023-04-18
Payer: MEDICARE

## 2023-04-18 PROBLEM — D64.9 ACUTE ANEMIA: Status: ACTIVE | Noted: 2023-04-18

## 2023-04-18 PROBLEM — J98.4 CAVITARY LESION OF LUNG: Chronic | Status: ACTIVE | Noted: 2023-04-15

## 2023-04-18 PROBLEM — R93.2 ABNORMAL CT SCAN, GALLBLADDER: Status: ACTIVE | Noted: 2023-04-18

## 2023-04-18 LAB
ALBUMIN SERPL-MCNC: 2.9 G/DL (ref 3.5–5.2)
ALP SERPL-CCNC: 42 U/L (ref 40–129)
ALT SERPL-CCNC: 24 U/L (ref 0–40)
ANION GAP SERPL CALCULATED.3IONS-SCNC: 12 MMOL/L (ref 7–16)
AST SERPL-CCNC: 24 U/L (ref 0–39)
BASOPHILS # BLD: 0.06 E9/L (ref 0–0.2)
BASOPHILS NFR BLD: 0.5 % (ref 0–2)
BILIRUB SERPL-MCNC: 0.5 MG/DL (ref 0–1.2)
BUN SERPL-MCNC: 11 MG/DL (ref 6–23)
CALCIUM SERPL-MCNC: 8.4 MG/DL (ref 8.6–10.2)
CHLORIDE SERPL-SCNC: 106 MMOL/L (ref 98–107)
CO2 SERPL-SCNC: 21 MMOL/L (ref 22–29)
CREAT SERPL-MCNC: 1 MG/DL (ref 0.7–1.2)
EOSINOPHIL # BLD: 0.18 E9/L (ref 0.05–0.5)
EOSINOPHIL NFR BLD: 1.4 % (ref 0–6)
ERYTHROCYTE [DISTWIDTH] IN BLOOD BY AUTOMATED COUNT: 12.8 FL (ref 11.5–15)
GLUCOSE SERPL-MCNC: 156 MG/DL (ref 74–99)
HCT VFR BLD AUTO: 34.7 % (ref 37–54)
HCT VFR BLD AUTO: 37.2 % (ref 37–54)
HGB BLD-MCNC: 11.5 G/DL (ref 12.5–16.5)
HGB BLD-MCNC: 12.3 G/DL (ref 12.5–16.5)
IMM GRANULOCYTES # BLD: 0.06 E9/L
IMM GRANULOCYTES NFR BLD: 0.5 % (ref 0–5)
LYMPHOCYTES # BLD: 1.84 E9/L (ref 1.5–4)
LYMPHOCYTES NFR BLD: 14.2 % (ref 20–42)
MAGNESIUM SERPL-MCNC: 1.7 MG/DL (ref 1.6–2.6)
MCH RBC QN AUTO: 31.3 PG (ref 26–35)
MCHC RBC AUTO-ENTMCNC: 33.1 % (ref 32–34.5)
MCV RBC AUTO: 94.3 FL (ref 80–99.9)
MONOCYTES # BLD: 2.36 E9/L (ref 0.1–0.95)
MONOCYTES NFR BLD: 18.2 % (ref 2–12)
NEUTROPHILS # BLD: 8.46 E9/L (ref 1.8–7.3)
NEUTS SEG NFR BLD: 65.2 % (ref 43–80)
PLATELET # BLD AUTO: 158 E9/L (ref 130–450)
PMV BLD AUTO: 9.5 FL (ref 7–12)
POLYCHROMASIA: ABNORMAL
POTASSIUM SERPL-SCNC: 3.1 MMOL/L (ref 3.5–5)
PROT SERPL-MCNC: 6.1 G/DL (ref 6.4–8.3)
RBC # BLD AUTO: 3.68 E12/L (ref 3.8–5.8)
SODIUM SERPL-SCNC: 139 MMOL/L (ref 132–146)
WBC # BLD: 13 E9/L (ref 4.5–11.5)

## 2023-04-18 PROCEDURE — 6370000000 HC RX 637 (ALT 250 FOR IP)

## 2023-04-18 PROCEDURE — 6370000000 HC RX 637 (ALT 250 FOR IP): Performed by: FAMILY MEDICINE

## 2023-04-18 PROCEDURE — 99231 SBSQ HOSP IP/OBS SF/LOW 25: CPT | Performed by: FAMILY MEDICINE

## 2023-04-18 PROCEDURE — 2060000000 HC ICU INTERMEDIATE R&B

## 2023-04-18 PROCEDURE — 6370000000 HC RX 637 (ALT 250 FOR IP): Performed by: INTERNAL MEDICINE

## 2023-04-18 PROCEDURE — 85018 HEMOGLOBIN: CPT

## 2023-04-18 PROCEDURE — 36415 COLL VENOUS BLD VENIPUNCTURE: CPT

## 2023-04-18 PROCEDURE — 6360000002 HC RX W HCPCS

## 2023-04-18 PROCEDURE — 85025 COMPLETE CBC W/AUTO DIFF WBC: CPT

## 2023-04-18 PROCEDURE — 76705 ECHO EXAM OF ABDOMEN: CPT

## 2023-04-18 PROCEDURE — 83735 ASSAY OF MAGNESIUM: CPT

## 2023-04-18 PROCEDURE — 85014 HEMATOCRIT: CPT

## 2023-04-18 PROCEDURE — 2580000003 HC RX 258

## 2023-04-18 PROCEDURE — 80053 COMPREHEN METABOLIC PANEL: CPT

## 2023-04-18 RX ORDER — POTASSIUM CHLORIDE 20 MEQ/1
40 TABLET, EXTENDED RELEASE ORAL 2 TIMES DAILY
Status: DISCONTINUED | OUTPATIENT
Start: 2023-04-18 | End: 2023-04-22

## 2023-04-18 RX ORDER — POLYETHYLENE GLYCOL 3350 17 G/17G
17 POWDER, FOR SOLUTION ORAL DAILY
Status: DISCONTINUED | OUTPATIENT
Start: 2023-04-18 | End: 2023-04-20

## 2023-04-18 RX ORDER — SENNA PLUS 8.6 MG/1
1 TABLET ORAL NIGHTLY
Status: DISCONTINUED | OUTPATIENT
Start: 2023-04-18 | End: 2023-04-19

## 2023-04-18 RX ORDER — LANOLIN ALCOHOL/MO/W.PET/CERES
400 CREAM (GRAM) TOPICAL 2 TIMES DAILY
Status: DISCONTINUED | OUTPATIENT
Start: 2023-04-18 | End: 2023-04-19

## 2023-04-18 RX ADMIN — BENZTROPINE MESYLATE 1 MG: 1 TABLET ORAL at 20:46

## 2023-04-18 RX ADMIN — ROSUVASTATIN CALCIUM 10 MG: 10 TABLET, FILM COATED ORAL at 08:21

## 2023-04-18 RX ADMIN — AMLODIPINE BESYLATE 5 MG: 5 TABLET ORAL at 08:20

## 2023-04-18 RX ADMIN — LATANOPROST 1 DROP: 50 SOLUTION OPHTHALMIC at 20:47

## 2023-04-18 RX ADMIN — POTASSIUM CHLORIDE 40 MEQ: 1500 TABLET, EXTENDED RELEASE ORAL at 20:46

## 2023-04-18 RX ADMIN — BENZTROPINE MESYLATE 1 MG: 1 TABLET ORAL at 08:21

## 2023-04-18 RX ADMIN — GUAIFENESIN 400 MG: 400 TABLET ORAL at 08:22

## 2023-04-18 RX ADMIN — Medication 400 MG: at 08:20

## 2023-04-18 RX ADMIN — DIVALPROEX SODIUM 500 MG: 250 TABLET, DELAYED RELEASE ORAL at 20:45

## 2023-04-18 RX ADMIN — APIXABAN 5 MG: 5 TABLET, FILM COATED ORAL at 20:46

## 2023-04-18 RX ADMIN — Medication 400 MG: at 20:46

## 2023-04-18 RX ADMIN — PIPERACILLIN AND TAZOBACTAM 4500 MG: 4; .5 INJECTION, POWDER, LYOPHILIZED, FOR SOLUTION INTRAVENOUS at 08:17

## 2023-04-18 RX ADMIN — SODIUM CHLORIDE: 9 INJECTION, SOLUTION INTRAVENOUS at 20:52

## 2023-04-18 RX ADMIN — VANCOMYCIN HYDROCHLORIDE 1750 MG: 5 INJECTION, POWDER, LYOPHILIZED, FOR SOLUTION INTRAVENOUS at 12:09

## 2023-04-18 RX ADMIN — SENNOSIDES 8.6 MG: 8.6 TABLET, FILM COATED ORAL at 20:47

## 2023-04-18 RX ADMIN — DIVALPROEX SODIUM 500 MG: 250 TABLET, DELAYED RELEASE ORAL at 08:21

## 2023-04-18 RX ADMIN — POLYETHYLENE GLYCOL 3350 17 G: 17 POWDER, FOR SOLUTION ORAL at 08:22

## 2023-04-18 RX ADMIN — PALIPERIDONE 6 MG: 3 TABLET, EXTENDED RELEASE ORAL at 08:21

## 2023-04-18 RX ADMIN — SODIUM CHLORIDE, PRESERVATIVE FREE 10 ML: 5 INJECTION INTRAVENOUS at 20:47

## 2023-04-18 RX ADMIN — SODIUM CHLORIDE, PRESERVATIVE FREE 10 ML: 5 INJECTION INTRAVENOUS at 08:23

## 2023-04-18 RX ADMIN — VANCOMYCIN HYDROCHLORIDE 1750 MG: 5 INJECTION, POWDER, LYOPHILIZED, FOR SOLUTION INTRAVENOUS at 23:55

## 2023-04-18 RX ADMIN — Medication 3 MG: at 20:46

## 2023-04-18 RX ADMIN — POTASSIUM CHLORIDE 40 MEQ: 1500 TABLET, EXTENDED RELEASE ORAL at 08:21

## 2023-04-18 RX ADMIN — PIPERACILLIN AND TAZOBACTAM 4500 MG: 4; .5 INJECTION, POWDER, LYOPHILIZED, FOR SOLUTION INTRAVENOUS at 16:08

## 2023-04-18 RX ADMIN — GUAIFENESIN 400 MG: 400 TABLET ORAL at 20:46

## 2023-04-18 ASSESSMENT — PAIN SCALES - GENERAL
PAINLEVEL_OUTOF10: 0

## 2023-04-19 ENCOUNTER — APPOINTMENT (OUTPATIENT)
Dept: GENERAL RADIOLOGY | Age: 68
DRG: 982 | End: 2023-04-19
Payer: MEDICARE

## 2023-04-19 LAB
ALBUMIN SERPL-MCNC: 2.1 G/DL (ref 3.5–5.2)
ALP SERPL-CCNC: 37 U/L (ref 40–129)
ALT SERPL-CCNC: 32 U/L (ref 0–40)
ANION GAP SERPL CALCULATED.3IONS-SCNC: 9 MMOL/L (ref 7–16)
AST SERPL-CCNC: 26 U/L (ref 0–39)
BASOPHILS # BLD: 0.05 E9/L (ref 0–0.2)
BASOPHILS NFR BLD: 0.5 % (ref 0–2)
BILIRUB SERPL-MCNC: 0.2 MG/DL (ref 0–1.2)
BUN SERPL-MCNC: 8 MG/DL (ref 6–23)
BURR CELLS: ABNORMAL
CALCIUM SERPL-MCNC: 6.5 MG/DL (ref 8.6–10.2)
CHLORIDE SERPL-SCNC: 116 MMOL/L (ref 98–107)
CO2 SERPL-SCNC: 17 MMOL/L (ref 22–29)
CREAT SERPL-MCNC: 0.7 MG/DL (ref 0.7–1.2)
EOSINOPHIL # BLD: 0.41 E9/L (ref 0.05–0.5)
EOSINOPHIL NFR BLD: 3.8 % (ref 0–6)
ERYTHROCYTE [DISTWIDTH] IN BLOOD BY AUTOMATED COUNT: 13.1 FL (ref 11.5–15)
GLUCOSE SERPL-MCNC: 93 MG/DL (ref 74–99)
HCT VFR BLD AUTO: 27.1 % (ref 37–54)
HCT VFR BLD AUTO: 32.7 % (ref 37–54)
HGB BLD-MCNC: 10.8 G/DL (ref 12.5–16.5)
HGB BLD-MCNC: 8.7 G/DL (ref 12.5–16.5)
IMM GRANULOCYTES # BLD: 0.05 E9/L
IMM GRANULOCYTES NFR BLD: 0.5 % (ref 0–5)
LYMPHOCYTES # BLD: 1.66 E9/L (ref 1.5–4)
LYMPHOCYTES NFR BLD: 15.5 % (ref 20–42)
MAGNESIUM SERPL-MCNC: 1.6 MG/DL (ref 1.6–2.6)
MCH RBC QN AUTO: 31.5 PG (ref 26–35)
MCHC RBC AUTO-ENTMCNC: 32.1 % (ref 32–34.5)
MCV RBC AUTO: 98.2 FL (ref 80–99.9)
MONOCYTES # BLD: 1.56 E9/L (ref 0.1–0.95)
MONOCYTES NFR BLD: 14.5 % (ref 2–12)
NEUTROPHILS # BLD: 7 E9/L (ref 1.8–7.3)
NEUTS SEG NFR BLD: 65.2 % (ref 43–80)
PLATELET # BLD AUTO: 146 E9/L (ref 130–450)
PMV BLD AUTO: 9.6 FL (ref 7–12)
POIKILOCYTES: ABNORMAL
POTASSIUM SERPL-SCNC: 2.8 MMOL/L (ref 3.5–5)
PROT SERPL-MCNC: 4.5 G/DL (ref 6.4–8.3)
RBC # BLD AUTO: 2.76 E12/L (ref 3.8–5.8)
SODIUM SERPL-SCNC: 142 MMOL/L (ref 132–146)
WBC # BLD: 10.7 E9/L (ref 4.5–11.5)

## 2023-04-19 PROCEDURE — 6370000000 HC RX 637 (ALT 250 FOR IP)

## 2023-04-19 PROCEDURE — 71046 X-RAY EXAM CHEST 2 VIEWS: CPT

## 2023-04-19 PROCEDURE — 6370000000 HC RX 637 (ALT 250 FOR IP): Performed by: INTERNAL MEDICINE

## 2023-04-19 PROCEDURE — 6360000002 HC RX W HCPCS

## 2023-04-19 PROCEDURE — 83735 ASSAY OF MAGNESIUM: CPT

## 2023-04-19 PROCEDURE — 2060000000 HC ICU INTERMEDIATE R&B

## 2023-04-19 PROCEDURE — 99232 SBSQ HOSP IP/OBS MODERATE 35: CPT | Performed by: FAMILY MEDICINE

## 2023-04-19 PROCEDURE — 85025 COMPLETE CBC W/AUTO DIFF WBC: CPT

## 2023-04-19 PROCEDURE — 85018 HEMOGLOBIN: CPT

## 2023-04-19 PROCEDURE — 6370000000 HC RX 637 (ALT 250 FOR IP): Performed by: FAMILY MEDICINE

## 2023-04-19 PROCEDURE — 71045 X-RAY EXAM CHEST 1 VIEW: CPT

## 2023-04-19 PROCEDURE — 85014 HEMATOCRIT: CPT

## 2023-04-19 PROCEDURE — 36415 COLL VENOUS BLD VENIPUNCTURE: CPT

## 2023-04-19 PROCEDURE — 80053 COMPREHEN METABOLIC PANEL: CPT

## 2023-04-19 PROCEDURE — 2580000003 HC RX 258

## 2023-04-19 RX ORDER — POTASSIUM CHLORIDE 7.45 MG/ML
10 INJECTION INTRAVENOUS PRN
Status: DISCONTINUED | OUTPATIENT
Start: 2023-04-19 | End: 2023-04-24 | Stop reason: HOSPADM

## 2023-04-19 RX ORDER — MAGNESIUM SULFATE IN WATER 40 MG/ML
2000 INJECTION, SOLUTION INTRAVENOUS ONCE
Status: COMPLETED | OUTPATIENT
Start: 2023-04-19 | End: 2023-04-19

## 2023-04-19 RX ORDER — PRAZOSIN HYDROCHLORIDE 1 MG/1
1 CAPSULE ORAL NIGHTLY
Status: DISCONTINUED | OUTPATIENT
Start: 2023-04-19 | End: 2023-04-24 | Stop reason: HOSPADM

## 2023-04-19 RX ORDER — POTASSIUM CHLORIDE 20 MEQ/1
40 TABLET, EXTENDED RELEASE ORAL PRN
Status: DISCONTINUED | OUTPATIENT
Start: 2023-04-19 | End: 2023-04-24 | Stop reason: HOSPADM

## 2023-04-19 RX ADMIN — PIPERACILLIN AND TAZOBACTAM 4500 MG: 4; .5 INJECTION, POWDER, LYOPHILIZED, FOR SOLUTION INTRAVENOUS at 03:40

## 2023-04-19 RX ADMIN — PALIPERIDONE 6 MG: 3 TABLET, EXTENDED RELEASE ORAL at 07:52

## 2023-04-19 RX ADMIN — AMLODIPINE BESYLATE 5 MG: 5 TABLET ORAL at 07:53

## 2023-04-19 RX ADMIN — PIPERACILLIN AND TAZOBACTAM 4500 MG: 4; .5 INJECTION, POWDER, LYOPHILIZED, FOR SOLUTION INTRAVENOUS at 20:07

## 2023-04-19 RX ADMIN — Medication 3 MG: at 20:06

## 2023-04-19 RX ADMIN — SODIUM CHLORIDE, PRESERVATIVE FREE 10 ML: 5 INJECTION INTRAVENOUS at 20:06

## 2023-04-19 RX ADMIN — ROSUVASTATIN CALCIUM 10 MG: 10 TABLET, FILM COATED ORAL at 07:52

## 2023-04-19 RX ADMIN — POTASSIUM CHLORIDE 10 MEQ: 7.46 INJECTION, SOLUTION INTRAVENOUS at 09:17

## 2023-04-19 RX ADMIN — SODIUM CHLORIDE, PRESERVATIVE FREE 10 ML: 5 INJECTION INTRAVENOUS at 07:53

## 2023-04-19 RX ADMIN — POTASSIUM CHLORIDE 10 MEQ: 7.46 INJECTION, SOLUTION INTRAVENOUS at 14:33

## 2023-04-19 RX ADMIN — POTASSIUM CHLORIDE 40 MEQ: 1500 TABLET, EXTENDED RELEASE ORAL at 20:05

## 2023-04-19 RX ADMIN — POTASSIUM CHLORIDE 40 MEQ: 1500 TABLET, EXTENDED RELEASE ORAL at 07:52

## 2023-04-19 RX ADMIN — PIPERACILLIN AND TAZOBACTAM 4500 MG: 4; .5 INJECTION, POWDER, LYOPHILIZED, FOR SOLUTION INTRAVENOUS at 12:25

## 2023-04-19 RX ADMIN — GUAIFENESIN 400 MG: 400 TABLET ORAL at 07:52

## 2023-04-19 RX ADMIN — PRAZOSIN HYDROCHLORIDE 1 MG: 1 CAPSULE ORAL at 20:06

## 2023-04-19 RX ADMIN — POTASSIUM CHLORIDE 10 MEQ: 7.46 INJECTION, SOLUTION INTRAVENOUS at 06:29

## 2023-04-19 RX ADMIN — LATANOPROST 1 DROP: 50 SOLUTION OPHTHALMIC at 20:06

## 2023-04-19 RX ADMIN — DIVALPROEX SODIUM 500 MG: 250 TABLET, DELAYED RELEASE ORAL at 20:05

## 2023-04-19 RX ADMIN — BENZTROPINE MESYLATE 1 MG: 1 TABLET ORAL at 20:05

## 2023-04-19 RX ADMIN — BENZTROPINE MESYLATE 1 MG: 1 TABLET ORAL at 07:52

## 2023-04-19 RX ADMIN — GUAIFENESIN 400 MG: 400 TABLET ORAL at 20:06

## 2023-04-19 RX ADMIN — POTASSIUM CHLORIDE 10 MEQ: 7.46 INJECTION, SOLUTION INTRAVENOUS at 08:02

## 2023-04-19 RX ADMIN — APIXABAN 5 MG: 5 TABLET, FILM COATED ORAL at 20:05

## 2023-04-19 RX ADMIN — POTASSIUM CHLORIDE 10 MEQ: 7.46 INJECTION, SOLUTION INTRAVENOUS at 10:46

## 2023-04-19 RX ADMIN — MAGNESIUM SULFATE HEPTAHYDRATE 2000 MG: 40 INJECTION, SOLUTION INTRAVENOUS at 08:20

## 2023-04-19 RX ADMIN — DIVALPROEX SODIUM 500 MG: 250 TABLET, DELAYED RELEASE ORAL at 07:52

## 2023-04-19 RX ADMIN — GUAIFENESIN 400 MG: 400 TABLET ORAL at 14:38

## 2023-04-19 RX ADMIN — POTASSIUM CHLORIDE 10 MEQ: 7.46 INJECTION, SOLUTION INTRAVENOUS at 12:26

## 2023-04-19 ASSESSMENT — PAIN SCALES - GENERAL
PAINLEVEL_OUTOF10: 0

## 2023-04-20 ENCOUNTER — APPOINTMENT (OUTPATIENT)
Dept: NUCLEAR MEDICINE | Age: 68
DRG: 982 | End: 2023-04-20
Payer: MEDICARE

## 2023-04-20 LAB
ALBUMIN SERPL-MCNC: 2.9 G/DL (ref 3.5–5.2)
ALP SERPL-CCNC: 56 U/L (ref 40–129)
ALT SERPL-CCNC: 52 U/L (ref 0–40)
ANION GAP SERPL CALCULATED.3IONS-SCNC: 10 MMOL/L (ref 7–16)
AST SERPL-CCNC: 32 U/L (ref 0–39)
BACTERIA BLD CULT ORG #2: NORMAL
BACTERIA BLD CULT: NORMAL
BASOPHILS # BLD: 0.06 E9/L (ref 0–0.2)
BASOPHILS NFR BLD: 0.5 % (ref 0–2)
BILIRUB SERPL-MCNC: 0.2 MG/DL (ref 0–1.2)
BUN SERPL-MCNC: 14 MG/DL (ref 6–23)
BURR CELLS: ABNORMAL
CALCIUM SERPL-MCNC: 8.8 MG/DL (ref 8.6–10.2)
CHLORIDE SERPL-SCNC: 111 MMOL/L (ref 98–107)
CO2 SERPL-SCNC: 22 MMOL/L (ref 22–29)
CREAT SERPL-MCNC: 1.2 MG/DL (ref 0.7–1.2)
EOSINOPHIL # BLD: 0.57 E9/L (ref 0.05–0.5)
EOSINOPHIL NFR BLD: 5.1 % (ref 0–6)
ERYTHROCYTE [DISTWIDTH] IN BLOOD BY AUTOMATED COUNT: 13.2 FL (ref 11.5–15)
GLUCOSE SERPL-MCNC: 144 MG/DL (ref 74–99)
HCT VFR BLD AUTO: 33.3 % (ref 37–54)
HGB BLD-MCNC: 10.6 G/DL (ref 12.5–16.5)
IMM GRANULOCYTES # BLD: 0.05 E9/L
IMM GRANULOCYTES NFR BLD: 0.5 % (ref 0–5)
LYMPHOCYTES # BLD: 1.83 E9/L (ref 1.5–4)
LYMPHOCYTES NFR BLD: 16.5 % (ref 20–42)
MAGNESIUM SERPL-MCNC: 2.1 MG/DL (ref 1.6–2.6)
MCH RBC QN AUTO: 31.1 PG (ref 26–35)
MCHC RBC AUTO-ENTMCNC: 31.8 % (ref 32–34.5)
MCV RBC AUTO: 97.7 FL (ref 80–99.9)
MONOCYTES # BLD: 1.74 E9/L (ref 0.1–0.95)
MONOCYTES NFR BLD: 15.7 % (ref 2–12)
NEUTROPHILS # BLD: 6.86 E9/L (ref 1.8–7.3)
NEUTS SEG NFR BLD: 61.7 % (ref 43–80)
PLATELET # BLD AUTO: 201 E9/L (ref 130–450)
PMV BLD AUTO: 9.2 FL (ref 7–12)
POIKILOCYTES: ABNORMAL
POTASSIUM SERPL-SCNC: 4.3 MMOL/L (ref 3.5–5)
PROT SERPL-MCNC: 6.3 G/DL (ref 6.4–8.3)
RBC # BLD AUTO: 3.41 E12/L (ref 3.8–5.8)
SODIUM SERPL-SCNC: 143 MMOL/L (ref 132–146)
WBC # BLD: 11.1 E9/L (ref 4.5–11.5)

## 2023-04-20 PROCEDURE — 3430000000 HC RX DIAGNOSTIC RADIOPHARMACEUTICAL: Performed by: RADIOLOGY

## 2023-04-20 PROCEDURE — 6370000000 HC RX 637 (ALT 250 FOR IP)

## 2023-04-20 PROCEDURE — 2060000000 HC ICU INTERMEDIATE R&B

## 2023-04-20 PROCEDURE — 36415 COLL VENOUS BLD VENIPUNCTURE: CPT

## 2023-04-20 PROCEDURE — 85025 COMPLETE CBC W/AUTO DIFF WBC: CPT

## 2023-04-20 PROCEDURE — 80053 COMPREHEN METABOLIC PANEL: CPT

## 2023-04-20 PROCEDURE — 2580000003 HC RX 258

## 2023-04-20 PROCEDURE — 6360000002 HC RX W HCPCS

## 2023-04-20 PROCEDURE — 83735 ASSAY OF MAGNESIUM: CPT

## 2023-04-20 PROCEDURE — 99232 SBSQ HOSP IP/OBS MODERATE 35: CPT | Performed by: FAMILY MEDICINE

## 2023-04-20 PROCEDURE — 78226 HEPATOBILIARY SYSTEM IMAGING: CPT

## 2023-04-20 PROCEDURE — 6370000000 HC RX 637 (ALT 250 FOR IP): Performed by: INTERNAL MEDICINE

## 2023-04-20 PROCEDURE — A9537 TC99M MEBROFENIN: HCPCS | Performed by: RADIOLOGY

## 2023-04-20 RX ADMIN — Medication 6 MILLICURIE: at 12:51

## 2023-04-20 RX ADMIN — BENZTROPINE MESYLATE 1 MG: 1 TABLET ORAL at 20:41

## 2023-04-20 RX ADMIN — LATANOPROST 1 DROP: 50 SOLUTION OPHTHALMIC at 20:42

## 2023-04-20 RX ADMIN — DIVALPROEX SODIUM 500 MG: 250 TABLET, DELAYED RELEASE ORAL at 20:41

## 2023-04-20 RX ADMIN — PRAZOSIN HYDROCHLORIDE 1 MG: 1 CAPSULE ORAL at 20:41

## 2023-04-20 RX ADMIN — POTASSIUM CHLORIDE 40 MEQ: 1500 TABLET, EXTENDED RELEASE ORAL at 20:41

## 2023-04-20 RX ADMIN — PIPERACILLIN AND TAZOBACTAM 4500 MG: 4; .5 INJECTION, POWDER, LYOPHILIZED, FOR SOLUTION INTRAVENOUS at 04:29

## 2023-04-20 RX ADMIN — ACETAMINOPHEN 650 MG: 325 TABLET ORAL at 20:40

## 2023-04-20 RX ADMIN — GUAIFENESIN 400 MG: 400 TABLET ORAL at 20:40

## 2023-04-20 RX ADMIN — PIPERACILLIN AND TAZOBACTAM 4500 MG: 4; .5 INJECTION, POWDER, LYOPHILIZED, FOR SOLUTION INTRAVENOUS at 20:40

## 2023-04-20 RX ADMIN — PIPERACILLIN AND TAZOBACTAM 4500 MG: 4; .5 INJECTION, POWDER, LYOPHILIZED, FOR SOLUTION INTRAVENOUS at 12:26

## 2023-04-20 RX ADMIN — SODIUM CHLORIDE, PRESERVATIVE FREE 10 ML: 5 INJECTION INTRAVENOUS at 09:15

## 2023-04-20 RX ADMIN — Medication 3 MG: at 20:41

## 2023-04-20 RX ADMIN — SODIUM CHLORIDE, PRESERVATIVE FREE 10 ML: 5 INJECTION INTRAVENOUS at 20:42

## 2023-04-20 ASSESSMENT — PAIN DESCRIPTION - LOCATION: LOCATION: HEAD

## 2023-04-20 ASSESSMENT — PAIN DESCRIPTION - DESCRIPTORS: DESCRIPTORS: ACHING

## 2023-04-20 ASSESSMENT — PAIN SCALES - GENERAL
PAINLEVEL_OUTOF10: 0
PAINLEVEL_OUTOF10: 0
PAINLEVEL_OUTOF10: 3
PAINLEVEL_OUTOF10: 0
PAINLEVEL_OUTOF10: 0

## 2023-04-20 ASSESSMENT — PAIN DESCRIPTION - FREQUENCY: FREQUENCY: INTERMITTENT

## 2023-04-20 ASSESSMENT — PAIN DESCRIPTION - PAIN TYPE: TYPE: ACUTE PAIN

## 2023-04-20 ASSESSMENT — PAIN - FUNCTIONAL ASSESSMENT: PAIN_FUNCTIONAL_ASSESSMENT: ACTIVITIES ARE NOT PREVENTED

## 2023-04-20 ASSESSMENT — PAIN DESCRIPTION - ONSET: ONSET: GRADUAL

## 2023-04-21 ENCOUNTER — ANESTHESIA (OUTPATIENT)
Dept: OPERATING ROOM | Age: 68
End: 2023-04-21
Payer: MEDICARE

## 2023-04-21 ENCOUNTER — ANESTHESIA EVENT (OUTPATIENT)
Dept: OPERATING ROOM | Age: 68
End: 2023-04-21
Payer: MEDICARE

## 2023-04-21 ENCOUNTER — APPOINTMENT (OUTPATIENT)
Dept: GENERAL RADIOLOGY | Age: 68
DRG: 982 | End: 2023-04-21
Payer: MEDICARE

## 2023-04-21 PROBLEM — J18.9 CAVITARY PNEUMONIA: Status: ACTIVE | Noted: 2023-04-15

## 2023-04-21 LAB
ABO + RH BLD: NORMAL
ABO + RH BLD: NORMAL
ALBUMIN SERPL-MCNC: 2.8 G/DL (ref 3.5–5.2)
ALP SERPL-CCNC: 63 U/L (ref 40–129)
ALT SERPL-CCNC: 71 U/L (ref 0–40)
ANION GAP SERPL CALCULATED.3IONS-SCNC: 11 MMOL/L (ref 7–16)
AST SERPL-CCNC: 53 U/L (ref 0–39)
BASOPHILS # BLD: 0 E9/L (ref 0–0.2)
BASOPHILS NFR BLD: 0 % (ref 0–2)
BILIRUB SERPL-MCNC: 0.2 MG/DL (ref 0–1.2)
BLD GP AB SCN SERPL QL: NORMAL
BUN SERPL-MCNC: 13 MG/DL (ref 6–23)
BURR CELLS: ABNORMAL
CALCIUM SERPL-MCNC: 8.9 MG/DL (ref 8.6–10.2)
CHLORIDE SERPL-SCNC: 107 MMOL/L (ref 98–107)
CO2 SERPL-SCNC: 23 MMOL/L (ref 22–29)
CREAT SERPL-MCNC: 1.2 MG/DL (ref 0.7–1.2)
EOSINOPHIL # BLD: 0.66 E9/L (ref 0.05–0.5)
EOSINOPHIL NFR BLD: 7 % (ref 0–6)
ERYTHROCYTE [DISTWIDTH] IN BLOOD BY AUTOMATED COUNT: 13.2 FL (ref 11.5–15)
GLUCOSE SERPL-MCNC: 139 MG/DL (ref 74–99)
HCT VFR BLD AUTO: 32.9 % (ref 37–54)
HGB BLD-MCNC: 10.5 G/DL (ref 12.5–16.5)
INR BLD: 1.1
LYMPHOCYTES # BLD: 2.35 E9/L (ref 1.5–4)
LYMPHOCYTES NFR BLD: 24.6 % (ref 20–42)
MAGNESIUM SERPL-MCNC: 2.2 MG/DL (ref 1.6–2.6)
MCH RBC QN AUTO: 31.3 PG (ref 26–35)
MCHC RBC AUTO-ENTMCNC: 31.9 % (ref 32–34.5)
MCV RBC AUTO: 98.2 FL (ref 80–99.9)
MONOCYTES # BLD: 1.41 E9/L (ref 0.1–0.95)
MONOCYTES NFR BLD: 14.9 % (ref 2–12)
MYELOCYTES NFR BLD MANUAL: 0.9 % (ref 0–0)
NEUTROPHILS # BLD: 5.08 E9/L (ref 1.8–7.3)
NEUTS SEG NFR BLD: 52.6 % (ref 43–80)
NRBC BLD-RTO: 0.9 /100 WBC
OVALOCYTES: ABNORMAL
PLATELET # BLD AUTO: 190 E9/L (ref 130–450)
PMV BLD AUTO: 9 FL (ref 7–12)
POIKILOCYTES: ABNORMAL
POLYCHROMASIA: ABNORMAL
POTASSIUM SERPL-SCNC: 4.5 MMOL/L (ref 3.5–5)
PROT SERPL-MCNC: 6.3 G/DL (ref 6.4–8.3)
PROTHROMBIN TIME: 11.7 SEC (ref 9.3–12.4)
RBC # BLD AUTO: 3.35 E12/L (ref 3.8–5.8)
SODIUM SERPL-SCNC: 141 MMOL/L (ref 132–146)
WBC # BLD: 9.4 E9/L (ref 4.5–11.5)

## 2023-04-21 PROCEDURE — 0FB44ZZ EXCISION OF GALLBLADDER, PERCUTANEOUS ENDOSCOPIC APPROACH: ICD-10-PCS | Performed by: STUDENT IN AN ORGANIZED HEALTH CARE EDUCATION/TRAINING PROGRAM

## 2023-04-21 PROCEDURE — 6370000000 HC RX 637 (ALT 250 FOR IP): Performed by: STUDENT IN AN ORGANIZED HEALTH CARE EDUCATION/TRAINING PROGRAM

## 2023-04-21 PROCEDURE — 2580000003 HC RX 258: Performed by: STUDENT IN AN ORGANIZED HEALTH CARE EDUCATION/TRAINING PROGRAM

## 2023-04-21 PROCEDURE — 86900 BLOOD TYPING SEROLOGIC ABO: CPT

## 2023-04-21 PROCEDURE — 83735 ASSAY OF MAGNESIUM: CPT

## 2023-04-21 PROCEDURE — 88304 TISSUE EXAM BY PATHOLOGIST: CPT

## 2023-04-21 PROCEDURE — 85610 PROTHROMBIN TIME: CPT

## 2023-04-21 PROCEDURE — 36415 COLL VENOUS BLD VENIPUNCTURE: CPT

## 2023-04-21 PROCEDURE — 6360000002 HC RX W HCPCS: Performed by: NURSE ANESTHETIST, CERTIFIED REGISTERED

## 2023-04-21 PROCEDURE — 3600000003 HC SURGERY LEVEL 3 BASE: Performed by: STUDENT IN AN ORGANIZED HEALTH CARE EDUCATION/TRAINING PROGRAM

## 2023-04-21 PROCEDURE — 6370000000 HC RX 637 (ALT 250 FOR IP)

## 2023-04-21 PROCEDURE — 0BBF3ZX EXCISION OF RIGHT LOWER LUNG LOBE, PERCUTANEOUS APPROACH, DIAGNOSTIC: ICD-10-PCS | Performed by: STUDENT IN AN ORGANIZED HEALTH CARE EDUCATION/TRAINING PROGRAM

## 2023-04-21 PROCEDURE — 2500000003 HC RX 250 WO HCPCS: Performed by: NURSE ANESTHETIST, CERTIFIED REGISTERED

## 2023-04-21 PROCEDURE — 6370000000 HC RX 637 (ALT 250 FOR IP): Performed by: FAMILY MEDICINE

## 2023-04-21 PROCEDURE — 2580000003 HC RX 258: Performed by: NURSE ANESTHETIST, CERTIFIED REGISTERED

## 2023-04-21 PROCEDURE — 2709999900 HC NON-CHARGEABLE SUPPLY: Performed by: STUDENT IN AN ORGANIZED HEALTH CARE EDUCATION/TRAINING PROGRAM

## 2023-04-21 PROCEDURE — 6360000002 HC RX W HCPCS: Performed by: ANESTHESIOLOGY

## 2023-04-21 PROCEDURE — 86901 BLOOD TYPING SEROLOGIC RH(D): CPT

## 2023-04-21 PROCEDURE — 6370000000 HC RX 637 (ALT 250 FOR IP): Performed by: INTERNAL MEDICINE

## 2023-04-21 PROCEDURE — 6360000002 HC RX W HCPCS: Performed by: STUDENT IN AN ORGANIZED HEALTH CARE EDUCATION/TRAINING PROGRAM

## 2023-04-21 PROCEDURE — 7100000000 HC PACU RECOVERY - FIRST 15 MIN: Performed by: STUDENT IN AN ORGANIZED HEALTH CARE EDUCATION/TRAINING PROGRAM

## 2023-04-21 PROCEDURE — 85025 COMPLETE CBC W/AUTO DIFF WBC: CPT

## 2023-04-21 PROCEDURE — 80053 COMPREHEN METABOLIC PANEL: CPT

## 2023-04-21 PROCEDURE — 6360000002 HC RX W HCPCS

## 2023-04-21 PROCEDURE — 2060000000 HC ICU INTERMEDIATE R&B

## 2023-04-21 PROCEDURE — 86850 RBC ANTIBODY SCREEN: CPT

## 2023-04-21 PROCEDURE — 99231 SBSQ HOSP IP/OBS SF/LOW 25: CPT | Performed by: FAMILY MEDICINE

## 2023-04-21 PROCEDURE — 3600000013 HC SURGERY LEVEL 3 ADDTL 15MIN: Performed by: STUDENT IN AN ORGANIZED HEALTH CARE EDUCATION/TRAINING PROGRAM

## 2023-04-21 PROCEDURE — 7100000001 HC PACU RECOVERY - ADDTL 15 MIN: Performed by: STUDENT IN AN ORGANIZED HEALTH CARE EDUCATION/TRAINING PROGRAM

## 2023-04-21 PROCEDURE — 3700000000 HC ANESTHESIA ATTENDED CARE: Performed by: STUDENT IN AN ORGANIZED HEALTH CARE EDUCATION/TRAINING PROGRAM

## 2023-04-21 PROCEDURE — 2500000003 HC RX 250 WO HCPCS: Performed by: STUDENT IN AN ORGANIZED HEALTH CARE EDUCATION/TRAINING PROGRAM

## 2023-04-21 PROCEDURE — 2580000003 HC RX 258

## 2023-04-21 PROCEDURE — 3700000001 HC ADD 15 MINUTES (ANESTHESIA): Performed by: STUDENT IN AN ORGANIZED HEALTH CARE EDUCATION/TRAINING PROGRAM

## 2023-04-21 RX ORDER — FENTANYL CITRATE 50 UG/ML
INJECTION, SOLUTION INTRAMUSCULAR; INTRAVENOUS PRN
Status: DISCONTINUED | OUTPATIENT
Start: 2023-04-21 | End: 2023-04-21 | Stop reason: SDUPTHER

## 2023-04-21 RX ORDER — OXYCODONE HYDROCHLORIDE 5 MG/1
10 TABLET ORAL EVERY 4 HOURS PRN
Status: DISCONTINUED | OUTPATIENT
Start: 2023-04-21 | End: 2023-04-24 | Stop reason: HOSPADM

## 2023-04-21 RX ORDER — ROCURONIUM BROMIDE 10 MG/ML
INJECTION, SOLUTION INTRAVENOUS PRN
Status: DISCONTINUED | OUTPATIENT
Start: 2023-04-21 | End: 2023-04-21 | Stop reason: SDUPTHER

## 2023-04-21 RX ORDER — FENTANYL CITRATE 50 UG/ML
INJECTION, SOLUTION INTRAMUSCULAR; INTRAVENOUS PRN
Status: DISCONTINUED | OUTPATIENT
Start: 2023-04-21 | End: 2023-04-21

## 2023-04-21 RX ORDER — PROPOFOL 10 MG/ML
INJECTION, EMULSION INTRAVENOUS PRN
Status: DISCONTINUED | OUTPATIENT
Start: 2023-04-21 | End: 2023-04-21 | Stop reason: SDUPTHER

## 2023-04-21 RX ORDER — DIPHENHYDRAMINE HYDROCHLORIDE 50 MG/ML
12.5 INJECTION INTRAMUSCULAR; INTRAVENOUS
Status: COMPLETED | OUTPATIENT
Start: 2023-04-21 | End: 2023-04-21

## 2023-04-21 RX ORDER — LIDOCAINE HYDROCHLORIDE 20 MG/ML
INJECTION, SOLUTION EPIDURAL; INFILTRATION; INTRACAUDAL; PERINEURAL PRN
Status: DISCONTINUED | OUTPATIENT
Start: 2023-04-21 | End: 2023-04-21

## 2023-04-21 RX ORDER — DEXAMETHASONE SODIUM PHOSPHATE 4 MG/ML
INJECTION, SOLUTION INTRA-ARTICULAR; INTRALESIONAL; INTRAMUSCULAR; INTRAVENOUS; SOFT TISSUE PRN
Status: DISCONTINUED | OUTPATIENT
Start: 2023-04-21 | End: 2023-04-21 | Stop reason: SDUPTHER

## 2023-04-21 RX ORDER — LIDOCAINE HYDROCHLORIDE 20 MG/ML
INJECTION, SOLUTION EPIDURAL; INFILTRATION; INTRACAUDAL; PERINEURAL PRN
Status: DISCONTINUED | OUTPATIENT
Start: 2023-04-21 | End: 2023-04-21 | Stop reason: SDUPTHER

## 2023-04-21 RX ORDER — OXYCODONE HYDROCHLORIDE 5 MG/1
5 TABLET ORAL EVERY 4 HOURS PRN
Status: DISCONTINUED | OUTPATIENT
Start: 2023-04-21 | End: 2023-04-24 | Stop reason: HOSPADM

## 2023-04-21 RX ORDER — SODIUM CHLORIDE 0.9 % (FLUSH) 0.9 %
5-40 SYRINGE (ML) INJECTION EVERY 12 HOURS SCHEDULED
Status: DISCONTINUED | OUTPATIENT
Start: 2023-04-21 | End: 2023-04-21 | Stop reason: HOSPADM

## 2023-04-21 RX ORDER — MEPERIDINE HYDROCHLORIDE 25 MG/ML
12.5 INJECTION INTRAMUSCULAR; INTRAVENOUS; SUBCUTANEOUS EVERY 5 MIN PRN
Status: DISCONTINUED | OUTPATIENT
Start: 2023-04-21 | End: 2023-04-21 | Stop reason: HOSPADM

## 2023-04-21 RX ORDER — SODIUM CHLORIDE 9 MG/ML
INJECTION, SOLUTION INTRAVENOUS PRN
Status: DISCONTINUED | OUTPATIENT
Start: 2023-04-21 | End: 2023-04-21 | Stop reason: HOSPADM

## 2023-04-21 RX ORDER — SODIUM CHLORIDE 0.9 % (FLUSH) 0.9 %
5-40 SYRINGE (ML) INJECTION PRN
Status: DISCONTINUED | OUTPATIENT
Start: 2023-04-21 | End: 2023-04-21 | Stop reason: HOSPADM

## 2023-04-21 RX ORDER — SODIUM CHLORIDE, SODIUM LACTATE, POTASSIUM CHLORIDE, CALCIUM CHLORIDE 600; 310; 30; 20 MG/100ML; MG/100ML; MG/100ML; MG/100ML
INJECTION, SOLUTION INTRAVENOUS CONTINUOUS PRN
Status: DISCONTINUED | OUTPATIENT
Start: 2023-04-21 | End: 2023-04-21 | Stop reason: SDUPTHER

## 2023-04-21 RX ORDER — ONDANSETRON 2 MG/ML
INJECTION INTRAMUSCULAR; INTRAVENOUS PRN
Status: DISCONTINUED | OUTPATIENT
Start: 2023-04-21 | End: 2023-04-21 | Stop reason: SDUPTHER

## 2023-04-21 RX ORDER — SODIUM CHLORIDE, SODIUM LACTATE, POTASSIUM CHLORIDE, CALCIUM CHLORIDE 600; 310; 30; 20 MG/100ML; MG/100ML; MG/100ML; MG/100ML
INJECTION, SOLUTION INTRAVENOUS CONTINUOUS
Status: DISCONTINUED | OUTPATIENT
Start: 2023-04-21 | End: 2023-04-22

## 2023-04-21 RX ORDER — BUPIVACAINE HYDROCHLORIDE AND EPINEPHRINE 5; 5 MG/ML; UG/ML
INJECTION, SOLUTION EPIDURAL; INTRACAUDAL; PERINEURAL PRN
Status: DISCONTINUED | OUTPATIENT
Start: 2023-04-21 | End: 2023-04-21 | Stop reason: ALTCHOICE

## 2023-04-21 RX ADMIN — PRAZOSIN HYDROCHLORIDE 1 MG: 1 CAPSULE ORAL at 21:30

## 2023-04-21 RX ADMIN — Medication 3 MG: at 21:30

## 2023-04-21 RX ADMIN — ROSUVASTATIN CALCIUM 10 MG: 10 TABLET, FILM COATED ORAL at 08:36

## 2023-04-21 RX ADMIN — POTASSIUM CHLORIDE 40 MEQ: 1500 TABLET, EXTENDED RELEASE ORAL at 21:30

## 2023-04-21 RX ADMIN — FENTANYL CITRATE 50 MCG: 50 INJECTION, SOLUTION INTRAMUSCULAR; INTRAVENOUS at 19:07

## 2023-04-21 RX ADMIN — SUGAMMADEX 400 MG: 100 INJECTION, SOLUTION INTRAVENOUS at 19:50

## 2023-04-21 RX ADMIN — SODIUM CHLORIDE, POTASSIUM CHLORIDE, SODIUM LACTATE AND CALCIUM CHLORIDE: 600; 310; 30; 20 INJECTION, SOLUTION INTRAVENOUS at 18:57

## 2023-04-21 RX ADMIN — DIVALPROEX SODIUM 500 MG: 250 TABLET, DELAYED RELEASE ORAL at 21:30

## 2023-04-21 RX ADMIN — LIDOCAINE HYDROCHLORIDE 100 MG: 20 INJECTION, SOLUTION EPIDURAL; INFILTRATION; INTRACAUDAL; PERINEURAL at 19:07

## 2023-04-21 RX ADMIN — DEXAMETHASONE SODIUM PHOSPHATE 8 MG: 4 INJECTION, SOLUTION INTRAMUSCULAR; INTRAVENOUS at 19:18

## 2023-04-21 RX ADMIN — GUAIFENESIN 400 MG: 400 TABLET ORAL at 08:36

## 2023-04-21 RX ADMIN — AMLODIPINE BESYLATE 5 MG: 5 TABLET ORAL at 08:36

## 2023-04-21 RX ADMIN — DIVALPROEX SODIUM 500 MG: 250 TABLET, DELAYED RELEASE ORAL at 08:36

## 2023-04-21 RX ADMIN — LATANOPROST 1 DROP: 50 SOLUTION OPHTHALMIC at 21:30

## 2023-04-21 RX ADMIN — GUAIFENESIN 400 MG: 400 TABLET ORAL at 21:30

## 2023-04-21 RX ADMIN — PIPERACILLIN AND TAZOBACTAM 4500 MG: 4; .5 INJECTION, POWDER, LYOPHILIZED, FOR SOLUTION INTRAVENOUS at 12:37

## 2023-04-21 RX ADMIN — SODIUM CHLORIDE, PRESERVATIVE FREE 10 ML: 5 INJECTION INTRAVENOUS at 08:38

## 2023-04-21 RX ADMIN — PALIPERIDONE 6 MG: 3 TABLET, EXTENDED RELEASE ORAL at 08:36

## 2023-04-21 RX ADMIN — ONDANSETRON 4 MG: 2 INJECTION INTRAMUSCULAR; INTRAVENOUS at 19:18

## 2023-04-21 RX ADMIN — BENZTROPINE MESYLATE 1 MG: 1 TABLET ORAL at 21:30

## 2023-04-21 RX ADMIN — BENZTROPINE MESYLATE 1 MG: 1 TABLET ORAL at 08:36

## 2023-04-21 RX ADMIN — PIPERACILLIN AND TAZOBACTAM 4500 MG: 4; .5 INJECTION, POWDER, LYOPHILIZED, FOR SOLUTION INTRAVENOUS at 21:43

## 2023-04-21 RX ADMIN — PIPERACILLIN AND TAZOBACTAM 4500 MG: 4; .5 INJECTION, POWDER, LYOPHILIZED, FOR SOLUTION INTRAVENOUS at 02:19

## 2023-04-21 RX ADMIN — SODIUM CHLORIDE, PRESERVATIVE FREE 10 ML: 5 INJECTION INTRAVENOUS at 21:30

## 2023-04-21 RX ADMIN — POTASSIUM CHLORIDE 40 MEQ: 1500 TABLET, EXTENDED RELEASE ORAL at 08:39

## 2023-04-21 RX ADMIN — SODIUM CHLORIDE, POTASSIUM CHLORIDE, SODIUM LACTATE AND CALCIUM CHLORIDE: 600; 310; 30; 20 INJECTION, SOLUTION INTRAVENOUS at 21:16

## 2023-04-21 RX ADMIN — PROPOFOL 150 MG: 10 INJECTION, EMULSION INTRAVENOUS at 19:07

## 2023-04-21 RX ADMIN — DIPHENHYDRAMINE HYDROCHLORIDE 12.5 MG: 50 INJECTION, SOLUTION INTRAMUSCULAR; INTRAVENOUS at 20:15

## 2023-04-21 RX ADMIN — ROCURONIUM BROMIDE 40 MG: 10 INJECTION, SOLUTION INTRAVENOUS at 19:07

## 2023-04-21 ASSESSMENT — PAIN DESCRIPTION - DESCRIPTORS: DESCRIPTORS: ACHING;DISCOMFORT;DULL

## 2023-04-21 ASSESSMENT — LIFESTYLE VARIABLES: SMOKING_STATUS: 1

## 2023-04-21 ASSESSMENT — PAIN DESCRIPTION - ONSET: ONSET: ON-GOING

## 2023-04-21 ASSESSMENT — PAIN SCALES - GENERAL: PAINLEVEL_OUTOF10: 4

## 2023-04-21 ASSESSMENT — PAIN DESCRIPTION - LOCATION: LOCATION: ABDOMEN

## 2023-04-21 ASSESSMENT — PAIN DESCRIPTION - ORIENTATION: ORIENTATION: RIGHT;LOWER

## 2023-04-21 ASSESSMENT — PAIN DESCRIPTION - PAIN TYPE: TYPE: ACUTE PAIN;SURGICAL PAIN

## 2023-04-21 ASSESSMENT — PAIN DESCRIPTION - FREQUENCY: FREQUENCY: INTERMITTENT

## 2023-04-21 ASSESSMENT — PAIN - FUNCTIONAL ASSESSMENT: PAIN_FUNCTIONAL_ASSESSMENT: ACTIVITIES ARE NOT PREVENTED

## 2023-04-21 NOTE — ANESTHESIA PRE PROCEDURE
Department of Anesthesiology  Preprocedure Note       Name:  Reuben Araya   Age:  79 y.o.  :  1955                                          MRN:  [id number]         Date:  2023      Surgeon: Stephanie Corey):  Onel Petty MD    Procedure: LAPAROSCOPIC  CHOLECYSTECTOMY POSSIBLE OPEN (Abdomen)    Medications prior to admission:   Prior to Admission medications    Medication Sig Start Date End Date Taking?  Authorizing Provider   rosuvastatin (CRESTOR) 10 MG tablet TAKE 1 TABLET BY MOUTH ONCE DAILY IN THE MORNING. 3/17/23   Kaylynn Pritchard MD   tiotropium (SPIRIVA) 18 MCG inhalation capsule Inhale 1 capsule into the lungs daily 2/3/23   Harinder Martinez MD   vitamin D (D3-1000) 25 MCG (1000 UT) TABS tablet TAKE 1 TABLET BY MOUTH ONCE DAILY IN THE MORNING. 2/3/23   Harinder Martinez MD   INVEGA SUSTENNA 156 MG/ML LEANDRA IM injection  23   Historical Provider, MD   apixaban (ELIQUIS) 5 MG TABS tablet Take 1 tablet by mouth 2 times daily 2/3/23   Harinder Martinez MD   nicotine polacrilex (NICORETTE) 2 MG gum Take 1 each by mouth as needed for Smoking cessation 2/3/23   Harinder Martinez MD   prazosin (MINIPRESS) 1 MG capsule Take 1 capsule by mouth nightly 22  Liyah Erps, APRN - CNP   benztropine (COGENTIN) 1 MG tablet Take 1 tablet by mouth 2 times daily 12/7/22 4/15/23  Liyah Erps, APRN - CNP   paliperidone (INVEGA) 6 MG extended release tablet Take 1 tablet by mouth daily 12/7/22 4/15/23  Liyah Erps, APRN - CNP   melatonin 3 MG TABS tablet Take 1 tablet by mouth nightly 22   Liyah Erps, APRN - CNP   divalproex (DEPAKOTE) 500 MG DR tablet Take 1 tablet by mouth in the morning and at bedtime 12/7/22 4/15/23  Liyah Erps, APRN - CNP   acetaminophen (TYLENOL) 500 MG tablet Take 1 tablet by mouth every 4 hours as needed for Pain    Historical Provider, MD   latanoprost (XALATAN) 0.005 % ophthalmic solution Place 1 drop into both eyes nightly 20   Kennedy Jorge MD

## 2023-04-22 PROBLEM — D64.9 NORMOCYTIC ANEMIA: Status: ACTIVE | Noted: 2023-04-22

## 2023-04-22 PROBLEM — J98.4 CAVITARY LESION OF LUNG: Status: ACTIVE | Noted: 2023-04-22

## 2023-04-22 LAB
ALBUMIN SERPL-MCNC: 3.1 G/DL (ref 3.5–5.2)
ALP SERPL-CCNC: 59 U/L (ref 40–129)
ALT SERPL-CCNC: 90 U/L (ref 0–40)
ANION GAP SERPL CALCULATED.3IONS-SCNC: 10 MMOL/L (ref 7–16)
AST SERPL-CCNC: 59 U/L (ref 0–39)
BASOPHILS # BLD: 0 E9/L (ref 0–0.2)
BASOPHILS NFR BLD: 0 % (ref 0–2)
BILIRUB SERPL-MCNC: 0.2 MG/DL (ref 0–1.2)
BUN SERPL-MCNC: 15 MG/DL (ref 6–23)
CALCIUM SERPL-MCNC: 9.2 MG/DL (ref 8.6–10.2)
CHLORIDE SERPL-SCNC: 105 MMOL/L (ref 98–107)
CO2 SERPL-SCNC: 25 MMOL/L (ref 22–29)
CREAT SERPL-MCNC: 1.1 MG/DL (ref 0.7–1.2)
EOSINOPHIL # BLD: 0 E9/L (ref 0.05–0.5)
EOSINOPHIL NFR BLD: 0 % (ref 0–6)
ERYTHROCYTE [DISTWIDTH] IN BLOOD BY AUTOMATED COUNT: 13.3 FL (ref 11.5–15)
GLUCOSE SERPL-MCNC: 160 MG/DL (ref 74–99)
HCT VFR BLD AUTO: 33.9 % (ref 37–54)
HGB BLD-MCNC: 10.8 G/DL (ref 12.5–16.5)
HYPOCHROMIA: ABNORMAL
LYMPHOCYTES # BLD: 0.84 E9/L (ref 1.5–4)
LYMPHOCYTES NFR BLD: 10.4 % (ref 20–42)
MCH RBC QN AUTO: 30.9 PG (ref 26–35)
MCHC RBC AUTO-ENTMCNC: 31.9 % (ref 32–34.5)
MCV RBC AUTO: 97.1 FL (ref 80–99.9)
MONOCYTES # BLD: 0.34 E9/L (ref 0.1–0.95)
MONOCYTES NFR BLD: 4.4 % (ref 2–12)
NEUTROPHILS # BLD: 7.14 E9/L (ref 1.8–7.3)
NEUTS SEG NFR BLD: 85.2 % (ref 43–80)
NRBC BLD-RTO: 0 /100 WBC
OVALOCYTES: ABNORMAL
PLATELET # BLD AUTO: 219 E9/L (ref 130–450)
PMV BLD AUTO: 8.7 FL (ref 7–12)
POLYCHROMASIA: ABNORMAL
POTASSIUM SERPL-SCNC: 5.2 MMOL/L (ref 3.5–5)
PROT SERPL-MCNC: 6.9 G/DL (ref 6.4–8.3)
RBC # BLD AUTO: 3.49 E12/L (ref 3.8–5.8)
SODIUM SERPL-SCNC: 140 MMOL/L (ref 132–146)
WBC # BLD: 8.4 E9/L (ref 4.5–11.5)

## 2023-04-22 PROCEDURE — 6370000000 HC RX 637 (ALT 250 FOR IP): Performed by: STUDENT IN AN ORGANIZED HEALTH CARE EDUCATION/TRAINING PROGRAM

## 2023-04-22 PROCEDURE — 2580000003 HC RX 258: Performed by: STUDENT IN AN ORGANIZED HEALTH CARE EDUCATION/TRAINING PROGRAM

## 2023-04-22 PROCEDURE — 99232 SBSQ HOSP IP/OBS MODERATE 35: CPT | Performed by: FAMILY MEDICINE

## 2023-04-22 PROCEDURE — 6360000002 HC RX W HCPCS: Performed by: STUDENT IN AN ORGANIZED HEALTH CARE EDUCATION/TRAINING PROGRAM

## 2023-04-22 PROCEDURE — 36415 COLL VENOUS BLD VENIPUNCTURE: CPT

## 2023-04-22 PROCEDURE — 80053 COMPREHEN METABOLIC PANEL: CPT

## 2023-04-22 PROCEDURE — 2700000000 HC OXYGEN THERAPY PER DAY

## 2023-04-22 PROCEDURE — 85025 COMPLETE CBC W/AUTO DIFF WBC: CPT

## 2023-04-22 PROCEDURE — 2060000000 HC ICU INTERMEDIATE R&B

## 2023-04-22 RX ADMIN — LATANOPROST 1 DROP: 50 SOLUTION OPHTHALMIC at 20:40

## 2023-04-22 RX ADMIN — PIPERACILLIN AND TAZOBACTAM 4500 MG: 4; .5 INJECTION, POWDER, LYOPHILIZED, FOR SOLUTION INTRAVENOUS at 20:52

## 2023-04-22 RX ADMIN — OXYCODONE 5 MG: 5 TABLET ORAL at 20:40

## 2023-04-22 RX ADMIN — DIVALPROEX SODIUM 500 MG: 250 TABLET, DELAYED RELEASE ORAL at 20:40

## 2023-04-22 RX ADMIN — SODIUM CHLORIDE, PRESERVATIVE FREE 10 ML: 5 INJECTION INTRAVENOUS at 20:51

## 2023-04-22 RX ADMIN — AMLODIPINE BESYLATE 5 MG: 5 TABLET ORAL at 08:29

## 2023-04-22 RX ADMIN — PRAZOSIN HYDROCHLORIDE 1 MG: 1 CAPSULE ORAL at 20:39

## 2023-04-22 RX ADMIN — SODIUM CHLORIDE, PRESERVATIVE FREE 10 ML: 5 INJECTION INTRAVENOUS at 17:05

## 2023-04-22 RX ADMIN — GUAIFENESIN 400 MG: 400 TABLET ORAL at 08:29

## 2023-04-22 RX ADMIN — BENZTROPINE MESYLATE 1 MG: 1 TABLET ORAL at 20:40

## 2023-04-22 RX ADMIN — DIVALPROEX SODIUM 500 MG: 250 TABLET, DELAYED RELEASE ORAL at 08:29

## 2023-04-22 RX ADMIN — BENZTROPINE MESYLATE 1 MG: 1 TABLET ORAL at 08:29

## 2023-04-22 RX ADMIN — ROSUVASTATIN CALCIUM 10 MG: 10 TABLET, FILM COATED ORAL at 08:30

## 2023-04-22 RX ADMIN — PALIPERIDONE 6 MG: 3 TABLET, EXTENDED RELEASE ORAL at 08:29

## 2023-04-22 RX ADMIN — GUAIFENESIN 400 MG: 400 TABLET ORAL at 12:52

## 2023-04-22 RX ADMIN — PIPERACILLIN AND TAZOBACTAM 4500 MG: 4; .5 INJECTION, POWDER, LYOPHILIZED, FOR SOLUTION INTRAVENOUS at 05:49

## 2023-04-22 RX ADMIN — GUAIFENESIN 400 MG: 400 TABLET ORAL at 20:39

## 2023-04-22 RX ADMIN — PIPERACILLIN AND TAZOBACTAM 4500 MG: 4; .5 INJECTION, POWDER, LYOPHILIZED, FOR SOLUTION INTRAVENOUS at 12:53

## 2023-04-22 RX ADMIN — Medication 3 MG: at 20:39

## 2023-04-22 RX ADMIN — SODIUM CHLORIDE, POTASSIUM CHLORIDE, SODIUM LACTATE AND CALCIUM CHLORIDE: 600; 310; 30; 20 INJECTION, SOLUTION INTRAVENOUS at 04:27

## 2023-04-22 ASSESSMENT — PAIN SCALES - GENERAL: PAINLEVEL_OUTOF10: 5

## 2023-04-22 NOTE — ANESTHESIA POSTPROCEDURE EVALUATION
Department of Anesthesiology  Postprocedure Note    Patient: Rob Marte  MRN: [id number]  YOB: 1955  Date of evaluation: 4/21/2023      Procedure Summary     Date: 04/21/23 Room / Location: [facility] 01 / [facility]    Anesthesia Start: 4920 Anesthesia Stop:     Procedure: LAPAROSCOPIC  CHOLECYSTECTOMY POSSIBLE OPEN (Abdomen) Diagnosis:       Pain      (Pain [R52])    Surgeons: Angella Daley MD Responsible Provider: Malorie Fuchs MD    Anesthesia Type: general ASA Status: 3          Anesthesia Type: No value filed.     Jorge Luis Phase I: Jorge Luis Score: 10    Jorge Luis Phase II: Jorge Luis Score: 10      Anesthesia Post Evaluation    Patient location during evaluation: PACU  Patient participation: complete - patient participated  Level of consciousness: awake  Airway patency: patent  Nausea & Vomiting: no nausea and no vomiting  Complications: no  Cardiovascular status: hemodynamically stable  Respiratory status: acceptable  Hydration status: euvolemic

## 2023-04-23 LAB
ALBUMIN SERPL-MCNC: 3.2 G/DL (ref 3.5–5.2)
ALP SERPL-CCNC: 58 U/L (ref 40–129)
ALT SERPL-CCNC: 79 U/L (ref 0–40)
ANION GAP SERPL CALCULATED.3IONS-SCNC: 13 MMOL/L (ref 7–16)
AST SERPL-CCNC: 37 U/L (ref 0–39)
BASOPHILS # BLD: 0 E9/L (ref 0–0.2)
BASOPHILS NFR BLD: 0 % (ref 0–2)
BILIRUB SERPL-MCNC: 0.2 MG/DL (ref 0–1.2)
BUN SERPL-MCNC: 15 MG/DL (ref 6–23)
CALCIUM SERPL-MCNC: 9.1 MG/DL (ref 8.6–10.2)
CHLORIDE SERPL-SCNC: 102 MMOL/L (ref 98–107)
CO2 SERPL-SCNC: 25 MMOL/L (ref 22–29)
CREAT SERPL-MCNC: 1.1 MG/DL (ref 0.7–1.2)
EOSINOPHIL # BLD: 0.2 E9/L (ref 0.05–0.5)
EOSINOPHIL NFR BLD: 1.7 % (ref 0–6)
ERYTHROCYTE [DISTWIDTH] IN BLOOD BY AUTOMATED COUNT: 13.2 FL (ref 11.5–15)
FERRITIN SERPL-MCNC: 232 NG/ML
GLUCOSE SERPL-MCNC: 143 MG/DL (ref 74–99)
HCT VFR BLD AUTO: 36.9 % (ref 37–54)
HGB BLD-MCNC: 11.7 G/DL (ref 12.5–16.5)
IRON SATN MFR SERPL: 11 % (ref 20–55)
IRON SERPL-MCNC: 24 MCG/DL (ref 59–158)
LYMPHOCYTES # BLD: 3.24 E9/L (ref 1.5–4)
LYMPHOCYTES NFR BLD: 23.9 % (ref 20–42)
MCH RBC QN AUTO: 31 PG (ref 26–35)
MCHC RBC AUTO-ENTMCNC: 31.7 % (ref 32–34.5)
MCV RBC AUTO: 97.6 FL (ref 80–99.9)
MONOCYTES # BLD: 2.28 E9/L (ref 0.1–0.95)
MONOCYTES NFR BLD: 18.8 % (ref 2–12)
NEUTROPHILS # BLD: 6.36 E9/L (ref 1.8–7.3)
NEUTS SEG NFR BLD: 53 % (ref 43–80)
NRBC BLD-RTO: 0 /100 WBC
PLATELET # BLD AUTO: 275 E9/L (ref 130–450)
PMV BLD AUTO: 8.9 FL (ref 7–12)
POTASSIUM SERPL-SCNC: 4.2 MMOL/L (ref 3.5–5)
PROT SERPL-MCNC: 7 G/DL (ref 6.4–8.3)
RBC # BLD AUTO: 3.78 E12/L (ref 3.8–5.8)
SODIUM SERPL-SCNC: 140 MMOL/L (ref 132–146)
TIBC SERPL-MCNC: 215 MCG/DL (ref 250–450)
VARIANT LYMPHS NFR BLD: 2.6 % (ref 0–4)
WBC # BLD: 12 E9/L (ref 4.5–11.5)

## 2023-04-23 PROCEDURE — 2060000000 HC ICU INTERMEDIATE R&B

## 2023-04-23 PROCEDURE — 6370000000 HC RX 637 (ALT 250 FOR IP): Performed by: STUDENT IN AN ORGANIZED HEALTH CARE EDUCATION/TRAINING PROGRAM

## 2023-04-23 PROCEDURE — 80053 COMPREHEN METABOLIC PANEL: CPT

## 2023-04-23 PROCEDURE — 85025 COMPLETE CBC W/AUTO DIFF WBC: CPT

## 2023-04-23 PROCEDURE — 83550 IRON BINDING TEST: CPT

## 2023-04-23 PROCEDURE — 6360000002 HC RX W HCPCS: Performed by: STUDENT IN AN ORGANIZED HEALTH CARE EDUCATION/TRAINING PROGRAM

## 2023-04-23 PROCEDURE — 82728 ASSAY OF FERRITIN: CPT

## 2023-04-23 PROCEDURE — 2580000003 HC RX 258: Performed by: STUDENT IN AN ORGANIZED HEALTH CARE EDUCATION/TRAINING PROGRAM

## 2023-04-23 PROCEDURE — 99232 SBSQ HOSP IP/OBS MODERATE 35: CPT | Performed by: FAMILY MEDICINE

## 2023-04-23 PROCEDURE — 83540 ASSAY OF IRON: CPT

## 2023-04-23 PROCEDURE — 36415 COLL VENOUS BLD VENIPUNCTURE: CPT

## 2023-04-23 RX ORDER — DOCUSATE SODIUM 100 MG/1
100 CAPSULE, LIQUID FILLED ORAL EVERY 8 HOURS
Status: DISCONTINUED | OUTPATIENT
Start: 2023-04-23 | End: 2023-04-24 | Stop reason: HOSPADM

## 2023-04-23 RX ORDER — CALCIUM CARBONATE 200(500)MG
500 TABLET,CHEWABLE ORAL 3 TIMES DAILY PRN
Status: DISCONTINUED | OUTPATIENT
Start: 2023-04-23 | End: 2023-04-24 | Stop reason: HOSPADM

## 2023-04-23 RX ADMIN — APIXABAN 5 MG: 5 TABLET, FILM COATED ORAL at 20:19

## 2023-04-23 RX ADMIN — SODIUM CHLORIDE, PRESERVATIVE FREE 10 ML: 5 INJECTION INTRAVENOUS at 20:20

## 2023-04-23 RX ADMIN — LATANOPROST 1 DROP: 50 SOLUTION OPHTHALMIC at 20:20

## 2023-04-23 RX ADMIN — DOCUSATE SODIUM 100 MG: 100 CAPSULE, LIQUID FILLED ORAL at 10:41

## 2023-04-23 RX ADMIN — ROSUVASTATIN CALCIUM 10 MG: 10 TABLET, FILM COATED ORAL at 08:02

## 2023-04-23 RX ADMIN — PRAZOSIN HYDROCHLORIDE 1 MG: 1 CAPSULE ORAL at 20:19

## 2023-04-23 RX ADMIN — GUAIFENESIN 400 MG: 400 TABLET ORAL at 20:19

## 2023-04-23 RX ADMIN — OXYCODONE 10 MG: 5 TABLET ORAL at 03:55

## 2023-04-23 RX ADMIN — PIPERACILLIN AND TAZOBACTAM 4500 MG: 4; .5 INJECTION, POWDER, LYOPHILIZED, FOR SOLUTION INTRAVENOUS at 20:27

## 2023-04-23 RX ADMIN — DIVALPROEX SODIUM 500 MG: 250 TABLET, DELAYED RELEASE ORAL at 20:19

## 2023-04-23 RX ADMIN — AMLODIPINE BESYLATE 5 MG: 5 TABLET ORAL at 08:02

## 2023-04-23 RX ADMIN — GUAIFENESIN 400 MG: 400 TABLET ORAL at 08:02

## 2023-04-23 RX ADMIN — DOCUSATE SODIUM 100 MG: 100 CAPSULE, LIQUID FILLED ORAL at 18:21

## 2023-04-23 RX ADMIN — OXYCODONE 5 MG: 5 TABLET ORAL at 20:50

## 2023-04-23 RX ADMIN — PALIPERIDONE 6 MG: 3 TABLET, EXTENDED RELEASE ORAL at 08:02

## 2023-04-23 RX ADMIN — DIVALPROEX SODIUM 500 MG: 250 TABLET, DELAYED RELEASE ORAL at 08:02

## 2023-04-23 RX ADMIN — GUAIFENESIN 400 MG: 400 TABLET ORAL at 14:22

## 2023-04-23 RX ADMIN — PIPERACILLIN AND TAZOBACTAM 4500 MG: 4; .5 INJECTION, POWDER, LYOPHILIZED, FOR SOLUTION INTRAVENOUS at 06:50

## 2023-04-23 RX ADMIN — PIPERACILLIN AND TAZOBACTAM 4500 MG: 4; .5 INJECTION, POWDER, LYOPHILIZED, FOR SOLUTION INTRAVENOUS at 14:22

## 2023-04-23 RX ADMIN — CALCIUM CARBONATE 500 MG: 500 TABLET, CHEWABLE ORAL at 20:19

## 2023-04-23 RX ADMIN — Medication 3 MG: at 20:19

## 2023-04-23 RX ADMIN — BENZTROPINE MESYLATE 1 MG: 1 TABLET ORAL at 20:19

## 2023-04-23 RX ADMIN — BENZTROPINE MESYLATE 1 MG: 1 TABLET ORAL at 08:02

## 2023-04-23 ASSESSMENT — PAIN SCALES - GENERAL: PAINLEVEL_OUTOF10: 5

## 2023-04-24 VITALS
RESPIRATION RATE: 18 BRPM | SYSTOLIC BLOOD PRESSURE: 137 MMHG | TEMPERATURE: 97.6 F | DIASTOLIC BLOOD PRESSURE: 79 MMHG | WEIGHT: 211.9 LBS | HEART RATE: 70 BPM | HEIGHT: 68 IN | OXYGEN SATURATION: 97 % | BODY MASS INDEX: 32.12 KG/M2

## 2023-04-24 DIAGNOSIS — J43.9 PULMONARY EMPHYSEMA, UNSPECIFIED EMPHYSEMA TYPE (HCC): ICD-10-CM

## 2023-04-24 PROBLEM — D64.9 ACUTE ANEMIA: Status: RESOLVED | Noted: 2023-04-18 | Resolved: 2023-04-24

## 2023-04-24 PROBLEM — R93.2 ABNORMAL CT SCAN, GALLBLADDER: Status: RESOLVED | Noted: 2023-04-18 | Resolved: 2023-04-24

## 2023-04-24 PROBLEM — K81.0 ACUTE CHOLECYSTITIS: Status: RESOLVED | Noted: 2023-04-15 | Resolved: 2023-04-24

## 2023-04-24 LAB
ALBUMIN SERPL-MCNC: 2.7 G/DL (ref 3.5–5.2)
ALP SERPL-CCNC: 45 U/L (ref 40–129)
ALT SERPL-CCNC: 47 U/L (ref 0–40)
ANION GAP SERPL CALCULATED.3IONS-SCNC: 10 MMOL/L (ref 7–16)
AST SERPL-CCNC: 17 U/L (ref 0–39)
BASOPHILS # BLD: 0.1 E9/L (ref 0–0.2)
BASOPHILS NFR BLD: 0.9 % (ref 0–2)
BILIRUB SERPL-MCNC: <0.2 MG/DL (ref 0–1.2)
BUN SERPL-MCNC: 14 MG/DL (ref 6–23)
CALCIUM SERPL-MCNC: 8.7 MG/DL (ref 8.6–10.2)
CHLORIDE SERPL-SCNC: 105 MMOL/L (ref 98–107)
CO2 SERPL-SCNC: 27 MMOL/L (ref 22–29)
CREAT SERPL-MCNC: 1.1 MG/DL (ref 0.7–1.2)
EOSINOPHIL # BLD: 0.38 E9/L (ref 0.05–0.5)
EOSINOPHIL NFR BLD: 3.5 % (ref 0–6)
ERYTHROCYTE [DISTWIDTH] IN BLOOD BY AUTOMATED COUNT: 13.2 FL (ref 11.5–15)
GLUCOSE SERPL-MCNC: 197 MG/DL (ref 74–99)
HCT VFR BLD AUTO: 32.8 % (ref 37–54)
HGB BLD-MCNC: 10.3 G/DL (ref 12.5–16.5)
LYMPHOCYTES # BLD: 3.24 E9/L (ref 1.5–4)
LYMPHOCYTES NFR BLD: 25.2 % (ref 20–42)
MCH RBC QN AUTO: 31.2 PG (ref 26–35)
MCHC RBC AUTO-ENTMCNC: 31.4 % (ref 32–34.5)
MCV RBC AUTO: 99.4 FL (ref 80–99.9)
MONOCYTES # BLD: 1.62 E9/L (ref 0.1–0.95)
MONOCYTES NFR BLD: 14.8 % (ref 2–12)
NEUTROPHILS # BLD: 5.51 E9/L (ref 1.8–7.3)
NEUTS SEG NFR BLD: 51.3 % (ref 43–80)
NRBC BLD-RTO: 0.9 /100 WBC
OVALOCYTES: ABNORMAL
PLATELET # BLD AUTO: 251 E9/L (ref 130–450)
PMV BLD AUTO: 8.9 FL (ref 7–12)
POLYCHROMASIA: ABNORMAL
POTASSIUM SERPL-SCNC: 4.3 MMOL/L (ref 3.5–5)
PROT SERPL-MCNC: 6 G/DL (ref 6.4–8.3)
RBC # BLD AUTO: 3.3 E12/L (ref 3.8–5.8)
SODIUM SERPL-SCNC: 142 MMOL/L (ref 132–146)
VARIANT LYMPHS NFR BLD: 4.3 % (ref 0–4)
WBC # BLD: 10.8 E9/L (ref 4.5–11.5)

## 2023-04-24 PROCEDURE — 6370000000 HC RX 637 (ALT 250 FOR IP)

## 2023-04-24 PROCEDURE — 6370000000 HC RX 637 (ALT 250 FOR IP): Performed by: STUDENT IN AN ORGANIZED HEALTH CARE EDUCATION/TRAINING PROGRAM

## 2023-04-24 PROCEDURE — 80053 COMPREHEN METABOLIC PANEL: CPT

## 2023-04-24 PROCEDURE — 99238 HOSP IP/OBS DSCHRG MGMT 30/<: CPT | Performed by: FAMILY MEDICINE

## 2023-04-24 PROCEDURE — 36415 COLL VENOUS BLD VENIPUNCTURE: CPT

## 2023-04-24 PROCEDURE — 6360000002 HC RX W HCPCS: Performed by: STUDENT IN AN ORGANIZED HEALTH CARE EDUCATION/TRAINING PROGRAM

## 2023-04-24 PROCEDURE — 85025 COMPLETE CBC W/AUTO DIFF WBC: CPT

## 2023-04-24 PROCEDURE — 2580000003 HC RX 258: Performed by: STUDENT IN AN ORGANIZED HEALTH CARE EDUCATION/TRAINING PROGRAM

## 2023-04-24 RX ORDER — GUAIFENESIN 400 MG/1
400 TABLET ORAL 3 TIMES DAILY
Qty: 56 TABLET | Refills: 0 | Status: SHIPPED | OUTPATIENT
Start: 2023-04-24

## 2023-04-24 RX ORDER — TIOTROPIUM BROMIDE 18 UG/1
CAPSULE ORAL; RESPIRATORY (INHALATION)
Qty: 30 CAPSULE | Refills: 5 | Status: SHIPPED | OUTPATIENT
Start: 2023-04-24

## 2023-04-24 RX ORDER — OXYCODONE HYDROCHLORIDE 5 MG/1
5 TABLET ORAL EVERY 8 HOURS PRN
Qty: 9 TABLET | Refills: 0 | Status: SHIPPED | OUTPATIENT
Start: 2023-04-24 | End: 2023-04-27

## 2023-04-24 RX ORDER — AMOXICILLIN AND CLAVULANATE POTASSIUM 875; 125 MG/1; MG/1
1 TABLET, FILM COATED ORAL 2 TIMES DAILY
Qty: 20 TABLET | Refills: 0 | Status: SHIPPED | OUTPATIENT
Start: 2023-04-24 | End: 2023-05-04

## 2023-04-24 RX ORDER — SENNA PLUS 8.6 MG/1
1 TABLET ORAL NIGHTLY
Status: DISCONTINUED | OUTPATIENT
Start: 2023-04-24 | End: 2023-04-24 | Stop reason: HOSPADM

## 2023-04-24 RX ORDER — POLYETHYLENE GLYCOL 3350 17 G/17G
17 POWDER, FOR SOLUTION ORAL DAILY
Qty: 30 EACH | Refills: 0 | Status: SHIPPED | OUTPATIENT
Start: 2023-04-25 | End: 2023-05-25

## 2023-04-24 RX ORDER — AMLODIPINE BESYLATE 5 MG/1
5 TABLET ORAL DAILY
Qty: 30 TABLET | Refills: 3 | Status: SHIPPED | OUTPATIENT
Start: 2023-04-25

## 2023-04-24 RX ORDER — POLYETHYLENE GLYCOL 3350 17 G/17G
17 POWDER, FOR SOLUTION ORAL DAILY
Status: DISCONTINUED | OUTPATIENT
Start: 2023-04-24 | End: 2023-04-24 | Stop reason: HOSPADM

## 2023-04-24 RX ORDER — SENNA PLUS 8.6 MG/1
1 TABLET ORAL NIGHTLY
Qty: 30 TABLET | Refills: 0 | Status: SHIPPED | OUTPATIENT
Start: 2023-04-25 | End: 2023-05-25

## 2023-04-24 RX ORDER — PSEUDOEPHEDRINE HCL 30 MG
100 TABLET ORAL 3 TIMES DAILY PRN
Qty: 90 CAPSULE | Refills: 0 | Status: SHIPPED | OUTPATIENT
Start: 2023-04-24 | End: 2023-05-24

## 2023-04-24 RX ADMIN — OXYCODONE 10 MG: 5 TABLET ORAL at 08:35

## 2023-04-24 RX ADMIN — DOCUSATE SODIUM 100 MG: 100 CAPSULE, LIQUID FILLED ORAL at 05:10

## 2023-04-24 RX ADMIN — GUAIFENESIN 400 MG: 400 TABLET ORAL at 13:47

## 2023-04-24 RX ADMIN — GUAIFENESIN 400 MG: 400 TABLET ORAL at 08:27

## 2023-04-24 RX ADMIN — PIPERACILLIN AND TAZOBACTAM 4500 MG: 4; .5 INJECTION, POWDER, LYOPHILIZED, FOR SOLUTION INTRAVENOUS at 05:07

## 2023-04-24 RX ADMIN — DIVALPROEX SODIUM 500 MG: 250 TABLET, DELAYED RELEASE ORAL at 08:28

## 2023-04-24 RX ADMIN — AMLODIPINE BESYLATE 5 MG: 5 TABLET ORAL at 08:27

## 2023-04-24 RX ADMIN — SENNOSIDES 8.6 MG: 8.6 TABLET, FILM COATED ORAL at 09:44

## 2023-04-24 RX ADMIN — POLYETHYLENE GLYCOL 3350 17 G: 17 POWDER, FOR SOLUTION ORAL at 09:44

## 2023-04-24 RX ADMIN — SODIUM CHLORIDE, PRESERVATIVE FREE 10 ML: 5 INJECTION INTRAVENOUS at 08:29

## 2023-04-24 RX ADMIN — APIXABAN 5 MG: 5 TABLET, FILM COATED ORAL at 08:27

## 2023-04-24 RX ADMIN — DOCUSATE SODIUM 100 MG: 100 CAPSULE, LIQUID FILLED ORAL at 09:44

## 2023-04-24 RX ADMIN — BENZTROPINE MESYLATE 1 MG: 1 TABLET ORAL at 08:28

## 2023-04-24 RX ADMIN — PALIPERIDONE 6 MG: 3 TABLET, EXTENDED RELEASE ORAL at 08:28

## 2023-04-24 RX ADMIN — ROSUVASTATIN CALCIUM 10 MG: 10 TABLET, FILM COATED ORAL at 08:27

## 2023-04-24 ASSESSMENT — PAIN DESCRIPTION - DESCRIPTORS: DESCRIPTORS: ACHING;DISCOMFORT

## 2023-04-24 ASSESSMENT — PAIN DESCRIPTION - FREQUENCY: FREQUENCY: INTERMITTENT

## 2023-04-24 ASSESSMENT — PAIN DESCRIPTION - ONSET: ONSET: ON-GOING

## 2023-04-24 ASSESSMENT — PAIN SCALES - GENERAL
PAINLEVEL_OUTOF10: 4
PAINLEVEL_OUTOF10: 7

## 2023-04-24 ASSESSMENT — PAIN DESCRIPTION - ORIENTATION: ORIENTATION: RIGHT;LOWER

## 2023-04-24 ASSESSMENT — PAIN - FUNCTIONAL ASSESSMENT: PAIN_FUNCTIONAL_ASSESSMENT: ACTIVITIES ARE NOT PREVENTED

## 2023-04-24 ASSESSMENT — PAIN DESCRIPTION - PAIN TYPE: TYPE: SURGICAL PAIN

## 2023-04-24 ASSESSMENT — PAIN DESCRIPTION - LOCATION: LOCATION: ABDOMEN

## 2023-04-24 NOTE — DISCHARGE SUMMARY
Discharge Summary    Date:4/24/2023  Patient Name: Marco Correa     YOB: 1955     Age: 79 y.o. MRN: [id number]    Admit Date:4/15/2023    Discharge Date: 04/24/23  Discharged Condition:good    Discharge Diagnoses   Principal Problem:    Cavitary pneumonia  Active Problems:    Tobacco abuse    Bipolar affective disorder, mixed, severe, with psychotic behavior ([address]    Pulmonary emphysema ([address]    Cholecystitis    Normocytic anemia    Cavitary lesion of lung  Resolved Problems:    Acute cholecystitis    Acute anemia    Abnormal CT scan, gallbladder      Hospital Stay   Narrative of Hospital Course: Refer to H&P for more detailed course    Marco Correa was admitted on 4/15/2023 with suspected cholecystitis/cholelithiasis. Patient was started on fluids and antibiotics, and general surgery was consulted. During work-up patient was found to have a suspected right lower lobe cavitary lesion. General surgery had held on working up patient's abdominal pain, which at that time had a lower suspicion for cholecystitis. Pulmonology was consulted for the cavitary lesion, in which IR was also consulted for a CT-guided biopsy. A sample was sent for studies, aerosols and antitussives started. Concern for anemia as hemoglobin dropped, chest x-ray ruled out significant bleeding into thorax. Patient continued to be monitored and was stable. Lung tissue biopsy for the right lower lobe cavitary lesion came back negative for malignancy, patient was continued on broad antibiotics to cover for possible pneumonia and abdominal infection. During the course, patient's abdominal pain seemed to worsen. A right upper quadrant ultrasound was ordered in which general surgery had reviewed. An order for HIDA scan evaluated for gallbladder etiology. Given the results, patient was planned for a lap cholecystectomy. Patient underwent a subtotal cholecystectomy with placement of SARAHI drain in the gallbladder fossa.    During

## 2023-04-24 NOTE — CARE COORDINATION
Social Work/Discharge Planning:  Home health care order noted. Met with patient and discussed options for home health care. He declined need for review [facility] choice list.  Reviewed options and he is agreeable to any accepting agency. Patient had a michelle drain placed 4/21. He will be switched to oral antibiotic at discharge. Referral made to Corrina with [facility] and she will review patient information. Will continue to follow. Electronically signed by NALINI Osborne on 4/24/2023 at 11:46 AM      The Plan for Transition of Care is related to the following treatment goals: [facility]    The Patient and/or patient representative N/A was provided with a choice of provider and agrees   with the discharge plan. [] Yes [x] No, patient declined need for choice list    Freedom of choice list was provided with basic dialogue that supports the patient's individualized plan of care/goals, treatment preferences and shares the quality data associated with the providers.  [] Yes [x] No, patient declined need for [facility] choice list

## 2023-04-24 NOTE — PROGRESS NOTES
GENERAL SURGERY  DAILY PROGRESS NOTE  4/24/2023    Chief Complaint   Patient presents with    Abdominal Pain       Subjective:  Naeo  Pain controlled  Passing gas  Vss/af    Objective:  BP (!) 141/57   Pulse 53   Temp 98.4 °F (36.9 °C) (Oral)   Resp 18   Ht 5' 8\" (1.727 m)   Wt 211 lb 14.4 oz (96.1 kg)   SpO2 100%   BMI 32.22 kg/m²     Gen: aox3, nad  Heent: nc/at  Neck: trachea midline, no LAD  Cvs: rrr  Lungs: non labored  Abd: soft, nondistended, incisions c/d/I, SARAHI with ss output  Neuro: no focal deficits      Assessment/Plan:  79 y.o. male with acute on chronic cholecystitis s/p lap fenestrated subtotal cholecystectomy with drain placement 4/21    - diet as tolerated  - strict I/O for drain   - drain care; he will go home with this drain; he may need HHC for it  - abx per primary  - ok for dc from surgery standpoint; f/u with Dr. Maximiliano Ingram 2 weeks    Anel Reyes MD FACS  Minimally Invasive General Surgery and Endoscopy  [address] [facility].  [address]: [phone]  F: [phone]       Electronically signed by Christy Blackman MD on 4/24/2023 at 7:36 AM

## 2023-04-24 NOTE — PROGRESS NOTES
Helen Loredo M.D.,[facility]  Enriqueta Oconnor D.O., F.A.C.O.I., Mago Peña M.D. Yomaira Parson M.D. Hailey Roldan D.O. Daily Pulmonary Progress Note    Patient:  Ned Dela Cruz 79 y.o. male MRN: [id number]     Date of Service: 4/24/2023      Synopsis     We are following patient for  right lung cavitary lesion    \"CC\" cough shortness of breath    Code status: Full      Subjective      Patient was seen and examined. Sitting up at the side of the bed. No complaints. No cough or mucus. Tolerated CT-guided biopsy of right lung mass 4/17/23 per IR. Path negative for malignancy. Repeat chest x-ray with no pneumothorax appreciated. Tolerating Zosyn. Not requiring supplemental oxygen. Ok to [employer] from a pulm perspective      Review of Systems:  Constitutional: Denies fever, weight loss, night sweats, and fatigue  Skin: Denies pigmentation, dark lesions, and rashes   HEENT: Denies hearing loss, tinnitus, ear drainage, epistaxis, sore throat, and hoarseness. Cardiovascular: Denies palpitations, chest pain, and chest pressure. Respiratory: Denies cough, dyspnea at rest, hemoptysis, apnea, and choking.   Gastrointestinal: Denies nausea, vomiting, poor appetite, diarrhea, heartburn or reflux  Genitourinary: Denies dysuria, frequency, urgency or hematuria  Musculoskeletal: Denies myalgias, muscle weakness, and bone pain  Neurological: Denies dizziness, vertigo, headache, and focal weakness  Psychological: Denies anxiety and depression  Endocrine: Denies heat intolerance and cold intolerance  Hematopoietic/Lymphatic: Denies bleeding problems and blood transfusions    24-hour events:  None    Objective   Vitals: /77   Pulse 75   Temp 98.3 °F (36.8 °C) (Oral)   Resp 18   Ht 5' 8\" (1.727 m)   Wt 211 lb 14.4 oz (96.1 kg)   SpO2 94%   BMI 32.22 kg/m²     I/O:    Intake/Output Summary (Last 24 hours) at 4/24/2023 124  Last data filed at 4/24/2023 8724  Gross per 24 hour   Intake 300 ml

## 2023-04-24 NOTE — CARE COORDINATION
Social Work/Discharge Planning:  Corrina with [address] states they do not accept patient insurance. Referral made to [facility] with [address] at [facility] and they can accept patient at discharge. [facility] will need to be notified when patient has a discharge order. Notified patient of acceptance. Will continue to follow. Electronically signed by NALINI Alaniz on 4/24/2023 at 12:17 PM    Addendum:  Discharge order noted. Notified [facility] with [address] at Home of discharge.   Electronically signed by NALINI Alaniz on 4/24/2023 at 1:43 PM

## 2023-04-24 NOTE — PROGRESS NOTES
[facility]  Progress Note    Chief complaint :  Chief Complaint   Patient presents with    Abdominal Pain       Subjective:    No acute events overnight. Patient reports intermittent chills but no fevers. Does endorse belly pain. States that his last BM was 5 days ago despite taking colace yesterday. He has been passing minimal flatus. Has been ambulating. Denies abdominal distention. Tolerating p.o. intake. Reports intermittent abdominal pain on the right. Denies fevers, chest pain, shortness of breath, nausea, vomiting    Review of systems is unremarkable other than stated above. Past medical, surgical, family and social history were reviewed, non-contributory, and unchanged unless otherwise stated. Objective:  BP (!) 148/69   Pulse 62   Temp 98.3 °F (36.8 °C) (Oral)   Resp 16   Ht 5' 8\" (1.727 m)   Wt 207 lb (93.9 kg)   SpO2 95%   BMI 31.47 kg/m²   Vital signs reviewed       Physical Exam  Vitals and nursing note reviewed. Constitutional:       General: He is awake. Appearance: Normal appearance. He is obese. HENT:      Head: Normocephalic and atraumatic. Right Ear: External ear normal.      Left Ear: External ear normal.      Nose: Nose normal. No congestion or rhinorrhea. Mouth/Throat:      Mouth: Mucous membranes are moist.      Pharynx: Oropharynx is clear. Eyes:      General: No scleral icterus. Extraocular Movements: Extraocular movements intact. Conjunctiva/sclera: Conjunctivae normal.   Cardiovascular:      Rate and Rhythm: Normal rate and regular rhythm. Pulses: Normal pulses. Heart sounds: Normal heart sounds. No murmur heard. Pulmonary:      Effort: Pulmonary effort is normal. No respiratory distress. Breath sounds: No stridor. No wheezing or rhonchi. Abdominal:      General: There is distension. Palpations: Abdomen is soft. There is no mass. Tenderness:  There is abdominal tenderness

## 2023-04-26 NOTE — PROGRESS NOTES
CLINICAL PHARMACY NOTE: MEDS TO BEDS    Total # of Prescriptions Filled: 7   The following medications were delivered to the patient:  Oxy 5  Colace  Chest jacob  Amox potclav  Miralax  Senna  Norvasc 5    Additional Documentation:

## 2023-04-27 ENCOUNTER — OFFICE VISIT (OUTPATIENT)
Dept: FAMILY MEDICINE CLINIC | Age: 68
End: 2023-04-27

## 2023-04-27 VITALS
OXYGEN SATURATION: 99 % | HEART RATE: 81 BPM | BODY MASS INDEX: 31.49 KG/M2 | SYSTOLIC BLOOD PRESSURE: 131 MMHG | TEMPERATURE: 97.6 F | HEIGHT: 68 IN | WEIGHT: 207.8 LBS | DIASTOLIC BLOOD PRESSURE: 63 MMHG

## 2023-04-27 DIAGNOSIS — Z09 HOSPITAL DISCHARGE FOLLOW-UP: Primary | ICD-10-CM

## 2023-04-27 RX ORDER — DOXEPIN HYDROCHLORIDE 10 MG/1
CAPSULE ORAL
COMMUNITY
Start: 2023-03-28

## 2023-04-27 RX ORDER — POLYETHYLENE GLYCOL 3350 17 G/17G
POWDER, FOR SOLUTION ORAL
COMMUNITY
Start: 2023-04-24 | End: 2023-04-27

## 2023-04-27 ASSESSMENT — ENCOUNTER SYMPTOMS
SORE THROAT: 0
ABDOMINAL PAIN: 1
VOMITING: 0
RHINORRHEA: 0
DIARRHEA: 1
SHORTNESS OF BREATH: 0
NAUSEA: 0
EYE PAIN: 0
WHEEZING: 0

## 2023-05-02 DIAGNOSIS — R53.83 OTHER FATIGUE: ICD-10-CM

## 2023-05-02 RX ORDER — MULTIVIT-MIN/IRON/FOLIC ACID/K 18-600-40
CAPSULE ORAL
Qty: 28 TABLET | Refills: 0 | Status: SHIPPED | OUTPATIENT
Start: 2023-05-02

## 2023-05-15 ENCOUNTER — OFFICE VISIT (OUTPATIENT)
Dept: FAMILY MEDICINE CLINIC | Age: 68
End: 2023-05-15
Payer: MEDICARE

## 2023-05-15 VITALS
OXYGEN SATURATION: 96 % | SYSTOLIC BLOOD PRESSURE: 110 MMHG | BODY MASS INDEX: 31.37 KG/M2 | HEART RATE: 62 BPM | WEIGHT: 207 LBS | TEMPERATURE: 97.8 F | DIASTOLIC BLOOD PRESSURE: 78 MMHG | RESPIRATION RATE: 16 BRPM | HEIGHT: 68 IN

## 2023-05-15 DIAGNOSIS — J98.4 CAVITARY PNEUMONIA: ICD-10-CM

## 2023-05-15 DIAGNOSIS — E55.9 VITAMIN D INSUFFICIENCY: ICD-10-CM

## 2023-05-15 DIAGNOSIS — F31.64 BIPOLAR AFFECTIVE DISORDER, MIXED, SEVERE, WITH PSYCHOTIC BEHAVIOR (HCC): ICD-10-CM

## 2023-05-15 DIAGNOSIS — E78.2 MIXED HYPERLIPIDEMIA: ICD-10-CM

## 2023-05-15 DIAGNOSIS — Z72.0 TOBACCO ABUSE: ICD-10-CM

## 2023-05-15 DIAGNOSIS — I10 PRIMARY HYPERTENSION: Primary | ICD-10-CM

## 2023-05-15 DIAGNOSIS — R73.09 ELEVATED HEMOGLOBIN A1C: ICD-10-CM

## 2023-05-15 DIAGNOSIS — J18.9 CAVITARY PNEUMONIA: ICD-10-CM

## 2023-05-15 DIAGNOSIS — J43.9 PULMONARY EMPHYSEMA, UNSPECIFIED EMPHYSEMA TYPE (HCC): ICD-10-CM

## 2023-05-15 LAB
BASOPHILS # BLD: 0.06 E9/L (ref 0–0.2)
BASOPHILS NFR BLD: 0.7 % (ref 0–2)
EOSINOPHIL # BLD: 0.28 E9/L (ref 0.05–0.5)
EOSINOPHIL NFR BLD: 3.4 % (ref 0–6)
ERYTHROCYTE [DISTWIDTH] IN BLOOD BY AUTOMATED COUNT: 13.6 FL (ref 11.5–15)
HCT VFR BLD AUTO: 40.9 % (ref 37–54)
HGB BLD-MCNC: 12.5 G/DL (ref 12.5–16.5)
IMM GRANULOCYTES # BLD: 0.03 E9/L
IMM GRANULOCYTES NFR BLD: 0.4 % (ref 0–5)
LYMPHOCYTES # BLD: 3.22 E9/L (ref 1.5–4)
LYMPHOCYTES NFR BLD: 38.6 % (ref 20–42)
MCH RBC QN AUTO: 30.8 PG (ref 26–35)
MCHC RBC AUTO-ENTMCNC: 30.6 % (ref 32–34.5)
MCV RBC AUTO: 100.7 FL (ref 80–99.9)
MONOCYTES # BLD: 0.87 E9/L (ref 0.1–0.95)
MONOCYTES NFR BLD: 10.4 % (ref 2–12)
NEUTROPHILS # BLD: 3.88 E9/L (ref 1.8–7.3)
NEUTS SEG NFR BLD: 46.5 % (ref 43–80)
PLATELET # BLD AUTO: 195 E9/L (ref 130–450)
PMV BLD AUTO: 9.8 FL (ref 7–12)
RBC # BLD AUTO: 4.06 E12/L (ref 3.8–5.8)
WBC # BLD: 8.3 E9/L (ref 4.5–11.5)

## 2023-05-15 PROCEDURE — 3074F SYST BP LT 130 MM HG: CPT | Performed by: FAMILY MEDICINE

## 2023-05-15 PROCEDURE — 1111F DSCHRG MED/CURRENT MED MERGE: CPT | Performed by: FAMILY MEDICINE

## 2023-05-15 PROCEDURE — G8427 DOCREV CUR MEDS BY ELIG CLIN: HCPCS | Performed by: FAMILY MEDICINE

## 2023-05-15 PROCEDURE — 1123F ACP DISCUSS/DSCN MKR DOCD: CPT | Performed by: FAMILY MEDICINE

## 2023-05-15 PROCEDURE — 4004F PT TOBACCO SCREEN RCVD TLK: CPT | Performed by: FAMILY MEDICINE

## 2023-05-15 PROCEDURE — 3078F DIAST BP <80 MM HG: CPT | Performed by: FAMILY MEDICINE

## 2023-05-15 PROCEDURE — 3023F SPIROM DOC REV: CPT | Performed by: FAMILY MEDICINE

## 2023-05-15 PROCEDURE — 3017F COLORECTAL CA SCREEN DOC REV: CPT | Performed by: FAMILY MEDICINE

## 2023-05-15 PROCEDURE — G8417 CALC BMI ABV UP PARAM F/U: HCPCS | Performed by: FAMILY MEDICINE

## 2023-05-15 PROCEDURE — 99214 OFFICE O/P EST MOD 30 MIN: CPT | Performed by: FAMILY MEDICINE

## 2023-05-15 RX ORDER — AMLODIPINE BESYLATE 5 MG/1
5 TABLET ORAL DAILY
Qty: 30 TABLET | Refills: 5 | Status: SHIPPED | OUTPATIENT
Start: 2023-05-15

## 2023-05-15 RX ORDER — PALIPERIDONE PALMITATE 156 MG/ML
INJECTION INTRAMUSCULAR
COMMUNITY
Start: 2023-04-27

## 2023-05-15 NOTE — PROGRESS NOTES
threatening disease. Patient and/or guardian verbalizes understanding and agrees with above counseling, assessment and plan. All questions answered.

## 2023-05-15 NOTE — PATIENT INSTRUCTIONS
Meagan Lopez DO  [address] [facility] [phone]     Call to schedule your appointment for hospital follow-up.   Non-contrast CT chest prior to appointment

## 2023-05-16 LAB
ALBUMIN SERPL-MCNC: 3.8 G/DL (ref 3.5–5.2)
ALP SERPL-CCNC: 68 U/L (ref 40–129)
ALT SERPL-CCNC: 25 U/L (ref 0–40)
ANION GAP SERPL CALCULATED.3IONS-SCNC: 14 MMOL/L (ref 7–16)
AST SERPL-CCNC: 19 U/L (ref 0–39)
BILIRUB SERPL-MCNC: <0.2 MG/DL (ref 0–1.2)
BUN SERPL-MCNC: 11 MG/DL (ref 6–23)
CALCIUM SERPL-MCNC: 9.6 MG/DL (ref 8.6–10.2)
CHLORIDE SERPL-SCNC: 102 MMOL/L (ref 98–107)
CHOLESTEROL, TOTAL: 162 MG/DL (ref 0–199)
CO2 SERPL-SCNC: 22 MMOL/L (ref 22–29)
CREAT SERPL-MCNC: 0.9 MG/DL (ref 0.7–1.2)
GLUCOSE SERPL-MCNC: 136 MG/DL (ref 74–99)
HBA1C MFR BLD: 8.5 % (ref 4–5.6)
HDLC SERPL-MCNC: 26 MG/DL
LDLC SERPL CALC-MCNC: ABNORMAL MG/DL (ref 0–99)
POTASSIUM SERPL-SCNC: 4.3 MMOL/L (ref 3.5–5)
PROT SERPL-MCNC: 7.9 G/DL (ref 6.4–8.3)
SODIUM SERPL-SCNC: 138 MMOL/L (ref 132–146)
TRIGL SERPL-MCNC: 415 MG/DL (ref 0–149)
TSH SERPL-MCNC: 1.92 UIU/ML (ref 0.27–4.2)
VITAMIN D 25-HYDROXY: 25 NG/ML (ref 30–100)
VLDLC SERPL CALC-MCNC: ABNORMAL MG/DL

## 2023-05-18 DIAGNOSIS — E55.9 VITAMIN D DEFICIENCY: ICD-10-CM

## 2023-05-18 DIAGNOSIS — R53.83 OTHER FATIGUE: ICD-10-CM

## 2023-05-18 RX ORDER — MULTIVIT-MIN/IRON/FOLIC ACID/K 18-600-40
2 CAPSULE ORAL EVERY MORNING
Qty: 60 TABLET | Refills: 5 | Status: SHIPPED | OUTPATIENT
Start: 2023-05-18

## 2023-05-18 RX ORDER — ROSUVASTATIN CALCIUM 20 MG/1
20 TABLET, COATED ORAL EVERY MORNING
Qty: 90 TABLET | Refills: 1 | Status: SHIPPED | OUTPATIENT
Start: 2023-05-18

## 2023-05-30 ENCOUNTER — OFFICE VISIT (OUTPATIENT)
Dept: FAMILY MEDICINE CLINIC | Age: 68
End: 2023-05-30
Payer: MEDICARE

## 2023-05-30 VITALS
WEIGHT: 208 LBS | BODY MASS INDEX: 31.52 KG/M2 | HEIGHT: 68 IN | DIASTOLIC BLOOD PRESSURE: 81 MMHG | SYSTOLIC BLOOD PRESSURE: 119 MMHG | HEART RATE: 83 BPM | OXYGEN SATURATION: 97 % | TEMPERATURE: 97.6 F | RESPIRATION RATE: 16 BRPM

## 2023-05-30 DIAGNOSIS — E11.9 TYPE 2 DIABETES MELLITUS WITHOUT COMPLICATION, WITHOUT LONG-TERM CURRENT USE OF INSULIN (HCC): ICD-10-CM

## 2023-05-30 DIAGNOSIS — E11.9 TYPE 2 DIABETES MELLITUS WITHOUT COMPLICATION, WITHOUT LONG-TERM CURRENT USE OF INSULIN (HCC): Primary | ICD-10-CM

## 2023-05-30 LAB
CREAT UR-MCNC: 106 MG/DL (ref 40–278)
MICROALBUMIN UR-MCNC: 33.5 MG/L
MICROALBUMIN/CREAT UR-RTO: 31.6 (ref 0–30)

## 2023-05-30 PROCEDURE — 4004F PT TOBACCO SCREEN RCVD TLK: CPT

## 2023-05-30 PROCEDURE — 3074F SYST BP LT 130 MM HG: CPT

## 2023-05-30 PROCEDURE — G8417 CALC BMI ABV UP PARAM F/U: HCPCS

## 2023-05-30 PROCEDURE — 1123F ACP DISCUSS/DSCN MKR DOCD: CPT

## 2023-05-30 PROCEDURE — 3017F COLORECTAL CA SCREEN DOC REV: CPT

## 2023-05-30 PROCEDURE — 3052F HG A1C>EQUAL 8.0%<EQUAL 9.0%: CPT

## 2023-05-30 PROCEDURE — 99214 OFFICE O/P EST MOD 30 MIN: CPT

## 2023-05-30 PROCEDURE — 3078F DIAST BP <80 MM HG: CPT

## 2023-05-30 PROCEDURE — 2022F DILAT RTA XM EVC RTNOPTHY: CPT

## 2023-05-30 PROCEDURE — G8427 DOCREV CUR MEDS BY ELIG CLIN: HCPCS

## 2023-05-30 RX ORDER — METFORMIN HYDROCHLORIDE 500 MG/1
500 TABLET, EXTENDED RELEASE ORAL
Qty: 90 TABLET | Refills: 0 | Status: SHIPPED | OUTPATIENT
Start: 2023-05-30

## 2023-05-30 ASSESSMENT — ENCOUNTER SYMPTOMS
ABDOMINAL PAIN: 0
NAUSEA: 0
SHORTNESS OF BREATH: 0
VOMITING: 0
CHEST TIGHTNESS: 0

## 2023-06-07 DIAGNOSIS — E11.9 TYPE 2 DIABETES MELLITUS WITHOUT COMPLICATION, WITHOUT LONG-TERM CURRENT USE OF INSULIN (HCC): Primary | ICD-10-CM

## 2023-06-07 DIAGNOSIS — R80.9 MICROALBUMINURIA DUE TO TYPE 2 DIABETES MELLITUS (HCC): ICD-10-CM

## 2023-06-07 DIAGNOSIS — E11.29 MICROALBUMINURIA DUE TO TYPE 2 DIABETES MELLITUS (HCC): ICD-10-CM

## 2023-06-07 RX ORDER — LISINOPRIL 5 MG/1
2.5 TABLET ORAL DAILY
Qty: 15 TABLET | Refills: 0 | Status: SHIPPED | OUTPATIENT
Start: 2023-06-07

## 2023-06-22 ENCOUNTER — HOSPITAL ENCOUNTER (OUTPATIENT)
Dept: DIABETES SERVICES | Age: 68
Setting detail: THERAPIES SERIES
Discharge: HOME | End: 2023-06-22
Payer: MEDICARE

## 2023-06-22 PROCEDURE — G0108 DIAB MANAGE TRN  PER INDIV: HCPCS

## 2023-06-22 SDOH — ECONOMIC STABILITY: FOOD INSECURITY: ADDITIONAL INFORMATION: NO

## 2023-06-22 ASSESSMENT — PROBLEM AREAS IN DIABETES QUESTIONNAIRE (PAID)
PAID-5 TOTAL SCORE: 3
FEELING THAT DIABETES IS TAKING UP TOO MUCH OF YOUR MENTAL AND PHYSICAL ENERGY EVERY DAY: 0
FEELING DEPRESSED WHEN YOU THINK ABOUT LIVING WITH DIABETES: 0
FEELING SCARED WHEN YOU THINK ABOUT LIVING WITH DIABETES: 1
COPING WITH COMPLICATIONS OF DIABETES: 0
WORRYING ABOUT THE FUTURE AND THE POSSIBILITY OF SERIOUS COMPLICATIONS: 2

## 2023-06-22 NOTE — PROGRESS NOTES
Diabetes Self-Management Education Record    Participant Name: Taj Lara  Referring Provider: Cynthia Martinez MD  Assessment/Evaluation Ratings:  1=Needs Instruction   4=Demonstrates Understanding/Competency  2=Needs Review   NC=Not Covered    3=Comprehends Key Points  N/A=Not Applicable  Topics/Learning Objectives Pre-session Assess Date:  Instructor initials/date  6/22/23 KMS Instr. Date    Instructor initials/date  6/22/23 KMS Follow-up Post- session Eval Comments   Diabetes disease process & Treatment process:   -Define type of diabetes in simple terms.  - Describe the ABCs of  diabetes management  -Identify own type of diabetes  -Identify lifestyle changes/treatment options  -other:  1 [x] All     []  []  []  []  []  4 6/22/23 KMS  Diagnosed with type 2 DM 3 weeks ago  A1C 8.5%   Developing strategies for Healthy coping/psychosocial issues:    -Describe feelings about living with diabetes  -Identify coping strategies and sources of stress  -Identify support needed & support network available  -Complete PAID-5 Diabetes questionnaire 1 [x] All     []  []  []    []  4 Date: 6/22/23 KMS  PAID-5 Score: 3  Confidence Score: 7           Prevention, detection & treatment of Chronic complications:    -Identify the prevention, detection and treatment for complications including immunizations, preventive eye, foot, dental and renal exams as indicated per the participant's duration of diabetes and health status.  -Define the natural course of diabetes and the relationship of blood glucose levels to long term complications of diabetes.   1 [x] All     []            []  4    Prevention, detection & treatment of acute complications:    -State the causes,signs & symptoms of hyper & hypoglycemia, and prevention & treatment strategies.   -Describe sick day guidelines  DKA /indications for ketone testing &  when to call physician  1 [x] All     []      []    4          -Identify severe weather/situation crisis  & diabetes

## 2023-06-28 DIAGNOSIS — R80.9 MICROALBUMINURIA DUE TO TYPE 2 DIABETES MELLITUS (HCC): ICD-10-CM

## 2023-06-28 DIAGNOSIS — D68.59 HYPERCOAGULABLE STATE (HCC): ICD-10-CM

## 2023-06-28 DIAGNOSIS — E11.29 MICROALBUMINURIA DUE TO TYPE 2 DIABETES MELLITUS (HCC): ICD-10-CM

## 2023-06-28 RX ORDER — APIXABAN 5 MG/1
TABLET, FILM COATED ORAL
Qty: 180 TABLET | Refills: 0 | OUTPATIENT
Start: 2023-06-28

## 2023-06-28 RX ORDER — LISINOPRIL 5 MG/1
TABLET ORAL
Qty: 45 TABLET | Refills: 0 | Status: SHIPPED
Start: 2023-06-28 | End: 2023-08-15

## 2023-07-11 ENCOUNTER — HOSPITAL ENCOUNTER (OUTPATIENT)
Dept: CT IMAGING | Age: 68
Discharge: HOME | End: 2023-07-13
Attending: INTERNAL MEDICINE
Payer: MEDICARE

## 2023-07-11 DIAGNOSIS — J98.4 CAVITARY LESION OF LUNG: ICD-10-CM

## 2023-07-11 PROCEDURE — 71250 CT THORAX DX C-: CPT

## 2023-07-27 DIAGNOSIS — E11.9 TYPE 2 DIABETES MELLITUS WITHOUT COMPLICATION, WITHOUT LONG-TERM CURRENT USE OF INSULIN (HCC): ICD-10-CM

## 2023-07-30 RX ORDER — METFORMIN HYDROCHLORIDE 500 MG/1
TABLET, EXTENDED RELEASE ORAL
Qty: 28 TABLET | Refills: 0 | Status: SHIPPED | OUTPATIENT
Start: 2023-07-30

## 2023-08-15 ENCOUNTER — OFFICE VISIT (OUTPATIENT)
Dept: FAMILY MEDICINE CLINIC | Age: 68
End: 2023-08-15
Payer: MEDICARE

## 2023-08-15 VITALS
RESPIRATION RATE: 16 BRPM | OXYGEN SATURATION: 97 % | BODY MASS INDEX: 31.67 KG/M2 | HEIGHT: 68 IN | DIASTOLIC BLOOD PRESSURE: 67 MMHG | TEMPERATURE: 97.9 F | SYSTOLIC BLOOD PRESSURE: 105 MMHG | HEART RATE: 103 BPM | WEIGHT: 209 LBS

## 2023-08-15 DIAGNOSIS — E11.29 MICROALBUMINURIA DUE TO TYPE 2 DIABETES MELLITUS (HCC): ICD-10-CM

## 2023-08-15 DIAGNOSIS — I10 PRIMARY HYPERTENSION: ICD-10-CM

## 2023-08-15 DIAGNOSIS — H61.23 BILATERAL IMPACTED CERUMEN: ICD-10-CM

## 2023-08-15 DIAGNOSIS — E11.9 TYPE 2 DIABETES MELLITUS WITHOUT COMPLICATION, WITHOUT LONG-TERM CURRENT USE OF INSULIN (HCC): Primary | ICD-10-CM

## 2023-08-15 DIAGNOSIS — R80.9 MICROALBUMINURIA DUE TO TYPE 2 DIABETES MELLITUS (HCC): ICD-10-CM

## 2023-08-15 DIAGNOSIS — E11.9 TYPE 2 DIABETES MELLITUS WITHOUT COMPLICATION, WITHOUT LONG-TERM CURRENT USE OF INSULIN (HCC): ICD-10-CM

## 2023-08-15 LAB — HBA1C MFR BLD: 9.5 %

## 2023-08-15 PROCEDURE — 83036 HEMOGLOBIN GLYCOSYLATED A1C: CPT | Performed by: FAMILY MEDICINE

## 2023-08-15 PROCEDURE — 1036F TOBACCO NON-USER: CPT | Performed by: FAMILY MEDICINE

## 2023-08-15 PROCEDURE — G8417 CALC BMI ABV UP PARAM F/U: HCPCS | Performed by: FAMILY MEDICINE

## 2023-08-15 PROCEDURE — 3046F HEMOGLOBIN A1C LEVEL >9.0%: CPT | Performed by: FAMILY MEDICINE

## 2023-08-15 PROCEDURE — 3078F DIAST BP <80 MM HG: CPT | Performed by: FAMILY MEDICINE

## 2023-08-15 PROCEDURE — 3074F SYST BP LT 130 MM HG: CPT | Performed by: FAMILY MEDICINE

## 2023-08-15 PROCEDURE — 2022F DILAT RTA XM EVC RTNOPTHY: CPT | Performed by: FAMILY MEDICINE

## 2023-08-15 PROCEDURE — 99214 OFFICE O/P EST MOD 30 MIN: CPT | Performed by: FAMILY MEDICINE

## 2023-08-15 PROCEDURE — 3017F COLORECTAL CA SCREEN DOC REV: CPT | Performed by: FAMILY MEDICINE

## 2023-08-15 PROCEDURE — G8427 DOCREV CUR MEDS BY ELIG CLIN: HCPCS | Performed by: FAMILY MEDICINE

## 2023-08-15 PROCEDURE — 1123F ACP DISCUSS/DSCN MKR DOCD: CPT | Performed by: FAMILY MEDICINE

## 2023-08-15 PROCEDURE — 36415 COLL VENOUS BLD VENIPUNCTURE: CPT | Performed by: FAMILY MEDICINE

## 2023-08-15 RX ORDER — LISINOPRIL 5 MG/1
10 TABLET ORAL DAILY
Qty: 90 TABLET | Refills: 1 | Status: SHIPPED | OUTPATIENT
Start: 2023-08-15

## 2023-08-15 RX ORDER — SITAGLIPTIN AND METFORMIN HYDROCHLORIDE 1000; 100 MG/1; MG/1
1 TABLET, FILM COATED, EXTENDED RELEASE ORAL DAILY
Qty: 30 TABLET | Refills: 2 | Status: SHIPPED | OUTPATIENT
Start: 2023-08-15

## 2023-08-15 ASSESSMENT — ENCOUNTER SYMPTOMS
SHORTNESS OF BREATH: 0
NAUSEA: 0
VOMITING: 0
CONSTIPATION: 0
DIARRHEA: 0
CHEST TIGHTNESS: 0

## 2023-08-16 ENCOUNTER — TELEPHONE (OUTPATIENT)
Dept: FAMILY MEDICINE CLINIC | Age: 68
End: 2023-08-16

## 2023-08-16 LAB
ANION GAP SERPL CALCULATED.3IONS-SCNC: 13 MMOL/L (ref 7–16)
BUN BLDV-MCNC: 15 MG/DL (ref 6–23)
CALCIUM SERPL-MCNC: 9.4 MG/DL (ref 8.6–10.2)
CHLORIDE BLD-SCNC: 99 MMOL/L (ref 98–107)
CO2: 21 MMOL/L (ref 22–29)
CREAT SERPL-MCNC: 0.9 MG/DL (ref 0.7–1.2)
GFR SERPL CREATININE-BSD FRML MDRD: >60 ML/MIN/1.73M2
GLUCOSE BLD-MCNC: 369 MG/DL (ref 74–99)
POTASSIUM SERPL-SCNC: 4.5 MMOL/L (ref 3.5–5)
SODIUM BLD-SCNC: 133 MMOL/L (ref 132–146)

## 2023-08-16 NOTE — TELEPHONE ENCOUNTER
Max called in regarding the Lisinopril script to clarify-Per Dr Shasha Webb ok to change script to 10 mg take one tablet daily.

## 2023-09-11 DIAGNOSIS — H61.23 BILATERAL IMPACTED CERUMEN: ICD-10-CM

## 2023-09-12 RX ORDER — ADHESIVE BANDAGE 3/4"
BANDAGE TOPICAL
Qty: 15 ML | Refills: 0 | Status: SHIPPED | OUTPATIENT
Start: 2023-09-12

## 2023-09-13 ENCOUNTER — TELEPHONE (OUTPATIENT)
Dept: FAMILY MEDICINE CLINIC | Age: 68
End: 2023-09-13

## 2023-09-13 NOTE — TELEPHONE ENCOUNTER
Call transferred from [facility] ([facility] due to \"red flag\"     Patient called to schedule an appointment with Dr Donald Decker due to dizzy spells. He states they have been going on for a week or so (he cannot recall when they started)     He said when he gets up from sitting or lying down, he gets dizzy and his body starts to shake. He sits down and the feeling goes away after a minute or so. He says this does not happen all the time, just occasionally. He is wondering if it could be due to one of his medications. Tried to schedule patient today or any time this week and patient declined asking for an appointment after Friday. Scheduled with Dr Connie Hodgkins on Tuesday as Dr Donald Decker is on vacation next week. Is there something patient should do in the mean time? Please advise or call patient if possible.

## 2023-09-13 NOTE — TELEPHONE ENCOUNTER
Symptoms could possibly be due to medications vs other causes. With his blood pressure and diabetes meds it is possible that blood sugar or blood pressure are going too low. I would recommend that patient be checking both of these at home.  If symptoms continue or worsen I would recommend ED evaluation

## 2023-09-19 ENCOUNTER — OFFICE VISIT (OUTPATIENT)
Dept: FAMILY MEDICINE CLINIC | Age: 68
End: 2023-09-19
Payer: MEDICARE

## 2023-09-19 VITALS
HEIGHT: 68 IN | SYSTOLIC BLOOD PRESSURE: 111 MMHG | OXYGEN SATURATION: 98 % | HEART RATE: 66 BPM | TEMPERATURE: 98 F | BODY MASS INDEX: 31.37 KG/M2 | WEIGHT: 207 LBS | DIASTOLIC BLOOD PRESSURE: 76 MMHG | RESPIRATION RATE: 18 BRPM

## 2023-09-19 DIAGNOSIS — H61.23 IMPACTED CERUMEN OF BOTH EARS: Primary | ICD-10-CM

## 2023-09-19 DIAGNOSIS — R42 DIZZINESS: ICD-10-CM

## 2023-09-19 DIAGNOSIS — I10 PRIMARY HYPERTENSION: ICD-10-CM

## 2023-09-19 PROBLEM — I95.1 ORTHOSTATIC HYPOTENSION: Status: ACTIVE | Noted: 2023-09-19

## 2023-09-19 PROBLEM — I95.1 ORTHOSTATIC HYPOTENSION: Status: RESOLVED | Noted: 2023-09-19 | Resolved: 2023-09-19

## 2023-09-19 PROCEDURE — G8427 DOCREV CUR MEDS BY ELIG CLIN: HCPCS

## 2023-09-19 PROCEDURE — 99213 OFFICE O/P EST LOW 20 MIN: CPT

## 2023-09-19 PROCEDURE — 1123F ACP DISCUSS/DSCN MKR DOCD: CPT

## 2023-09-19 PROCEDURE — 3074F SYST BP LT 130 MM HG: CPT

## 2023-09-19 PROCEDURE — G8417 CALC BMI ABV UP PARAM F/U: HCPCS

## 2023-09-19 PROCEDURE — 3017F COLORECTAL CA SCREEN DOC REV: CPT

## 2023-09-19 PROCEDURE — 1036F TOBACCO NON-USER: CPT

## 2023-09-19 PROCEDURE — 69209 REMOVE IMPACTED EAR WAX UNI: CPT

## 2023-09-19 PROCEDURE — 3078F DIAST BP <80 MM HG: CPT

## 2023-09-19 RX ORDER — LISINOPRIL 5 MG/1
5 TABLET ORAL DAILY
Qty: 90 TABLET | Refills: 1 | Status: SHIPPED | OUTPATIENT
Start: 2023-09-19

## 2023-09-19 ASSESSMENT — ENCOUNTER SYMPTOMS
DIARRHEA: 0
SHORTNESS OF BREATH: 0
ABDOMINAL PAIN: 0
WHEEZING: 0
SORE THROAT: 0
CONSTIPATION: 0
NAUSEA: 0
VOMITING: 0

## 2023-09-19 NOTE — PROGRESS NOTES
[facility]  Department of Family Medicine  Family Medicine Residency Program      Patient: Damian Nagy 79 y.o. male     Date of Service: 9/19/23      Chief complaint:   Chief Complaint   Patient presents with    Dizziness     Has not happened the last few days. Each episode is 10-15 seconds. Happens when changing positions. HISTORY OF PRESENTING ILLNESS     79 y.o. male presented to the clinic with complaints of dizziness.       Dizziness  Present when standing up from sitting position  Not worsened when turning head   No falls  No LOC  No hitting head   Drinking roughly 2 8z glasses of water a day   Dose of lisinopril was increased from 2.5 mg to 10 mg 1 month ago, which is the same timeline as the onset of dizziness     Hearing Issue  Going after the past month  In left ear      Health Maintenance:  Health Maintenance Due   Topic Date Due    Diabetic foot exam  Never done    Diabetic retinal exam  Never done    Hepatitis C screen  Never done    DTaP/Tdap/Td vaccine (1 - Tdap) Never done    Hepatitis B vaccine (1 of 3 - Risk 3-dose series) Never done    Shingles vaccine (2 of 3) 05/30/2018    COVID-19 Vaccine (3 - Mixed Product series) 06/09/2021    Low dose CT lung screening &/or counseling  09/15/2021    Flu vaccine (1) 08/01/2023     Past Medical History:      Diagnosis Date    Acute bacterial conjunctivitis of left eye 9/11/2016    Bipolar 1 disorder (HCC)     goes to [facility]    Bipolar affect, depressed ([address])     Cellulitis of face 9/11/2016    Full dentures     Hx of blood clots     DVT, PE hx of    Hypertension     on propanolol / patient states does not have high BP    Impetigo 9/11/2016    Palpitations     Screening     for colonoscopy    Seizure disorder ([address])     last seizure 2010 / cause unknown     Past Surgical History:        Procedure Laterality Date    APPENDECTOMY      CHOLECYSTECTOMY, LAPAROSCOPIC N/A 4/21/2023    LAPAROSCOPIC  CHOLECYSTECTOMY POSSIBLE

## 2023-09-20 DIAGNOSIS — D68.59 HYPERCOAGULABLE STATE (HCC): ICD-10-CM

## 2023-09-21 RX ORDER — APIXABAN 5 MG/1
5 TABLET, FILM COATED ORAL 2 TIMES DAILY
Qty: 180 TABLET | Refills: 0 | Status: SHIPPED | OUTPATIENT
Start: 2023-09-21

## 2023-09-22 ENCOUNTER — HOSPITAL ENCOUNTER (OUTPATIENT)
Dept: PET IMAGING | Age: 68
End: 2023-09-22
Payer: MEDICARE

## 2023-09-22 DIAGNOSIS — R91.1 LESION OF LUNG: ICD-10-CM

## 2023-09-22 LAB — GLUCOSE BLD-MCNC: 123 MG/DL (ref 74–99)

## 2023-09-22 PROCEDURE — A9552 F18 FDG: HCPCS | Performed by: RADIOLOGY

## 2023-09-22 PROCEDURE — 78815 PET IMAGE W/CT SKULL-THIGH: CPT

## 2023-09-22 PROCEDURE — 3430000000 HC RX DIAGNOSTIC RADIOPHARMACEUTICAL: Performed by: RADIOLOGY

## 2023-09-22 PROCEDURE — 82962 GLUCOSE BLOOD TEST: CPT

## 2023-09-22 RX ORDER — FLUDEOXYGLUCOSE F 18 200 MCI/ML
15 INJECTION, SOLUTION INTRAVENOUS
Status: COMPLETED | OUTPATIENT
Start: 2023-09-22 | End: 2023-09-22

## 2023-09-22 RX ADMIN — FLUDEOXYGLUCOSE F 18 15 MILLICURIE: 200 INJECTION, SOLUTION INTRAVENOUS at 13:20

## 2023-10-03 ENCOUNTER — FOLLOWUP TELEPHONE ENCOUNTER (OUTPATIENT)
Dept: DIABETES SERVICES | Age: 68
End: 2023-10-03

## 2023-10-03 NOTE — PROGRESS NOTES
blood glucose as prescribed by PCP (Patient is interested in CGM)   []Exercise Routine   []Other:   []Other:     []Patient lost to follow-up  Dr Notified by []EMR []Fax

## 2023-10-12 DIAGNOSIS — H61.23 BILATERAL IMPACTED CERUMEN: ICD-10-CM

## 2023-10-13 DIAGNOSIS — R53.83 OTHER FATIGUE: ICD-10-CM

## 2023-10-13 DIAGNOSIS — E11.9 TYPE 2 DIABETES MELLITUS WITHOUT COMPLICATION, WITHOUT LONG-TERM CURRENT USE OF INSULIN (HCC): ICD-10-CM

## 2023-10-16 RX ORDER — AMLODIPINE BESYLATE 5 MG/1
5 TABLET ORAL DAILY
Qty: 90 TABLET | Refills: 0 | Status: SHIPPED | OUTPATIENT
Start: 2023-10-16

## 2023-10-16 RX ORDER — MULTIVIT-MIN/IRON/FOLIC ACID/K 18-600-40
2 CAPSULE ORAL EVERY MORNING
Qty: 180 TABLET | Refills: 0 | Status: SHIPPED | OUTPATIENT
Start: 2023-10-16

## 2023-10-16 RX ORDER — ADHESIVE BANDAGE 3/4"
BANDAGE TOPICAL
Qty: 15 ML | Refills: 0 | Status: SHIPPED | OUTPATIENT
Start: 2023-10-16

## 2023-10-16 RX ORDER — SITAGLIPTIN AND METFORMIN HYDROCHLORIDE 1000; 100 MG/1; MG/1
1 TABLET, FILM COATED, EXTENDED RELEASE ORAL DAILY
Qty: 90 TABLET | Refills: 0 | Status: SHIPPED | OUTPATIENT
Start: 2023-10-16

## 2023-10-18 ENCOUNTER — TELEPHONE (OUTPATIENT)
Dept: FAMILY MEDICINE CLINIC | Age: 68
End: 2023-10-18

## 2023-10-18 DIAGNOSIS — K11.8 NODULE OF PAROTID GLAND: Primary | ICD-10-CM

## 2023-10-18 NOTE — TELEPHONE ENCOUNTER
----- Message from Lia Momin sent at 10/18/2023  9:55 AM EDT -----  Subject: Results Request    QUESTIONS  Results: results from pet scan and any imaging patient only had a phone   number which is [phone]; Ordered by:   Date Performed: 2023-10-12  ---------------------------------------------------------------------------  --------------  Sujit ALFORD    [phone]; OK to leave message on voicemail  ---------------------------------------------------------------------------  --------------

## 2023-10-19 NOTE — TELEPHONE ENCOUNTER
PET scan shows decreased size of lesion in the right lung, likely chronic inflammation/infection. Suggested follow-up CT in the future which I will leave for pulmonology to decide. Several small left parotid gland nodules which is recommended to follow-up with ultrasound. I will order ultrasound of the parotid gland to evaluate.     Ebony Sanchez MD

## 2023-10-20 DIAGNOSIS — J43.9 PULMONARY EMPHYSEMA, UNSPECIFIED EMPHYSEMA TYPE (HCC): ICD-10-CM

## 2023-10-20 RX ORDER — TIOTROPIUM BROMIDE 18 UG/1
CAPSULE ORAL; RESPIRATORY (INHALATION)
Qty: 30 CAPSULE | Refills: 0 | Status: SHIPPED | OUTPATIENT
Start: 2023-10-20

## 2023-10-30 ENCOUNTER — TELEPHONE (OUTPATIENT)
Dept: FAMILY MEDICINE CLINIC | Age: 68
End: 2023-10-30

## 2023-10-30 RX ORDER — POLYETHYLENE GLYCOL 3350 17 G/17G
17 POWDER, FOR SOLUTION ORAL DAILY
Qty: 255 G | Refills: 1 | Status: SHIPPED | OUTPATIENT
Start: 2023-10-30 | End: 2023-11-29

## 2023-10-30 NOTE — TELEPHONE ENCOUNTER
Last Appointment   9/19/2023  Next Appointment  11/14/2023  Patient called in to see if something else can be sent in for him to [facility] on [address] in place of the Depakote, it does not seem to be helping him, Cholo Ramirez feels backed up\"

## 2023-10-30 NOTE — TELEPHONE ENCOUNTER
Sent Glycolax to be used daily to help soften stools. Also sent Milk of Magnesia to be used as needed and as directed by perscription. Patient may try either or both for constipation.     Kerrie Larsen MD

## 2023-11-06 ENCOUNTER — TELEPHONE (OUTPATIENT)
Dept: FAMILY MEDICINE CLINIC | Age: 68
End: 2023-11-06

## 2023-11-06 NOTE — TELEPHONE ENCOUNTER
Pt having Bronchoscopy on 11/15- needs directions on when to hold/begin Eliquis before and after procedure     Please advise

## 2023-11-20 DIAGNOSIS — E55.9 VITAMIN D DEFICIENCY: ICD-10-CM

## 2023-11-21 DIAGNOSIS — J43.9 PULMONARY EMPHYSEMA, UNSPECIFIED EMPHYSEMA TYPE (HCC): ICD-10-CM

## 2023-11-22 RX ORDER — TIOTROPIUM BROMIDE 18 UG/1
18 CAPSULE ORAL; RESPIRATORY (INHALATION) DAILY
Qty: 30 CAPSULE | Refills: 0 | Status: CANCELLED | OUTPATIENT
Start: 2023-11-22

## 2023-11-22 RX ORDER — ROSUVASTATIN CALCIUM 20 MG/1
20 TABLET, COATED ORAL EVERY MORNING
Qty: 90 TABLET | Refills: 0 | Status: SHIPPED | OUTPATIENT
Start: 2023-11-22

## 2023-11-27 RX ORDER — TIOTROPIUM BROMIDE 18 UG/1
18 CAPSULE ORAL; RESPIRATORY (INHALATION) DAILY
Qty: 30 CAPSULE | Refills: 0 | Status: SHIPPED | OUTPATIENT
Start: 2023-11-27

## 2023-11-27 RX ORDER — UMECLIDINIUM 62.5 UG/1
AEROSOL, POWDER ORAL
Status: CANCELLED | OUTPATIENT
Start: 2023-11-27

## 2023-12-27 DIAGNOSIS — J43.9 PULMONARY EMPHYSEMA, UNSPECIFIED EMPHYSEMA TYPE (HCC): ICD-10-CM

## 2023-12-27 RX ORDER — TIOTROPIUM BROMIDE 18 UG/1
18 CAPSULE ORAL; RESPIRATORY (INHALATION) DAILY
Qty: 90 CAPSULE | Refills: 0 | Status: SHIPPED | OUTPATIENT
Start: 2023-12-27

## 2023-12-27 NOTE — TELEPHONE ENCOUNTER
Refill request    Spiriva Handihaler 18g    Max Brand @ [facility]     . Last Appointment   9/19/2023  Next Appointment  1/10/2024

## 2024-01-01 DIAGNOSIS — R53.83 OTHER FATIGUE: ICD-10-CM

## 2024-01-01 RX ORDER — CHOLECALCIFEROL (VITAMIN D3) 25 MCG
2 TABLET ORAL EVERY MORNING
Qty: 56 TABLET | Status: CANCELLED | OUTPATIENT
Start: 2024-01-01

## 2024-01-08 DIAGNOSIS — R53.83 OTHER FATIGUE: ICD-10-CM

## 2024-01-08 DIAGNOSIS — E11.9 TYPE 2 DIABETES MELLITUS WITHOUT COMPLICATION, WITHOUT LONG-TERM CURRENT USE OF INSULIN (HCC): ICD-10-CM

## 2024-01-08 RX ORDER — AMLODIPINE BESYLATE 5 MG/1
5 TABLET ORAL DAILY
Qty: 90 TABLET | Refills: 0 | Status: SHIPPED
Start: 2024-01-08 | End: 2024-01-12 | Stop reason: ALTCHOICE

## 2024-01-08 RX ORDER — MULTIVIT-MIN/IRON/FOLIC ACID/K 18-600-40
2 CAPSULE ORAL EVERY MORNING
Qty: 180 TABLET | Refills: 0 | Status: SHIPPED | OUTPATIENT
Start: 2024-01-08

## 2024-01-08 RX ORDER — SITAGLIPTIN AND METFORMIN HYDROCHLORIDE 1000; 100 MG/1; MG/1
1 TABLET, FILM COATED, EXTENDED RELEASE ORAL DAILY
Qty: 90 TABLET | Refills: 0 | Status: SHIPPED
Start: 2024-01-08 | End: 2024-01-12 | Stop reason: SDUPTHER

## 2024-01-12 ENCOUNTER — OFFICE VISIT (OUTPATIENT)
Dept: FAMILY MEDICINE CLINIC | Age: 69
End: 2024-01-12

## 2024-01-12 VITALS
HEIGHT: 68 IN | OXYGEN SATURATION: 96 % | HEART RATE: 100 BPM | WEIGHT: 203 LBS | TEMPERATURE: 98.1 F | BODY MASS INDEX: 30.77 KG/M2 | DIASTOLIC BLOOD PRESSURE: 82 MMHG | SYSTOLIC BLOOD PRESSURE: 130 MMHG | RESPIRATION RATE: 16 BRPM

## 2024-01-12 DIAGNOSIS — E78.5 HYPERLIPIDEMIA, UNSPECIFIED HYPERLIPIDEMIA TYPE: ICD-10-CM

## 2024-01-12 DIAGNOSIS — R25.9 MIXED ACTION AND RESTING TREMOR: Primary | ICD-10-CM

## 2024-01-12 DIAGNOSIS — E55.9 VITAMIN D DEFICIENCY: ICD-10-CM

## 2024-01-12 DIAGNOSIS — E11.9 TYPE 2 DIABETES MELLITUS WITHOUT COMPLICATION, WITHOUT LONG-TERM CURRENT USE OF INSULIN (HCC): ICD-10-CM

## 2024-01-12 DIAGNOSIS — Z23 NEED FOR IMMUNIZATION AGAINST INFLUENZA: ICD-10-CM

## 2024-01-12 DIAGNOSIS — E11.29 MICROALBUMINURIA DUE TO TYPE 2 DIABETES MELLITUS (HCC): ICD-10-CM

## 2024-01-12 DIAGNOSIS — I10 PRIMARY HYPERTENSION: ICD-10-CM

## 2024-01-12 DIAGNOSIS — R80.9 MICROALBUMINURIA DUE TO TYPE 2 DIABETES MELLITUS (HCC): ICD-10-CM

## 2024-01-12 LAB
HBA1C MFR BLD: 7.1 %
VITAMIN D 25-HYDROXY: 43.7 NG/ML (ref 30–100)

## 2024-01-12 RX ORDER — ROSUVASTATIN CALCIUM 20 MG/1
20 TABLET, COATED ORAL DAILY
Qty: 90 TABLET | Refills: 1 | Status: SHIPPED | OUTPATIENT
Start: 2024-01-12

## 2024-01-12 RX ORDER — AMLODIPINE BESYLATE 5 MG/1
5 TABLET ORAL DAILY
Qty: 90 TABLET | Refills: 1 | Status: CANCELLED | OUTPATIENT
Start: 2024-01-12

## 2024-01-12 RX ORDER — SITAGLIPTIN AND METFORMIN HYDROCHLORIDE 1000; 100 MG/1; MG/1
1 TABLET, FILM COATED, EXTENDED RELEASE ORAL DAILY
Qty: 90 TABLET | Refills: 1 | Status: SHIPPED | OUTPATIENT
Start: 2024-01-12

## 2024-01-12 RX ORDER — BENZTROPINE MESYLATE 1 MG/1
1 TABLET ORAL DAILY
Qty: 60 TABLET | Refills: 2 | Status: CANCELLED | OUTPATIENT
Start: 2024-01-12

## 2024-01-12 RX ORDER — PROPRANOLOL HYDROCHLORIDE 20 MG/1
20 TABLET ORAL 3 TIMES DAILY
Qty: 90 TABLET | Refills: 3 | Status: SHIPPED | OUTPATIENT
Start: 2024-01-12

## 2024-01-12 RX ORDER — LISINOPRIL 10 MG/1
10 TABLET ORAL DAILY
Qty: 90 TABLET | Refills: 1 | Status: SHIPPED | OUTPATIENT
Start: 2024-01-12

## 2024-01-12 ASSESSMENT — PATIENT HEALTH QUESTIONNAIRE - PHQ9: DEPRESSION UNABLE TO ASSESS: FUNCTIONAL CAPACITY MOTIVATION LIMITS ACCURACY

## 2024-01-12 NOTE — PROGRESS NOTES
S: 68 y.o. male with   Chief Complaint   Patient presents with    Dizziness    Hypertension       Pt is here bc of his chronic issues.  He sees psych for his bipolar.  He sees Dr. Gates every month.  He is having some lightheadness a few times a week.  He drinks a lot of coffee.  He has had no episodes this week.   He saw Dr. Watson recently.  Had a lung biopsy and is now back on his eliquis.    O: VS:  height is 1.727 m (5' 8\") and weight is 92.1 kg (203 lb). His temporal temperature is 98.1 °F (36.7 °C). His blood pressure is 130/82 and his pulse is 100. His respiration is 16 and oxygen saturation is 96%.   BP Readings from Last 3 Encounters:   01/12/24 130/82   12/07/23 (!) 173/103   09/19/23 111/76     See resident note    Impression/Plan:   1) HTN - stop the norvasc, increase lisinopril  2) resting tremor - start propranolol  3) microalbuminuria - increase lisiniopril  4) prev - schedule for AWV      Health Maintenance Due   Topic Date Due    Diabetic foot exam  Never done    Diabetic retinal exam  Never done    Hepatitis C screen  Never done    DTaP/Tdap/Td vaccine (1 - Tdap) Never done    Respiratory Syncytial Virus (RSV) Pregnant or age 60 yrs+ (1 - 1-dose 60+ series) Never done    Shingles vaccine (2 of 3) 05/30/2018    Low dose CT lung screening &/or counseling  09/15/2021    Flu vaccine (1) 08/01/2023    COVID-19 Vaccine (3 - 2023-24 season) 09/01/2023    Annual Wellness Visit (Medicare Advantage)  01/01/2024    Depression Monitoring  02/03/2024         Attending Physician Statement  I have discussed the case, including pertinent history and exam findings with the resident. I also have seen the patient and performed key portions of the examination.  I agree with the documented assessment and plan.      Charleen Louie MD

## 2024-01-12 NOTE — PROGRESS NOTES
[facility]  Department of Family Medicine  Family Medicine Residency Program      Patient: Trace Wang 68 y.o. male     Date of Service: 1/12/24      Chief complaint:   Chief Complaint   Patient presents with    Dizziness    Hypertension       HISTORY OF PRESENTING ILLNESS     68 y.o. male PMH HTN, hx of blood clots on Eliquis, vitamin D deficiency presented to the clinic for follow-up on chronic medical conditions:      Tremors  Patient tells me that he has tremors for more than 30 years.  Initially it affected his peripheries but now it has affected all his extremities and is also affected his head.  He has a history of a bipolar disorder and was prescribed antipsychotics, however he had tremors even before he started taking the medications.  He was prescribed Cogentin by psychiatry however it did not help him much with his tremors.  Sometimes his body shakes a lot and he feels losing his balance.  He never saw a neurologist in the past.  He tells me that his workup for Parkinson's was negative.    Hypertension  - Patient is compliant with his blood pressure medication  - He does complain of having 2 episodes of dizziness in the last week, he felt as if he will fall down and had to find support.  - He denies chest pain, shortness of breath, nausea or vomiting    Bipolar disorder  - Sees Dr Gates: psychiatrist almost every month (saw her last month), refills his psychiatry medications.  - Patient tells me that he has been diagnosed with bipolar disorder for around 20 years now.  - His symptoms are stable on current medication  - He denies having any suicidal or homicidal ideation.      - Patient tells me that he had a lung biopsy back in December, and he was told that he had an infection and the possibility of malignancy was ruled out.    -He does drink about 5 to 6 cups of coffee every day, he has tried using decaffeinated coffee but does not like the taste and is back on caffeinated

## 2024-04-11 DIAGNOSIS — J43.9 PULMONARY EMPHYSEMA, UNSPECIFIED EMPHYSEMA TYPE (HCC): ICD-10-CM

## 2024-04-11 DIAGNOSIS — I10 PRIMARY HYPERTENSION: ICD-10-CM

## 2024-04-11 DIAGNOSIS — E11.9 TYPE 2 DIABETES MELLITUS WITHOUT COMPLICATION, WITHOUT LONG-TERM CURRENT USE OF INSULIN (HCC): ICD-10-CM

## 2024-04-11 DIAGNOSIS — R53.83 OTHER FATIGUE: ICD-10-CM

## 2024-04-11 DIAGNOSIS — E78.5 HYPERLIPIDEMIA, UNSPECIFIED HYPERLIPIDEMIA TYPE: ICD-10-CM

## 2024-04-11 DIAGNOSIS — R25.9 MIXED ACTION AND RESTING TREMOR: ICD-10-CM

## 2024-04-11 DIAGNOSIS — D68.59 HYPERCOAGULABLE STATE (HCC): ICD-10-CM

## 2024-04-11 RX ORDER — TIOTROPIUM BROMIDE 18 UG/1
18 CAPSULE ORAL; RESPIRATORY (INHALATION) DAILY
Qty: 90 CAPSULE | Refills: 1 | Status: SHIPPED | OUTPATIENT
Start: 2024-04-11

## 2024-04-11 RX ORDER — MULTIVIT-MIN/IRON/FOLIC ACID/K 18-600-40
2 CAPSULE ORAL EVERY MORNING
Qty: 180 TABLET | Refills: 0 | Status: SHIPPED | OUTPATIENT
Start: 2024-04-11

## 2024-04-11 RX ORDER — ROSUVASTATIN CALCIUM 20 MG/1
20 TABLET, COATED ORAL DAILY
Qty: 90 TABLET | Refills: 1 | Status: SHIPPED | OUTPATIENT
Start: 2024-04-11

## 2024-04-11 RX ORDER — PROPRANOLOL HYDROCHLORIDE 20 MG/1
20 TABLET ORAL 3 TIMES DAILY
Qty: 270 TABLET | Refills: 1 | Status: SHIPPED | OUTPATIENT
Start: 2024-04-11

## 2024-04-11 RX ORDER — SITAGLIPTIN AND METFORMIN HYDROCHLORIDE 1000; 100 MG/1; MG/1
1 TABLET, FILM COATED, EXTENDED RELEASE ORAL DAILY
Qty: 90 TABLET | Refills: 1 | Status: SHIPPED | OUTPATIENT
Start: 2024-04-11

## 2024-04-11 RX ORDER — LISINOPRIL 10 MG/1
10 TABLET ORAL DAILY
Qty: 90 TABLET | Refills: 1 | Status: SHIPPED | OUTPATIENT
Start: 2024-04-11

## 2024-04-17 ENCOUNTER — TELEPHONE (OUTPATIENT)
Dept: FAMILY MEDICINE CLINIC | Age: 69
End: 2024-04-17

## 2024-04-17 NOTE — TELEPHONE ENCOUNTER
Patient is requesting a refill of:    Amlodipine 5 mg tabs be sent to [place] pharmacy on [address]

## 2024-04-18 NOTE — TELEPHONE ENCOUNTER
Amlodipine discontinued at last visit on 1/12. Left message for pt to call office to discuss medications.

## 2024-05-02 RX ORDER — AMLODIPINE BESYLATE 5 MG/1
5 TABLET ORAL DAILY
Qty: 90 TABLET | Refills: 0 | Status: SHIPPED | OUTPATIENT
Start: 2024-05-02

## 2024-07-09 DIAGNOSIS — R53.83 OTHER FATIGUE: ICD-10-CM

## 2024-07-09 RX ORDER — MELATONIN
2 EVERY MORNING
Qty: 56 TABLET | Refills: 5 | Status: SHIPPED | OUTPATIENT
Start: 2024-07-09

## (undated) DEVICE — TOWEL,OR,DSP,ST,BLUE,STD,6/PK,12PK/CS: Brand: MEDLINE

## (undated) DEVICE — DRAIN SURG 19FR 100% SIL RADPQ RND CHN FULL FLUT

## (undated) DEVICE — CAMERA STRYKER 1488 HD GEN

## (undated) DEVICE — TROCAR: Brand: KII® SLEEVE

## (undated) DEVICE — INSUFFLATION NEEDLE TO ESTABLISH PNEUMOPERITONEUM.: Brand: INSUFFLATION NEEDLE

## (undated) DEVICE — DRESSING COMP W4XL4IN N ADH PD W2.5XL2.5IN GZ BORDERED ADH

## (undated) DEVICE — NEEDLE HYPO 18GA L1.5IN PNK POLYPR HUB S STL REG BVL STR

## (undated) DEVICE — SYRINGE 20ML LL S/C 50

## (undated) DEVICE — DRAPE,LAP,CHOLE,W/TROUGHS,STERILE: Brand: MEDLINE

## (undated) DEVICE — SYRINGE, LUER LOCK, 5ML: Brand: MEDLINE

## (undated) DEVICE — DOUBLE BASIN SET: Brand: MEDLINE INDUSTRIES, INC.

## (undated) DEVICE — PACK PROCEDURE SURG GEN CUST

## (undated) DEVICE — PLUMEPORT ACTIV LAPAROSCOPIC SMOKE FILTRATION DEVICE: Brand: PLUMEPORT ACTIVE

## (undated) DEVICE — SURGICAL PROCEDURE PACK EENT CUST

## (undated) DEVICE — NEEDLE HYPO 25GA L1.5IN BLU POLYPR HUB S STL REG BVL STR

## (undated) DEVICE — TISSUE RETRIEVAL SYSTEM: Brand: INZII RETRIEVAL SYSTEM

## (undated) DEVICE — GLOVE SURG SZ 75 L12IN FNGR THK94MIL TRNSLUC YEL LTX

## (undated) DEVICE — APPLICATOR PREP 26ML 0.7% IOD POVACRYLEX 74% ISO ALC ST

## (undated) DEVICE — ELECTRODE PT RET AD L9FT HI MOIST COND ADH HYDRGEL CORDED

## (undated) DEVICE — INSUFFLATION TUBING SET WITH FILTER, FUNNEL CONNECTOR AND LUER LOCK: Brand: JOSNOE MEDICAL INC

## (undated) DEVICE — CODMAN® SURGICAL PATTIES 1/2" X 1/2" (1.27CM X 1.27CM): Brand: CODMAN®

## (undated) DEVICE — SET SINUS SCOPE

## (undated) DEVICE — ADHESIVE SKIN CLSR 0.7ML TOP DERMBND ADV

## (undated) DEVICE — ELECTRODE ES 36CM LAP FLAT L HK COAT DISP CLEANCOAT

## (undated) DEVICE — KIT,ANTI FOG,W/SPONGE & FLUID,SOFT PACK: Brand: MEDLINE

## (undated) DEVICE — Device

## (undated) DEVICE — TROCAR: Brand: KII FIOS FIRST ENTRY

## (undated) DEVICE — 4-PORT MANIFOLD: Brand: NEPTUNE 2

## (undated) DEVICE — PAD,NON-ADHERENT,2X3,STERILE,LF,1/PK: Brand: MEDLINE

## (undated) DEVICE — GOWN,SIRUS,FABRNF,XL,20/CS: Brand: MEDLINE

## (undated) DEVICE — APPLIER CLP M L L11.4IN DIA10MM ENDOSCP ROT MULT FOR LIG

## (undated) DEVICE — MARKER,SKIN,WI/RULER AND LABELS: Brand: MEDLINE

## (undated) DEVICE — PUMP SUC IRR TBNG L10FT W/ HNDPC ASSEMB STRYKEFLOW 2

## (undated) DEVICE — BLADE,STAINLESS-STEEL,11,STRL,DISPOSABLE: Brand: MEDLINE

## (undated) DEVICE — SOLUTION IRRIG 2000ML 0.9% SOD CHL USP UROMATIC PLAS CONT

## (undated) DEVICE — GLOVE ORANGE PI 7 1/2   MSG9075

## (undated) DEVICE — PAPER FILTER 1 32CM

## (undated) DEVICE — LAPAROSCOPIC SCISSORS: Brand: EPIX LAPAROSCOPIC SCISSORS